# Patient Record
Sex: FEMALE | Race: BLACK OR AFRICAN AMERICAN | NOT HISPANIC OR LATINO | Employment: OTHER | URBAN - METROPOLITAN AREA
[De-identification: names, ages, dates, MRNs, and addresses within clinical notes are randomized per-mention and may not be internally consistent; named-entity substitution may affect disease eponyms.]

---

## 2019-01-24 ENCOUNTER — HOSPITAL ENCOUNTER (EMERGENCY)
Facility: HOSPITAL | Age: 69
Discharge: HOME | End: 2019-01-24
Attending: EMERGENCY MEDICINE
Payer: MEDICARE

## 2019-01-24 VITALS
TEMPERATURE: 98 F | WEIGHT: 165 LBS | HEIGHT: 71 IN | BODY MASS INDEX: 23.1 KG/M2 | RESPIRATION RATE: 18 BRPM | SYSTOLIC BLOOD PRESSURE: 150 MMHG | DIASTOLIC BLOOD PRESSURE: 69 MMHG | HEART RATE: 80 BPM | OXYGEN SATURATION: 95 %

## 2019-01-24 DIAGNOSIS — R05.9 COUGH: ICD-10-CM

## 2019-01-24 DIAGNOSIS — J10.1 INFLUENZA A: Primary | ICD-10-CM

## 2019-01-24 LAB
ALBUMIN SERPL BCP-MCNC: 3.9 G/DL
ALP SERPL-CCNC: 45 U/L
ALT SERPL W/O P-5'-P-CCNC: 12 U/L
ANION GAP SERPL CALC-SCNC: 11 MMOL/L
AST SERPL-CCNC: 22 U/L
BASOPHILS # BLD AUTO: 0.01 K/UL
BASOPHILS NFR BLD: 0.1 %
BILIRUB SERPL-MCNC: 0.8 MG/DL
BUN SERPL-MCNC: 17 MG/DL
CALCIUM SERPL-MCNC: 9.4 MG/DL
CHLORIDE SERPL-SCNC: 99 MMOL/L
CO2 SERPL-SCNC: 28 MMOL/L
CREAT SERPL-MCNC: 1.2 MG/DL
CTP QC/QA: YES
DIFFERENTIAL METHOD: ABNORMAL
EOSINOPHIL # BLD AUTO: 0.1 K/UL
EOSINOPHIL NFR BLD: 0.6 %
ERYTHROCYTE [DISTWIDTH] IN BLOOD BY AUTOMATED COUNT: 11.8 %
EST. GFR  (AFRICAN AMERICAN): 53 ML/MIN/1.73 M^2
EST. GFR  (NON AFRICAN AMERICAN): 46 ML/MIN/1.73 M^2
FLUAV AG NPH QL: POSITIVE
FLUBV AG NPH QL: NEGATIVE
GLUCOSE SERPL-MCNC: 182 MG/DL
HCT VFR BLD AUTO: 35.9 %
HGB BLD-MCNC: 12.2 G/DL
LYMPHOCYTES # BLD AUTO: 1.7 K/UL
LYMPHOCYTES NFR BLD: 13.7 %
MCH RBC QN AUTO: 28.8 PG
MCHC RBC AUTO-ENTMCNC: 34 G/DL
MCV RBC AUTO: 85 FL
MONOCYTES # BLD AUTO: 0.8 K/UL
MONOCYTES NFR BLD: 5.9 %
NEUTROPHILS # BLD AUTO: 10.1 K/UL
NEUTROPHILS NFR BLD: 79.7 %
PLATELET # BLD AUTO: 158 K/UL
PMV BLD AUTO: 12 FL
POTASSIUM SERPL-SCNC: 3.6 MMOL/L
PROT SERPL-MCNC: 7.4 G/DL
RBC # BLD AUTO: 4.23 M/UL
SODIUM SERPL-SCNC: 138 MMOL/L
WBC # BLD AUTO: 12.66 K/UL

## 2019-01-24 PROCEDURE — 25000003 PHARM REV CODE 250: Performed by: PHYSICIAN ASSISTANT

## 2019-01-24 PROCEDURE — 25000242 PHARM REV CODE 250 ALT 637 W/ HCPCS: Performed by: PHYSICIAN ASSISTANT

## 2019-01-24 PROCEDURE — 94640 AIRWAY INHALATION TREATMENT: CPT

## 2019-01-24 PROCEDURE — 80053 COMPREHEN METABOLIC PANEL: CPT

## 2019-01-24 PROCEDURE — 99284 EMERGENCY DEPT VISIT MOD MDM: CPT | Mod: 25

## 2019-01-24 PROCEDURE — 85025 COMPLETE CBC W/AUTO DIFF WBC: CPT

## 2019-01-24 RX ORDER — OSELTAMIVIR PHOSPHATE 75 MG/1
75 CAPSULE ORAL 2 TIMES DAILY
Qty: 10 CAPSULE | Refills: 0 | Status: SHIPPED | OUTPATIENT
Start: 2019-01-24 | End: 2019-01-29

## 2019-01-24 RX ORDER — ALBUTEROL SULFATE 2.5 MG/.5ML
2.5 SOLUTION RESPIRATORY (INHALATION)
Status: COMPLETED | OUTPATIENT
Start: 2019-01-24 | End: 2019-01-24

## 2019-01-24 RX ORDER — ONDANSETRON 8 MG/1
8 TABLET, ORALLY DISINTEGRATING ORAL
Status: COMPLETED | OUTPATIENT
Start: 2019-01-24 | End: 2019-01-24

## 2019-01-24 RX ORDER — AZITHROMYCIN 250 MG/1
TABLET, FILM COATED ORAL
Qty: 6 TABLET | Refills: 0 | Status: SHIPPED | OUTPATIENT
Start: 2019-01-24 | End: 2019-04-18

## 2019-01-24 RX ORDER — BENZONATATE 100 MG/1
100 CAPSULE ORAL 3 TIMES DAILY PRN
Qty: 15 CAPSULE | Refills: 0 | Status: SHIPPED | OUTPATIENT
Start: 2019-01-24 | End: 2019-02-03

## 2019-01-24 RX ADMIN — ALBUTEROL SULFATE 2.5 MG: 2.5 SOLUTION RESPIRATORY (INHALATION) at 01:01

## 2019-01-24 RX ADMIN — ONDANSETRON 8 MG: 8 TABLET, ORALLY DISINTEGRATING ORAL at 01:01

## 2019-01-24 NOTE — ED PROVIDER NOTES
Encounter Date: 1/24/2019       History     Chief Complaint   Patient presents with    Cough     pt states she has cough / congestion x > 2 weeks. 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 states when she lays down she feels like she is wheezing and  can't sleep for coughing.     Chief Complaint:  Cough  History of  Present Illness: History obtained from patient. This 68 y.o. female who has past medical history of diabetes, hypertension and CAD presents to the ED complaining of productive cough for 2 weeks with associated nasal congestion, rhinorrhea, sore throat, chills and nausea.  She reports midsternal chest pain in generalized headache only with coughing.  She denies fever, difficulty swallowing, vomiting, diarrhea, abdominal pain, shortness of breath, urinary symptoms, dizziness or weakness. Patient states she receive the flu vaccination this year.  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            Review of patient's allergies indicates:   Allergen Reactions    Statins-hmg-coa reductase inhibitors Edema     Past Medical History:   Diagnosis Date    Coronary artery disease     Diabetes mellitus     Hypertension      Past Surgical History:   Procedure Laterality Date    BACK SURGERY      CHOLECYSTECTOMY      HYSTERECTOMY       No family history on file.  Social History     Tobacco Use    Smoking status: Never Smoker   Substance Use Topics    Alcohol use: No     Frequency: Never    Drug use: No     Review of Systems   Constitutional: Positive for chills. Negative for fever.   HENT: Positive for congestion, rhinorrhea and sore throat.    Eyes: Negative for pain.   Respiratory: Positive for cough. Negative for shortness of breath.    Cardiovascular: Negative for chest pain.   Gastrointestinal: Positive for nausea. Negative for abdominal pain, diarrhea and vomiting.   Genitourinary: Negative for dysuria.   Musculoskeletal: Negative for back pain and myalgias.   Skin: Negative  for rash.   Neurological: Negative for dizziness, weakness and headaches.       Physical Exam     Initial Vitals [01/24/19 0039]   BP Pulse Resp Temp SpO2   (!) 153/69 82 18 99.1 °F (37.3 °C) (!) 94 %      MAP       --         Physical Exam    Nursing note and vitals reviewed.  Constitutional: She appears well-developed and well-nourished. No distress.   HENT:   Head: Normocephalic and atraumatic.   Right Ear: Tympanic membrane normal.   Left Ear: Tympanic membrane normal.   Nose: Rhinorrhea present.   Mouth/Throat: Uvula is midline, oropharynx is clear and moist and mucous membranes are normal.   Eyes: EOM are normal. Pupils are equal, round, and reactive to light.   Neck: Normal range of motion. Neck supple.   Cardiovascular: Normal rate, regular rhythm and normal heart sounds. Exam reveals no gallop and no friction rub.    No murmur heard.  Pulmonary/Chest: Breath sounds normal. No respiratory distress. She has no wheezes. She has no rhonchi. She has no rales.   Abdominal: Soft. Bowel sounds are normal. There is no tenderness. There is no rebound and no guarding.   Musculoskeletal: Normal range of motion.   Neurological: She is alert and oriented to person, place, and time.   Skin: Skin is warm and dry.   Psychiatric: She has a normal mood and affect.         ED Course   Procedures  Labs Reviewed   COMPREHENSIVE METABOLIC PANEL - Abnormal; Notable for the following components:       Result Value    Glucose 182 (*)     Alkaline Phosphatase 45 (*)     eGFR if  53 (*)     eGFR if non  46 (*)     All other components within normal limits   CBC W/ AUTO DIFFERENTIAL - Abnormal; Notable for the following components:    Hematocrit 35.9 (*)     Gran # (ANC) 10.1 (*)     Gran% 79.7 (*)     Lymph% 13.7 (*)     All other components within normal limits   POCT INFLUENZA A/B - Abnormal; Notable for the following components:    Rapid Influenza A Ag Positive (*)     All other components within  normal limits          Imaging Results          X-Ray Chest PA And Lateral (Final result)  Result time 01/24/19 01:24:37    Final result by Rafaela Sanon MD (01/24/19 01:24:37)                 Impression:      No acute abnormality.      Electronically signed by: Rafaela Sanon  Date:    01/24/2019  Time:    01:24             Narrative:    EXAMINATION:  XR CHEST PA AND LATERAL    CLINICAL HISTORY:  Cough    TECHNIQUE:  PA and lateral views of the chest were performed.    COMPARISON:  None    FINDINGS:  The lungs are clear, with normal appearance of pulmonary vasculature and no pleural effusion or pneumothorax.  There is asymmetric elevation of the right hemidiaphragm.  Mild scoliotic changes are present.    The cardiac silhouette is normal in size. The hilar and mediastinal contours are unremarkable.  His vascular calcifications seen in the aortic knob.    Bones are intact. There is incompletely imaged cervical screw plate device.                                 Medical Decision Making:   Clinical Tests:   Lab Tests: Ordered and Reviewed  Radiological Study: Ordered and Reviewed  ED Management:  This is an evaluation of a 68 y.o. female who presents to the ED for cough.  Oxygen saturation initially at 94%.  Vital signs otherwise stable.  Afebrile.  Patient is nontoxic appearing and in no acute distress. Lungs are clear to auscultation. Patient is coughing on exam.  There is rhinorrhea. Posterior oropharynx is clear.  Bilateral TMs are clear.  Heart sounds with.  Abdomen soft nontender to palpation. Given patient's comorbidities, labs were drawn.  CMP showed no significant electrolyte abnormalities.  CBC showed no leukocytosis or anemia.  Chest x-ray showed no acute cardiopulmonary findings.  Patient positive for influenza A.  Patient is coughing improved after albuterol breathing treatment.  Patient will be discharged home with Tamiflu and Tessalon.  Given patient's duration of cough and positive influenza, I  will discharge home with azithromycin.    Patient given return precautions and instructed to return to the emergency department for any new or worsening symptoms. Patient verbalized understanding and agreed with plan.     I discussed the case with Dr. Santana who is in agreement with my assessment and plan.                Attending Attestation:     Physician Attestation Statement for NP/PA:   I discussed this assessment and plan of this patient with the NP/PA, but I did not personally examine the patient. The face to face encounter was performed by the NP/PA.                     Clinical Impression:   The primary encounter diagnosis was Influenza A. A diagnosis of Cough was also pertinent to this visit.                             Akira Burgos PA-C  01/24/19 0647       Jose Alberto Santana MD  01/24/19 2004

## 2019-01-24 NOTE — ED TRIAGE NOTES
Patient arrived to ED with c/o productive cough, congestion, n/v due to gagging during coughing, with SOB x 2 weeks.

## 2019-01-28 ENCOUNTER — HOSPITAL ENCOUNTER (EMERGENCY)
Facility: HOSPITAL | Age: 69
Discharge: HOME | End: 2019-01-28
Attending: EMERGENCY MEDICINE
Payer: MEDICARE

## 2019-01-28 VITALS
HEART RATE: 76 BPM | WEIGHT: 165 LBS | RESPIRATION RATE: 18 BRPM | SYSTOLIC BLOOD PRESSURE: 117 MMHG | TEMPERATURE: 99 F | DIASTOLIC BLOOD PRESSURE: 62 MMHG | HEIGHT: 70 IN | OXYGEN SATURATION: 95 % | BODY MASS INDEX: 23.62 KG/M2

## 2019-01-28 DIAGNOSIS — J10.1 INFLUENZA A: ICD-10-CM

## 2019-01-28 DIAGNOSIS — J40 BRONCHITIS: Primary | ICD-10-CM

## 2019-01-28 DIAGNOSIS — N39.0 ACUTE UTI: ICD-10-CM

## 2019-01-28 LAB
BACTERIA #/AREA URNS HPF: ABNORMAL /HPF
BILIRUB UR QL STRIP: ABNORMAL
CLARITY UR: CLEAR
COLOR UR: ABNORMAL
GLUCOSE UR QL STRIP: NEGATIVE
GRAN CASTS #/AREA URNS LPF: 2 /LPF
HGB UR QL STRIP: NEGATIVE
HYALINE CASTS #/AREA URNS LPF: 50 /LPF
KETONES UR QL STRIP: NEGATIVE
LEUKOCYTE ESTERASE UR QL STRIP: ABNORMAL
MICROSCOPIC COMMENT: ABNORMAL
NITRITE UR QL STRIP: NEGATIVE
PH UR STRIP: 5 [PH] (ref 5–8)
PROT UR QL STRIP: ABNORMAL
RBC #/AREA URNS HPF: 2 /HPF (ref 0–4)
SP GR UR STRIP: 1.03 (ref 1–1.03)
URN SPEC COLLECT METH UR: ABNORMAL
UROBILINOGEN UR STRIP-ACNC: ABNORMAL EU/DL
WBC #/AREA URNS HPF: 10 /HPF (ref 0–5)

## 2019-01-28 PROCEDURE — 94640 AIRWAY INHALATION TREATMENT: CPT

## 2019-01-28 PROCEDURE — 25000003 PHARM REV CODE 250: Performed by: PHYSICIAN ASSISTANT

## 2019-01-28 PROCEDURE — 81000 URINALYSIS NONAUTO W/SCOPE: CPT

## 2019-01-28 PROCEDURE — 25000242 PHARM REV CODE 250 ALT 637 W/ HCPCS: Performed by: PHYSICIAN ASSISTANT

## 2019-01-28 PROCEDURE — 99284 EMERGENCY DEPT VISIT MOD MDM: CPT | Mod: 25

## 2019-01-28 RX ORDER — ALBUTEROL SULFATE 90 UG/1
1-2 AEROSOL, METERED RESPIRATORY (INHALATION) EVERY 6 HOURS PRN
Qty: 1 INHALER | Refills: 0 | Status: SHIPPED | OUTPATIENT
Start: 2019-01-28 | End: 2019-04-18

## 2019-01-28 RX ORDER — AMOXICILLIN AND CLAVULANATE POTASSIUM 875; 125 MG/1; MG/1
1 TABLET, FILM COATED ORAL 2 TIMES DAILY
Qty: 14 TABLET | Refills: 0 | Status: SHIPPED | OUTPATIENT
Start: 2019-01-28 | End: 2019-02-04

## 2019-01-28 RX ORDER — BENZONATATE 100 MG/1
200 CAPSULE ORAL ONCE
Status: DISCONTINUED | OUTPATIENT
Start: 2019-01-28 | End: 2019-01-28 | Stop reason: HOSPADM

## 2019-01-28 RX ORDER — BENZONATATE 100 MG/1
100 CAPSULE ORAL 3 TIMES DAILY PRN
Qty: 20 CAPSULE | Refills: 0 | Status: SHIPPED | OUTPATIENT
Start: 2019-01-28 | End: 2019-02-07

## 2019-01-28 RX ORDER — ACETAMINOPHEN 325 MG/1
650 TABLET ORAL
Status: COMPLETED | OUTPATIENT
Start: 2019-01-28 | End: 2019-01-28

## 2019-01-28 RX ORDER — IPRATROPIUM BROMIDE AND ALBUTEROL SULFATE 2.5; .5 MG/3ML; MG/3ML
3 SOLUTION RESPIRATORY (INHALATION)
Status: COMPLETED | OUTPATIENT
Start: 2019-01-28 | End: 2019-01-28

## 2019-01-28 RX ADMIN — ACETAMINOPHEN 650 MG: 325 TABLET ORAL at 10:01

## 2019-01-28 RX ADMIN — IPRATROPIUM BROMIDE AND ALBUTEROL SULFATE 3 ML: .5; 3 SOLUTION RESPIRATORY (INHALATION) at 10:01

## 2019-01-28 NOTE — ED PROVIDER NOTES
"Encounter Date: 1/28/2019    SCRIBE #1 NOTE: IChance, am scribing for, and in the presence of,  Ryan Ennis PA-C. I have scribed the following portions of the note - Other sections scribed: HPI, ROS .       History     Chief Complaint   Patient presents with    Influenza     "I got the flu." diagnosed Thursday; still feeling bad, cough, fever, bodyaches     CC: Influenza      68 y.o. Female with a past medical history of Coronary artery disease, Diabetes mellitus, and Hypertension presents to ED c/o acute onset type A influenza that was diagnosed on Thursday. Pt reports symptoms have not improved since taking antibiotics and antivirals.She reports experiencing cough with phlegm, frequency, less than x10 episodes of diarrhea, and an left temporal headache after coughing. She has been experiencing these symptoms for about x1 week. Pt notes using her relatives albuterol inhaler as needed that has provided some relief. She notes abdominal pain, and chest pain have subsided. Deneis dysuria, rash, and nausea.           The history is provided by the patient and a relative. No  was used.     Review of patient's allergies indicates:   Allergen Reactions    Statins-hmg-coa reductase inhibitors Edema     Past Medical History:   Diagnosis Date    Coronary artery disease     Diabetes mellitus     Hypertension      Past Surgical History:   Procedure Laterality Date    BACK SURGERY      CHOLECYSTECTOMY      HYSTERECTOMY       No family history on file.  Social History     Tobacco Use    Smoking status: Never Smoker   Substance Use Topics    Alcohol use: No     Frequency: Never    Drug use: No     Review of Systems   Constitutional: Negative for appetite change, diaphoresis and fever.   HENT: Negative for rhinorrhea and sore throat.    Eyes: Negative for visual disturbance.   Respiratory: Positive for cough and shortness of breath (secondary to cough).    Cardiovascular: Negative " for chest pain.   Gastrointestinal: Positive for diarrhea. Negative for abdominal pain, nausea and vomiting.   Genitourinary: Positive for frequency. Negative for dysuria.   Musculoskeletal: Negative for gait problem.   Skin: Negative for rash.   Neurological: Positive for headaches. Negative for syncope.       Physical Exam     Initial Vitals [01/28/19 0947]   BP Pulse Resp Temp SpO2   134/63 (!) 58 20 99.1 °F (37.3 °C) 95 %      MAP       --         Physical Exam    Nursing note and vitals reviewed.  Constitutional: She appears well-developed and well-nourished. She is not diaphoretic. No distress.   HENT:   Head: Normocephalic and atraumatic.   Nasal congestion present without active rhinorrhea. No sinus TTP. TMs intact without erythema or swelling; able to discern bony landmarks. No mastoid tenderness or swelling behind the ears. No pain with manipulation of external ears. No oropharyngeal edema, swelling, erythema, tonsillar exudates, uvula deviation, changes in phonation, trismus, drooling, or cervical adenopathy. No meningeal signs.    Eyes: Conjunctivae and EOM are normal. Right eye exhibits no discharge. Left eye exhibits no discharge.   Neck: Normal range of motion. No tracheal deviation present. No JVD present.   Cardiovascular: Normal rate, regular rhythm and normal heart sounds. Exam reveals no friction rub.    No murmur heard.  Pulmonary/Chest: Breath sounds normal. No stridor. No respiratory distress. She has no decreased breath sounds. She has no wheezes. She has no rhonchi. She has no rales. She exhibits no tenderness.   Abdominal: Soft. She exhibits no distension. There is no tenderness. There is no rigidity, no rebound and no guarding.   Musculoskeletal: She exhibits no edema or tenderness.   Neurological: She is alert and oriented to person, place, and time.   Skin: Skin is warm and dry. No rash and no abscess noted. No erythema. No pallor.         ED Course   Procedures  Labs Reviewed    URINALYSIS, REFLEX TO URINE CULTURE - Abnormal; Notable for the following components:       Result Value    Protein, UA 2+ (*)     Bilirubin (UA) 1+ (*)     Urobilinogen, UA 4.0-6.0 (*)     Leukocytes, UA Trace (*)     All other components within normal limits    Narrative:     Preferred Collection Type->Urine, Clean Catch   URINALYSIS MICROSCOPIC - Abnormal; Notable for the following components:    WBC, UA 10 (*)     Hyaline Casts, UA 50 (*)     Granular Casts, UA 2 (*)     All other components within normal limits    Narrative:     Preferred Collection Type->Urine, Clean Catch          Imaging Results    None          Medical Decision Making:   History:   Old Medical Records: I decided to obtain old medical records.  Initial Assessment:   68-year-old female with URI symptoms. Diagnosed with flu A 4 days ago  Clinical Tests:   Lab Tests: Ordered and Reviewed  ED Management:  Will offer DuoNeb for symptomatic care.  Patient reports significant improvement of symptoms. Patient has just completed course of antibiotics prophylactically for pneumonia despite absence of pneumonia on chest x-ray 4 days ago.  Patient is also completed a course of Tamiflu for her diagnosis of flu a 4 days ago.  Patient likely has lingering URI symptoms. Given symptoms urinary frequency, urinalysis is also ordered that reveals possible UTI.  Will send home on a course of Augmentin.  Patient is not septic.  No respiratory distress.    Sent home with albuterol inhaler and Augmentin.  Advising PCP follow-up.  Strict return precautions discussed with patient.  Patient agreeable with plan.  Other:   I have discussed this case with another health care provider.            Scribe Attestation:   Scribe #1: I performed the above scribed service and the documentation accurately describes the services I performed. I attest to the accuracy of the note.    Attending Attestation:           Physician Attestation for Scribe:  Physician Attestation  Statement for Scribe #1: I, Ryan Ennis PA-C, reviewed documentation, as scribed by Chance Hearn in my presence, and it is both accurate and complete.                    Clinical Impression:   The primary encounter diagnosis was Bronchitis. Diagnoses of Acute UTI and Influenza A were also pertinent to this visit.                             Ryan Ennis PA-C  01/28/19 1300

## 2019-01-28 NOTE — ED TRIAGE NOTES
Pt presents to ED c/o symptoms from flu.  States she can't stop coughing, very congested, and is wheezing.  States she was given cough medicine, tamiflu, and takes tylenol as well.  States she isn't getting better.  Denies chest pains.

## 2019-01-29 ENCOUNTER — PES CALL (OUTPATIENT)
Dept: ADMINISTRATIVE | Facility: CLINIC | Age: 69
End: 2019-01-29

## 2019-04-16 PROBLEM — I25.10 CORONARY ARTERY DISEASE: Status: ACTIVE | Noted: 2019-04-16

## 2019-04-16 PROBLEM — E11.9 DIABETES MELLITUS: Status: ACTIVE | Noted: 2019-04-16

## 2019-04-16 PROBLEM — I10 HYPERTENSION: Status: ACTIVE | Noted: 2019-04-16

## 2019-04-16 NOTE — PROGRESS NOTES
Subjective:       Patient ID: Meenu MASON is a 68 y.o. female with a PMH significant for CAD, HTN, T2D who presents today to establish care.  Patient was seen in the ED at [facility] in 1/2019 with Influenza A.  Patient previously followed by Dr. Hawk.    Chief Complaint: Establish Care; Night Sweats (1+m); and Arm Pain (Right arm)    HPI     Patient denies f/c, n/v/d.  No chest pain or SOB.  No abdominal pain or dysuria.  No headaches or change in vision.  No dizziness.  No significant  weight gain or weight loss.  Remaining ROS negative.    Review of Systems   Constitutional: Negative for appetite change, chills, diaphoresis, fatigue, fever and unexpected weight change.   HENT: Negative for ear pain, hearing loss, sinus pain, tinnitus, trouble swallowing and voice change.    Eyes: Negative for photophobia, pain and visual disturbance.   Respiratory: Negative for chest tightness, shortness of breath and wheezing.    Cardiovascular: Negative for chest pain, palpitations and leg swelling.   Gastrointestinal: Negative for abdominal pain, blood in stool, constipation, diarrhea, nausea and vomiting.   Endocrine: Negative for cold intolerance, heat intolerance, polydipsia, polyphagia and polyuria.   Genitourinary: Negative for decreased urine volume, difficulty urinating, dysuria, flank pain, hematuria, pelvic pain, vaginal bleeding, vaginal discharge and vaginal pain.   Musculoskeletal: Positive for arthralgias (right arm pain). Negative for gait problem, joint swelling, myalgias and neck pain.   Skin: Negative for rash.   Neurological: Negative for dizziness, tremors, syncope, weakness, numbness and headaches.   Hematological: Does not bruise/bleed easily.   Psychiatric/Behavioral: Negative for agitation, confusion and sleep disturbance. The patient is not nervous/anxious.        Objective:      Physical Exam   Constitutional: She is oriented to person, place, and time. She appears well-developed and  well-nourished. No distress.   HENT:   Head: Normocephalic and atraumatic.   Nose: Nose normal.   Mouth/Throat: Oropharynx is clear and moist.   Eyes: Pupils are equal, round, and reactive to light. Conjunctivae and EOM are normal. No scleral icterus.   Neck: Normal range of motion. Neck supple. No JVD present. No thyromegaly present.   Cardiovascular: Normal rate, regular rhythm and intact distal pulses. Exam reveals no gallop and no friction rub.   No murmur heard.  Pulmonary/Chest: Effort normal and breath sounds normal. No respiratory distress. She has no wheezes. She has no rales.   Abdominal: Soft. Bowel sounds are normal. She exhibits no distension. There is no tenderness. There is no rebound and no guarding.   Musculoskeletal: Normal range of motion. She exhibits no edema.   Lymphadenopathy:     She has no cervical adenopathy.   Neurological: She is alert and oriented to person, place, and time. No cranial nerve deficit or sensory deficit.   Skin: Skin is warm and dry. No rash noted. No erythema.   Psychiatric: She has a normal mood and affect. Her behavior is normal. Thought content normal.       Assessment:       1. Encounter to establish care    2. Type 2 diabetes mellitus without complication, without long-term current use of insulin    3. Essential hypertension    4. Coronary artery disease involving native coronary artery of native heart without angina pectoris        Plan:   -Today's Visit - patient is awake and alert and with her  today.    -Nutrition - Obesity - BMI was 31.23 today.  Discussed diet modification and exercise.    -Cards - HTN and HLD (no history of CAD per patient) - on Amlodipine and Carvedilol.  On Atorvastatin (no allergy to statins).  On ASA.  Check fasting labs.  BP today stable at 118/57.  Heart exam with 2/6 KELSEY - evaluate further next visit and consider an Echo.    PVD - Right Femoral Bypass - around 2012 in [place]. Dr. Essex.  No claudication.    -Endocrine - T2D - on  Metformin 500mg bid (had diarrhea with 1g bid).  Check A1C.  Check urine microalbumin.  Has allergy to ACEI - tongue swelling.    Last Eye exam - 2018.  Will refer to Optometry.  Last Foot exam -  Today.  No PN.    -MSK - Cervical DD - S/p discectomy 20 years ago.  Having pain in right arm originating from the c-spine.  Seen by Dr. Madsen 2 weeks (Orthopedics) and told it was not Fibromyalgia (as diagnosed by Dr. Hawk).  On Lyrica and doesn't feel it helps.  On Opiods in past - Argillite - in [place], [place].  Has not been on this for 2 years.  Will refer to Pain.  Can consider Mobic if renal function ok.    -Renal - CKD - check CMP.    -GI - GERD - on Omeprazole prn.  No gastritis or esophagitis.    History of Gallstones - s/p Lap Janet 2012.    We discussed colon cancer screening - last colonoscopy was 2017 - has 2 polyps.  Will refer back to GI  No FH of colon cancer.    -GYN - Last Pap was many and normal.  Will refer to GYN.  Last Mammo was 2018 and normal - will reschedule.  We discussed DXA screening for osteoporosis - will discuss next visit.      -HCM - We discussed Flu (10/2018) and Tdap (over 10 years - will give a script for Tdap) vaccinations.  We discussed Shingles (waiting for Shingrix Availability) and Pneumococcal (23 and 13 - had both and will get records) vaccinations.      -Follow Up - 3 monthss

## 2019-04-18 ENCOUNTER — LAB VISIT (OUTPATIENT)
Dept: LAB | Facility: HOSPITAL | Age: 69
End: 2019-04-18
Attending: INTERNAL MEDICINE
Payer: MEDICARE

## 2019-04-18 ENCOUNTER — TELEPHONE (OUTPATIENT)
Dept: PRIMARY CARE CLINIC | Facility: CLINIC | Age: 69
End: 2019-04-18

## 2019-04-18 ENCOUNTER — OFFICE VISIT (OUTPATIENT)
Dept: PRIMARY CARE CLINIC | Facility: CLINIC | Age: 69
End: 2019-04-18
Payer: MEDICARE

## 2019-04-18 DIAGNOSIS — Z12.4 CERVICAL CANCER SCREENING: ICD-10-CM

## 2019-04-18 DIAGNOSIS — I10 ESSENTIAL HYPERTENSION: ICD-10-CM

## 2019-04-18 DIAGNOSIS — Z12.39 BREAST CANCER SCREENING: ICD-10-CM

## 2019-04-18 DIAGNOSIS — M50.30 DDD (DEGENERATIVE DISC DISEASE), CERVICAL: ICD-10-CM

## 2019-04-18 DIAGNOSIS — K63.5 POLYP OF COLON, UNSPECIFIED PART OF COLON, UNSPECIFIED TYPE: ICD-10-CM

## 2019-04-18 DIAGNOSIS — Z12.11 COLON CANCER SCREENING: ICD-10-CM

## 2019-04-18 DIAGNOSIS — I25.10 CORONARY ARTERY DISEASE INVOLVING NATIVE CORONARY ARTERY OF NATIVE HEART WITHOUT ANGINA PECTORIS: ICD-10-CM

## 2019-04-18 DIAGNOSIS — E11.9 TYPE 2 DIABETES MELLITUS WITHOUT COMPLICATION, WITHOUT LONG-TERM CURRENT USE OF INSULIN: ICD-10-CM

## 2019-04-18 DIAGNOSIS — G89.4 CHRONIC PAIN DISORDER: ICD-10-CM

## 2019-04-18 DIAGNOSIS — Z76.89 ENCOUNTER TO ESTABLISH CARE: ICD-10-CM

## 2019-04-18 DIAGNOSIS — Z76.89 ENCOUNTER TO ESTABLISH CARE: Primary | ICD-10-CM

## 2019-04-18 LAB
25(OH)D3+25(OH)D2 SERPL-MCNC: 17 NG/ML (ref 30–96)
ALBUMIN SERPL BCP-MCNC: 4 G/DL (ref 3.5–5.2)
ALP SERPL-CCNC: 82 U/L (ref 55–135)
ALT SERPL W/O P-5'-P-CCNC: 43 U/L (ref 10–44)
ANION GAP SERPL CALC-SCNC: 12 MMOL/L (ref 8–16)
AST SERPL-CCNC: 91 U/L (ref 10–40)
BASOPHILS # BLD AUTO: 0.02 K/UL (ref 0–0.2)
BASOPHILS NFR BLD: 0.3 % (ref 0–1.9)
BILIRUB SERPL-MCNC: 1.1 MG/DL (ref 0.1–1)
BILIRUB UR QL STRIP: NEGATIVE
BUN SERPL-MCNC: 20 MG/DL (ref 8–23)
CALCIUM SERPL-MCNC: 9.4 MG/DL (ref 8.7–10.5)
CHLORIDE SERPL-SCNC: 103 MMOL/L (ref 95–110)
CHOLEST SERPL-MCNC: 109 MG/DL (ref 120–199)
CHOLEST/HDLC SERPL: 1.9 {RATIO} (ref 2–5)
CLARITY UR REFRACT.AUTO: CLEAR
CO2 SERPL-SCNC: 27 MMOL/L (ref 23–29)
COLOR UR AUTO: YELLOW
CREAT SERPL-MCNC: 1.1 MG/DL (ref 0.5–1.4)
DIFFERENTIAL METHOD: ABNORMAL
EOSINOPHIL # BLD AUTO: 0.3 K/UL (ref 0–0.5)
EOSINOPHIL NFR BLD: 3.9 % (ref 0–8)
ERYTHROCYTE [DISTWIDTH] IN BLOOD BY AUTOMATED COUNT: 12.2 % (ref 11.5–14.5)
EST. GFR  (AFRICAN AMERICAN): 59.2 ML/MIN/1.73 M^2
EST. GFR  (NON AFRICAN AMERICAN): 51.3 ML/MIN/1.73 M^2
ESTIMATED AVG GLUCOSE: 189 MG/DL (ref 68–131)
GLUCOSE SERPL-MCNC: 169 MG/DL (ref 70–110)
GLUCOSE UR QL STRIP: ABNORMAL
HBA1C MFR BLD HPLC: 8.2 % (ref 4–5.6)
HCT VFR BLD AUTO: 38.9 % (ref 37–48.5)
HDLC SERPL-MCNC: 57 MG/DL (ref 40–75)
HDLC SERPL: 52.3 % (ref 20–50)
HGB BLD-MCNC: 12.3 G/DL (ref 12–16)
HGB UR QL STRIP: NEGATIVE
IMM GRANULOCYTES # BLD AUTO: 0.01 K/UL (ref 0–0.04)
IMM GRANULOCYTES NFR BLD AUTO: 0.2 % (ref 0–0.5)
KETONES UR QL STRIP: NEGATIVE
LDLC SERPL CALC-MCNC: 37.6 MG/DL (ref 63–159)
LEUKOCYTE ESTERASE UR QL STRIP: NEGATIVE
LYMPHOCYTES # BLD AUTO: 2.8 K/UL (ref 1–4.8)
LYMPHOCYTES NFR BLD: 41.7 % (ref 18–48)
MCH RBC QN AUTO: 28 PG (ref 27–31)
MCHC RBC AUTO-ENTMCNC: 31.6 G/DL (ref 32–36)
MCV RBC AUTO: 89 FL (ref 82–98)
MONOCYTES # BLD AUTO: 0.5 K/UL (ref 0.3–1)
MONOCYTES NFR BLD: 7.8 % (ref 4–15)
NEUTROPHILS # BLD AUTO: 3.1 K/UL (ref 1.8–7.7)
NEUTROPHILS NFR BLD: 46.1 % (ref 38–73)
NITRITE UR QL STRIP: NEGATIVE
NONHDLC SERPL-MCNC: 52 MG/DL
NRBC BLD-RTO: 0 /100 WBC
PH UR STRIP: 5 [PH] (ref 5–8)
PLATELET # BLD AUTO: 171 K/UL (ref 150–350)
PMV BLD AUTO: 13.4 FL (ref 9.2–12.9)
POTASSIUM SERPL-SCNC: 3.7 MMOL/L (ref 3.5–5.1)
PROT SERPL-MCNC: 7.8 G/DL (ref 6–8.4)
PROT UR QL STRIP: NEGATIVE
RBC # BLD AUTO: 4.39 M/UL (ref 4–5.4)
SODIUM SERPL-SCNC: 142 MMOL/L (ref 136–145)
SP GR UR STRIP: 1.02 (ref 1–1.03)
TRIGL SERPL-MCNC: 72 MG/DL (ref 30–150)
TSH SERPL DL<=0.005 MIU/L-ACNC: 1.91 UIU/ML (ref 0.4–4)
URN SPEC COLLECT METH UR: ABNORMAL
WBC # BLD AUTO: 6.64 K/UL (ref 3.9–12.7)

## 2019-04-18 PROCEDURE — 3074F SYST BP LT 130 MM HG: CPT | Mod: CPTII,S$GLB,, | Performed by: INTERNAL MEDICINE

## 2019-04-18 PROCEDURE — 80061 LIPID PANEL: CPT

## 2019-04-18 PROCEDURE — 99204 OFFICE O/P NEW MOD 45 MIN: CPT | Mod: S$GLB,,, | Performed by: INTERNAL MEDICINE

## 2019-04-18 PROCEDURE — 3078F DIAST BP <80 MM HG: CPT | Mod: CPTII,S$GLB,, | Performed by: INTERNAL MEDICINE

## 2019-04-18 PROCEDURE — 99999 PR PBB SHADOW E&M-EST. PATIENT-LVL V: ICD-10-PCS | Mod: PBBFAC,,, | Performed by: INTERNAL MEDICINE

## 2019-04-18 PROCEDURE — 99999 PR PBB SHADOW E&M-EST. PATIENT-LVL V: CPT | Mod: PBBFAC,,, | Performed by: INTERNAL MEDICINE

## 2019-04-18 PROCEDURE — 1101F PR PT FALLS ASSESS DOC 0-1 FALLS W/OUT INJ PAST YR: ICD-10-PCS | Mod: CPTII,S$GLB,, | Performed by: INTERNAL MEDICINE

## 2019-04-18 PROCEDURE — 99204 PR OFFICE/OUTPT VISIT, NEW, LEVL IV, 45-59 MIN: ICD-10-PCS | Mod: S$GLB,,, | Performed by: INTERNAL MEDICINE

## 2019-04-18 PROCEDURE — 84443 ASSAY THYROID STIM HORMONE: CPT

## 2019-04-18 PROCEDURE — 85025 COMPLETE CBC W/AUTO DIFF WBC: CPT

## 2019-04-18 PROCEDURE — 3074F PR MOST RECENT SYSTOLIC BLOOD PRESSURE < 130 MM HG: ICD-10-PCS | Mod: CPTII,S$GLB,, | Performed by: INTERNAL MEDICINE

## 2019-04-18 PROCEDURE — 83036 HEMOGLOBIN GLYCOSYLATED A1C: CPT

## 2019-04-18 PROCEDURE — 1101F PT FALLS ASSESS-DOCD LE1/YR: CPT | Mod: CPTII,S$GLB,, | Performed by: INTERNAL MEDICINE

## 2019-04-18 PROCEDURE — 3078F PR MOST RECENT DIASTOLIC BLOOD PRESSURE < 80 MM HG: ICD-10-PCS | Mod: CPTII,S$GLB,, | Performed by: INTERNAL MEDICINE

## 2019-04-18 PROCEDURE — 82306 VITAMIN D 25 HYDROXY: CPT

## 2019-04-18 PROCEDURE — 36415 COLL VENOUS BLD VENIPUNCTURE: CPT | Mod: PN

## 2019-04-18 PROCEDURE — 81003 URINALYSIS AUTO W/O SCOPE: CPT

## 2019-04-18 PROCEDURE — 80053 COMPREHEN METABOLIC PANEL: CPT

## 2019-04-18 RX ORDER — ATORVASTATIN CALCIUM 20 MG/1
20 TABLET, FILM COATED ORAL DAILY
COMMUNITY
Start: 2018-10-16 | End: 2020-01-29 | Stop reason: SDUPTHER

## 2019-04-18 RX ORDER — CARVEDILOL 6.25 MG/1
6.25 TABLET ORAL 2 TIMES DAILY
COMMUNITY
Start: 2018-11-13 | End: 2019-08-05 | Stop reason: SDUPTHER

## 2019-04-18 RX ORDER — AMLODIPINE BESYLATE 10 MG/1
10 TABLET ORAL DAILY
Refills: 0 | COMMUNITY
Start: 2019-04-02 | End: 2019-07-08 | Stop reason: SDUPTHER

## 2019-04-18 RX ORDER — INSULIN PUMP SYRINGE, 3 ML
EACH MISCELLANEOUS
Qty: 1 EACH | Refills: 0 | Status: SHIPPED | OUTPATIENT
Start: 2019-04-18 | End: 2022-02-14 | Stop reason: SDUPTHER

## 2019-04-18 RX ORDER — METFORMIN HYDROCHLORIDE 500 MG/1
500 TABLET ORAL 2 TIMES DAILY
COMMUNITY
Start: 2018-10-16 | End: 2019-07-03 | Stop reason: SDUPTHER

## 2019-04-18 RX ORDER — OMEPRAZOLE 20 MG/1
20 CAPSULE, DELAYED RELEASE ORAL DAILY
COMMUNITY
Start: 2018-10-16 | End: 2020-03-16 | Stop reason: SDUPTHER

## 2019-04-18 RX ORDER — LANCETS
1 EACH MISCELLANEOUS 3 TIMES DAILY
Qty: 200 EACH | Refills: 1 | Status: SHIPPED | OUTPATIENT
Start: 2019-04-18

## 2019-04-18 NOTE — TELEPHONE ENCOUNTER
----- Message from Sergio Pendleton sent at 4/18/2019 11:04 AM CDT -----  Contact: LOUIE MASON [[id number]]  Name of Who is Calling: LOUIE MASON [[id number]]     What is the request in detail: Called in regards to informing nurse with the information she requested. Pt states that the last Ophthalmologist she visit was Dr. Metzger :[facility] ([address] [place] [address]). A call back is recommended if needed. Please advise. Thanks        Can the clinic reply by [facility]: No     What Number to Call Back if not in [facility]: [phone]

## 2019-04-18 NOTE — PATIENT INSTRUCTIONS
I will have you sign a release to get records from Dr. Hawk.    Your weight and BMI are elevated today.  Target BMI is less than 25.  Please start or countinue diet changes and exercise.     Your blood pressure was good.    I will order routine fasting labs today - at least 6-8 hours of fasting.    We will give you the prescription to have the Tdap Vaccine at your pharmacy.    I will refer you to GI for a screening colonoscopy.  I will refer you to the Pain Clinic.  I will refer you GYN for your PAP.  I will refer you to Optometry for an Eye exam.  I will refer you for a Mammogram.    Return in 3 months - sooner if needed.  Please come at least 15-20 minutes before your scheduled appointment time.

## 2019-04-18 NOTE — TELEPHONE ENCOUNTER
Pt stated that she had an eye exam om Blank 15, 2018 with Dr. Antonio on [address]. Pt will wait to see schedule with  eye doctor. 4*18*19 jdp

## 2019-04-22 ENCOUNTER — TELEPHONE (OUTPATIENT)
Dept: PRIMARY CARE CLINIC | Facility: CLINIC | Age: 69
End: 2019-04-22

## 2019-04-22 ENCOUNTER — TELEPHONE (OUTPATIENT)
Dept: INTERNAL MEDICINE | Facility: CLINIC | Age: 69
End: 2019-04-22

## 2019-04-22 NOTE — TELEPHONE ENCOUNTER
"----- Message from Gustavo Wallace MD sent at 4/18/2019  4:36 PM CDT -----  Please let patient know the following"    I have reviewed your recent labs and have the following recommendations:    Your Metabolic Panel shows that your kidney function is overall good and stable.  One of your liver numbers is slightly elevated.  It is unclear the reason.  I would like to repeat this test when you return for follow up.  Your Blood Counts are normal.  Your Thyroid Function is normal.  Your vitamin D level was low.  I recommend taking over the counter Vitamin D3 at 2000 units once a day.  We will follow the levels periodically.  Your Diabetic test (A1C) was a little high at 8.1.  I would like to add another medication to you called Januvia (we would have to see if your insurance will cover this) - dose is 100mg once a day.  If you are ok with this addition, let me know.  Your Cholesterol panel was overall very good.  Your Urine Test showed glucose in your urine, but otherwise normal.    Please let me know if you have any questions or concerns.  "

## 2019-04-22 NOTE — TELEPHONE ENCOUNTER
Pt verbalized understanding that her  Metabolic Panel shows that your kidney function is overall good and stable.  One of your liver numbers is slightly elevated.  It is unclear the reason.  I would like to repeat this test when you return for follow up.   Your Blood Counts are normal.   Your Thyroid Function is normal.   Your vitamin D level was low.  I recommend taking over the counter Vitamin D3 at 2000 units once a day.  We will follow the levels periodically.   Your Diabetic test (A1C) was a little high at 8.1.  I would like to add another medication to you called Januvia (we would have to see if your insurance will cover this) - dose is 100mg once a day.  If you are ok with this addition, let me know.   Your Cholesterol panel was overall very good.   Your Urine Test showed glucose in your urine, but otherwise normal.     Please let me know if you have any questions or concerns. 4*22*19 jdp

## 2019-04-22 NOTE — TELEPHONE ENCOUNTER
Left voice message for patient to schedule appointment from referral to Pain Medicine, Optometry and Gastroenterology Clinics.  Letter sent to patient home.  Alex MORE  [phone]

## 2019-04-26 ENCOUNTER — TELEPHONE (OUTPATIENT)
Dept: PAIN MEDICINE | Facility: CLINIC | Age: 69
End: 2019-04-26

## 2019-05-02 ENCOUNTER — TELEPHONE (OUTPATIENT)
Dept: PRIMARY CARE CLINIC | Facility: CLINIC | Age: 69
End: 2019-05-02

## 2019-05-02 ENCOUNTER — OFFICE VISIT (OUTPATIENT)
Dept: PAIN MEDICINE | Facility: CLINIC | Age: 69
End: 2019-05-02
Attending: ANESTHESIOLOGY
Payer: MEDICARE

## 2019-05-02 ENCOUNTER — HOSPITAL ENCOUNTER (OUTPATIENT)
Dept: RADIOLOGY | Facility: OTHER | Age: 69
Discharge: HOME | End: 2019-05-02
Attending: INTERNAL MEDICINE
Payer: MEDICARE

## 2019-05-02 VITALS — BODY MASS INDEX: 23.1 KG/M2 | HEIGHT: 71 IN | WEIGHT: 165 LBS

## 2019-05-02 VITALS
HEIGHT: 61 IN | TEMPERATURE: 98 F | BODY MASS INDEX: 31.15 KG/M2 | WEIGHT: 165 LBS | DIASTOLIC BLOOD PRESSURE: 65 MMHG | RESPIRATION RATE: 18 BRPM | HEART RATE: 57 BPM | SYSTOLIC BLOOD PRESSURE: 147 MMHG

## 2019-05-02 DIAGNOSIS — Z12.39 BREAST CANCER SCREENING: ICD-10-CM

## 2019-05-02 DIAGNOSIS — M54.12 CERVICAL RADICULOPATHY: ICD-10-CM

## 2019-05-02 DIAGNOSIS — M50.30 DDD (DEGENERATIVE DISC DISEASE), CERVICAL: Primary | ICD-10-CM

## 2019-05-02 DIAGNOSIS — M47.812 OSTEOARTHRITIS OF CERVICAL SPINE, UNSPECIFIED SPINAL OSTEOARTHRITIS COMPLICATION STATUS: ICD-10-CM

## 2019-05-02 DIAGNOSIS — M54.2 NECK PAIN: ICD-10-CM

## 2019-05-02 PROCEDURE — 3077F SYST BP >= 140 MM HG: CPT | Mod: CPTII,S$GLB,, | Performed by: ANESTHESIOLOGY

## 2019-05-02 PROCEDURE — 99999 PR PBB SHADOW E&M-EST. PATIENT-LVL IV: ICD-10-PCS | Mod: PBBFAC,,, | Performed by: ANESTHESIOLOGY

## 2019-05-02 PROCEDURE — 77067 SCR MAMMO BI INCL CAD: CPT | Mod: 26,,, | Performed by: RADIOLOGY

## 2019-05-02 PROCEDURE — 1101F PR PT FALLS ASSESS DOC 0-1 FALLS W/OUT INJ PAST YR: ICD-10-PCS | Mod: CPTII,S$GLB,, | Performed by: ANESTHESIOLOGY

## 2019-05-02 PROCEDURE — 3078F DIAST BP <80 MM HG: CPT | Mod: CPTII,S$GLB,, | Performed by: ANESTHESIOLOGY

## 2019-05-02 PROCEDURE — 77067 MAMMO DIGITAL SCREENING BILAT WITH TOMOSYNTHESIS_CAD: ICD-10-PCS | Mod: 26,,, | Performed by: RADIOLOGY

## 2019-05-02 PROCEDURE — 1101F PT FALLS ASSESS-DOCD LE1/YR: CPT | Mod: CPTII,S$GLB,, | Performed by: ANESTHESIOLOGY

## 2019-05-02 PROCEDURE — 99499 UNLISTED E&M SERVICE: CPT | Mod: S$GLB,,, | Performed by: ANESTHESIOLOGY

## 2019-05-02 PROCEDURE — 3077F PR MOST RECENT SYSTOLIC BLOOD PRESSURE >= 140 MM HG: ICD-10-PCS | Mod: CPTII,S$GLB,, | Performed by: ANESTHESIOLOGY

## 2019-05-02 PROCEDURE — 99204 PR OFFICE/OUTPT VISIT, NEW, LEVL IV, 45-59 MIN: ICD-10-PCS | Mod: GC,S$GLB,, | Performed by: ANESTHESIOLOGY

## 2019-05-02 PROCEDURE — 99999 PR PBB SHADOW E&M-EST. PATIENT-LVL IV: CPT | Mod: PBBFAC,,, | Performed by: ANESTHESIOLOGY

## 2019-05-02 PROCEDURE — 77067 SCR MAMMO BI INCL CAD: CPT | Mod: TC

## 2019-05-02 PROCEDURE — 77063 BREAST TOMOSYNTHESIS BI: CPT | Mod: 26,,, | Performed by: RADIOLOGY

## 2019-05-02 PROCEDURE — 77063 MAMMO DIGITAL SCREENING BILAT WITH TOMOSYNTHESIS_CAD: ICD-10-PCS | Mod: 26,,, | Performed by: RADIOLOGY

## 2019-05-02 PROCEDURE — 3078F PR MOST RECENT DIASTOLIC BLOOD PRESSURE < 80 MM HG: ICD-10-PCS | Mod: CPTII,S$GLB,, | Performed by: ANESTHESIOLOGY

## 2019-05-02 PROCEDURE — 99204 OFFICE O/P NEW MOD 45 MIN: CPT | Mod: GC,S$GLB,, | Performed by: ANESTHESIOLOGY

## 2019-05-02 PROCEDURE — 99499 RISK ADDL DX/OHS AUDIT: ICD-10-PCS | Mod: S$GLB,,, | Performed by: ANESTHESIOLOGY

## 2019-05-02 RX ORDER — GABAPENTIN 300 MG/1
300 CAPSULE ORAL 3 TIMES DAILY
Qty: 90 CAPSULE | Refills: 2 | Status: SHIPPED | OUTPATIENT
Start: 2019-05-02 | End: 2019-05-03 | Stop reason: SDUPTHER

## 2019-05-02 RX ORDER — IBUPROFEN 600 MG/1
TABLET ORAL
Refills: 0 | COMMUNITY
Start: 2019-04-02 | End: 2020-03-16

## 2019-05-02 RX ORDER — ASPIRIN 81 MG/1
1 TABLET ORAL
COMMUNITY
Start: 2019-01-08 | End: 2020-06-16 | Stop reason: SDUPTHER

## 2019-05-02 RX ORDER — PREGABALIN 150 MG/1
CAPSULE ORAL
Refills: 2 | COMMUNITY
Start: 2019-04-15 | End: 2019-05-29

## 2019-05-02 NOTE — TELEPHONE ENCOUNTER
----- Message from Gustavo Wallace MD sent at 5/2/2019 11:34 AM CDT -----  Please let patient know that her Mammogram was negative.  Thank you

## 2019-05-02 NOTE — TELEPHONE ENCOUNTER
Call placed to pt regarding results of mammogram. The patient verbalized understanding and had no further questions or concerns.  Manolo Escoto LPN

## 2019-05-02 NOTE — PROGRESS NOTES
Chronic Pain - New Consult    Referring Physician: Gustavo Wallace MD    Chief Complaint:   Chief Complaint   Patient presents with    Arm Pain     right        SUBJECTIVE:    Meenu Guy presents to the clinic for the evaluation of right arm pain. The pain started 3years ago following (non- injury related) and symptoms have been worsening.The pain is located in the right arm area and radiates to the right hand.  The pain is described as aching and hot sensation and is rated as 10/10. The pain is rated with a score of  10/10 on the BEST day and a score of 10/10 on the WORST day.  Symptoms interfere with daily activity and sleeping. The pain is exacerbated by Sitting and Touching.  The pain is mitigated by massage, elevation and medications. She reports spending 3 hours per day reclining. The patient reports 3 hours of uninterrupted sleep per night.    Patient denies night fever/night sweats, urinary incontinence, bowel incontinence, significant weight loss and significant motor weakness.    Pt has history of cervical and lumbar discectomy 20 years ago in [facility] by Dr. Alejandro Cruz. Pt has been pain free up until 4 years ago. Pain began in her right arm and has progressively worsened. Denies weakness. Pt has lost ROM in her right arm.  She is right handed. She has previously been seen by orthopedics and received a steroid shoulder injections which did not provide any relief and worsened her pain. Received injection in January.     She was previously on Norco 7.5mg TID by pain management in [place], [place]. She has not taken any opioids in 1.5 years.     Physical Therapy/Home Exercise: completed one year ago     Pain Disability Index Review:  Last 3 PDI Scores 5/2/2019   Pain Disability Index (PDI) 35       Pain Medications:   lyrica 175mg BID for approx 6 months  Ibuprofen 600mg BID prn    - Anti-Coagulants: [email or web address]     report:  Reviewed and consistent with medication use as prescribed.     Pain  Procedures: none    Imaging: none    Past Medical History:   Diagnosis Date    Coronary artery disease     Diabetes mellitus     Diabetes mellitus, type 2     Hypertension      Past Surgical History:   Procedure Laterality Date    BACK SURGERY      CHOLECYSTECTOMY      DISC REMOVAL      HYSTERECTOMY       Social History     Socioeconomic History    Marital status:      Spouse name: Not on file    Number of children: Not on file    Years of education: Not on file    Highest education level: Not on file   Occupational History    Not on file   Social Needs    Financial resource strain: Not on file    Food insecurity:     Worry: Not on file     Inability: Not on file    Transportation needs:     Medical: Not on file     Non-medical: Not on file   Tobacco Use    Smoking status: Never Smoker   Substance and Sexual Activity    Alcohol use: No     Frequency: Never    Drug use: No    Sexual activity: Not Currently   Lifestyle    Physical activity:     Days per week: Not on file     Minutes per session: Not on file    Stress: Not on file   Relationships    Social connections:     Talks on phone: Not on file     Gets together: Not on file     Attends Confucianism service: Not on file     Active member of club or organization: Not on file     Attends meetings of clubs or organizations: Not on file     Relationship status: Not on file   Other Topics Concern    Not on file   Social History Narrative    Not on file     No family history on file.    Review of patient's allergies indicates:   Allergen Reactions    Statins-hmg-coa reductase inhibitors Edema       Current Outpatient Medications   Medication Sig    amLODIPine (NORVASC) 10 MG tablet Take 10 mg by mouth once daily.    atorvastatin (LIPITOR) 20 MG tablet Take 20 mg by mouth once daily.    blood sugar diagnostic Strp 1 each by Misc.(Non-Drug; Combo Route) route 3 (three) times daily.    blood-glucose meter kit Use as instructed     "carvedilol (COREG) 6.25 MG tablet Take 6.25 mg by mouth 2 (two) times daily.    lancets Misc 1 each by Misc.(Non-Drug; Combo Route) route 3 (three) times daily.    metFORMIN (GLUCOPHAGE) 500 MG tablet Take 500 mg by mouth 2 (two) times daily.    omeprazole (PRILOSEC) 20 MG capsule Take 20 mg by mouth once daily.     No current facility-administered medications for this visit.        REVIEW OF SYSTEMS:    GENERAL:  No weight loss, malaise or fevers.  HEENT:  Negative for frequent or significant headaches.  NECK:  Negative for lumps, goiter, pain and significant neck swelling.  RESPIRATORY:  Negative for cough, wheezing or shortness of breath.  CARDIOVASCULAR:  Negative for chest pain, leg swelling or palpitations. +CAD +HTN  GI:  Negative for abdominal discomfort, blood in stools or black stools or change in bowel habits. +GERD +DM  MUSCULOSKELETAL:  See HPI.  SKIN:  Negative for lesions, rash, and itching.  PSYCH:  +ve for sleep disturbance, mood disorder and recent psychosocial stressors.  HEMATOLOGY/LYMPHOLOGY:  Negative for prolonged bleeding, bruising easily or swollen nodes. +CKD  NEURO:   No history of headaches, syncope, paralysis, seizures or tremors.  All other reviewed and negative other than HPI.    OBJECTIVE:    BP (!) 147/65   Pulse (!) 57   Temp 98 °F (36.7 °C)   Resp 18   Ht 5' 1" (1.549 m)   Wt 74.8 kg (165 lb)   BMI 31.18 kg/m²     PHYSICAL EXAMINATION:    General appearance: Well appearing, in no acute distress, alert and oriented x3.  Psych:  Mood and affect appropriate.  Skin: Skin color, texture, turgor normal, no rashes or lesions, in both upper and lower body.  Head/face:  Normocephalic, atraumatic. No palpable lymph nodes.  Neck: mild pain to palpation over the cervical paraspinous muscles. Spurling +. mild pain with neck flexion, extension, or lateral flexion.   Cor: RRR  Pulm: CTA  GI:  Soft and non-tender.  Back: Straight leg raising in the sitting and supine positions is negative " to radicular pain. No pain to palpation over the spine or costovertebral angles. Limited range of motion with pain reproduction on right arm.  Extremities: Peripheral joint ROM is limited and with pain in the right arm.  No deformities, edema, or skin discoloration. Good capillary refill. +TTP right bicep and shoulder.   Musculoskeletal: Shoulder, hip, sacroiliac and knee provocative maneuvers are negative. Bilateral upper and lower extremity strength is normal and symmetric.  No atrophy or tone abnormalities are noted.  Neuro: Bilateral upper and lower extremity coordination and muscle stretch reflexes are physiologic and symmetric.  Plantar response are downgoing. No loss of sensation is noted.   Gait: normal.    ASSESSMENT: 68 y.o. year old female with right arm pain, consistent with      1. DDD (degenerative disc disease), cervical     2. Neck pain  MRI Cervical Spine Without Contrast    X-Ray Cervical Spine AP And Lateral   3. Osteoarthritis of cervical spine, unspecified spinal osteoarthritis complication status     4. Cervical radiculopathy           PLAN:       - Will order cervical MRI and cervical Xray  - Will obtain records from imaging center, neurosurgeon, and primary care doctor  - Will wean off lyrica and switch to Gabapentin. Will start gabapentin 300mg nightly x 1 week, titrate to 300mg TID  - Will start cymbalta at next visit  - RTC 2 weeks  - Consider scheduling for a Cervical Epidural Injection at next visit once MRI obtained      The above plan and management options were discussed at length with patient. Patient is in agreement with the above and verbalized understanding. It will be communicated with the referring physician via electronic record, fax, or mail.     I have personally reviewed the history and exam of this patient and agree with the resident/fellow/NPs note as stated above.    Rahul Bowie MD    05/02/2019

## 2019-05-02 NOTE — LETTER
May 9, 2019      Gustavo Wallace MD  [address]  [address]  [facility] [address]           [facility] [place] [facility] [address]  [address] [place] [address]  [facility] [address]  Phone: [phone]  Fax: [phone]          Patient: Meenu Guy   MR Number: [id number]   YOB: 1950   Date of Visit: 5/2/2019       Dear Dr. Gustavo Wallace:    Thank you for referring Meenu Guy to me for evaluation. Attached you will find relevant portions of my assessment and plan of care.    If you have questions, please do not hesitate to call me. I look forward to following Meenu Guy along with you.    Sincerely,    Rahul Bowie MD    Enclosure  CC:  No Recipients    If you would like to receive this communication electronically, please contact [email or web address] or [phone] to request more information on [employer] access.    For providers and/or their staff who would like to refer a patient to [facility], please contact us through our one-stop-shop provider referral line, [facility], at [phone].    If you feel you have received this communication in error or would no longer like to receive these types of communications, please e-mail [email or web address]

## 2019-05-03 RX ORDER — GABAPENTIN 300 MG/1
300 CAPSULE ORAL 3 TIMES DAILY
Qty: 90 CAPSULE | Refills: 2 | Status: SHIPPED | OUTPATIENT
Start: 2019-05-03 | End: 2019-05-09 | Stop reason: SDUPTHER

## 2019-05-03 NOTE — TELEPHONE ENCOUNTER
----- Message from Anthony Lester sent at 5/3/2019  2:02 PM CDT -----  Contact: LOUIE MASON [[id number]]  Name of Who is Calling: LOUIE MASON [[id number]]      What is the request in detail: Patient would like to follow up on her gabapentin (NEURONTIN) 300 MG capsule refill to [facility] [address] [facility]      Can the clinic reply by [facility]: no      What Number to Call Back if not in [facility]: [phone] (requesting a confirmation

## 2019-05-03 NOTE — TELEPHONE ENCOUNTER
----- Message from Rukhsana Angulo sent at 5/3/2019 10:58 AM CDT -----  Contact: pt  Can the clinic reply in [facility]: no  Please refill the medication(s) listed below. Please call the patient when the prescription(s) is ready for  at this phone number  [phone]        Medication #1 gabapentin (NEURONTIN) 300 MG capsule      Preferred Pharmacy:[address]

## 2019-05-06 ENCOUNTER — TELEPHONE (OUTPATIENT)
Dept: PAIN MEDICINE | Facility: CLINIC | Age: 69
End: 2019-05-06

## 2019-05-06 DIAGNOSIS — G89.4 CHRONIC PAIN DISORDER: Primary | ICD-10-CM

## 2019-05-06 RX ORDER — GABAPENTIN 300 MG/1
300 CAPSULE ORAL 3 TIMES DAILY
Qty: 90 CAPSULE | Refills: 2 | OUTPATIENT
Start: 2019-05-06 | End: 2020-05-05

## 2019-05-06 NOTE — TELEPHONE ENCOUNTER
Call placed to pt regarding medication refill. Pt was informed that Dr. Wallace could not refill her , pt should request refill from Pain Management doctor. The patient verbalized understanding and had no further questions or concerns.  Manolo Escoto LPN

## 2019-05-06 NOTE — TELEPHONE ENCOUNTER
----- Message from Valdo Fierro sent at 5/6/2019 11:59 AM CDT -----  Contact: LOUIE MASON [[id number]]  Name of Who is Calling:LOUIE MASON [[id number]]      What is the request in detail: Please fax pt's script for gabapentin (NEURONTIN) 300 MG capsule. [facility] [phone]        Can the clinic reply by [facility]: N    What Number to Call Back if not in [facility]:[phone]

## 2019-05-06 NOTE — TELEPHONE ENCOUNTER
Contacted and spoke with patient regarding Gabapentin medication that his pharmacy did not receive.    Can you please resend the Gabapentin. [facility] at [phone].    Please review.  Thanks

## 2019-05-06 NOTE — TELEPHONE ENCOUNTER
----- Message from Lynette Singh sent at 5/6/2019  1:20 PM CDT -----  Contact: Self            Name of Who is Calling: LOUIE MASON [[id number]]      What is the request in detail: Pt states the pharmacy advised her that they do not have the Gabapentin. Pt would like the medication sent to [facility] pharmacy at [phone]. Please contact to further discuss and advise.        Can the clinic reply by [facility]: N      What Number to Call Back [facility] not [facility]: [phone]

## 2019-05-06 NOTE — TELEPHONE ENCOUNTER
Pt requesting refill on Gabapentin 300 mg. Please authorize. Medication pended.  Lov: 04/18/2019.  Manolo Escoto LPN

## 2019-05-09 RX ORDER — GABAPENTIN 300 MG/1
300 CAPSULE ORAL 3 TIMES DAILY
Qty: 90 CAPSULE | Refills: 2 | OUTPATIENT
Start: 2019-05-09 | End: 2019-05-20 | Stop reason: SDUPTHER

## 2019-05-09 NOTE — TELEPHONE ENCOUNTER
----- Message from Lynda Stevens sent at 5/9/2019 11:23 AM CDT -----  Contact: pt   Name of Who is Calling: LOUIE MASON [[id number]]    What is the request in detail: Patient is requesting a call back in regards to RX gabapentin (NEURONTIN) 300 MG capsule, patient would like to know if RX is ready for ....Please contact to further discuss and advise      Can the clinic reply by [facility]:     What Number to Call Back if not in [facility]: [phone]

## 2019-05-09 NOTE — TELEPHONE ENCOUNTER
Staff contacted the patient to inform her that her prescription had been sent to her requested pharmacy [facility] - [place], [place] - [address] [place] [address].    Patient verbalized understanding and expressed thanks.

## 2019-05-09 NOTE — TELEPHONE ENCOUNTER
Staff contacted the patient to further discuss her concerns regarding gabapentin prescription being sent.     Patient did not answer therefore staff left a detailed voice message instructing the patient to give our office a call at [phone].    Patient prescription was printed instead of being e prescribed to her pharmacy please e prescribed attached gabapentin prescription.

## 2019-05-20 ENCOUNTER — OFFICE VISIT (OUTPATIENT)
Dept: PAIN MEDICINE | Facility: CLINIC | Age: 69
End: 2019-05-20
Payer: MEDICARE

## 2019-05-20 VITALS
TEMPERATURE: 98 F | SYSTOLIC BLOOD PRESSURE: 131 MMHG | HEIGHT: 71 IN | WEIGHT: 169.81 LBS | DIASTOLIC BLOOD PRESSURE: 64 MMHG | HEART RATE: 54 BPM | RESPIRATION RATE: 18 BRPM | BODY MASS INDEX: 23.77 KG/M2

## 2019-05-20 DIAGNOSIS — M50.30 DDD (DEGENERATIVE DISC DISEASE), CERVICAL: ICD-10-CM

## 2019-05-20 DIAGNOSIS — M47.22 OSTEOARTHRITIS OF SPINE WITH RADICULOPATHY, CERVICAL REGION: ICD-10-CM

## 2019-05-20 DIAGNOSIS — M54.12 CERVICAL RADICULOPATHY: Primary | ICD-10-CM

## 2019-05-20 PROCEDURE — 99999 PR PBB SHADOW E&M-EST. PATIENT-LVL III: CPT | Mod: PBBFAC,,, | Performed by: NURSE PRACTITIONER

## 2019-05-20 PROCEDURE — 99213 OFFICE O/P EST LOW 20 MIN: CPT | Mod: S$GLB,,, | Performed by: NURSE PRACTITIONER

## 2019-05-20 PROCEDURE — 3078F PR MOST RECENT DIASTOLIC BLOOD PRESSURE < 80 MM HG: ICD-10-PCS | Mod: CPTII,S$GLB,, | Performed by: NURSE PRACTITIONER

## 2019-05-20 PROCEDURE — 3075F PR MOST RECENT SYSTOLIC BLOOD PRESS GE 130-139MM HG: ICD-10-PCS | Mod: CPTII,S$GLB,, | Performed by: NURSE PRACTITIONER

## 2019-05-20 PROCEDURE — 99999 PR PBB SHADOW E&M-EST. PATIENT-LVL III: ICD-10-PCS | Mod: PBBFAC,,, | Performed by: NURSE PRACTITIONER

## 2019-05-20 PROCEDURE — 3078F DIAST BP <80 MM HG: CPT | Mod: CPTII,S$GLB,, | Performed by: NURSE PRACTITIONER

## 2019-05-20 PROCEDURE — 1101F PR PT FALLS ASSESS DOC 0-1 FALLS W/OUT INJ PAST YR: ICD-10-PCS | Mod: CPTII,S$GLB,, | Performed by: NURSE PRACTITIONER

## 2019-05-20 PROCEDURE — 99213 PR OFFICE/OUTPT VISIT, EST, LEVL III, 20-29 MIN: ICD-10-PCS | Mod: S$GLB,,, | Performed by: NURSE PRACTITIONER

## 2019-05-20 PROCEDURE — 1101F PT FALLS ASSESS-DOCD LE1/YR: CPT | Mod: CPTII,S$GLB,, | Performed by: NURSE PRACTITIONER

## 2019-05-20 PROCEDURE — 3075F SYST BP GE 130 - 139MM HG: CPT | Mod: CPTII,S$GLB,, | Performed by: NURSE PRACTITIONER

## 2019-05-20 RX ORDER — GABAPENTIN 300 MG/1
CAPSULE ORAL
Qty: 90 CAPSULE | Refills: 2 | Status: SHIPPED | OUTPATIENT
Start: 2019-05-20 | End: 2019-10-09 | Stop reason: SDUPTHER

## 2019-05-20 NOTE — PROGRESS NOTES
Chronic patient Established Note (Follow up visit)      SUBJECTIVE:    Meenu Guy presents to the clinic for a follow-up appointment for neck pain. She continues to report neck pain that radiates into her right arm to her hand. She denies any left arm pain. She reports difficulty dressing and bathing herself due to the pain. She was unable to obtain previously ordered MRI due to scheduling conflict. She did not start Gabapentin as prescribed. She denies any other health changes. Her pain today is 10/10.     Pain Disability Index Review:  Last 3 PDI Scores 5/20/2019 5/2/2019   Pain Disability Index (PDI) 61 35       Pain Medications:  none    Opioid Contract: no     report:  Reviewed and consistent with medication use as prescribed.    Pain Procedures:   None     Relevant Surgery: cervical and lumbar discectomy 20 years ago     Physical Therapy/Home Exercise: yes    Imaging:   None      Allergies:   Review of patient's allergies indicates:   Allergen Reactions    Statins-hmg-coa reductase inhibitors Edema    Ace inhibitors Swelling       Current Medications:   Current Outpatient Medications   Medication Sig Dispense Refill    amLODIPine (NORVASC) 10 MG tablet Take 10 mg by mouth once daily.  0    aspirin (ECOTRIN) 81 MG EC tablet Take 1 tablet by mouth.      atorvastatin (LIPITOR) 20 MG tablet Take 20 mg by mouth once daily.      blood sugar diagnostic Strp 1 each by Misc.(Non-Drug; Combo Route) route 3 (three) times daily. 200 each 1    blood-glucose meter kit Use as instructed 1 each 0    carvedilol (COREG) 6.25 MG tablet Take 6.25 mg by mouth 2 (two) times daily.      ibuprofen (ADVIL,MOTRIN) 600 MG tablet   0    lancets Misc 1 each by Misc.(Non-Drug; Combo Route) route 3 (three) times daily. 200 each 1    LYRICA 150 mg capsule   2    metFORMIN (GLUCOPHAGE) 500 MG tablet Take 500 mg by mouth 2 (two) times daily.      omeprazole (PRILOSEC) 20 MG capsule Take 20 mg by mouth once daily.       gabapentin (NEURONTIN) 300 MG capsule Take 1 capsule (300 mg total) by mouth 3 (three) times daily. Take 1 capsule at night for 1 week, then take 1 capsule twice a day for a week, then take 1 capsule 3x/day, thereafter. 90 capsule 2     No current facility-administered medications for this visit.        REVIEW OF SYSTEMS:    GENERAL:  No weight loss, malaise or fevers.  HEENT:  Negative for frequent or significant headaches.  NECK:  Negative for lumps, goiter, pain and significant neck swelling.  RESPIRATORY:  Negative for cough, wheezing or shortness of breath.  CARDIOVASCULAR:  Negative for chest pain, leg swelling or palpitations. HTN  GI:  Negative for abdominal discomfort, blood in stools or black stools or change in bowel habits.  MUSCULOSKELETAL:  See HPI.  SKIN:  Negative for lesions, rash, and itching.  PSYCH:  Negative for sleep disturbance, mood disorder and recent psychosocial stressors.  HEMATOLOGY/LYMPHOLOGY:  Negative for prolonged bleeding, bruising easily or swollen nodes.  NEURO:   No history of headaches, syncope, paralysis, seizures or tremors.  ENDO: Diabetes  All other reviewed and negative other than HPI.    Past Medical History:  Past Medical History:   Diagnosis Date    Coronary artery disease     Diabetes mellitus     Diabetes mellitus, type 2     Hypertension        Past Surgical History:  Past Surgical History:   Procedure Laterality Date    BACK SURGERY      CHOLECYSTECTOMY      DISC REMOVAL      HYSTERECTOMY         Family History:  History reviewed. No pertinent family history.    Social History:  Social History     Socioeconomic History    Marital status:      Spouse name: Not on file    Number of children: Not on file    Years of education: Not on file    Highest education level: Not on file   Occupational History    Not on file   Social Needs    Financial resource strain: Not on file    Food insecurity:     Worry: Not on file     Inability: Not on file     "Transportation needs:     Medical: Not on file     Non-medical: Not on file   Tobacco Use    Smoking status: Never Smoker   Substance and Sexual Activity    Alcohol use: No     Frequency: Never    Drug use: No    Sexual activity: Not Currently   Lifestyle    Physical activity:     Days per week: Not on file     Minutes per session: Not on file    Stress: Not on file   Relationships    Social connections:     Talks on phone: Not on file     Gets together: Not on file     Attends Amish service: Not on file     Active member of club or organization: Not on file     Attends meetings of clubs or organizations: Not on file     Relationship status: Not on file   Other Topics Concern    Not on file   Social History Narrative    Not on file       OBJECTIVE:    /64   Pulse (!) 54   Temp 98.2 °F (36.8 °C) (Oral)   Resp 18   Ht 5' 11" (1.803 m)   Wt 77 kg (169 lb 12.8 oz)   BMI 23.68 kg/m²     PHYSICAL EXAMINATION:    General appearance: Well appearing, in no acute distress, alert and oriented x3.  Psych:  Mood and affect appropriate.  Skin: Skin color, texture, turgor normal, no rashes or lesions, in both upper and lower body.  Head/face:  Atraumatic, normocephalic. No palpable lymph nodes  Neck: There is pain to palpation over the cervical paraspinous muscles. Spurling Negative. Limited ROM with pain on flexion and extension.   Cor: RRR  Pulm: CTA  GI: Abdomen soft and non-tender.  Extremities: Peripheral joint ROM is full and pain free without obvious instability or laxity in all four extremities. No deformities, edema, or skin discoloration. Good capillary refill.  Musculoskeletal: Shoulder provocative maneuvers are negative. Bilateral upper extremity strength is normal and symmetric.  No atrophy or tone abnormalities are noted.  Neuro: Bilateral upper extremity coordination and muscle stretch reflexes are physiologic and symmetric.  Plantar response are downgoing. No loss of sensation is " noted.  Gait: Normal.    ASSESSMENT: 68 y.o. year old female with neck pain, consistent with the followin. Cervical radiculopathy  gabapentin (NEURONTIN) 300 MG capsule   2. Osteoarthritis of spine with radiculopathy, cervical region  gabapentin (NEURONTIN) 300 MG capsule   3. DDD (degenerative disc disease), cervical  gabapentin (NEURONTIN) 300 MG capsule         PLAN:     - Previous imaging was reviewed and discussed with the patient today.    - Obtain cervical xray and MRI. Rescheduled today.     - Consider cervical TACOS depending on above imaging.     - Trial Gabapentin 300 mg TID. Titration instructions provided today.     - I have stressed the importance of physical activity and a home exercise plan to help with pain and improve health.    - RTC after above imaging.     - Counseled patient regarding the importance of activity modification and constant sleeping habits.    - Dr. Bowie was consulted on the patient and agrees with this plan.    The above plan and management options were discussed at length with patient. Patient is in agreement with the above and verbalized understanding.    Yudi Mclean NP  2019

## 2019-05-22 ENCOUNTER — HOSPITAL ENCOUNTER (OUTPATIENT)
Dept: RADIOLOGY | Facility: OTHER | Age: 69
Discharge: HOME | End: 2019-05-22
Attending: STUDENT IN AN ORGANIZED HEALTH CARE EDUCATION/TRAINING PROGRAM
Payer: MEDICARE

## 2019-05-22 DIAGNOSIS — M54.2 NECK PAIN: ICD-10-CM

## 2019-05-22 PROCEDURE — 72040 X-RAY EXAM NECK SPINE 2-3 VW: CPT | Mod: TC,FY

## 2019-05-22 PROCEDURE — 72040 XR CERVICAL SPINE AP LATERAL: ICD-10-PCS | Mod: 26,,, | Performed by: RADIOLOGY

## 2019-05-22 PROCEDURE — 72040 X-RAY EXAM NECK SPINE 2-3 VW: CPT | Mod: 26,,, | Performed by: RADIOLOGY

## 2019-05-23 ENCOUNTER — TELEPHONE (OUTPATIENT)
Dept: PAIN MEDICINE | Facility: CLINIC | Age: 69
End: 2019-05-23

## 2019-05-23 RX ORDER — DIAZEPAM 5 MG/1
TABLET ORAL
Qty: 2 TABLET | Refills: 0 | Status: SHIPPED | OUTPATIENT
Start: 2019-05-23 | End: 2019-05-29

## 2019-05-23 NOTE — TELEPHONE ENCOUNTER
Spoke with patient regarding MRI. She was unable to complete MRI as she became anxious. We will reschedule the MRI with Valium phoned into to pharmacy to take prior to MRI. Patient verbalized understanding.

## 2019-05-23 NOTE — TELEPHONE ENCOUNTER
----- Message from Peace Arreola sent at 5/23/2019 10:31 AM CDT -----  Name of Who is Calling: LOUIE MASON [[id number]]    What is the request in detail: Pt states she could not go through with the MRI yesterday because she was anxious while in the machine. Please call.       Can the clinic reply by [facility]:   No       What Number to Call Back if not in [facility]: [phone]

## 2019-05-27 ENCOUNTER — HOSPITAL ENCOUNTER (OUTPATIENT)
Dept: RADIOLOGY | Facility: OTHER | Age: 69
Discharge: HOME | End: 2019-05-27
Attending: STUDENT IN AN ORGANIZED HEALTH CARE EDUCATION/TRAINING PROGRAM
Payer: MEDICARE

## 2019-05-27 PROCEDURE — 72141 MRI CERVICAL SPINE WITHOUT CONTRAST: ICD-10-PCS | Mod: 26,,, | Performed by: RADIOLOGY

## 2019-05-27 PROCEDURE — 72141 MRI NECK SPINE W/O DYE: CPT | Mod: TC

## 2019-05-27 PROCEDURE — 72141 MRI NECK SPINE W/O DYE: CPT | Mod: 26,,, | Performed by: RADIOLOGY

## 2019-05-29 ENCOUNTER — OFFICE VISIT (OUTPATIENT)
Dept: PAIN MEDICINE | Facility: CLINIC | Age: 69
End: 2019-05-29
Payer: MEDICARE

## 2019-05-29 VITALS
BODY MASS INDEX: 23.55 KG/M2 | DIASTOLIC BLOOD PRESSURE: 66 MMHG | SYSTOLIC BLOOD PRESSURE: 136 MMHG | TEMPERATURE: 99 F | HEIGHT: 71 IN | HEART RATE: 63 BPM | WEIGHT: 168.19 LBS

## 2019-05-29 DIAGNOSIS — M47.22 OSTEOARTHRITIS OF SPINE WITH RADICULOPATHY, CERVICAL REGION: ICD-10-CM

## 2019-05-29 DIAGNOSIS — M50.30 DDD (DEGENERATIVE DISC DISEASE), CERVICAL: ICD-10-CM

## 2019-05-29 DIAGNOSIS — Z98.1 HISTORY OF FUSION OF CERVICAL SPINE: ICD-10-CM

## 2019-05-29 DIAGNOSIS — M54.12 CERVICAL RADICULOPATHY: Primary | ICD-10-CM

## 2019-05-29 PROCEDURE — 99999 PR PBB SHADOW E&M-EST. PATIENT-LVL III: CPT | Mod: PBBFAC,,, | Performed by: NURSE PRACTITIONER

## 2019-05-29 PROCEDURE — 99213 PR OFFICE/OUTPT VISIT, EST, LEVL III, 20-29 MIN: ICD-10-PCS | Mod: S$GLB,,, | Performed by: NURSE PRACTITIONER

## 2019-05-29 PROCEDURE — 3078F DIAST BP <80 MM HG: CPT | Mod: CPTII,S$GLB,, | Performed by: NURSE PRACTITIONER

## 2019-05-29 PROCEDURE — 1101F PT FALLS ASSESS-DOCD LE1/YR: CPT | Mod: CPTII,S$GLB,, | Performed by: NURSE PRACTITIONER

## 2019-05-29 PROCEDURE — 3075F SYST BP GE 130 - 139MM HG: CPT | Mod: CPTII,S$GLB,, | Performed by: NURSE PRACTITIONER

## 2019-05-29 PROCEDURE — 99499 RISK ADDL DX/OHS AUDIT: ICD-10-PCS | Mod: S$GLB,,, | Performed by: NURSE PRACTITIONER

## 2019-05-29 PROCEDURE — 99999 PR PBB SHADOW E&M-EST. PATIENT-LVL III: ICD-10-PCS | Mod: PBBFAC,,, | Performed by: NURSE PRACTITIONER

## 2019-05-29 PROCEDURE — 3078F PR MOST RECENT DIASTOLIC BLOOD PRESSURE < 80 MM HG: ICD-10-PCS | Mod: CPTII,S$GLB,, | Performed by: NURSE PRACTITIONER

## 2019-05-29 PROCEDURE — 3075F PR MOST RECENT SYSTOLIC BLOOD PRESS GE 130-139MM HG: ICD-10-PCS | Mod: CPTII,S$GLB,, | Performed by: NURSE PRACTITIONER

## 2019-05-29 PROCEDURE — 1101F PR PT FALLS ASSESS DOC 0-1 FALLS W/OUT INJ PAST YR: ICD-10-PCS | Mod: CPTII,S$GLB,, | Performed by: NURSE PRACTITIONER

## 2019-05-29 PROCEDURE — 99499 UNLISTED E&M SERVICE: CPT | Mod: S$GLB,,, | Performed by: NURSE PRACTITIONER

## 2019-05-29 PROCEDURE — 99213 OFFICE O/P EST LOW 20 MIN: CPT | Mod: S$GLB,,, | Performed by: NURSE PRACTITIONER

## 2019-05-29 NOTE — PROGRESS NOTES
Chronic patient Established Note (Follow up visit)      SUBJECTIVE:    Meenu Guy presents to the clinic for a follow-up appointment for neck pain and image review. She continues to report neck pain that radiates into her right arm to her hand. She describes this pain as burning and shooting in nature. She denies any left arm pain. Her pain is worse with activity. She reports difficulty performing housework. She is currently taking Gabapentin with limited benefit at this time. She denies any other health changes. Her pain today is 8/10..       Pain Disability Index Review:  Last 3 PDI Scores 5/29/2019 5/20/2019 5/2/2019   Pain Disability Index (PDI) 10 61 35       Pain Medications:  none    Opioid Contract: no     report:  Reviewed and consistent with medication use as prescribed.    Pain Procedures:   None     Relevant Surgery: cervical and lumbar discectomy 20 years ago     Physical Therapy/Home Exercise: yes    Imaging:   MRI Cervical Spine 5/27/2019:  FINDINGS:  There is reversal of the normal cervical curvature.  Extensive postoperative changes from anterior cervical fusion C3-4 C4-5 C5-C6 and C6-C7 disc spaces.  There is a faint T2 hyperintensity in the cervical cord at C4-5 disc space likely representing myelomalacia.  Craniovertebral alignment is within normal limits.  There is no Chiari type malformations.  Prevertebral soft tissues are within normal limits.    C2-3: There is disc dehydration.  Broad-based posterior disc bulge and osteophyte complex greater on the right with compression of the thecal sac.  No cord compression is noted.  There is hypertrophic changes of the ligamentum flava dorsally that causes an extradural compression of the thecal sac.  The AP diameter of the spinal canal is compromised and measures approximately 9 mm (image 4 series 7).  There is also at least moderate right foraminal stenosis and mild left foraminal stenosis.  Facet arthropathy noted.    C3-4: Postoperative  changes from anterior cervical fusion.  Mild broad-based posterior osteophyte and disc bulge complex without cord compression.  There is no significant central canal spinal stenosis.  There is however bilateral moderate to severe foraminal stenosis from uncovertebral joint osteophytes.    C4-5: Stable postoperative changes from anterior cervical fusion.  Susceptibility artifacts from presence of cervical plate.  Mild broad-based posterior osteophytes without cord compression.  There is at least a moderate to severe right foraminal stenosis and moderate left foraminal stenosis from uncovertebral joint osteophytes.    C5-6, C6-C7: Solid anterior bony cervical fusion.  There is no significant spinal stenosis.  Broad-based posterior osteophyte lateralize to the right at C5-C6 disc space with effacement of the anterior subarachnoid space.  No cord compression.  There is moderate bilateral foraminal stenosis C5-C6 and C6-C7 disc spaces.    C7-T1: Spondylosis with disc space narrowing.  There is a broad-based disc protrusion and osteophyte complex greater on the left with posterior displacement of the thecal sac.  The disc protrusion/osteophyte complex contacts the ventral surface of the cervical cord.  There is a severely bilateral foraminal stenosis from uncovertebral joint osteophytes.    T1-T2: Broad-based posterior osteophyte and a disc protrusion complex with posterior displacement of the thecal sac and slight dorsal displacement of the cervical cord.  Severe bilateral foraminal stenosis and at least a mild to moderate central canal spinal stenosis.      Impression       1. Postoperative changes from anterior cervical fusion C3 through C7 without significant central canal spinal stenosis.  There is however bilateral foraminal stenotic disease at multiple intervertebral disc spaces as above.  2. Broad-based disc protrusion and osteophyte complex C7-T1 disc space and T1-T2 disc spaces as above.  There is associated  severe foraminal stenotic changes at these levels.     Xray Cervical Spine 5/22/2019:  FINDINGS:  There are vertebral body screws with an anterior plate at C3-C4-C4-C5 status post ACDF fusion with disc implants.  The odontoid prevertebral soft tissues and posterior elements are intact.  There is osseous fusion of C5-C6.  And C6-C7.  No fracture dislocation bone destruction seen.  There is mild DJD.      Impression       Chronic change as above.           Allergies:   Review of patient's allergies indicates:   Allergen Reactions    Statins-hmg-coa reductase inhibitors Edema    Ace inhibitors Swelling       Current Medications:   Current Outpatient Medications   Medication Sig Dispense Refill    amLODIPine (NORVASC) 10 MG tablet Take 10 mg by mouth once daily.  0    aspirin (ECOTRIN) 81 MG EC tablet Take 1 tablet by mouth.      atorvastatin (LIPITOR) 20 MG tablet Take 20 mg by mouth once daily.      blood sugar diagnostic Strp 1 each by Misc.(Non-Drug; Combo Route) route 3 (three) times daily. 200 each 1    blood-glucose meter kit Use as instructed 1 each 0    carvedilol (COREG) 6.25 MG tablet Take 6.25 mg by mouth 2 (two) times daily.      diazePAM (VALIUM) 5 MG tablet Take 1 tablet 30 minutes prior to MRI, take second tablet if needed. 2 tablet 0    gabapentin (NEURONTIN) 300 MG capsule Take 1 capsule at bedtime for 1 week, then take 1 capsule twice a day for 1 week, then increase to 1 capsule three times a day. 90 capsule 2    ibuprofen (ADVIL,MOTRIN) 600 MG tablet   0    lancets Misc 1 each by Misc.(Non-Drug; Combo Route) route 3 (three) times daily. 200 each 1    LYRICA 150 mg capsule   2    metFORMIN (GLUCOPHAGE) 500 MG tablet Take 500 mg by mouth 2 (two) times daily.      omeprazole (PRILOSEC) 20 MG capsule Take 20 mg by mouth once daily.       No current facility-administered medications for this visit.        REVIEW OF SYSTEMS:    GENERAL:  No weight loss, malaise or fevers.  HEENT:  Negative  for frequent or significant headaches.  NECK:  Negative for lumps, goiter, pain and significant neck swelling.  RESPIRATORY:  Negative for cough, wheezing or shortness of breath.  CARDIOVASCULAR:  Negative for chest pain, leg swelling or palpitations. HTN  GI:  Negative for abdominal discomfort, blood in stools or black stools or change in bowel habits.  MUSCULOSKELETAL:  See HPI.  SKIN:  Negative for lesions, rash, and itching.  PSYCH:  Negative for sleep disturbance, mood disorder and recent psychosocial stressors.  HEMATOLOGY/LYMPHOLOGY:  Negative for prolonged bleeding, bruising easily or swollen nodes.  NEURO:   No history of headaches, syncope, paralysis, seizures or tremors.  ENDO: Diabetes  All other reviewed and negative other than HPI.    Past Medical History:  Past Medical History:   Diagnosis Date    Coronary artery disease     Diabetes mellitus     Diabetes mellitus, type 2     Hypertension        Past Surgical History:  Past Surgical History:   Procedure Laterality Date    BACK SURGERY      CHOLECYSTECTOMY      DISC REMOVAL      HYSTERECTOMY         Family History:  History reviewed. No pertinent family history.    Social History:  Social History     Socioeconomic History    Marital status:      Spouse name: Not on file    Number of children: Not on file    Years of education: Not on file    Highest education level: Not on file   Occupational History    Not on file   Social Needs    Financial resource strain: Not on file    Food insecurity:     Worry: Not on file     Inability: Not on file    Transportation needs:     Medical: Not on file     Non-medical: Not on file   Tobacco Use    Smoking status: Never Smoker   Substance and Sexual Activity    Alcohol use: No     Frequency: Never    Drug use: No    Sexual activity: Not Currently   Lifestyle    Physical activity:     Days per week: Not on file     Minutes per session: Not on file    Stress: Not on file   Relationships     "Social connections:     Talks on phone: Not on file     Gets together: Not on file     Attends Hinduism service: Not on file     Active member of club or organization: Not on file     Attends meetings of clubs or organizations: Not on file     Relationship status: Not on file   Other Topics Concern    Not on file   Social History Narrative    Not on file       OBJECTIVE:    /66   Pulse 63   Temp 98.6 °F (37 °C)   Ht 5' 11" (1.803 m)   Wt 76.3 kg (168 lb 3.4 oz)   BMI 23.46 kg/m²     PHYSICAL EXAMINATION:    General appearance: Well appearing, in no acute distress, alert and oriented x3.  Psych:  Mood and affect appropriate.  Skin: Skin color, texture, turgor normal, no rashes or lesions, in both upper and lower body.  Head/face:  Atraumatic, normocephalic. No palpable lymph nodes  Neck: There is pain to palpation over the cervical paraspinous muscles. Spurling Negative. Limited ROM with pain on flexion and extension. Positive facet loading bilaterally.  Cor: RRR  Pulm: CTA  GI: Abdomen soft and non-tender.  Extremities: Peripheral joint ROM is full and pain free without obvious instability or laxity in all four extremities. No deformities, edema, or skin discoloration. Good capillary refill.  Musculoskeletal: Shoulder provocative maneuvers are negative. Bilateral upper extremity strength is normal and symmetric.  No atrophy or tone abnormalities are noted.  Neuro: Bilateral upper extremity coordination and muscle stretch reflexes are physiologic and symmetric.  Plantar response are downgoing. No loss of sensation is noted.  Gait: Normal.    ASSESSMENT: 68 y.o. year old female with neck pain, consistent with the followin. Cervical radiculopathy     2. Osteoarthritis of spine with radiculopathy, cervical region     3. DDD (degenerative disc disease), cervical     4. History of fusion of cervical spine           PLAN:     - Previous imaging was reviewed and discussed with the patient today.    - " We discussed cervical TACOS today. She would like to think about this and discuss with her .     - Continue Gabapentin 300 mg TID. We may escalate this further in the future,     - I have stressed the importance of physical activity and a home exercise plan to help with pain and improve health.    - RTC PRN. She may call to schedule procedure.     - Counseled patient regarding the importance of activity modification and constant sleeping habits.    - Dr. Bowie was consulted on the patient and agrees with this plan.    The above plan and management options were discussed at length with patient. Patient is in agreement with the above and verbalized understanding.    Yudi Mclean NP  05/29/2019

## 2019-06-06 ENCOUNTER — TELEPHONE (OUTPATIENT)
Dept: PRIMARY CARE CLINIC | Facility: CLINIC | Age: 69
End: 2019-06-06

## 2019-06-06 NOTE — TELEPHONE ENCOUNTER
Spoke w pt and advised this is done.    ----- Message from Gustavo Wallace MD sent at 6/6/2019  2:19 PM CDT -----  Contact: LOUIE MASON [[id number]]  Letter done.  Please forward as she requests.  thanks  ----- Message -----  From: Carlota Rooney MA  Sent: 6/6/2019   2:07 PM  To: Gustavo Wallace MD    Please advise.    ----- Message -----  From: Suzi Estrada  Sent: 6/6/2019   9:15 AM  To: Rodrigo Chen Staff    Name of Who is Calling:  LOUIE MASON [[id number]]    What is the request in detail:  Patient called requesting a letter from Dr. Wallace stating that she has degenerative disc disease and it has become difficult climbing the stairs to her apartment each day.  Please give a call back at your earliest convenience.   THANKS!       Can the clinic reply by [facility]:  No    Number to Call Back: LOUIE MASON / # [phone]

## 2019-07-03 DIAGNOSIS — E11.9 TYPE 2 DIABETES MELLITUS WITHOUT COMPLICATION, WITHOUT LONG-TERM CURRENT USE OF INSULIN: Primary | ICD-10-CM

## 2019-07-03 RX ORDER — METFORMIN HYDROCHLORIDE 500 MG/1
500 TABLET ORAL 2 TIMES DAILY
Qty: 180 TABLET | Refills: 1 | Status: SHIPPED | OUTPATIENT
Start: 2019-07-03 | End: 2020-01-06 | Stop reason: SDUPTHER

## 2019-07-05 DIAGNOSIS — E11.9 TYPE 2 DIABETES MELLITUS WITHOUT COMPLICATION: ICD-10-CM

## 2019-07-08 RX ORDER — AMLODIPINE BESYLATE 10 MG/1
10 TABLET ORAL DAILY
Qty: 90 TABLET | Refills: 0 | Status: SHIPPED | OUTPATIENT
Start: 2019-07-08 | End: 2019-10-08 | Stop reason: SDUPTHER

## 2019-07-08 NOTE — TELEPHONE ENCOUNTER
Message below was sent to our office in error.Please review pended medication refill request.Thanks.

## 2019-07-08 NOTE — TELEPHONE ENCOUNTER
----- Message from Lucy Roach sent at 7/8/2019 11:09 AM CDT -----  Contact: PT  Can the clinic reply in [facility]: No    Please refill the medication(s) listed below. The patient can be reached at this phone number (_)(_[phone] once it is called into the pharmacy.    Medication #1amLODIPine (NORVASC) 10 MG tablet    Preferred Pharmacy:[address]

## 2019-07-12 ENCOUNTER — PATIENT OUTREACH (OUTPATIENT)
Dept: ADMINISTRATIVE | Facility: HOSPITAL | Age: 69
End: 2019-07-12

## 2019-07-12 DIAGNOSIS — E11.9 TYPE 2 DIABETES MELLITUS WITHOUT COMPLICATION, UNSPECIFIED WHETHER LONG TERM INSULIN USE: ICD-10-CM

## 2019-07-12 DIAGNOSIS — Z11.59 NEED FOR HEPATITIS C SCREENING TEST: ICD-10-CM

## 2019-07-12 DIAGNOSIS — E11.9 ENCOUNTER FOR DIABETIC FOOT EXAM: Primary | ICD-10-CM

## 2019-07-12 PROBLEM — M19.041 PRIMARY OSTEOARTHRITIS OF RIGHT HAND: Status: ACTIVE | Noted: 2019-07-12

## 2019-07-12 PROBLEM — K21.9 GASTRO-ESOPHAGEAL REFLUX DISEASE WITHOUT ESOPHAGITIS: Status: ACTIVE | Noted: 2019-07-12

## 2019-07-12 PROBLEM — N18.2 CHRONIC KIDNEY DISEASE, STAGE II (MILD): Status: ACTIVE | Noted: 2019-07-12

## 2019-07-12 PROBLEM — K59.1 FUNCTIONAL DIARRHEA: Status: ACTIVE | Noted: 2019-07-12

## 2019-07-12 PROBLEM — E78.5 HYPERLIPIDEMIA: Status: ACTIVE | Noted: 2019-07-12

## 2019-07-12 PROBLEM — J01.90 ACUTE SINUSITIS: Status: ACTIVE | Noted: 2019-07-12

## 2019-07-12 PROBLEM — M79.7 FIBROMYALGIA: Status: ACTIVE | Noted: 2019-07-12

## 2019-07-12 PROBLEM — E11.51 TYPE 2 DIABETES MELLITUS WITH DIABETIC PERIPHERAL ANGIOPATHY WITHOUT GANGRENE: Status: ACTIVE | Noted: 2019-07-12

## 2019-07-12 PROBLEM — I73.9 PERIPHERAL VASCULAR DISEASE: Status: ACTIVE | Noted: 2019-04-16

## 2019-07-12 NOTE — PROGRESS NOTES
Chart review completed.    Jeanne VALERIO LPN  Clinical Care Coordinator  Internal Medicine  [facility]

## 2019-07-23 NOTE — PROGRESS NOTES
Subjective:       Patient ID: Meenu Guy is a 69 y.o. female with a PMH significant for CAD, HTN, T2D who was seen initially by me on 4/18/2019.  Patient was seen in the ED at [facility] in 1/2019 with Influenza A.  Patient previously followed by Dr. Hawk.    Chief Complaint: Follow-up    HPI  Patient is overall stable today.  Still with right arm pain and gabapentin not helping.  She recently went to [place] for a birthday trip and walked a lot.  Some minor aches on he legs.      Patient denies f/c, n/v/d.  No chest pain or SOB.  No abdominal pain or dysuria.  No headaches or change in vision.  No dizziness.  No significant  weight gain or weight loss.  Remaining ROS negative.    Review of Systems   Constitutional: Negative for appetite change, chills, diaphoresis, fatigue, fever and unexpected weight change.   HENT: Negative for ear pain, hearing loss, sinus pain, tinnitus, trouble swallowing and voice change.    Eyes: Negative for photophobia, pain and visual disturbance.   Respiratory: Negative for chest tightness, shortness of breath and wheezing.    Cardiovascular: Negative for chest pain, palpitations and leg swelling.   Gastrointestinal: Negative for abdominal pain, blood in stool, constipation, diarrhea, nausea and vomiting.   Endocrine: Negative for cold intolerance, heat intolerance, polydipsia, polyphagia and polyuria.   Genitourinary: Negative for decreased urine volume, difficulty urinating, dysuria, flank pain, hematuria, pelvic pain, vaginal bleeding, vaginal discharge and vaginal pain.   Musculoskeletal: Positive for arthralgias (right arm pain). Negative for gait problem, joint swelling, myalgias and neck pain.   Skin: Negative for rash.   Neurological: Negative for dizziness, tremors, syncope, weakness, numbness and headaches.   Hematological: Does not bruise/bleed easily.   Psychiatric/Behavioral: Negative for agitation, confusion and sleep disturbance. The patient is not  nervous/anxious.        Objective:      Physical Exam   Constitutional: She is oriented to person, place, and time. She appears well-developed and well-nourished. No distress.   HENT:   Head: Normocephalic and atraumatic.   Nose: Nose normal.   Mouth/Throat: Oropharynx is clear and moist.   Eyes: Pupils are equal, round, and reactive to light. Conjunctivae and EOM are normal. No scleral icterus.   Neck: Normal range of motion. Neck supple. No JVD present. No thyromegaly present.   Cardiovascular: Normal rate, regular rhythm and intact distal pulses. Exam reveals no gallop and no friction rub.   Murmur (2/6 KELSEY) heard.  Pulmonary/Chest: Effort normal and breath sounds normal. No respiratory distress. She has no wheezes. She has no rales.   Abdominal: Soft. Bowel sounds are normal. She exhibits no distension. There is no tenderness. There is no rebound and no guarding.   Musculoskeletal: Normal range of motion. She exhibits no edema.   Lymphadenopathy:     She has no cervical adenopathy.   Neurological: She is alert and oriented to person, place, and time. No cranial nerve deficit or sensory deficit.   Skin: Skin is warm and dry. No rash noted. No erythema.   Psychiatric: She has a normal mood and affect. Her behavior is normal. Thought content normal.       Assessment:       1. Coronary artery disease involving native coronary artery of native heart without angina pectoris    2. Hyperlipidemia, unspecified hyperlipidemia type    3. Peripheral vascular disease    4. Chronic kidney disease, stage II (mild)    5. Type 2 diabetes mellitus with diabetic peripheral angiopathy without gangrene, without long-term current use of insulin    6. Gastro-esophageal reflux disease without esophagitis    7. Primary osteoarthritis of right hand    8. Fibromyalgia    9. Systolic murmur    10. Post-menopausal    11. Colon cancer screening        Plan:   -Today's Visit - patient is awake and alert and with her   today.    -Nutrition - BMI good at 24.29 today.      -Cards - HTN and HLD (no history of CAD per patient) - on Amlodipine and Carvedilol.  BP in 4/2019 was stable at 118/57. BP today is 128/54.    On Atorvastatin (no allergy to statins).  On ASA.  Check fasting labs - done 4/2019 with  , TG 72, HDL 57, LDL 37.6.     Heart exam with 2/6 KELSEY - evaluate further next visit and consider an Echo.    PVD - Right Femoral Bypass - around 2012 in [place]. Dr. Essex.  No claudication.    -Endocrine - T2D - on Metformin 500mg bid (had diarrhea with 1g bid).  Check A1C - 8.2 in 4/2019.  Discussed adding Januvia.    Check urine microalbumin - ordered in 4/2019, but not yet completed.  Has allergy to ACEI - tongue swelling.  Last Eye exam - 2018.  Will refer to Optometry - patient to arrange it herself..  Last Foot exam -  Done 4/2019 by me.  No PN.    TSH normal in 4/2019.  Vitamin D Insufficient - level was 17 - advised to lower D3 to 5000 units daily (she was taking 10,000 daily).    -MSK - Cervical DD - S/p discectomy 20 years ago.  Having pain in right arm originating from the c-spine.  Seen by Dr. Madsen 2 weeks (Orthopedics) and told it was not Fibromyalgia (as diagnosed by Dr. Hawk).  On Lyrica and doesn't feel it helps.  On Opiods in past - [facility] - in [place], [place].  Has not been on this for 2 years.  Will refer to Pain.  Last seen on 5/29/2019.  On Gabapentin with not much benefit.  Will add Lidoderm 5% patch.  If not covered by her insurance, I will prescribe Voltaren gel as she requested.    -Renal - CKD - CMP on 4/18/2019 with creatinine 1.1 (GFR 59.2).    -GI - GERD - on Omeprazole prn.  No gastritis or esophagitis.    History of Gallstones - s/p Lap Janet 2012.    We discussed colon cancer screening - last colonoscopy was 2017 - has 2 polyps.  Will refer back to GI - she refused in 4/2019 .  Will send FIT test.  No FH of colon cancer.    -GYN - Last Pap was many years ago and normal. Will refer to GYN - missed  appointment on 5/1/2019.    Last Mammo was 5/2019 and normal.  We discussed DXA screening for osteoporosis - will discuss next visit.      -HCM - We discussed Flu (10/2018) and Tdap (4/19/2019) vaccinations.  We discussed Shingles (waiting for Shingrix Availability - advised to get on waiting list at pharmacy) and Pneumococcal (23 and 13 - had both and will get records) vaccinations.      -Follow Up - 3 months

## 2019-07-25 ENCOUNTER — HOSPITAL ENCOUNTER (OUTPATIENT)
Dept: RADIOLOGY | Facility: OTHER | Age: 69
Discharge: HOME | End: 2019-07-25
Attending: INTERNAL MEDICINE
Payer: MEDICARE

## 2019-07-25 ENCOUNTER — HOSPITAL ENCOUNTER (OUTPATIENT)
Dept: CARDIOLOGY | Facility: OTHER | Age: 69
Discharge: HOME | End: 2019-07-25
Attending: INTERNAL MEDICINE
Payer: MEDICARE

## 2019-07-25 ENCOUNTER — TELEPHONE (OUTPATIENT)
Dept: INTERNAL MEDICINE | Facility: CLINIC | Age: 69
End: 2019-07-25

## 2019-07-25 ENCOUNTER — OFFICE VISIT (OUTPATIENT)
Dept: PRIMARY CARE CLINIC | Facility: CLINIC | Age: 69
End: 2019-07-25
Payer: MEDICARE

## 2019-07-25 VITALS
BODY MASS INDEX: 24.24 KG/M2 | BODY MASS INDEX: 23.38 KG/M2 | HEIGHT: 70 IN | OXYGEN SATURATION: 96 % | HEIGHT: 71 IN | TEMPERATURE: 98 F | HEART RATE: 61 BPM | DIASTOLIC BLOOD PRESSURE: 57 MMHG | HEART RATE: 60 BPM | OXYGEN SATURATION: 95 % | WEIGHT: 169.31 LBS | DIASTOLIC BLOOD PRESSURE: 54 MMHG | WEIGHT: 167 LBS | SYSTOLIC BLOOD PRESSURE: 118 MMHG | SYSTOLIC BLOOD PRESSURE: 128 MMHG

## 2019-07-25 VITALS
HEIGHT: 70 IN | SYSTOLIC BLOOD PRESSURE: 128 MMHG | BODY MASS INDEX: 24.2 KG/M2 | DIASTOLIC BLOOD PRESSURE: 54 MMHG | WEIGHT: 169 LBS

## 2019-07-25 DIAGNOSIS — E78.5 HYPERLIPIDEMIA, UNSPECIFIED HYPERLIPIDEMIA TYPE: ICD-10-CM

## 2019-07-25 DIAGNOSIS — K21.9 GASTRO-ESOPHAGEAL REFLUX DISEASE WITHOUT ESOPHAGITIS: ICD-10-CM

## 2019-07-25 DIAGNOSIS — E11.51 TYPE 2 DIABETES MELLITUS WITH DIABETIC PERIPHERAL ANGIOPATHY WITHOUT GANGRENE, WITHOUT LONG-TERM CURRENT USE OF INSULIN: ICD-10-CM

## 2019-07-25 DIAGNOSIS — R01.1 SYSTOLIC MURMUR: ICD-10-CM

## 2019-07-25 DIAGNOSIS — M79.7 FIBROMYALGIA: ICD-10-CM

## 2019-07-25 DIAGNOSIS — Z12.11 COLON CANCER SCREENING: ICD-10-CM

## 2019-07-25 DIAGNOSIS — Z78.0 POST-MENOPAUSAL: ICD-10-CM

## 2019-07-25 DIAGNOSIS — M19.041 PRIMARY OSTEOARTHRITIS OF RIGHT HAND: ICD-10-CM

## 2019-07-25 DIAGNOSIS — N18.2 CHRONIC KIDNEY DISEASE, STAGE II (MILD): ICD-10-CM

## 2019-07-25 DIAGNOSIS — I73.9 PERIPHERAL VASCULAR DISEASE: ICD-10-CM

## 2019-07-25 DIAGNOSIS — I25.10 CORONARY ARTERY DISEASE INVOLVING NATIVE CORONARY ARTERY OF NATIVE HEART WITHOUT ANGINA PECTORIS: Primary | ICD-10-CM

## 2019-07-25 PROCEDURE — 99214 PR OFFICE/OUTPT VISIT, EST, LEVL IV, 30-39 MIN: ICD-10-PCS | Mod: S$GLB,,, | Performed by: INTERNAL MEDICINE

## 2019-07-25 PROCEDURE — 1101F PR PT FALLS ASSESS DOC 0-1 FALLS W/OUT INJ PAST YR: ICD-10-PCS | Mod: CPTII,S$GLB,, | Performed by: INTERNAL MEDICINE

## 2019-07-25 PROCEDURE — 3045F PR MOST RECENT HEMOGLOBIN A1C LEVEL 7.0-9.0%: CPT | Mod: CPTII,S$GLB,, | Performed by: INTERNAL MEDICINE

## 2019-07-25 PROCEDURE — 3074F PR MOST RECENT SYSTOLIC BLOOD PRESSURE < 130 MM HG: ICD-10-PCS | Mod: CPTII,S$GLB,, | Performed by: INTERNAL MEDICINE

## 2019-07-25 PROCEDURE — 99214 OFFICE O/P EST MOD 30 MIN: CPT | Mod: S$GLB,,, | Performed by: INTERNAL MEDICINE

## 2019-07-25 PROCEDURE — 3045F PR MOST RECENT HEMOGLOBIN A1C LEVEL 7.0-9.0%: ICD-10-PCS | Mod: CPTII,S$GLB,, | Performed by: INTERNAL MEDICINE

## 2019-07-25 PROCEDURE — 1101F PT FALLS ASSESS-DOCD LE1/YR: CPT | Mod: CPTII,S$GLB,, | Performed by: INTERNAL MEDICINE

## 2019-07-25 PROCEDURE — 99499 UNLISTED E&M SERVICE: CPT | Mod: S$GLB,,, | Performed by: INTERNAL MEDICINE

## 2019-07-25 PROCEDURE — 77080 DEXA BONE DENSITY SPINE HIP: ICD-10-PCS | Mod: 26,,, | Performed by: RADIOLOGY

## 2019-07-25 PROCEDURE — 99999 PR PBB SHADOW E&M-EST. PATIENT-LVL III: ICD-10-PCS | Mod: PBBFAC,,, | Performed by: INTERNAL MEDICINE

## 2019-07-25 PROCEDURE — 99999 PR PBB SHADOW E&M-EST. PATIENT-LVL III: CPT | Mod: PBBFAC,,, | Performed by: INTERNAL MEDICINE

## 2019-07-25 PROCEDURE — 93306 TRANSTHORACIC ECHO (TTE) COMPLETE (CUPID ONLY): ICD-10-PCS | Mod: 26,,, | Performed by: INTERNAL MEDICINE

## 2019-07-25 PROCEDURE — 77080 DXA BONE DENSITY AXIAL: CPT | Mod: 26,,, | Performed by: RADIOLOGY

## 2019-07-25 PROCEDURE — 3074F SYST BP LT 130 MM HG: CPT | Mod: CPTII,S$GLB,, | Performed by: INTERNAL MEDICINE

## 2019-07-25 PROCEDURE — 3078F DIAST BP <80 MM HG: CPT | Mod: CPTII,S$GLB,, | Performed by: INTERNAL MEDICINE

## 2019-07-25 PROCEDURE — 93306 TTE W/DOPPLER COMPLETE: CPT

## 2019-07-25 PROCEDURE — 77080 DXA BONE DENSITY AXIAL: CPT | Mod: TC

## 2019-07-25 PROCEDURE — 93306 TTE W/DOPPLER COMPLETE: CPT | Mod: 26,,, | Performed by: INTERNAL MEDICINE

## 2019-07-25 PROCEDURE — 99499 RISK ADDL DX/OHS AUDIT: ICD-10-PCS | Mod: S$GLB,,, | Performed by: INTERNAL MEDICINE

## 2019-07-25 PROCEDURE — 3078F PR MOST RECENT DIASTOLIC BLOOD PRESSURE < 80 MM HG: ICD-10-PCS | Mod: CPTII,S$GLB,, | Performed by: INTERNAL MEDICINE

## 2019-07-25 RX ORDER — LIDOCAINE 50 MG/G
1 PATCH TOPICAL DAILY
Qty: 30 PATCH | Refills: 3 | Status: SHIPPED | OUTPATIENT
Start: 2019-07-25 | End: 2019-07-30 | Stop reason: SDUPTHER

## 2019-07-25 NOTE — TELEPHONE ENCOUNTER
Call placed to patient regarding Bone Density test results. Results explained in detail. Patient verbalized understanding and had no further questions or concerns.     Elina Mendoza MA

## 2019-07-25 NOTE — TELEPHONE ENCOUNTER
----- Message from Suzi Estrada sent at 7/25/2019 12:31 PM CDT -----  Contact: LOUIE MASON [[id number]]      Can the clinic reply in [facility]:No    Please refill the medication(s) listed below. Please call the patient when the prescription(s) is ready for  at this phone number   [phone] /  cell [phone]      Medication #1 SITagliptin (JANUVIA) 100 MG Tab      [facility]  [address] [facility]       [phone] (Phone)  [phone] (Fax

## 2019-07-25 NOTE — TELEPHONE ENCOUNTER
----- Message from Gustavo Wallace MD sent at 7/25/2019 12:00 PM CDT -----  Please let patient know that her Bone Density test was overall good.  thanks

## 2019-07-26 ENCOUNTER — TELEPHONE (OUTPATIENT)
Dept: PRIMARY CARE CLINIC | Facility: CLINIC | Age: 69
End: 2019-07-26

## 2019-07-26 LAB
AORTIC ROOT ANNULUS: 2.23 CM
AORTIC VALVE CUSP SEPERATION: 1.89 CM
ASCENDING AORTA: 2.12 CM
AV INDEX (PROSTH): 0.97
AV MEAN GRADIENT: 6 MMHG
AV PEAK GRADIENT: 15 MMHG
AV VALVE AREA: 2.94 CM2
AV VELOCITY RATIO: 0.76
BSA FOR ECHO PROCEDURE: 1.95 M2
CV ECHO LV RWT: 0.49 CM
DOP CALC AO PEAK VEL: 1.94 M/S
DOP CALC AO VTI: 38.17 CM
DOP CALC LVOT AREA: 3 CM2
DOP CALC LVOT DIAMETER: 1.96 CM
DOP CALC LVOT PEAK VEL: 1.47 M/S
DOP CALC LVOT STROKE VOLUME: 112.18 CM3
DOP CALCLVOT PEAK VEL VTI: 37.2 CM
E WAVE DECELERATION TIME: 189.5 MSEC
E/A RATIO: 0.78
E/E' RATIO: 10 M/S
ECHO LV POSTERIOR WALL: 1.2 CM (ref 0.6–1.1)
FRACTIONAL SHORTENING: 32 % (ref 28–44)
INTERVENTRICULAR SEPTUM: 1.14 CM (ref 0.6–1.1)
LA MAJOR: 4.59 CM
LA MINOR: 5.18 CM
LA WIDTH: 3.84 CM
LEFT ATRIUM SIZE: 3.41 CM
LEFT ATRIUM VOLUME INDEX: 27.9 ML/M2
LEFT ATRIUM VOLUME: 54.17 CM3
LEFT INTERNAL DIMENSION IN SYSTOLE: 3.34 CM (ref 2.1–4)
LEFT VENTRICLE DIASTOLIC VOLUME INDEX: 58.07 ML/M2
LEFT VENTRICLE DIASTOLIC VOLUME: 112.83 ML
LEFT VENTRICLE MASS INDEX: 112 G/M2
LEFT VENTRICLE SYSTOLIC VOLUME INDEX: 23.4 ML/M2
LEFT VENTRICLE SYSTOLIC VOLUME: 45.39 ML
LEFT VENTRICULAR INTERNAL DIMENSION IN DIASTOLE: 4.9 CM (ref 3.5–6)
LEFT VENTRICULAR MASS: 218.46 G
LV LATERAL E/E' RATIO: 10 M/S
LV SEPTAL E/E' RATIO: 10 M/S
MV PEAK A VEL: 1.03 M/S
MV PEAK E VEL: 0.8 M/S
PISA TR MAX VEL: 2.21 M/S
RA MAJOR: 3.54 CM
RA WIDTH: 3.02 CM
RIGHT VENTRICULAR END-DIASTOLIC DIMENSION: 2.97 CM
SINUS: 2.52 CM
STJ: 2.19 CM
TDI LATERAL: 0.08 M/S
TDI SEPTAL: 0.08 M/S
TDI: 0.08 M/S
TR MAX PG: 20 MMHG

## 2019-07-26 NOTE — TELEPHONE ENCOUNTER
Call placed to pt regarding transthoracic echo test. Results given , The patient verbalized understanding and had no further questions or concerns.  Manolo Escoto LPN

## 2019-07-26 NOTE — TELEPHONE ENCOUNTER
----- Message from Gustavo Wallace MD sent at 7/26/2019 10:51 AM CDT -----  Please let patient know that her echo showed mild enlargement of the heart, which is most likely a result of her hypertension.  The heart's squeeze is normal, but does not relax well.  It is important that we continue to control her blood pressure well.  The heart valves are overall good.    Thank you

## 2019-07-29 ENCOUNTER — LAB VISIT (OUTPATIENT)
Dept: LAB | Facility: HOSPITAL | Age: 69
End: 2019-07-29
Attending: INTERNAL MEDICINE
Payer: MEDICARE

## 2019-07-29 DIAGNOSIS — Z12.11 COLON CANCER SCREENING: ICD-10-CM

## 2019-07-29 PROCEDURE — 82274 ASSAY TEST FOR BLOOD FECAL: CPT

## 2019-07-30 ENCOUNTER — TELEPHONE (OUTPATIENT)
Dept: PHARMACY | Facility: CLINIC | Age: 69
End: 2019-07-30

## 2019-07-30 DIAGNOSIS — M79.7 FIBROMYALGIA: Primary | ICD-10-CM

## 2019-07-30 NOTE — TELEPHONE ENCOUNTER
Good Afternoon.    The prior authorization for the Diclofenac Gel 1% prescription will not approved per the patient's [facility] [facility] Medicare plan. The medication is only FDA approved for Diabetic Neuropathy, Neuropathic Cancer, or Post-Herpatic Neuralgia. Per the diagnosis provided this authorization is not appropriate.     A failed attempt to notify the patient has been made.     If there are any additional questions or concerns please contact the pharmacy at, [phone].    Thanks.    Susanne Best CPhT.  Patient Care Advocate  [facility]   [email or web address]

## 2019-07-30 NOTE — TELEPHONE ENCOUNTER
----- Message from Camren Gamez sent at 7/30/2019  3:52 PM CDT -----  Contact: Self   Type: RX Refill Request    Who Called:  Self   Refill or New Rx: refill     RX Name and Strength:  lidocaine (LIDODERM) 5 %    How is the patient currently taking it? (ex. 1XDay):     Is this a 30 day or 90 day RX: 90    Preferred Pharmacy with phone number:[facility] - [address] [phone] (Phone)  [phone] (Fax)      Local or Mail Order: local     Ordering Provider: Rodrigo   Would the patient rather a call back or a response via [facility]?  Call     Best Call Back Number: [phone]    Patient wants it to be sent to [facility] pharmacy instead because [facility] did not contact her insurance company so she would prefer to send it [facility].

## 2019-07-31 LAB — HEMOCCULT STL QL IA: NEGATIVE

## 2019-07-31 RX ORDER — LIDOCAINE 50 MG/G
1 PATCH TOPICAL DAILY
Qty: 30 PATCH | Refills: 3 | Status: SHIPPED | OUTPATIENT
Start: 2019-07-31 | End: 2019-11-26

## 2019-07-31 NOTE — TELEPHONE ENCOUNTER
----- Message from Gustavo Wallace MD sent at 7/31/2019  1:40 PM CDT -----  Please let patient know that her FIT stool test is negative.

## 2019-07-31 NOTE — TELEPHONE ENCOUNTER
Call placed to pt regarding results of FIT Stool Test. Left voice message for pt to call office with any questions or concerns.  Manolo Escoto LPN

## 2019-08-01 ENCOUNTER — TELEPHONE (OUTPATIENT)
Dept: PRIMARY CARE CLINIC | Facility: CLINIC | Age: 69
End: 2019-08-01

## 2019-08-01 NOTE — TELEPHONE ENCOUNTER
----- Message from Mindy Peoples sent at 8/1/2019  9:37 AM CDT -----  Contact: pt  Name of Who is Calling: Meenu      What is the request in detail: pt states that she received a call on yesterday. Pt states that she thinks it may be in reference to some tests she took. Please call to advise      Can the clinic reply by [facility]: no      What Number to Call Back [facility]: [phone]

## 2019-08-02 ENCOUNTER — TELEPHONE (OUTPATIENT)
Dept: PRIMARY CARE CLINIC | Facility: CLINIC | Age: 69
End: 2019-08-02

## 2019-08-02 NOTE — TELEPHONE ENCOUNTER
----- Message from Es Lara sent at 8/2/2019 10:00 AM CDT -----  Contact: Self   Name of Who is Calling: LOUIE MASON [[id number]]      What is the request in detail: pt states that the pharmacy     ( [place], [place] [facility] [phone] (Phone)  [phone] (Fax))  informed her that the lidocaine (LIDODERM) 5 % requires a PA.  Please contact to further discuss and advise.    Can the clinic reply by [facility]: N       What Number [facility] Back [facility]: [phone]

## 2019-08-05 RX ORDER — CARVEDILOL 6.25 MG/1
6.25 TABLET ORAL 2 TIMES DAILY
Qty: 30 TABLET | Refills: 3 | Status: SHIPPED | OUTPATIENT
Start: 2019-08-05 | End: 2019-09-13 | Stop reason: SDUPTHER

## 2019-08-05 NOTE — TELEPHONE ENCOUNTER
----- Message from Es Lara sent at 8/5/2019 12:57 PM CDT -----  Contact: Self   Can the clinic reply in [facility]: N       Please refill the medication(s) listed below. Please call the patient when the prescription(s) is ready for  at this phone number  [phone]      Medication #1 carvedilol (COREG) 6.25 MG tablet    Medication #2       Preferred Pharmacy:  [address] [phone] (Phone)  [phone] (Fax)

## 2019-08-07 ENCOUNTER — TELEPHONE (OUTPATIENT)
Dept: INTERNAL MEDICINE | Facility: CLINIC | Age: 69
End: 2019-08-07

## 2019-08-07 NOTE — TELEPHONE ENCOUNTER
lvm that I called yesterday and PA was approved and she can  her lidocaine patches at [facility] if they did not call and tell her already

## 2019-08-13 ENCOUNTER — PATIENT OUTREACH (OUTPATIENT)
Dept: ADMINISTRATIVE | Facility: HOSPITAL | Age: 69
End: 2019-08-13

## 2019-08-20 ENCOUNTER — TELEPHONE (OUTPATIENT)
Dept: PRIMARY CARE CLINIC | Facility: CLINIC | Age: 69
End: 2019-08-20

## 2019-08-20 RX ORDER — IBUPROFEN 600 MG/1
600 TABLET ORAL
Refills: 0 | Status: CANCELLED | OUTPATIENT
Start: 2019-08-20

## 2019-08-20 NOTE — TELEPHONE ENCOUNTER
----- Message from Anthony Lester sent at 8/20/2019 11:47 AM CDT -----  Contact: LOUIE MASON [[id number]]    Can the clinic reply in [facility]: no      Please refill the medication(s) listed below. Please call the patient when the prescription(s) is ready for  at this phone number   [phone]      Medication #1 ibuprofen (ADVIL,MOTRIN) 600 MG tablet    Preferred Pharmacy: [address]

## 2019-08-22 ENCOUNTER — PATIENT OUTREACH (OUTPATIENT)
Dept: ADMINISTRATIVE | Facility: HOSPITAL | Age: 69
End: 2019-08-22

## 2019-08-22 DIAGNOSIS — E11.9 TYPE 2 DIABETES MELLITUS WITHOUT COMPLICATION, WITHOUT LONG-TERM CURRENT USE OF INSULIN: Primary | ICD-10-CM

## 2019-08-23 ENCOUNTER — TELEPHONE (OUTPATIENT)
Dept: PRIMARY CARE CLINIC | Facility: CLINIC | Age: 69
End: 2019-08-23

## 2019-08-23 NOTE — TELEPHONE ENCOUNTER
Call placed to pt regarding refill on medication. Pt stated insurance company will not cover Januvia 100 mg until next year. Pt was instructed to call insurance company and ask what alternative medication is covered. The patient verbalized understanding and had no further questions or concerns.  Manolo Escoto LPN

## 2019-08-23 NOTE — TELEPHONE ENCOUNTER
----- Message from Dayo Aiken sent at 8/22/2019  4:05 PM CDT -----  Contact: Pt  Name of Who is Calling: LOUIE MASON [[id number]]    What is the request in detail:Pt is requesting a call back regarding her medication....   Please contact to further discuss and advise      Can the clinic reply by [facility]: No     What [facility]:  [phone]

## 2019-08-26 DIAGNOSIS — E11.51 TYPE 2 DIABETES MELLITUS WITH DIABETIC PERIPHERAL ANGIOPATHY WITHOUT GANGRENE, WITHOUT LONG-TERM CURRENT USE OF INSULIN: Primary | ICD-10-CM

## 2019-08-26 RX ORDER — GLIMEPIRIDE 4 MG/1
4 TABLET ORAL
Qty: 90 TABLET | Refills: 3 | OUTPATIENT
Start: 2019-08-26 | End: 2020-08-25

## 2019-08-26 RX ORDER — ACARBOSE 50 MG/1
25 TABLET ORAL
Qty: 270 TABLET | Refills: 3 | Status: CANCELLED | OUTPATIENT
Start: 2019-08-26 | End: 2020-08-25

## 2019-08-26 RX ORDER — GLIMEPIRIDE 2 MG/1
2 TABLET ORAL
Qty: 30 TABLET | Refills: 3 | Status: SHIPPED | OUTPATIENT
Start: 2019-08-26 | End: 2020-03-16 | Stop reason: SDUPTHER

## 2019-08-26 RX ORDER — ACARBOSE 25 MG/1
25 TABLET ORAL
Qty: 270 TABLET | Refills: 3 | OUTPATIENT
Start: 2019-08-26 | End: 2020-08-25

## 2019-08-26 NOTE — TELEPHONE ENCOUNTER
----- Message from Josselin Sorensen sent at 8/26/2019 11:52 AM CDT -----  Contact: LOUIE MASON [[id number]]  Name of Who is Calling: LOUIE MASON [[id number]]    What is the request in detail: Would like to speak with staff in regards to alternative for SITagliptin (JANUVIA) 100 MG Tab. [facility] gave two alternatives: Tier 1- Glimepiride and Tier 2- Acarbose. Please contact to further discuss and advise      Can the clinic reply by [facility]: no    What Number to Call Back if not [facility]: [phone] or [phone]

## 2019-08-27 ENCOUNTER — TELEPHONE (OUTPATIENT)
Dept: INTERNAL MEDICINE | Facility: CLINIC | Age: 69
End: 2019-08-27

## 2019-08-27 NOTE — TELEPHONE ENCOUNTER
Spoke to pt and told her that glimepirdie was sent in to replace her januvia and reminded her 10/24 appt  Pt said she saw her eye appt already

## 2019-08-28 ENCOUNTER — PATIENT OUTREACH (OUTPATIENT)
Dept: ADMINISTRATIVE | Facility: HOSPITAL | Age: 69
End: 2019-08-28

## 2019-08-29 ENCOUNTER — PATIENT OUTREACH (OUTPATIENT)
Dept: ADMINISTRATIVE | Facility: HOSPITAL | Age: 69
End: 2019-08-29

## 2019-08-29 DIAGNOSIS — E11.9 TYPE 2 DIABETES MELLITUS WITHOUT COMPLICATION, WITHOUT LONG-TERM CURRENT USE OF INSULIN: Primary | ICD-10-CM

## 2019-09-03 ENCOUNTER — TELEPHONE (OUTPATIENT)
Dept: INTERNAL MEDICINE | Facility: CLINIC | Age: 69
End: 2019-09-03

## 2019-09-03 NOTE — TELEPHONE ENCOUNTER
----- Message from Josselin Sorensen sent at 9/3/2019  3:45 PM CDT -----  Contact: LOUIE MASON [[id number]]  Type:  Patient Returning Call    Who Called: LOUIE MASON [[id number]]    Who Left Message for Patient:     Does the patient know what this is regarding?: yes    Best Call Back Number: [phone]    Additional Information:

## 2019-09-13 RX ORDER — CARVEDILOL 6.25 MG/1
6.25 TABLET ORAL 2 TIMES DAILY
Qty: 30 TABLET | Refills: 3 | Status: SHIPPED | OUTPATIENT
Start: 2019-09-13 | End: 2020-02-10 | Stop reason: SDUPTHER

## 2019-09-17 ENCOUNTER — LAB VISIT (OUTPATIENT)
Dept: LAB | Facility: HOSPITAL | Age: 69
End: 2019-09-17
Attending: INTERNAL MEDICINE
Payer: MEDICARE

## 2019-09-17 DIAGNOSIS — E11.9 TYPE 2 DIABETES MELLITUS WITHOUT COMPLICATION, WITHOUT LONG-TERM CURRENT USE OF INSULIN: ICD-10-CM

## 2019-09-17 PROCEDURE — 83036 HEMOGLOBIN GLYCOSYLATED A1C: CPT

## 2019-09-17 PROCEDURE — 36415 COLL VENOUS BLD VENIPUNCTURE: CPT | Mod: PN

## 2019-09-18 DIAGNOSIS — E11.9 TYPE 2 DIABETES MELLITUS WITHOUT COMPLICATION, WITHOUT LONG-TERM CURRENT USE OF INSULIN: Primary | ICD-10-CM

## 2019-09-18 LAB
ESTIMATED AVG GLUCOSE: 186 MG/DL (ref 68–131)
HBA1C MFR BLD HPLC: 8.1 % (ref 4–5.6)

## 2019-09-19 ENCOUNTER — TELEPHONE (OUTPATIENT)
Dept: PRIMARY CARE CLINIC | Facility: CLINIC | Age: 69
End: 2019-09-19

## 2019-09-19 NOTE — TELEPHONE ENCOUNTER
Pt verbalized understanding that her A1C was still high.   I would like her to see Diabetic Education.   If she agrees, can you help her schedule appointment?   thanks  Pt is scheduled for 10/3/19 [email or web address]

## 2019-09-19 NOTE — TELEPHONE ENCOUNTER
----- Message from Gustavo Wallace MD sent at 9/18/2019  6:31 PM CDT -----  Please let patient know that her A1C was still high.  I would like her to see Diabetic Education.  If she agrees, can you help her schedule appointment?  thanks

## 2019-09-20 ENCOUNTER — PATIENT OUTREACH (OUTPATIENT)
Dept: ADMINISTRATIVE | Facility: HOSPITAL | Age: 69
End: 2019-09-20

## 2019-09-23 ENCOUNTER — PATIENT OUTREACH (OUTPATIENT)
Dept: ADMINISTRATIVE | Facility: HOSPITAL | Age: 69
End: 2019-09-23

## 2019-09-25 ENCOUNTER — PATIENT OUTREACH (OUTPATIENT)
Dept: ADMINISTRATIVE | Facility: OTHER | Age: 69
End: 2019-09-25

## 2019-09-26 ENCOUNTER — OFFICE VISIT (OUTPATIENT)
Dept: PODIATRY | Facility: CLINIC | Age: 69
End: 2019-09-26
Payer: MEDICARE

## 2019-09-26 VITALS — SYSTOLIC BLOOD PRESSURE: 150 MMHG | DIASTOLIC BLOOD PRESSURE: 67 MMHG | HEART RATE: 64 BPM

## 2019-09-26 DIAGNOSIS — E11.51 TYPE II DIABETES MELLITUS WITH PERIPHERAL CIRCULATORY DISORDER: ICD-10-CM

## 2019-09-26 DIAGNOSIS — I73.9 PERIPHERAL VASCULAR DISEASE: Primary | ICD-10-CM

## 2019-09-26 DIAGNOSIS — E11.9 ENCOUNTER FOR DIABETIC FOOT EXAM: ICD-10-CM

## 2019-09-26 PROCEDURE — 99999 PR PBB SHADOW E&M-EST. PATIENT-LVL II: CPT | Mod: PBBFAC,,, | Performed by: PODIATRIST

## 2019-09-26 PROCEDURE — 99499 UNLISTED E&M SERVICE: CPT | Mod: S$GLB,,, | Performed by: PODIATRIST

## 2019-09-26 PROCEDURE — 3078F DIAST BP <80 MM HG: CPT | Mod: CPTII,S$GLB,, | Performed by: PODIATRIST

## 2019-09-26 PROCEDURE — 3078F PR MOST RECENT DIASTOLIC BLOOD PRESSURE < 80 MM HG: ICD-10-PCS | Mod: CPTII,S$GLB,, | Performed by: PODIATRIST

## 2019-09-26 PROCEDURE — 99999 PR PBB SHADOW E&M-EST. PATIENT-LVL II: ICD-10-PCS | Mod: PBBFAC,,, | Performed by: PODIATRIST

## 2019-09-26 PROCEDURE — 1101F PR PT FALLS ASSESS DOC 0-1 FALLS W/OUT INJ PAST YR: ICD-10-PCS | Mod: CPTII,S$GLB,, | Performed by: PODIATRIST

## 2019-09-26 PROCEDURE — 3077F PR MOST RECENT SYSTOLIC BLOOD PRESSURE >= 140 MM HG: ICD-10-PCS | Mod: CPTII,S$GLB,, | Performed by: PODIATRIST

## 2019-09-26 PROCEDURE — 3077F SYST BP >= 140 MM HG: CPT | Mod: CPTII,S$GLB,, | Performed by: PODIATRIST

## 2019-09-26 PROCEDURE — 3045F PR MOST RECENT HEMOGLOBIN A1C LEVEL 7.0-9.0%: ICD-10-PCS | Mod: CPTII,S$GLB,, | Performed by: PODIATRIST

## 2019-09-26 PROCEDURE — 99204 PR OFFICE/OUTPT VISIT, NEW, LEVL IV, 45-59 MIN: ICD-10-PCS | Mod: S$GLB,,, | Performed by: PODIATRIST

## 2019-09-26 PROCEDURE — 3045F PR MOST RECENT HEMOGLOBIN A1C LEVEL 7.0-9.0%: CPT | Mod: CPTII,S$GLB,, | Performed by: PODIATRIST

## 2019-09-26 PROCEDURE — 99204 OFFICE O/P NEW MOD 45 MIN: CPT | Mod: S$GLB,,, | Performed by: PODIATRIST

## 2019-09-26 PROCEDURE — 99499 RISK ADDL DX/OHS AUDIT: ICD-10-PCS | Mod: S$GLB,,, | Performed by: PODIATRIST

## 2019-09-26 PROCEDURE — 1101F PT FALLS ASSESS-DOCD LE1/YR: CPT | Mod: CPTII,S$GLB,, | Performed by: PODIATRIST

## 2019-09-26 NOTE — PATIENT INSTRUCTIONS
How to Check Your Feet    Below are tips to help you look for foot problems. Try to check your feet at the same time each day, such as when you get out of bed in the morning.    · Check the top of each foot. The tops of toes, back of the heel, and outer edge of the foot can get a lot of rubbing from poor-fitting shoes.    · Check the bottom of each foot. Daily wear and tear often leads to problems at pressure spots.    · Check the toes and nails. Fungal infections often occur between toes. Toenail problems can also be a sign of fungal infections or lead to breaks in the skin.    · Check your shoes, too. Loose objects inside a shoe can injure the foot. Use your hand to feel inside your shoes for things like tami, loose stitching, or rough areas that could irritate your skin.        Diabetic Foot Care    Diabetes can lead to a number of different foot complications. Fortunately, most of these complications can be prevented with a little extra foot care. If diabetes is not well controlled, the high blood sugar can cause damage to blood vessels and result in poor circulation to the foot. When the skin does not get enough blood flow, it becomes prone to pressure sores and ulcers, which heal slowly.  High blood sugar can also damage nerves, interfering with the ability to feel pain and pressure. When you cant feel your foot normally, it is easy to injure your skin, bones and joints without knowing it. For these reasons diabetes increases the risk of fungal infections, bunions and ulcers. Deep ulcers can lead to bone infection. Gangrene is the most serious foot complication of diabetes. It usually occurs on the tips of the toes as blacked areas of skin. The black area is dead tissue. In severe cases, gangrene spreads to involve the entire toe, other toes and the entire foot. Foot or toe amputation may be required. Good foot care and blood sugar control can prevent this.    Home Care  1. Wear comfortable, proper fitting  shoes.  2. Wash your feet daily with warm water and mild soap.  3. After drying, apply a moisturizing cream or lotion.  4. Check your feet daily for skin breaks, blisters, swelling, or redness. Look between your toes also.  5. Wear cotton socks and change them every day.  6. Trim toe nails carefully and do not cut your cuticles.  7. Strive to keep your blood sugar under control with a combination of medicines, diet and activity.  8. If you smoke and have diabetes, it is very important that you stop. Smoking reduces blood flow to your foot.  9. Avoid activities that increase your risk of foot injury:  · Do not walk barefoot.  · Do not use heating pads or hot water bottles on your feet.  · Do not put your foot in a hot tub without first checking the temperature with your hand.  10) Schedule yearly foot exams.    Follow Up  with your doctor or as advised by our staff. Report any cut, puncture, scrape, other injury, blister, ingrown toenail or ulcer on your foot.    Get Prompt Medical Attention  if any of the following occur:  -- Open ulcer with pus draining from the wound  -- Increasing foot or leg pain  -- New areas of redness or swelling or tender areas of the foot    © 1587-5699 [employer]. [address] [place] [address], [place] [address]. All rights reserved. This information is not intended as a substitute for professional medical care. Always follow your healthcare professional's instructions.

## 2019-09-26 NOTE — PROGRESS NOTES
Chief Complaint   Patient presents with    Diabetic Foot Exam              HPI:   The patient is a 69 y.o.  female  who presents for a diabetic foot exam.     Patient reports no presence of abnormal sensation to the feet .    History of diabetic foot ulcers: none   History of foot surgery: none.     Shoes worn today:  Thick sole flip flops  She has history of PVD without ulcerations          Primary care doctor is: Gustavo Wallace MD  Chief Complaint   Patient presents with    Diabetic Foot Exam            Patient Active Problem List   Diagnosis    Diabetes mellitus    Peripheral vascular disease    Coronary artery disease    Acute sinusitis    Body mass index (bmi) 25.0-25.9, adult    Chronic kidney disease, stage II (mild)    Fibromyalgia    Functional diarrhea    Gastro-esophageal reflux disease without esophagitis    Hyperlipidemia    Primary osteoarthritis of right hand    Type 2 diabetes mellitus with diabetic peripheral angiopathy without gangrene           Current Outpatient Medications on File Prior to Visit   Medication Sig Dispense Refill    amLODIPine (NORVASC) 10 MG tablet Take 1 tablet (10 mg total) by mouth once daily. 90 tablet 0    aspirin (ECOTRIN) 81 MG EC tablet Take 1 tablet by mouth.      atorvastatin (LIPITOR) 20 MG tablet Take 20 mg by mouth once daily.      blood sugar diagnostic Strp 1 each by Misc.(Non-Drug; Combo Route) route 3 (three) times daily. 200 each 1    blood-glucose meter kit Use as instructed 1 each 0    carvedilol (COREG) 6.25 MG tablet Take 1 tablet (6.25 mg total) by mouth 2 (two) times daily. 30 tablet 3    glimepiride (AMARYL) 2 MG tablet Take 1 tablet (2 mg total) by mouth before breakfast. 30 tablet 3    lancets Misc 1 each by Misc.(Non-Drug; Combo Route) route 3 (three) times daily. 200 each 1    lidocaine (LIDODERM) 5 % Place 1 patch onto the skin once daily. Remove & Discard patch within 12 hours or as directed by MD 30 patch 3    metFORMIN  (GLUCOPHAGE) 500 MG tablet Take 1 tablet (500 mg total) by mouth 2 (two) times daily. 180 tablet 1    omeprazole (PRILOSEC) 20 MG capsule Take 20 mg by mouth once daily.      gabapentin (NEURONTIN) 300 MG capsule Take 1 capsule at bedtime for 1 week, then take 1 capsule twice a day for 1 week, then increase to 1 capsule three times a day. (Patient not taking: Reported on 9/26/2019) 90 capsule 2    ibuprofen (ADVIL,MOTRIN) 600 MG tablet   0     No current facility-administered medications on file prior to visit.            Review of patient's allergies indicates:   Allergen Reactions    Statins-hmg-coa reductase inhibitors Edema    Ace inhibitors Swelling           Social History     Socioeconomic History    Marital status:      Spouse name: Not on file    Number of children: Not on file    Years of education: Not on file    Highest education level: Not on file   Occupational History    Not on file   Social Needs    Financial resource strain: Not on file    Food insecurity:     Worry: Not on file     Inability: Not on file    Transportation needs:     Medical: Not on file     Non-medical: Not on file   Tobacco Use    Smoking status: Never Smoker    Smokeless tobacco: Never Used   Substance and Sexual Activity    Alcohol use: Yes     Frequency: Never     Comment: socially    Drug use: No    Sexual activity: Yes     Partners: Male   Lifestyle    Physical activity:     Days per week: Not on file     Minutes per session: Not on file    Stress: Not on file   Relationships    Social connections:     Talks on phone: Not on file     Gets together: Not on file     Attends Yarsanism service: Not on file     Active member of club or organization: Not on file     Attends meetings of clubs or organizations: Not on file     Relationship status: Not on file   Other Topics Concern    Not on file   Social History Narrative    Not on file           ROS:  General ROS: negative  Respiratory ROS: no cough,  shortness of breath, or wheezing  Cardiovascular ROS: no chest pain or dyspnea on exertion  Musculoskeletal ROS: negative  Neurological ROS:   negative for - impaired coordination/balance or numbness/tingling  Dermatological ROS: negative      LAST HbA1c:   Lab Results   Component Value Date    HGBA1C 8.1 (H) 09/18/2019           EXAM:   Vitals:    09/26/19 1338   BP: (!) 150/67   Pulse: 64       General: alert, no distress, cooperative    Vascular:   Dorsalis Pedis:  absent   Posterior Tibial:  diminished, but dopplerable  Capillary refill time:  3 seconds  Temperature of toes cool to touch  Edema: Absent bilaterally      Neurological:     Sharp touch:  normal  Light touch: normal  Tinels Sign:  Absent  Mulders Click:   Absent  [place]:  intact x 10       Dermatological:   Skin: warm and dry  Wounds/Ulcers:  Absent  Bruising:  Absent  Erythema:  Absent  Toenails:  Elongated by 1mm, thickened by 1mm and  Polished.    Absent paronychia      Musculoskeletal:   Metatarsophalangeal range of motion:   diminished range of motion  Subtalar joint range of motion: full range of motion  Ankle joint range of motion:  full range of motion  Bunions:  Present  Hammertoes: Present               ASSESSMENT/PLAN:          Problem List Items Addressed This Visit     Peripheral vascular disease - Primary      Other Visit Diagnoses     Encounter for diabetic foot exam        Type II diabetes mellitus with peripheral circulatory disorder                I counseled the patient on the patient's conditions, their implications and medical management.       Shoe inspection. Diabetic Foot Education. Patient reminded of the importance of good nutrition and blood sugar control to help prevent podiatric complications of diabetes. Patient instructed on proper foot hygiene. We discussed wearing proper shoe gear, daily foot inspections, never walking without protective shoe gear, never putting sharp instruments to feet.      Annual diabetes foot  exam.                  Lashay Burgos DPM  NPI: [id number]       [facility] - PODIATRY  [address] [address]  Dept: [phone]  Dept Fax: [phone]

## 2019-09-26 NOTE — LETTER
September 26, 2019      Gustavo Wallace MD  [address]  [address]  [facility] [address]           [place] - Podiatry  [address] [facility] [address]  [facility] [address]  Phone: [phone]  Fax: [phone]          Patient: Meenu Guy   MR Number: [id number]   YOB: 1950   Date of Visit: 9/26/2019       Dear Dr. Gustavo Wallace:    Thank you for referring Meenu Guy to me for evaluation. Attached you will find relevant portions of my assessment and plan of care.    If you have questions, please do not hesitate to call me. I look forward to following Meenu Guy along with you.    Sincerely,    Lashay Burgos DPM    Enclosure  CC:  No Recipients    If you would like to receive this communication electronically, please contact [email or web address] or [phone] to request more information on [facility] Link access.    For providers and/or their staff who would like to refer a patient to [facility], please contact us through our one-stop-shop provider referral line, [facility] [place], at [phone].    If you feel you have received this communication in error or would no longer like to receive these types of communications, please e-mail [email or web address]

## 2019-09-27 ENCOUNTER — PATIENT OUTREACH (OUTPATIENT)
Dept: ADMINISTRATIVE | Facility: HOSPITAL | Age: 69
End: 2019-09-27

## 2019-10-03 ENCOUNTER — HOSPITAL ENCOUNTER (OUTPATIENT)
Dept: DIABETES | Facility: OTHER | Age: 69
Discharge: HOME | End: 2019-10-03
Attending: INTERNAL MEDICINE
Payer: MEDICARE

## 2019-10-03 VITALS — WEIGHT: 168.63 LBS | BODY MASS INDEX: 24.14 KG/M2 | HEIGHT: 70 IN

## 2019-10-03 DIAGNOSIS — E11.9 TYPE 2 DIABETES MELLITUS WITHOUT COMPLICATION, WITHOUT LONG-TERM CURRENT USE OF INSULIN: ICD-10-CM

## 2019-10-03 DIAGNOSIS — E11.8 TYPE 2 DIABETES MELLITUS WITH COMPLICATION, UNSPECIFIED WHETHER LONG TERM INSULIN USE: Primary | ICD-10-CM

## 2019-10-03 PROCEDURE — G0108 DIAB MANAGE TRN  PER INDIV: HCPCS

## 2019-10-04 ENCOUNTER — PATIENT OUTREACH (OUTPATIENT)
Dept: ADMINISTRATIVE | Facility: HOSPITAL | Age: 69
End: 2019-10-04

## 2019-10-04 NOTE — PROGRESS NOTES
Diabetes Education  Author: Kezia Ambrose RN  Date: 10/4/2019    Diabetes Care Management Summary  Diabetes Education Record Assessment/Progress: Initial  Current Diabetes Risk Level: Low         Diabetes Type  Diabetes Type : Type II    Diabetes History  Diabetes Diagnosis: >10 years  Current Treatment: Oral Medication  Reviewed Problem List with Patient: Yes    Health Maintenance was reviewed today with patient. Discussed with patient importance of routine eye exams, foot exams/foot care, blood work (i.e.: A1c, microalbumin, and lipid), dental visits, yearly flu vaccine, and pneumonia vaccine as indicated by PCP. Patient verbalized understanding.     Health Maintenance Topics with due status: Not Due       Topic Last Completion Date    Lipid Panel 2019    TETANUS VACCINE 2019    Mammogram 2019    DEXA SCAN 2019    Fecal Occult Blood Test (FOBT)/FitKit 2019    Hemoglobin A1c 2019    Aspirin/Antiplatelet Therapy 2019    Foot Exam 2019     Health Maintenance Due   Topic Date Due    Hepatitis C Screening  1950    Eye Exam  1960    Urine Microalbumin  1959    Pneumococcal Vaccine (65+ High/Highest Risk) (1 of 2 - PCV13) 2014       Nutrition  Meal Plan 24 Hour Recall - Breakfast: bran muffin, cup of coffee w/ sweet n low   Meal Plan 24 Hour Recall - Lunch: crab salad, coke   Meal Plan 24 Hour Recall - Dinner: glucerna   Meal Plan 24 Hour Recall - Snack: 2 oreo cookies     Monitoring   Self Monitoring : SMBG: AM Fastin - hasn't been checking very regularly   Blood Glucose Logs: No  Do you use a personal continuous glucose monitor?: No  In the last month, how often have you had a low blood sugar reaction?: never    Exercise   Exercise Type: (having pain in arm d/t disc in neck)  Frequency: Never    Current Diabetes Treatment   Current Treatment: Oral Medication    Social History  Preferred Learning Method: Face to Face  Primary Support:  Spouse  Educational Level: High School  Occupation: retired   Smoking Status: Ex Smoker(quit about 12-13 years ago)  Alcohol Use: Rarely                                Barriers to Change  Barriers to Change: None  Learning Challenges : None    Readiness to Learn   Readiness to Learn : Acceptance    Cultural Influences  Cultural Influences: No    Diabetes Education Assessment/Progress  Diabetes Disease Process (diabetes disease process and treatment options): Discussion, Requires Assistance Family/SO, Comprehends Key Points, Individual Session(ed on insulin resistance, beta cell burnout and importance of using lifestyle changes + meds as needed to keep BG well controlled)  Nutrition (Incorporating nutritional management into one's lifestyle): Discussion, Requires Assistance Family/SO, Individual Session, Written Materials Provided, Demonstration(ed on the plate method, increasing veggies, choosing lean protein + healthy fats and portioned servings of complex cho's w/ each meal )  Physical Activity (incorporating physical activity into one's lifestyle): Discussion, Requires Assistance Family/SO, Individual Session(ed on role/benefit of activity, reviewed ADA recs for physical activity and safety considerations)  Medications (states correct name, dose, onset, peak, duration, side effects & timing of meds): Discussion, Requires Assistance Family/SO, Individual Session(ed on how metformin and glimeperide work together to help keep BG well controlled)  Monitoring (monitoring blood glucose/other parameters & using results): Discussion, Requires Assistance Family/SO, Written Materials Provided, Individual Session(ed on importance of monitoring BG to trend progress of regime changes, ed on SMBG schedule and goal BG)  Acute Complications (preventing, detecting, and treating acute complications): Discussion, Requires Assistance Family/SO, Individual Session(reviewed s/s of low BG and how to prevent and treat)  Chronic  Complications (preventing, detecting, and treating chronic complications): Discussion, Requires Assistance Family/SO, Individual Session, Written Materials Provided(ed on current A1C, goal A1C and importance of keeping BG well controlled to prevent complications r/t DM)  Clinical (diabetes, other pertinent medical history, and relevant comorbidities reviewed during visit): Discussion, Requires Assistance Family/SO, Individual Session  Cognitive (knowledge of self-management skills, functional health literacy): Discussion, Requires Assistance Family/SO, Individual Session  Psychosocial (emotional response to diabetes): Discussion, Requires Assistance Family/SO, Individual Session  Diabetes Distress and Support Systems: Discussion, Requires Assistance Family/SO, Individual Session  Behavioral (readiness for change, lifestyle practices, self-care behaviors): Discussion, Requires Assistance Family/SO, Individual Session(pt motivated to adjust lifestyle changes - does not want injections, but wants to keep BG well controlled)    Goals  Patient has selected/evaluated goals during today's session: Yes, selected  Healthy Eating: Set(pt will limit cho's per meal to about 30-45gm, will work on not skipping meals)  Start Date: 10/03/19         Diabetes Care Plan/Intervention  Education Plan/Intervention: Individual Follow-Up DSMT    Diabetes Meal Plan  Restrictions: Restricted Carbohydrate  Carbohydrate Per Meal: 30-45g    Today's Self-Management Care Plan was developed with the patient's input and is based on barriers identified during today's assessment.    The long and short-term goals in the care plan were written with the patient/caregiver's input. The patient has agreed to work toward these goals to improve her overall diabetes control.      The patient received a copy of today's self-management plan and verbalized understanding of the care plan, goals, and all of today's instructions.      The patient was encouraged to  communicate with her physician and care team regarding her condition(s) and treatment.  I provided the patient with my contact information today and encouraged her to contact me via phone or patient portal as needed.     Education Units of Time   Time Spent: 60 min

## 2019-10-07 ENCOUNTER — TELEPHONE (OUTPATIENT)
Dept: PAIN MEDICINE | Facility: CLINIC | Age: 69
End: 2019-10-07

## 2019-10-07 ENCOUNTER — TELEPHONE (OUTPATIENT)
Dept: PRIMARY CARE CLINIC | Facility: CLINIC | Age: 69
End: 2019-10-07

## 2019-10-07 NOTE — TELEPHONE ENCOUNTER
Pt forwarded to allie Haile. Pt needed Dr. chaves orthopedic doctor office  to reschedule her surgery  10/7/19 talha

## 2019-10-07 NOTE — TELEPHONE ENCOUNTER
----- Message from Elton Torres sent at 10/7/2019  9:22 AM CDT -----  Contact: LOUIE MASON [[id number]]  Name of Who is Calling: LOUIE MASON [[id number]]     What is the request in detail: LOUIE MASON [[id number]] is requesting a call back in regards to a sooner appointment patient is in  Pain   Please contact to further discuss and advise      Can the clinic reply by [facility]: No     What Number to Call Back if not in [facility]:  [phone]

## 2019-10-07 NOTE — TELEPHONE ENCOUNTER
----- Message from Brunilda Saldañaaux sent at 10/7/2019  8:57 AM CDT -----  Contact: Self  Type: Patient Call Back    Who called: self    What is the request in detail: patient calling about a surgery with orthopedics. At the time It was recommended she could not proceed she is now ready to move forward.    Can the clinic reply by [facility]? no    Would the patient rather a call back or a response via [facility]? call    Best call back number:[phone]

## 2019-10-07 NOTE — TELEPHONE ENCOUNTER
Staff returned call to pt    Pt stated she in pain and would like to get scheduled for the  procedure she discussed on 5/2919 with Yudi     Staff informed pt that we will inform Yudi of her regards and follow up upon her response to advised on if she can get scheduled for the procure or if an office visit is needed being that she was last seen on 5/29/19     Pt gave verbal understanding

## 2019-10-08 RX ORDER — AMLODIPINE BESYLATE 10 MG/1
10 TABLET ORAL DAILY
Qty: 90 TABLET | Refills: 1 | Status: SHIPPED | OUTPATIENT
Start: 2019-10-08 | End: 2020-01-06 | Stop reason: SDUPTHER

## 2019-10-09 DIAGNOSIS — M50.30 DDD (DEGENERATIVE DISC DISEASE), CERVICAL: ICD-10-CM

## 2019-10-09 DIAGNOSIS — M47.22 OSTEOARTHRITIS OF SPINE WITH RADICULOPATHY, CERVICAL REGION: ICD-10-CM

## 2019-10-09 DIAGNOSIS — M54.12 CERVICAL RADICULOPATHY: ICD-10-CM

## 2019-10-09 RX ORDER — GABAPENTIN 300 MG/1
CAPSULE ORAL
Qty: 90 CAPSULE | Refills: 2 | Status: SHIPPED | OUTPATIENT
Start: 2019-10-09 | End: 2019-11-26

## 2019-10-09 NOTE — TELEPHONE ENCOUNTER
----- Message from Li Sanon sent at 10/9/2019 11:08 AM CDT -----  Pt scheduled with Dr. Bowie 10/30/19 stating she need something for pain, please call pt.

## 2019-10-09 NOTE — TELEPHONE ENCOUNTER
Staff returned call to pt    Pt stated she is having sever pain in arm       Staff asked  pt if she is still taking Gabapentin and if so, does it work for her    Pt stated she is out, have been out for a whileand she needs a refill     Staff informed pt that we will speak with provider regarding refill for Gabapentin and follow up should it be sent to the pharmacy     Pt gave verbal understanding

## 2019-10-10 ENCOUNTER — PATIENT OUTREACH (OUTPATIENT)
Dept: ADMINISTRATIVE | Facility: HOSPITAL | Age: 69
End: 2019-10-10

## 2019-10-14 PROBLEM — J01.90 ACUTE SINUSITIS: Status: RESOLVED | Noted: 2019-07-12 | Resolved: 2019-10-14

## 2019-10-21 ENCOUNTER — PATIENT OUTREACH (OUTPATIENT)
Dept: ADMINISTRATIVE | Facility: HOSPITAL | Age: 69
End: 2019-10-21

## 2019-10-21 NOTE — PROGRESS NOTES
Subjective:       Patient ID: Meenu Guy is a 69 y.o. female with a PMH significant for CAD, HTN, T2D who was seen initially by me on 4/18/2019 and for follow up on 7/25/2019.  Patient previously followed by Dr. Hawk.    Chief Complaint: Diabetes; Hypertension; and Arm Pain (right )    HPI  Patient is overall stable today.  Still with right arm pain and scheduled for epidural next week. No other complaints.  Asking for a disability parking sticker form to be completed.      Patient denies f/c, n/v/d.  No chest pain or SOB.  No abdominal pain or dysuria.  No headaches or change in vision.  No dizziness.  No significant  weight gain or weight loss.  Remaining ROS negative.    Review of Systems   Constitutional: Negative for appetite change, chills, diaphoresis, fatigue, fever and unexpected weight change.   HENT: Negative for ear pain, hearing loss, sinus pain, tinnitus, trouble swallowing and voice change.    Eyes: Negative for photophobia, pain and visual disturbance.   Respiratory: Negative for chest tightness, shortness of breath and wheezing.    Cardiovascular: Negative for chest pain, palpitations and leg swelling.   Gastrointestinal: Negative for abdominal pain, blood in stool, constipation, diarrhea, nausea and vomiting.   Endocrine: Negative for cold intolerance, heat intolerance, polydipsia, polyphagia and polyuria.   Genitourinary: Negative for decreased urine volume, difficulty urinating, dysuria, flank pain, hematuria, pelvic pain, vaginal bleeding, vaginal discharge and vaginal pain.   Musculoskeletal: Positive for arthralgias (right arm pain). Negative for gait problem, joint swelling, myalgias and neck pain.   Skin: Negative for rash.   Neurological: Negative for dizziness, tremors, syncope, weakness, numbness and headaches.   Hematological: Does not bruise/bleed easily.   Psychiatric/Behavioral: Negative for agitation, confusion and sleep disturbance. The patient is not nervous/anxious.         Objective:      Physical Exam   Constitutional: She is oriented to person, place, and time. She appears well-developed and well-nourished. No distress.   HENT:   Head: Normocephalic and atraumatic.   Nose: Nose normal.   Mouth/Throat: Oropharynx is clear and moist.   Eyes: Pupils are equal, round, and reactive to light. Conjunctivae and EOM are normal. No scleral icterus.   Neck: Normal range of motion. Neck supple. No JVD present. No thyromegaly present.   Cardiovascular: Normal rate, regular rhythm and intact distal pulses. Exam reveals no gallop and no friction rub.   Murmur (2/6 KELSEY) heard.  Pulmonary/Chest: Effort normal and breath sounds normal. No respiratory distress. She has no wheezes. She has no rales.   Abdominal: Soft. Bowel sounds are normal. She exhibits no distension. There is no tenderness. There is no rebound and no guarding.   Musculoskeletal: Normal range of motion. She exhibits no edema.   Lymphadenopathy:     She has no cervical adenopathy.   Neurological: She is alert and oriented to person, place, and time. No cranial nerve deficit or sensory deficit.   Skin: Skin is warm and dry. No rash noted. No erythema.   Psychiatric: She has a normal mood and affect. Her behavior is normal. Thought content normal.       Assessment:       1. Coronary artery disease involving native coronary artery of native heart without angina pectoris    2. Hyperlipidemia, unspecified hyperlipidemia type    3. Chronic kidney disease, stage II (mild)    4. Type 2 diabetes mellitus with diabetic peripheral angiopathy without gangrene, without long-term current use of insulin    5. Fibromyalgia    6. Peripheral vascular disease        Plan:   -Today's Visit - patient is awake and alert and with her  today.    -Nutrition - BMI good at 24.29 in 7/2019.      -Cards - HTN and HLD (no history of CAD per patient) - on Amlodipine and Carvedilol.  BP in 4/2019 was stable at 118/57.  BP in 7/2019 was 128/54.  BP is  good at 126/60.    On Atorvastatin (no allergy to statins).  On ASA.  Check fasting labs - done 4/2019 with  , TG 72, HDL 57, LDL 37.6.     Heart exam with 2/6 KELSEY - TTE in 7/2019 - Mild concentric left ventricular hypertrophy.  · Normal left ventricular systolic function. The estimated ejection fraction is 60%  · Normal right ventricular systolic function.  · No wall motion abnormalities.  · Grade II (moderate) left ventricular diastolic dysfunction consistent with pseudonormalization.  · Normal left atrial pressure.  Mild mitral regurgitation..    PVD - Right Femoral Bypass - around 2012 in [place]. Dr. Essex.  No claudication.    -Endocrine - T2D - on Metformin 500mg bid (had diarrhea with 1g bid).  Check A1C - 8.2 in 4/2019.  Started Glimepiride 2mg in 8/2019.  Repeat was 8.1 in 9/2019.  Seen by Diabetic Education in 10/2019.  Repeat A1C.   Discussed adding Januvia.    Check urine microalbumin - ordered in 4/2019 and 7/2019, but not yet completed.  Has allergy to ACEI - tongue swelling.  Last Eye exam - 2018.  Will refer to Optometry - patient to arrange it herself.  Last Foot exam -  Done 4/2019 by me.  Seen by Podiatry on 9/26/2019.  Diabetic Education - seen last on 10/3/2019.    TSH normal in 4/2019.  Vitamin D Insufficient - level was 17 - advised to lower D3 to 5000 units daily (she was taking 10,000 daily).    -MSK - Cervical DD - S/p discectomy 20 years ago.  Having pain in right arm originating from the c-spine. Seen by Dr. Madsen 2 weeks (Orthopedics) and told it was not Fibromyalgia (as diagnosed by Dr. Hawk).  On Lyrica and doesn't feel it helps.  On Opiods in past - [place] - in [place].  Has not been on this for 2 years.    Will refer to Pain.  Seen on 5/29/2019.  On Gabapentin with not much benefit.  Will add Lidoderm 5% patch.  If not covered by her insurance, I will prescribe Voltaren gel as she requested.    Scheduled for Epidural in 10/2019.    -Renal - CKD - CMP on 4/18/2019 with  creatinine 1.1 (GFR 59.2).    -GI - GERD - on Omeprazole prn.  No gastritis or esophagitis.    History of Gallstones - s/p Lap Janet 2012.    We discussed colon cancer screening - last colonoscopy was 2017 - has 2 polyps.  Will refer back to GI - she refused in 4/2019 .  No FH of colon cancer.  FIT negative in 7/2019.    -GYN - Last Pap was many years ago and normal. Will refer to GYN - missed appointment on 5/1/2019.    Last Mammo was 5/2019 and normal.  We discussed DXA screening for osteoporosis - normal in 7/2019.    -HCM - We discussed Flu (10/2018 - 10/8/2019 by [facility] [facility]) and Tdap (4/19/2019) vaccinations.  We discussed Shingles (waiting for Shingrix Availability - advised to get on waiting list at pharmacy) and Pneumococcal (23 and 13 - had both and will get records) vaccinations.      -Follow Up - 3 months

## 2019-10-21 NOTE — H&P (VIEW-ONLY)
Subjective:       Patient ID: Meenu Guy is a 69 y.o. female with a PMH significant for CAD, HTN, T2D who was seen initially by me on 4/18/2019 and for follow up on 7/25/2019.  Patient previously followed by Dr. Hawk.    Chief Complaint: Diabetes; Hypertension; and Arm Pain (right )    HPI  Patient is overall stable today.  Still with right arm pain and scheduled for epidural next week. No other complaints.  Asking for a disability parking sticker form to be completed.      Patient denies f/c, n/v/d.  No chest pain or SOB.  No abdominal pain or dysuria.  No headaches or change in vision.  No dizziness.  No significant  weight gain or weight loss.  Remaining ROS negative.    Review of Systems   Constitutional: Negative for appetite change, chills, diaphoresis, fatigue, fever and unexpected weight change.   HENT: Negative for ear pain, hearing loss, sinus pain, tinnitus, trouble swallowing and voice change.    Eyes: Negative for photophobia, pain and visual disturbance.   Respiratory: Negative for chest tightness, shortness of breath and wheezing.    Cardiovascular: Negative for chest pain, palpitations and leg swelling.   Gastrointestinal: Negative for abdominal pain, blood in stool, constipation, diarrhea, nausea and vomiting.   Endocrine: Negative for cold intolerance, heat intolerance, polydipsia, polyphagia and polyuria.   Genitourinary: Negative for decreased urine volume, difficulty urinating, dysuria, flank pain, hematuria, pelvic pain, vaginal bleeding, vaginal discharge and vaginal pain.   Musculoskeletal: Positive for arthralgias (right arm pain). Negative for gait problem, joint swelling, myalgias and neck pain.   Skin: Negative for rash.   Neurological: Negative for dizziness, tremors, syncope, weakness, numbness and headaches.   Hematological: Does not bruise/bleed easily.   Psychiatric/Behavioral: Negative for agitation, confusion and sleep disturbance. The patient is not nervous/anxious.         Objective:      Physical Exam   Constitutional: She is oriented to person, place, and time. She appears well-developed and well-nourished. No distress.   HENT:   Head: Normocephalic and atraumatic.   Nose: Nose normal.   Mouth/Throat: Oropharynx is clear and moist.   Eyes: Pupils are equal, round, and reactive to light. Conjunctivae and EOM are normal. No scleral icterus.   Neck: Normal range of motion. Neck supple. No JVD present. No thyromegaly present.   Cardiovascular: Normal rate, regular rhythm and intact distal pulses. Exam reveals no gallop and no friction rub.   Murmur (2/6 KELSEY) heard.  Pulmonary/Chest: Effort normal and breath sounds normal. No respiratory distress. She has no wheezes. She has no rales.   Abdominal: Soft. Bowel sounds are normal. She exhibits no distension. There is no tenderness. There is no rebound and no guarding.   Musculoskeletal: Normal range of motion. She exhibits no edema.   Lymphadenopathy:     She has no cervical adenopathy.   Neurological: She is alert and oriented to person, place, and time. No cranial nerve deficit or sensory deficit.   Skin: Skin is warm and dry. No rash noted. No erythema.   Psychiatric: She has a normal mood and affect. Her behavior is normal. Thought content normal.       Assessment:       1. Coronary artery disease involving native coronary artery of native heart without angina pectoris    2. Hyperlipidemia, unspecified hyperlipidemia type    3. Chronic kidney disease, stage II (mild)    4. Type 2 diabetes mellitus with diabetic peripheral angiopathy without gangrene, without long-term current use of insulin    5. Fibromyalgia    6. Peripheral vascular disease        Plan:   -Today's Visit - patient is awake and alert and with her  today.    -Nutrition - BMI good at 24.29 in 7/2019.      -Cards - HTN and HLD (no history of CAD per patient) - on Amlodipine and Carvedilol.  BP in 4/2019 was stable at 118/57.  BP in 7/2019 was 128/54.  BP is  good at 126/60.    On Atorvastatin (no allergy to statins).  On ASA.  Check fasting labs - done 4/2019 with  , TG 72, HDL 57, LDL 37.6.     Heart exam with 2/6 KELSEY - TTE in 7/2019 - Mild concentric left ventricular hypertrophy.  · Normal left ventricular systolic function. The estimated ejection fraction is 60%  · Normal right ventricular systolic function.  · No wall motion abnormalities.  · Grade II (moderate) left ventricular diastolic dysfunction consistent with pseudonormalization.  · Normal left atrial pressure.  Mild mitral regurgitation..    PVD - Right Femoral Bypass - around 2012 in [place]. Dr. Essex.  No claudication.    -Endocrine - T2D - on Metformin 500mg bid (had diarrhea with 1g bid).  Check A1C - 8.2 in 4/2019.  Started Glimepiride 2mg in 8/2019.  Repeat was 8.1 in 9/2019.  Seen by Diabetic Education in 10/2019.  Repeat A1C.   Discussed adding Januvia.    Check urine microalbumin - ordered in 4/2019 and 7/2019, but not yet completed.  Has allergy to ACEI - tongue swelling.  Last Eye exam - 2018.  Will refer to Optometry - patient to arrange it herself.  Last Foot exam -  Done 4/2019 by me.  Seen by Podiatry on 9/26/2019.  Diabetic Education - seen last on 10/3/2019.    TSH normal in 4/2019.  Vitamin D Insufficient - level was 17 - advised to lower D3 to 5000 units daily (she was taking 10,000 daily).    -MSK - Cervical DD - S/p discectomy 20 years ago.  Having pain in right arm originating from the c-spine. Seen by Dr. Madsen 2 weeks (Orthopedics) and told it was not Fibromyalgia (as diagnosed by Dr. Hawk).  On Lyrica and doesn't feel it helps.  On Opiods in past - [place] - in [place].  Has not been on this for 2 years.    Will refer to Pain.  Seen on 5/29/2019.  On Gabapentin with not much benefit.  Will add Lidoderm 5% patch.  If not covered by her insurance, I will prescribe Voltaren gel as she requested.    Scheduled for Epidural in 10/2019.    -Renal - CKD - CMP on 4/18/2019 with  creatinine 1.1 (GFR 59.2).    -GI - GERD - on Omeprazole prn.  No gastritis or esophagitis.    History of Gallstones - s/p Lap Janet 2012.    We discussed colon cancer screening - last colonoscopy was 2017 - has 2 polyps.  Will refer back to GI - she refused in 4/2019 .  No FH of colon cancer.  FIT negative in 7/2019.    -GYN - Last Pap was many years ago and normal. Will refer to GYN - missed appointment on 5/1/2019.    Last Mammo was 5/2019 and normal.  We discussed DXA screening for osteoporosis - normal in 7/2019.    -HCM - We discussed Flu (10/2018 - 10/8/2019 by [facility] [employer]) and Tdap (4/19/2019) vaccinations.  We discussed Shingles (waiting for Shingrix Availability - advised to get on waiting list at pharmacy) and Pneumococcal (23 and 13 - had both and will get records) vaccinations.      -Follow Up - 3 months

## 2019-10-22 ENCOUNTER — PATIENT OUTREACH (OUTPATIENT)
Dept: ADMINISTRATIVE | Facility: HOSPITAL | Age: 69
End: 2019-10-22

## 2019-10-24 ENCOUNTER — LAB VISIT (OUTPATIENT)
Dept: LAB | Facility: HOSPITAL | Age: 69
End: 2019-10-24
Attending: INTERNAL MEDICINE
Payer: MEDICARE

## 2019-10-24 ENCOUNTER — OFFICE VISIT (OUTPATIENT)
Dept: PRIMARY CARE CLINIC | Facility: CLINIC | Age: 69
End: 2019-10-24
Payer: MEDICARE

## 2019-10-24 VITALS
HEIGHT: 70 IN | DIASTOLIC BLOOD PRESSURE: 60 MMHG | HEART RATE: 61 BPM | BODY MASS INDEX: 24.43 KG/M2 | SYSTOLIC BLOOD PRESSURE: 126 MMHG | WEIGHT: 170.63 LBS

## 2019-10-24 DIAGNOSIS — E11.51 TYPE 2 DIABETES MELLITUS WITH DIABETIC PERIPHERAL ANGIOPATHY WITHOUT GANGRENE, WITHOUT LONG-TERM CURRENT USE OF INSULIN: ICD-10-CM

## 2019-10-24 DIAGNOSIS — E78.5 HYPERLIPIDEMIA, UNSPECIFIED HYPERLIPIDEMIA TYPE: ICD-10-CM

## 2019-10-24 DIAGNOSIS — I25.10 CORONARY ARTERY DISEASE INVOLVING NATIVE CORONARY ARTERY OF NATIVE HEART WITHOUT ANGINA PECTORIS: Primary | ICD-10-CM

## 2019-10-24 DIAGNOSIS — I73.9 PERIPHERAL VASCULAR DISEASE: ICD-10-CM

## 2019-10-24 DIAGNOSIS — M79.7 FIBROMYALGIA: ICD-10-CM

## 2019-10-24 DIAGNOSIS — N18.2 CHRONIC KIDNEY DISEASE, STAGE II (MILD): ICD-10-CM

## 2019-10-24 LAB
ALBUMIN SERPL BCP-MCNC: 4.1 G/DL (ref 3.5–5.2)
ALP SERPL-CCNC: 46 U/L (ref 55–135)
ALT SERPL W/O P-5'-P-CCNC: 17 U/L (ref 10–44)
ANION GAP SERPL CALC-SCNC: 12 MMOL/L (ref 8–16)
AST SERPL-CCNC: 27 U/L (ref 10–40)
BILIRUB SERPL-MCNC: 0.6 MG/DL (ref 0.1–1)
BUN SERPL-MCNC: 24 MG/DL (ref 8–23)
CALCIUM SERPL-MCNC: 10.2 MG/DL (ref 8.7–10.5)
CHLORIDE SERPL-SCNC: 99 MMOL/L (ref 95–110)
CO2 SERPL-SCNC: 28 MMOL/L (ref 23–29)
CREAT SERPL-MCNC: 1.4 MG/DL (ref 0.5–1.4)
EST. GFR  (AFRICAN AMERICAN): 43.9 ML/MIN/1.73 M^2
EST. GFR  (NON AFRICAN AMERICAN): 38.1 ML/MIN/1.73 M^2
ESTIMATED AVG GLUCOSE: 174 MG/DL (ref 68–131)
GLUCOSE SERPL-MCNC: 147 MG/DL (ref 70–110)
HBA1C MFR BLD HPLC: 7.7 % (ref 4–5.6)
POTASSIUM SERPL-SCNC: 4.2 MMOL/L (ref 3.5–5.1)
PROT SERPL-MCNC: 7.7 G/DL (ref 6–8.4)
SODIUM SERPL-SCNC: 139 MMOL/L (ref 136–145)

## 2019-10-24 PROCEDURE — 1101F PR PT FALLS ASSESS DOC 0-1 FALLS W/OUT INJ PAST YR: ICD-10-PCS | Mod: CPTII,S$GLB,, | Performed by: INTERNAL MEDICINE

## 2019-10-24 PROCEDURE — 99214 PR OFFICE/OUTPT VISIT, EST, LEVL IV, 30-39 MIN: ICD-10-PCS | Mod: S$GLB,,, | Performed by: INTERNAL MEDICINE

## 2019-10-24 PROCEDURE — 80053 COMPREHEN METABOLIC PANEL: CPT

## 2019-10-24 PROCEDURE — 99499 RISK ADDL DX/OHS AUDIT: ICD-10-PCS | Mod: S$GLB,,, | Performed by: INTERNAL MEDICINE

## 2019-10-24 PROCEDURE — 3074F PR MOST RECENT SYSTOLIC BLOOD PRESSURE < 130 MM HG: ICD-10-PCS | Mod: CPTII,S$GLB,, | Performed by: INTERNAL MEDICINE

## 2019-10-24 PROCEDURE — 99999 PR PBB SHADOW E&M-EST. PATIENT-LVL III: ICD-10-PCS | Mod: PBBFAC,,, | Performed by: INTERNAL MEDICINE

## 2019-10-24 PROCEDURE — 36415 COLL VENOUS BLD VENIPUNCTURE: CPT | Mod: PN

## 2019-10-24 PROCEDURE — 3074F SYST BP LT 130 MM HG: CPT | Mod: CPTII,S$GLB,, | Performed by: INTERNAL MEDICINE

## 2019-10-24 PROCEDURE — 3078F DIAST BP <80 MM HG: CPT | Mod: CPTII,S$GLB,, | Performed by: INTERNAL MEDICINE

## 2019-10-24 PROCEDURE — 83036 HEMOGLOBIN GLYCOSYLATED A1C: CPT

## 2019-10-24 PROCEDURE — 1101F PT FALLS ASSESS-DOCD LE1/YR: CPT | Mod: CPTII,S$GLB,, | Performed by: INTERNAL MEDICINE

## 2019-10-24 PROCEDURE — 3078F PR MOST RECENT DIASTOLIC BLOOD PRESSURE < 80 MM HG: ICD-10-PCS | Mod: CPTII,S$GLB,, | Performed by: INTERNAL MEDICINE

## 2019-10-24 PROCEDURE — 99999 PR PBB SHADOW E&M-EST. PATIENT-LVL III: CPT | Mod: PBBFAC,,, | Performed by: INTERNAL MEDICINE

## 2019-10-24 PROCEDURE — 99499 UNLISTED E&M SERVICE: CPT | Mod: S$GLB,,, | Performed by: INTERNAL MEDICINE

## 2019-10-24 PROCEDURE — 99214 OFFICE O/P EST MOD 30 MIN: CPT | Mod: S$GLB,,, | Performed by: INTERNAL MEDICINE

## 2019-10-24 NOTE — PATIENT INSTRUCTIONS
Your exam was overall normal today.    Your blood pressure was good.    I will order labs today.    We will give you the Flu Vaccine today.  I recommend getting on a waiting list at your local pharmacy for the Shingles vaccine (Shingrix) is interested.    Return in 3 months - sooner if needed.  Please come at least 15-20 minutes before your scheduled appointment time.

## 2019-10-25 ENCOUNTER — TELEPHONE (OUTPATIENT)
Dept: PRIMARY CARE CLINIC | Facility: CLINIC | Age: 69
End: 2019-10-25

## 2019-10-25 DIAGNOSIS — R79.89 ELEVATED SERUM CREATININE: Primary | ICD-10-CM

## 2019-10-25 NOTE — TELEPHONE ENCOUNTER
----- Message from Elton Torres sent at 10/24/2019  2:49 PM CDT -----  Contact: LOUIE MASON [[id number]]  Name of Who is Calling: LOUIE MASON [[id number]]     What is the request in detail:LOUIE MASON [[id number]] is requesting a call back in regards to [facility]les [facility] [place] [address] [address]   Please contact to further discuss and advise      Can the clinic reply by [facility]: No     What [facility] Back [facility]:  [phone]

## 2019-10-30 ENCOUNTER — TELEPHONE (OUTPATIENT)
Dept: INTERNAL MEDICINE | Facility: CLINIC | Age: 69
End: 2019-10-30

## 2019-10-30 ENCOUNTER — HOSPITAL ENCOUNTER (OUTPATIENT)
Facility: OTHER | Age: 69
Discharge: HOME | End: 2019-10-30
Attending: ANESTHESIOLOGY | Admitting: ANESTHESIOLOGY
Payer: MEDICARE

## 2019-10-30 ENCOUNTER — TELEPHONE (OUTPATIENT)
Dept: PAIN MEDICINE | Facility: CLINIC | Age: 69
End: 2019-10-30

## 2019-10-30 VITALS
TEMPERATURE: 98 F | DIASTOLIC BLOOD PRESSURE: 71 MMHG | OXYGEN SATURATION: 96 % | RESPIRATION RATE: 16 BRPM | SYSTOLIC BLOOD PRESSURE: 170 MMHG | HEART RATE: 68 BPM

## 2019-10-30 DIAGNOSIS — M54.12 CERVICAL RADICULOPATHY: Primary | ICD-10-CM

## 2019-10-30 LAB — POCT GLUCOSE: 94 MG/DL (ref 70–110)

## 2019-10-30 PROCEDURE — 63600175 PHARM REV CODE 636 W HCPCS: Performed by: ANESTHESIOLOGY

## 2019-10-30 PROCEDURE — 62321 NJX INTERLAMINAR CRV/THRC: CPT | Performed by: ANESTHESIOLOGY

## 2019-10-30 PROCEDURE — 25000003 PHARM REV CODE 250: Performed by: ANESTHESIOLOGY

## 2019-10-30 PROCEDURE — 62321 NJX INTERLAMINAR CRV/THRC: CPT | Mod: ,,, | Performed by: ANESTHESIOLOGY

## 2019-10-30 PROCEDURE — 62321 PR INJ CERV/THORAC, W/GUIDANCE: ICD-10-PCS | Mod: ,,, | Performed by: ANESTHESIOLOGY

## 2019-10-30 PROCEDURE — 63600175 PHARM REV CODE 636 W HCPCS: Performed by: STUDENT IN AN ORGANIZED HEALTH CARE EDUCATION/TRAINING PROGRAM

## 2019-10-30 PROCEDURE — 25500020 PHARM REV CODE 255: Performed by: ANESTHESIOLOGY

## 2019-10-30 RX ORDER — MIDAZOLAM HYDROCHLORIDE 1 MG/ML
INJECTION INTRAMUSCULAR; INTRAVENOUS
Status: DISCONTINUED | OUTPATIENT
Start: 2019-10-30 | End: 2019-10-30 | Stop reason: HOSPADM

## 2019-10-30 RX ORDER — DEXAMETHASONE SODIUM PHOSPHATE 4 MG/ML
INJECTION, SOLUTION INTRA-ARTICULAR; INTRALESIONAL; INTRAMUSCULAR; INTRAVENOUS; SOFT TISSUE
Status: DISCONTINUED | OUTPATIENT
Start: 2019-10-30 | End: 2019-10-30 | Stop reason: HOSPADM

## 2019-10-30 RX ORDER — LIDOCAINE HYDROCHLORIDE 5 MG/ML
INJECTION, SOLUTION INFILTRATION; INTRAVENOUS
Status: DISCONTINUED | OUTPATIENT
Start: 2019-10-30 | End: 2019-10-30 | Stop reason: HOSPADM

## 2019-10-30 RX ORDER — LIDOCAINE HYDROCHLORIDE 10 MG/ML
INJECTION INFILTRATION; PERINEURAL
Status: DISCONTINUED | OUTPATIENT
Start: 2019-10-30 | End: 2019-10-30 | Stop reason: HOSPADM

## 2019-10-30 RX ORDER — SODIUM CHLORIDE 9 MG/ML
500 INJECTION, SOLUTION INTRAVENOUS CONTINUOUS
Status: DISCONTINUED | OUTPATIENT
Start: 2019-10-30 | End: 2019-10-30 | Stop reason: HOSPADM

## 2019-10-30 RX ADMIN — SODIUM CHLORIDE 500 ML: 0.9 INJECTION, SOLUTION INTRAVENOUS at 01:10

## 2019-10-30 NOTE — TELEPHONE ENCOUNTER
Spoke with pt, she had a verbal understanding. Scheduled lab.    ----- Message from Gustavo Wallace MD sent at 10/25/2019  6:46 AM CDT -----  Please let patient know the following.    Your A1C is better at 7.7 - good job.  Your metabolic panel shows a slight increase in your kidney test.  Please very gently increase your oral fluids over the next week and return in 1 week for a repeat metabolic panel.    Thank you.

## 2019-10-30 NOTE — TELEPHONE ENCOUNTER
Staff called pt to inform her that Yudi states it ok to schedule after 2 weeks    Pt gave verbal understanding       Pt scheduled pt for 11/26/19 @ 2:00

## 2019-10-30 NOTE — DISCHARGE INSTRUCTIONS

## 2019-10-30 NOTE — DISCHARGE SUMMARY
Discharge Note  Short Stay      SUMMARY     Admit Date: 10/30/2019    Attending Physician: Rahul Bowie      Discharge Physician: Rahul Bowie      Discharge Date: 10/30/2019 1:57 PM    Procedure(s) (LRB):  INJECTION, STEROID, EPIDURAL, C7-T1 IL (N/A)    Final Diagnosis: Cervical radiculopathy [M54.12]    Disposition: Home or self care    Patient Instructions:   Current Discharge Medication List      CONTINUE these medications which have NOT CHANGED    Details   amLODIPine (NORVASC) 10 MG tablet TAKE 1 TABLET (10 MG TOTAL) BY MOUTH ONCE DAILY.  Qty: 90 tablet, Refills: 1      carvedilol (COREG) 6.25 MG tablet Take 1 tablet (6.25 mg total) by mouth 2 (two) times daily.  Qty: 30 tablet, Refills: 3      glimepiride (AMARYL) 2 MG tablet Take 1 tablet (2 mg total) by mouth before breakfast.  Qty: 30 tablet, Refills: 3    Associated Diagnoses: Type 2 diabetes mellitus with diabetic peripheral angiopathy without gangrene, without long-term current use of insulin      metFORMIN (GLUCOPHAGE) 500 MG tablet Take 1 tablet (500 mg total) by mouth 2 (two) times daily.  Qty: 180 tablet, Refills: 1    Associated Diagnoses: Type 2 diabetes mellitus without complication, without long-term current use of insulin      aspirin (ECOTRIN) 81 MG EC tablet Take 1 tablet by mouth.      atorvastatin (LIPITOR) 20 MG tablet Take 20 mg by mouth once daily.      blood sugar diagnostic Strp 1 each by Misc.(Non-Drug; Combo Route) route 3 (three) times daily.  Qty: 200 each, Refills: 1      blood-glucose meter kit Use as instructed  Qty: 1 each, Refills: 0    Comments: Please give glucometer that insurance will cover      gabapentin (NEURONTIN) 300 MG capsule Take 1 capsule at bedtime for 1 week, then take 1 capsule twice a day for 1 week, then increase to 1 capsule three times a day.  Qty: 90 capsule, Refills: 2    Associated Diagnoses: Cervical radiculopathy; Osteoarthritis of spine with radiculopathy, cervical region; DDD (degenerative  disc disease), cervical      ibuprofen (ADVIL,MOTRIN) 600 MG tablet Refills: 0      lancets Misc 1 each by Misc.(Non-Drug; Combo Route) route 3 (three) times daily.  Qty: 200 each, Refills: 1      lidocaine (LIDODERM) 5 % Place 1 patch onto the skin once daily. Remove & Discard patch within 12 hours or as directed by MD  Qty: 30 patch, Refills: 3    Associated Diagnoses: Fibromyalgia      omeprazole (PRILOSEC) 20 MG capsule Take 20 mg by mouth once daily.                 Discharge Diagnosis: Cervical radiculopathy [M54.12]  Condition on Discharge: Stable with no complications to procedure   Diet on Discharge: Same as before.  Activity: as per instruction sheet.  Discharge to: Home with a responsible adult.  Follow up: 2-4 weeks    Please call my office or pager at [phone] if experienced any weakness or loss of sensation, fever > 101.5, pain uncontrolled with oral medications, persistent nausea/vomiting/or diarrhea, redness or drainage from the incisions, or any other worrisome concerns. If physician on call was not reached or could not communicate with our office for any reason please go to the nearest emergency department

## 2019-10-30 NOTE — TELEPHONE ENCOUNTER
Staff returned call to pt to regarding appt     Pt states seh needs a 2 week follow up appt after procedure today    Staff informed pt that we do not have any availability around the 2 week time frame with the physician nor the APPS so we will speak with Yudi regarding follow up appt and follow up upon her response    Pt gave verbal understanding

## 2019-10-30 NOTE — OP NOTE
"Patient Name: Meenu Guy  MRN: [id number]    INFORMED CONSENT: The procedure, risks, benefits and options were discussed with patient. There are no contraindications to the procedure. The patient expressed understanding and agreed to proceed. The personnel performing the procedure was discussed. I verify that I personally obtained Meenu's consent prior to the start of the procedure and the signed consent can be found on the patient's chart.    Procedure Date: 10/30/2019    Anesthesia: Topical    Pre Procedure diagnosis: Cervical radiculopathy [M54.12]  1. Cervical radiculopathy      Post-Procedure diagnosis: SAME    Moderate Sedation: None    PROCEDURE: C7-T1 CERVICAL EPIDURAL STEROID INJECTION         DESCRIPTION OF PROCEDURE: The patient was brought to the procedure room. After performing time out.  IV access was obtained prior to the procedure. The patient was positioned prone on the fluoroscopy table. Continuous hemodynamic monitoring was initiated including blood pressure, EKG, and pulse oximetry. The area of the cervical spine was prepped with chlorhexidine and draped in a sterile fashion. Skin anesthesia was achieved using 3 mL of Lidocaine 1% over the respective injection site. The C7-T1 interspace was visualized under fluoroscopic imaging. An 18 gauge 3 1/2" Tuohy needle was slowly inserted and advanced using loss of resistance to saline with AP, oblique and lateral fluoroscopic imaging for needle guidance. Negative aspiration for blood or CSF was confirmed. Epidural contrast spread was confirmed using 2mL of Omnipaque 300 contrast. Spread of the contrast in the cervical epidural space was noted . 6 mL of lidocaine 0.5% and 10 mg decadron was injected. The needle was removed and bleeding was nil. A sterile dressing was applied. No specimens collected. patient was taken back to the recovery room for further observation.     Blood Loss: Nill  Specimen: None    Rahul Bowie MD    "

## 2019-10-30 NOTE — TELEPHONE ENCOUNTER
Please make her an appointment for the next available with me or Dr. Bowie. It may have to be more than 2 weeks from procedure.

## 2019-10-30 NOTE — PLAN OF CARE
PATIENT TOLERATED PROCEDURE WELL. PT COMPLAINS OF  7/10 PAIN. ASSISTED PATIENT UP FOR FIRST TIME. STEADY ON FEET AND DISCHARGE INSTRUCTIONS GIVEN.

## 2019-10-30 NOTE — TELEPHONE ENCOUNTER
----- Message from Seth Blas, Patient Care Assistant sent at 10/30/2019  3:32 PM CDT -----  Contact: LOUIE MASON [[id number]]  Name of Who is Calling: LOUIE MASON [[id number]]    What is the request in detail:Requesting to reschedule 11/01/19 and needs a 2 week follow up   Appointment for surgery. Please contact to further discuss and advise      Can the clinic reply by [facility]: No    What Number to Call Back [facility] not [facility]:   [phone]

## 2019-11-06 ENCOUNTER — TELEPHONE (OUTPATIENT)
Dept: PAIN MEDICINE | Facility: CLINIC | Age: 69
End: 2019-11-06

## 2019-11-06 ENCOUNTER — TELEPHONE (OUTPATIENT)
Dept: INTERNAL MEDICINE | Facility: CLINIC | Age: 69
End: 2019-11-06

## 2019-11-06 ENCOUNTER — LAB VISIT (OUTPATIENT)
Dept: LAB | Facility: HOSPITAL | Age: 69
End: 2019-11-06
Attending: INTERNAL MEDICINE
Payer: MEDICARE

## 2019-11-06 DIAGNOSIS — M79.18 MYOFASCIAL PAIN: Primary | ICD-10-CM

## 2019-11-06 DIAGNOSIS — R79.89 ELEVATED SERUM CREATININE: ICD-10-CM

## 2019-11-06 LAB
ANION GAP SERPL CALC-SCNC: 11 MMOL/L (ref 8–16)
BUN SERPL-MCNC: 19 MG/DL (ref 8–23)
CALCIUM SERPL-MCNC: 10.3 MG/DL (ref 8.7–10.5)
CHLORIDE SERPL-SCNC: 101 MMOL/L (ref 95–110)
CO2 SERPL-SCNC: 29 MMOL/L (ref 23–29)
CREAT SERPL-MCNC: 1.3 MG/DL (ref 0.5–1.4)
EST. GFR  (AFRICAN AMERICAN): 48 ML/MIN/1.73 M^2
EST. GFR  (NON AFRICAN AMERICAN): 41.7 ML/MIN/1.73 M^2
GLUCOSE SERPL-MCNC: 144 MG/DL (ref 70–110)
POTASSIUM SERPL-SCNC: 3.8 MMOL/L (ref 3.5–5.1)
SODIUM SERPL-SCNC: 141 MMOL/L (ref 136–145)

## 2019-11-06 PROCEDURE — 36415 COLL VENOUS BLD VENIPUNCTURE: CPT | Mod: PN

## 2019-11-06 PROCEDURE — 80048 BASIC METABOLIC PNL TOTAL CA: CPT

## 2019-11-06 RX ORDER — TIZANIDINE 4 MG/1
4 TABLET ORAL 2 TIMES DAILY PRN
Qty: 20 TABLET | Refills: 0 | Status: SHIPPED | OUTPATIENT
Start: 2019-11-06 | End: 2019-12-05 | Stop reason: SDUPTHER

## 2019-11-06 NOTE — TELEPHONE ENCOUNTER
Spoke with patient regarding procedure and neck pain, patient stated that gabapentin and tylenol is not helping her pain and would like recommendations from nurse practitioner Yudi, message sent Yudi Mclean NP at this tme

## 2019-11-06 NOTE — TELEPHONE ENCOUNTER
----- Message from Jennifer Carrera sent at 11/6/2019  7:37 AM CST -----  Contact: LOUIE MASON [[id number]]  Name of Who is Calling : LOUIE MASON [[id number]]    What is the request in detail :     Patient is requesting a call from staff   .....Please contact to further discuss and advise.    Can the clinic reply by [facility] :  NO    What Number to Call Back : [phone]

## 2019-11-06 NOTE — TELEPHONE ENCOUNTER
Phoned patient and discussed her pain. We discussed that it may take up to 14 days to see the full benefit from TACOS. She verbalized understanding. We discussed continuing to take Gabapentin and Tylenol. She may also alternate ice and heat. Tizanidine sent to pharmacy for muscle pain.

## 2019-11-06 NOTE — TELEPHONE ENCOUNTER
Contacted and informed patient of lab results. States she is still having pain in her arm after a procedure that was done in pain medicine. Sent message to providers in pain med to inform them medication recommended and prescribed isn't giving patient any relief and the pain is worse at night and at time of call is 10/10.

## 2019-11-06 NOTE — TELEPHONE ENCOUNTER
----- Message from Ingrid Barrera LPN sent at 11/6/2019  9:11 AM CST -----  Contact: LOUIE MASON [[id number]]  Spoke with patient regarding procedure and neck pain, patient stated that gabapentin and tylenol is not helping her pain and would like recommendations on what she can take and can something be called into her pharmacy    ----- Message -----  From: Jennifer Carrera  Sent: 11/6/2019   7:37 AM CST  To: Caridad Bagley Staff    Name of Who is Calling : LOUIE MASON [[id number]]    What is the request in detail :     Patient is requesting a call from staff   .....Please contact to further discuss and advise.    Can the clinic reply by [facility] :  NO    What Number to Call Back : [phone]

## 2019-11-25 ENCOUNTER — TELEPHONE (OUTPATIENT)
Dept: PAIN MEDICINE | Facility: CLINIC | Age: 69
End: 2019-11-25

## 2019-11-26 ENCOUNTER — OFFICE VISIT (OUTPATIENT)
Dept: PAIN MEDICINE | Facility: CLINIC | Age: 69
End: 2019-11-26
Payer: MEDICARE

## 2019-11-26 VITALS
DIASTOLIC BLOOD PRESSURE: 69 MMHG | HEART RATE: 65 BPM | BODY MASS INDEX: 24.27 KG/M2 | SYSTOLIC BLOOD PRESSURE: 135 MMHG | HEIGHT: 70 IN | WEIGHT: 169.56 LBS | TEMPERATURE: 98 F

## 2019-11-26 DIAGNOSIS — M54.12 CERVICAL RADICULOPATHY: Primary | ICD-10-CM

## 2019-11-26 DIAGNOSIS — M47.22 OSTEOARTHRITIS OF SPINE WITH RADICULOPATHY, CERVICAL REGION: ICD-10-CM

## 2019-11-26 DIAGNOSIS — Z98.1 HISTORY OF FUSION OF CERVICAL SPINE: ICD-10-CM

## 2019-11-26 DIAGNOSIS — M50.30 DDD (DEGENERATIVE DISC DISEASE), CERVICAL: ICD-10-CM

## 2019-11-26 PROCEDURE — 99499 RISK ADDL DX/OHS AUDIT: ICD-10-PCS | Mod: S$GLB,,, | Performed by: NURSE PRACTITIONER

## 2019-11-26 PROCEDURE — 3078F DIAST BP <80 MM HG: CPT | Mod: CPTII,S$GLB,, | Performed by: NURSE PRACTITIONER

## 2019-11-26 PROCEDURE — 3078F PR MOST RECENT DIASTOLIC BLOOD PRESSURE < 80 MM HG: ICD-10-PCS | Mod: CPTII,S$GLB,, | Performed by: NURSE PRACTITIONER

## 2019-11-26 PROCEDURE — 3075F PR MOST RECENT SYSTOLIC BLOOD PRESS GE 130-139MM HG: ICD-10-PCS | Mod: CPTII,S$GLB,, | Performed by: NURSE PRACTITIONER

## 2019-11-26 PROCEDURE — 99499 UNLISTED E&M SERVICE: CPT | Mod: S$GLB,,, | Performed by: NURSE PRACTITIONER

## 2019-11-26 PROCEDURE — 99999 PR PBB SHADOW E&M-EST. PATIENT-LVL III: CPT | Mod: PBBFAC,,, | Performed by: NURSE PRACTITIONER

## 2019-11-26 PROCEDURE — 1125F PR PAIN SEVERITY QUANTIFIED, PAIN PRESENT: ICD-10-PCS | Mod: S$GLB,,, | Performed by: NURSE PRACTITIONER

## 2019-11-26 PROCEDURE — 1159F MED LIST DOCD IN RCRD: CPT | Mod: S$GLB,,, | Performed by: NURSE PRACTITIONER

## 2019-11-26 PROCEDURE — 99999 PR PBB SHADOW E&M-EST. PATIENT-LVL III: ICD-10-PCS | Mod: PBBFAC,,, | Performed by: NURSE PRACTITIONER

## 2019-11-26 PROCEDURE — 1125F AMNT PAIN NOTED PAIN PRSNT: CPT | Mod: S$GLB,,, | Performed by: NURSE PRACTITIONER

## 2019-11-26 PROCEDURE — 3075F SYST BP GE 130 - 139MM HG: CPT | Mod: CPTII,S$GLB,, | Performed by: NURSE PRACTITIONER

## 2019-11-26 PROCEDURE — 99213 PR OFFICE/OUTPT VISIT, EST, LEVL III, 20-29 MIN: ICD-10-PCS | Mod: S$GLB,,, | Performed by: NURSE PRACTITIONER

## 2019-11-26 PROCEDURE — 1101F PT FALLS ASSESS-DOCD LE1/YR: CPT | Mod: CPTII,S$GLB,, | Performed by: NURSE PRACTITIONER

## 2019-11-26 PROCEDURE — 99213 OFFICE O/P EST LOW 20 MIN: CPT | Mod: S$GLB,,, | Performed by: NURSE PRACTITIONER

## 2019-11-26 PROCEDURE — 1101F PR PT FALLS ASSESS DOC 0-1 FALLS W/OUT INJ PAST YR: ICD-10-PCS | Mod: CPTII,S$GLB,, | Performed by: NURSE PRACTITIONER

## 2019-11-26 PROCEDURE — 1159F PR MEDICATION LIST DOCUMENTED IN MEDICAL RECORD: ICD-10-PCS | Mod: S$GLB,,, | Performed by: NURSE PRACTITIONER

## 2019-11-26 RX ORDER — PREGABALIN 50 MG/1
50 CAPSULE ORAL DAILY
Qty: 30 CAPSULE | Refills: 1 | Status: SHIPPED | OUTPATIENT
Start: 2019-11-26 | End: 2019-12-26

## 2019-11-26 NOTE — PROGRESS NOTES
Chronic patient Established Note (Follow up visit)      SUBJECTIVE:    Meenu Guy presents to the clinic for a follow-up appointment for neck pain. She is s/p C7-T1 IL TACOS on 10/30/2019. She reports 100% relief of her neck pain for 3 days. Since then, her pain has returned to base line. She continues to report neck pain that radiates into her right arm to her hand. She describes this pain as tight and burning in nature. Her pain is worse with prolonged activity. She had limited relief with Gabapentin in the past. She is currently taking Zanaflex with limited relief. She denies any other health changes. Her pain today is 10/10.      Pain Disability Index Review:  Last 3 PDI Scores 5/29/2019 5/20/2019 5/2/2019   Pain Disability Index (PDI) 10 61 35       Pain Medications:  Zanaflex    Opioid Contract: no     report:  Reviewed and consistent with medication use as prescribed.    Pain Procedures:   10/30/2019- C7-T1 IL TACOS    Relevant Surgery: cervical and lumbar discectomy 20 years ago     Physical Therapy/Home Exercise: yes    Imaging:   MRI Cervical Spine 5/27/2019:  FINDINGS:  There is reversal of the normal cervical curvature.  Extensive postoperative changes from anterior cervical fusion C3-4 C4-5 C5-C6 and C6-C7 disc spaces.  There is a faint T2 hyperintensity in the cervical cord at C4-5 disc space likely representing myelomalacia.  Craniovertebral alignment is within normal limits.  There is no Chiari type malformations.  Prevertebral soft tissues are within normal limits.    C2-3: There is disc dehydration.  Broad-based posterior disc bulge and osteophyte complex greater on the right with compression of the thecal sac.  No cord compression is noted.  There is hypertrophic changes of the ligamentum flava dorsally that causes an extradural compression of the thecal sac.  The AP diameter of the spinal canal is compromised and measures approximately 9 mm (image 4 series 7).  There is also at least  moderate right foraminal stenosis and mild left foraminal stenosis.  Facet arthropathy noted.    C3-4: Postoperative changes from anterior cervical fusion.  Mild broad-based posterior osteophyte and disc bulge complex without cord compression.  There is no significant central canal spinal stenosis.  There is however bilateral moderate to severe foraminal stenosis from uncovertebral joint osteophytes.    C4-5: Stable postoperative changes from anterior cervical fusion.  Susceptibility artifacts from presence of cervical plate.  Mild broad-based posterior osteophytes without cord compression.  There is at least a moderate to severe right foraminal stenosis and moderate left foraminal stenosis from uncovertebral joint osteophytes.    C5-6, C6-C7: Solid anterior bony cervical fusion.  There is no significant spinal stenosis.  Broad-based posterior osteophyte lateralize to the right at C5-C6 disc space with effacement of the anterior subarachnoid space.  No cord compression.  There is moderate bilateral foraminal stenosis C5-C6 and C6-C7 disc spaces.    C7-T1: Spondylosis with disc space narrowing.  There is a broad-based disc protrusion and osteophyte complex greater on the left with posterior displacement of the thecal sac.  The disc protrusion/osteophyte complex contacts the ventral surface of the cervical cord.  There is a severely bilateral foraminal stenosis from uncovertebral joint osteophytes.    T1-T2: Broad-based posterior osteophyte and a disc protrusion complex with posterior displacement of the thecal sac and slight dorsal displacement of the cervical cord.  Severe bilateral foraminal stenosis and at least a mild to moderate central canal spinal stenosis.      Impression       1. Postoperative changes from anterior cervical fusion C3 through C7 without significant central canal spinal stenosis.  There is however bilateral foraminal stenotic disease at multiple intervertebral disc spaces as above.  2.  Broad-based disc protrusion and osteophyte complex C7-T1 disc space and T1-T2 disc spaces as above.  There is associated severe foraminal stenotic changes at these levels.     Xray Cervical Spine 5/22/2019:  FINDINGS:  There are vertebral body screws with an anterior plate at C3-C4-C4-C5 status post ACDF fusion with disc implants.  The odontoid prevertebral soft tissues and posterior elements are intact.  There is osseous fusion of C5-C6.  And C6-C7.  No fracture dislocation bone destruction seen.  There is mild DJD.      Impression       Chronic change as above.           Allergies:   Review of patient's allergies indicates:   Allergen Reactions    Statins-hmg-coa reductase inhibitors Edema    Ace inhibitors Swelling       Current Medications:   Current Outpatient Medications   Medication Sig Dispense Refill    amLODIPine (NORVASC) 10 MG tablet TAKE 1 TABLET (10 MG TOTAL) BY MOUTH ONCE DAILY. 90 tablet 1    aspirin (ECOTRIN) 81 MG EC tablet Take 1 tablet by mouth.      atorvastatin (LIPITOR) 20 MG tablet Take 20 mg by mouth once daily.      blood sugar diagnostic Strp 1 each by Misc.(Non-Drug; Combo Route) route 3 (three) times daily. 200 each 1    blood-glucose meter kit Use as instructed 1 each 0    carvedilol (COREG) 6.25 MG tablet Take 1 tablet (6.25 mg total) by mouth 2 (two) times daily. 30 tablet 3    gabapentin (NEURONTIN) 300 MG capsule Take 1 capsule at bedtime for 1 week, then take 1 capsule twice a day for 1 week, then increase to 1 capsule three times a day. 90 capsule 2    glimepiride (AMARYL) 2 MG tablet Take 1 tablet (2 mg total) by mouth before breakfast. 30 tablet 3    ibuprofen (ADVIL,MOTRIN) 600 MG tablet   0    lancets Misc 1 each by Misc.(Non-Drug; Combo Route) route 3 (three) times daily. 200 each 1    lidocaine (LIDODERM) 5 % Place 1 patch onto the skin once daily. Remove & Discard patch within 12 hours or as directed by MD 30 patch 3    metFORMIN (GLUCOPHAGE) 500 MG tablet  Take 1 tablet (500 mg total) by mouth 2 (two) times daily. 180 tablet 1    omeprazole (PRILOSEC) 20 MG capsule Take 20 mg by mouth once daily.       No current facility-administered medications for this visit.        REVIEW OF SYSTEMS:    GENERAL:  No weight loss, malaise or fevers.  HEENT:  Negative for frequent or significant headaches.  NECK:  Negative for lumps, goiter, pain and significant neck swelling.  RESPIRATORY:  Negative for cough, wheezing or shortness of breath.  CARDIOVASCULAR:  Negative for chest pain, leg swelling or palpitations. HTN  GI:  Negative for abdominal discomfort, blood in stools or black stools or change in bowel habits.  MUSCULOSKELETAL:  See HPI.  SKIN:  Negative for lesions, rash, and itching.  PSYCH:  Negative for sleep disturbance, mood disorder and recent psychosocial stressors.  HEMATOLOGY/LYMPHOLOGY:  Negative for prolonged bleeding, bruising easily or swollen nodes.  NEURO:   No history of headaches, syncope, paralysis, seizures or tremors.  ENDO: Diabetes  All other reviewed and negative other than HPI.    Past Medical History:  Past Medical History:   Diagnosis Date    Coronary artery disease     Diabetes mellitus     Diabetes mellitus, type 2     Hypertension        Past Surgical History:  Past Surgical History:   Procedure Laterality Date    BACK SURGERY      CHOLECYSTECTOMY      DISC REMOVAL      EPIDURAL STEROID INJECTION N/A 10/30/2019    Procedure: INJECTION, STEROID, EPIDURAL, C7-T1 IL;  Surgeon: Rahul Bowie MD;  Location: [facility];  Service: Pain Management;  Laterality: N/A;    HYSTERECTOMY         Family History:  History reviewed. No pertinent family history.    Social History:  Social History     Socioeconomic History    Marital status:      Spouse name: Not on file    Number of children: Not on file    Years of education: Not on file    Highest education level: Not on file   Occupational History    Not on file   Social Needs     "Financial resource strain: Not on file    Food insecurity:     Worry: Not on file     Inability: Not on file    Transportation needs:     Medical: Not on file     Non-medical: Not on file   Tobacco Use    Smoking status: Never Smoker    Smokeless tobacco: Never Used   Substance and Sexual Activity    Alcohol use: Yes     Frequency: Never     Comment: socially    Drug use: No    Sexual activity: Yes     Partners: Male   Lifestyle    Physical activity:     Days per week: Not on file     Minutes per session: Not on file    Stress: Not on file   Relationships    Social connections:     Talks on phone: Not on file     Gets together: Not on file     Attends Restorationist service: Not on file     Active member of club or organization: Not on file     Attends meetings of clubs or organizations: Not on file     Relationship status: Not on file   Other Topics Concern    Not on file   Social History Narrative    Not on file       OBJECTIVE:    /69   Pulse 65   Temp 98 °F (36.7 °C) (Oral)   Ht 5' 10" (1.778 m)   Wt 76.9 kg (169 lb 8.5 oz)   BMI 24.33 kg/m²     PHYSICAL EXAMINATION:    General appearance: Well appearing, in no acute distress, alert and oriented x3.  Psych:  Mood and affect appropriate.  Skin: Skin color, texture, turgor normal, no rashes or lesions, in both upper and lower body.  Head/face:  Atraumatic, normocephalic. No palpable lymph nodes  Neck: There is pain to palpation over the cervical paraspinous muscles. Spurling Negative. Limited ROM with pain on flexion, extension, and lateral rotation. Positive facet loading bilaterally.  Cor: RRR  Pulm: CTA  GI: Abdomen soft and non-tender.  Extremities: Peripheral joint ROM is full and pain free without obvious instability or laxity in all four extremities. No deformities, edema, or skin discoloration. Good capillary refill.  Musculoskeletal: Shoulder provocative maneuvers are negative. Bilateral upper extremity strength is normal and symmetric.  " No atrophy or tone abnormalities are noted.  Neuro: Bilateral upper extremity coordination and muscle stretch reflexes are physiologic and symmetric.  Plantar response are downgoing. No loss of sensation is noted.  Gait: Normal.    ASSESSMENT: 69 y.o. year old female with neck pain, consistent with the followin. Cervical radiculopathy     2. Osteoarthritis of spine with radiculopathy, cervical region     3. DDD (degenerative disc disease), cervical     4. History of fusion of cervical spine           PLAN:     - Previous imaging was reviewed and discussed with the patient today.    - We discussed repeat cervical TACOS today. She would like to think about this.    - She may benefit from cervical medial branch blocks in the future.    - Trial Lyrica 50 mg once daily, #30. We can escalate this in the future as needed.      - I have stressed the importance of physical activity and a home exercise plan to help with pain and improve health.    - RTC in 1 month or sooner if needed.      - Counseled patient regarding the importance of activity modification and constant sleeping habits.    - Dr. Bowie was consulted on the patient and agrees with this plan.    The above plan and management options were discussed at length with patient. Patient is in agreement with the above and verbalized understanding.    Yudi Mclean NP  2019

## 2019-11-29 ENCOUNTER — HOSPITAL ENCOUNTER (EMERGENCY)
Facility: OTHER | Age: 69
Discharge: HOME | End: 2019-11-29
Attending: EMERGENCY MEDICINE
Payer: MEDICARE

## 2019-11-29 VITALS
TEMPERATURE: 99 F | RESPIRATION RATE: 16 BRPM | DIASTOLIC BLOOD PRESSURE: 63 MMHG | BODY MASS INDEX: 23.62 KG/M2 | OXYGEN SATURATION: 91 % | WEIGHT: 165 LBS | HEIGHT: 70 IN | SYSTOLIC BLOOD PRESSURE: 137 MMHG | HEART RATE: 61 BPM

## 2019-11-29 DIAGNOSIS — R50.9 ACUTE FEBRILE ILLNESS: Primary | ICD-10-CM

## 2019-11-29 DIAGNOSIS — R11.2 NAUSEA VOMITING AND DIARRHEA: ICD-10-CM

## 2019-11-29 DIAGNOSIS — R06.02 SOB (SHORTNESS OF BREATH): ICD-10-CM

## 2019-11-29 DIAGNOSIS — R19.7 NAUSEA VOMITING AND DIARRHEA: ICD-10-CM

## 2019-11-29 DIAGNOSIS — J32.9 SINUSITIS, UNSPECIFIED CHRONICITY, UNSPECIFIED LOCATION: ICD-10-CM

## 2019-11-29 LAB
ALBUMIN SERPL BCP-MCNC: 3.3 G/DL (ref 3.5–5.2)
ALP SERPL-CCNC: 58 U/L (ref 55–135)
ALT SERPL W/O P-5'-P-CCNC: 18 U/L (ref 10–44)
ANION GAP SERPL CALC-SCNC: 13 MMOL/L (ref 8–16)
AST SERPL-CCNC: 23 U/L (ref 10–40)
BACTERIA #/AREA URNS HPF: ABNORMAL /HPF
BASOPHILS # BLD AUTO: 0.03 K/UL (ref 0–0.2)
BASOPHILS NFR BLD: 0.2 % (ref 0–1.9)
BILIRUB SERPL-MCNC: 1 MG/DL (ref 0.1–1)
BILIRUB UR QL STRIP: ABNORMAL
BNP SERPL-MCNC: 870 PG/ML (ref 0–99)
BUN SERPL-MCNC: 24 MG/DL (ref 8–23)
CALCIUM SERPL-MCNC: 9.3 MG/DL (ref 8.7–10.5)
CHLORIDE SERPL-SCNC: 99 MMOL/L (ref 95–110)
CLARITY UR: ABNORMAL
CO2 SERPL-SCNC: 25 MMOL/L (ref 23–29)
COLOR UR: YELLOW
CREAT SERPL-MCNC: 1.8 MG/DL (ref 0.5–1.4)
DIFFERENTIAL METHOD: ABNORMAL
EOSINOPHIL # BLD AUTO: 0 K/UL (ref 0–0.5)
EOSINOPHIL NFR BLD: 0.1 % (ref 0–8)
ERYTHROCYTE [DISTWIDTH] IN BLOOD BY AUTOMATED COUNT: 12.5 % (ref 11.5–14.5)
EST. GFR  (AFRICAN AMERICAN): 32 ML/MIN/1.73 M^2
EST. GFR  (NON AFRICAN AMERICAN): 28 ML/MIN/1.73 M^2
GLUCOSE SERPL-MCNC: 183 MG/DL (ref 70–110)
GLUCOSE UR QL STRIP: NEGATIVE
HCT VFR BLD AUTO: 34 % (ref 37–48.5)
HGB BLD-MCNC: 11.2 G/DL (ref 12–16)
HGB UR QL STRIP: ABNORMAL
HYALINE CASTS #/AREA URNS LPF: 0 /LPF
IMM GRANULOCYTES # BLD AUTO: 0.08 K/UL (ref 0–0.04)
IMM GRANULOCYTES NFR BLD AUTO: 0.6 % (ref 0–0.5)
INFLUENZA A, MOLECULAR: NEGATIVE
INFLUENZA B, MOLECULAR: NEGATIVE
KETONES UR QL STRIP: ABNORMAL
LEUKOCYTE ESTERASE UR QL STRIP: ABNORMAL
LYMPHOCYTES # BLD AUTO: 1.6 K/UL (ref 1–4.8)
LYMPHOCYTES NFR BLD: 12.6 % (ref 18–48)
MCH RBC QN AUTO: 28.9 PG (ref 27–31)
MCHC RBC AUTO-ENTMCNC: 32.9 G/DL (ref 32–36)
MCV RBC AUTO: 88 FL (ref 82–98)
MICROSCOPIC COMMENT: ABNORMAL
MONOCYTES # BLD AUTO: 1.2 K/UL (ref 0.3–1)
MONOCYTES NFR BLD: 9.3 % (ref 4–15)
NEUTROPHILS # BLD AUTO: 9.9 K/UL (ref 1.8–7.7)
NEUTROPHILS NFR BLD: 77.2 % (ref 38–73)
NITRITE UR QL STRIP: NEGATIVE
NRBC BLD-RTO: 0 /100 WBC
PH UR STRIP: 6 [PH] (ref 5–8)
PLATELET # BLD AUTO: 154 K/UL (ref 150–350)
PMV BLD AUTO: 12.3 FL (ref 9.2–12.9)
POCT GLUCOSE: 153 MG/DL (ref 70–110)
POTASSIUM SERPL-SCNC: 3.6 MMOL/L (ref 3.5–5.1)
PROT SERPL-MCNC: 7.3 G/DL (ref 6–8.4)
PROT UR QL STRIP: ABNORMAL
RBC # BLD AUTO: 3.87 M/UL (ref 4–5.4)
RBC #/AREA URNS HPF: 4 /HPF (ref 0–4)
SODIUM SERPL-SCNC: 137 MMOL/L (ref 136–145)
SP GR UR STRIP: 1.02 (ref 1–1.03)
SPECIMEN SOURCE: NORMAL
SQUAMOUS #/AREA URNS HPF: 14 /HPF
URN SPEC COLLECT METH UR: ABNORMAL
UROBILINOGEN UR STRIP-ACNC: NEGATIVE EU/DL
WBC # BLD AUTO: 12.8 K/UL (ref 3.9–12.7)
WBC #/AREA URNS HPF: 15 /HPF (ref 0–5)

## 2019-11-29 PROCEDURE — S0028 INJECTION, FAMOTIDINE, 20 MG: HCPCS | Performed by: NURSE PRACTITIONER

## 2019-11-29 PROCEDURE — 63600175 PHARM REV CODE 636 W HCPCS: Performed by: NURSE PRACTITIONER

## 2019-11-29 PROCEDURE — 87077 CULTURE AEROBIC IDENTIFY: CPT

## 2019-11-29 PROCEDURE — 96374 THER/PROPH/DIAG INJ IV PUSH: CPT

## 2019-11-29 PROCEDURE — 81000 URINALYSIS NONAUTO W/SCOPE: CPT

## 2019-11-29 PROCEDURE — 25000242 PHARM REV CODE 250 ALT 637 W/ HCPCS: Performed by: EMERGENCY MEDICINE

## 2019-11-29 PROCEDURE — 25000003 PHARM REV CODE 250: Performed by: NURSE PRACTITIONER

## 2019-11-29 PROCEDURE — 93005 ELECTROCARDIOGRAM TRACING: CPT

## 2019-11-29 PROCEDURE — 87502 INFLUENZA DNA AMP PROBE: CPT

## 2019-11-29 PROCEDURE — 99285 EMERGENCY DEPT VISIT HI MDM: CPT | Mod: 25

## 2019-11-29 PROCEDURE — 85025 COMPLETE CBC W/AUTO DIFF WBC: CPT

## 2019-11-29 PROCEDURE — 87186 SC STD MICRODIL/AGAR DIL: CPT

## 2019-11-29 PROCEDURE — 82962 GLUCOSE BLOOD TEST: CPT

## 2019-11-29 PROCEDURE — 96375 TX/PRO/DX INJ NEW DRUG ADDON: CPT

## 2019-11-29 PROCEDURE — 83880 ASSAY OF NATRIURETIC PEPTIDE: CPT

## 2019-11-29 PROCEDURE — 25000003 PHARM REV CODE 250: Performed by: EMERGENCY MEDICINE

## 2019-11-29 PROCEDURE — 94644 CONT INHLJ TX 1ST HOUR: CPT

## 2019-11-29 PROCEDURE — 87088 URINE BACTERIA CULTURE: CPT

## 2019-11-29 PROCEDURE — 87086 URINE CULTURE/COLONY COUNT: CPT

## 2019-11-29 PROCEDURE — 93010 EKG 12-LEAD: ICD-10-PCS | Mod: ,,, | Performed by: INTERNAL MEDICINE

## 2019-11-29 PROCEDURE — 80053 COMPREHEN METABOLIC PANEL: CPT

## 2019-11-29 PROCEDURE — 96361 HYDRATE IV INFUSION ADD-ON: CPT

## 2019-11-29 PROCEDURE — 93010 ELECTROCARDIOGRAM REPORT: CPT | Mod: ,,, | Performed by: INTERNAL MEDICINE

## 2019-11-29 PROCEDURE — 96376 TX/PRO/DX INJ SAME DRUG ADON: CPT

## 2019-11-29 PROCEDURE — 63600175 PHARM REV CODE 636 W HCPCS: Performed by: EMERGENCY MEDICINE

## 2019-11-29 RX ORDER — IBUPROFEN 600 MG/1
600 TABLET ORAL
Status: COMPLETED | OUTPATIENT
Start: 2019-11-29 | End: 2019-11-29

## 2019-11-29 RX ORDER — FAMOTIDINE 10 MG/ML
20 INJECTION INTRAVENOUS
Status: COMPLETED | OUTPATIENT
Start: 2019-11-29 | End: 2019-11-29

## 2019-11-29 RX ORDER — ONDANSETRON 4 MG/1
4 TABLET, ORALLY DISINTEGRATING ORAL EVERY 6 HOURS PRN
Qty: 12 TABLET | Refills: 0 | Status: SHIPPED | OUTPATIENT
Start: 2019-11-29 | End: 2019-12-04

## 2019-11-29 RX ORDER — ONDANSETRON 2 MG/ML
4 INJECTION INTRAMUSCULAR; INTRAVENOUS
Status: COMPLETED | OUTPATIENT
Start: 2019-11-29 | End: 2019-11-29

## 2019-11-29 RX ORDER — FLUTICASONE PROPIONATE 50 MCG
1 SPRAY, SUSPENSION (ML) NASAL 2 TIMES DAILY PRN
Qty: 15 G | Refills: 0 | Status: SHIPPED | OUTPATIENT
Start: 2019-11-29 | End: 2021-06-10 | Stop reason: SDUPTHER

## 2019-11-29 RX ORDER — IPRATROPIUM BROMIDE AND ALBUTEROL SULFATE 2.5; .5 MG/3ML; MG/3ML
3 SOLUTION RESPIRATORY (INHALATION)
Status: COMPLETED | OUTPATIENT
Start: 2019-11-29 | End: 2019-11-29

## 2019-11-29 RX ADMIN — IPRATROPIUM BROMIDE AND ALBUTEROL SULFATE 3 ML: .5; 3 SOLUTION RESPIRATORY (INHALATION) at 06:11

## 2019-11-29 RX ADMIN — ONDANSETRON 4 MG: 2 INJECTION INTRAMUSCULAR; INTRAVENOUS at 07:11

## 2019-11-29 RX ADMIN — IPRATROPIUM BROMIDE AND ALBUTEROL SULFATE 3 ML: .5; 3 SOLUTION RESPIRATORY (INHALATION) at 07:11

## 2019-11-29 RX ADMIN — SODIUM CHLORIDE 1000 ML: 0.9 INJECTION, SOLUTION INTRAVENOUS at 06:11

## 2019-11-29 RX ADMIN — IBUPROFEN 600 MG: 600 TABLET, FILM COATED ORAL at 06:11

## 2019-11-29 RX ADMIN — FAMOTIDINE 20 MG: 10 INJECTION, SOLUTION INTRAVENOUS at 06:11

## 2019-11-29 RX ADMIN — ONDANSETRON 4 MG: 2 INJECTION INTRAMUSCULAR; INTRAVENOUS at 06:11

## 2019-11-29 NOTE — ED NOTES
Pt with sob ,chill, cough and vomiting x 3 days. Pt aaox3 skin warm and dry. Lungs clear bilaterally, heart  Rate regular . Skin warm and dry. Abdomin soft non tender.

## 2019-11-29 NOTE — ED PROVIDER NOTES
Encounter Date: 11/29/2019    SCRIBE #1 NOTE: I, Cathy Sanchez, am scribing for, and in the presence of, Dr. Nicole.       History     Chief Complaint   Patient presents with    General Illness     belching, nausea, body aches, and wheezing since Wednesday.      Time seen by provider: 5:28 PM    This is a 69 y.o. female with hx of NIDDM, HTN who presents with multiple complaints over the past two days. She reports fever, chills, right ear pain, post nasal drip, painful swallowing, nausea, diarrhea, mild abdominal pain, urinary frequency, and excessive belching. She states she feels mucus in the throat which makes swallowing difficult.  Abdominal pain is described as cramping prior to episodes of diarrhea. She states that she ate a banana yesterday and has not had any abdominal pain or diarrhea since. Pt has no nasal congestion, rhinorrhea, sore throat, cough, SOB, chest pain, dysuria, or back pain. Pt does have chronic neck pain s/p epidural steroid injection on 10/30/19, which she states has actually exacerbated pain. She saw pain management who prescribed Lyrica, which she has not yet started. She is allergic to statins and ace inhibitors. She admits to smoking in the past (quit 14 years ago), but denies any current smoking, illicit drug use, or heavy drinking.    The history is provided by the patient and the spouse.     Review of patient's allergies indicates:   Allergen Reactions    Statins-hmg-coa reductase inhibitors Edema    Ace inhibitors Swelling     Past Medical History:   Diagnosis Date    Coronary artery disease     Diabetes mellitus     Diabetes mellitus, type 2     Hypertension      Past Surgical History:   Procedure Laterality Date    BACK SURGERY      CHOLECYSTECTOMY      DISC REMOVAL      EPIDURAL STEROID INJECTION N/A 10/30/2019    Procedure: INJECTION, STEROID, EPIDURAL, C7-T1 IL;  Surgeon: Rahul Bowie MD;  [facility];  Service: Pain Management;  Laterality: N/A;     HYSTERECTOMY       History reviewed. No pertinent family history.  Social History     Tobacco Use    Smoking status: Never Smoker    Smokeless tobacco: Never Used   Substance Use Topics    Alcohol use: Yes     Frequency: Never     Comment: socially    Drug use: No     Review of Systems   Constitutional: Positive for chills and fever. Negative for appetite change.   HENT: Positive for ear pain (right), postnasal drip and trouble swallowing. Negative for congestion, rhinorrhea and sore throat.    Eyes: Negative for visual disturbance.   Respiratory: Negative for cough and shortness of breath.    Cardiovascular: Negative for chest pain and palpitations.   Gastrointestinal: Positive for abdominal pain, diarrhea and nausea. Negative for vomiting.   Genitourinary: Positive for frequency. Negative for decreased urine volume, dysuria and vaginal discharge.   Musculoskeletal: Positive for neck pain (chronic). Negative for joint swelling and neck stiffness.   Skin: Negative for rash and wound.   Neurological: Negative for weakness, numbness and headaches.   Psychiatric/Behavioral: Negative for confusion.       Physical Exam     Initial Vitals [11/29/19 1629]   BP Pulse Resp Temp SpO2   125/89 81 (!) 22 99.9 °F (37.7 °C) (!) 91 %      MAP       --         Physical Exam    Nursing note and vitals reviewed.  Constitutional: She appears well-developed and well-nourished. No distress.   HENT:   Head: Normocephalic and atraumatic.   Uvula midline, no tonsillar enlargement, cobblestoning posterior oropharynx is present.  Turbinate swelling.   Eyes: Conjunctivae and EOM are normal. Pupils are equal, round, and reactive to light.   Neck: Normal range of motion. Neck supple.   Cardiovascular: Normal rate, regular rhythm and normal heart sounds.   No murmur heard.  Pulmonary/Chest: Breath sounds normal. No respiratory distress. She has no wheezes. She has no rhonchi. She has no rales.   Abdominal: Soft. Bowel sounds are normal.  There is no tenderness.   Musculoskeletal: Normal range of motion.   Neurological: She is alert and oriented to person, place, and time. She has normal strength. No cranial nerve deficit or sensory deficit. GCS score is 15. GCS eye subscore is 4. GCS verbal subscore is 5. GCS motor subscore is 6.   Skin: Skin is warm and dry. No rash noted.   Psychiatric: She has a normal mood and affect. Her behavior is normal.         ED Course   Procedures  Labs Reviewed   CULTURE, URINE - Abnormal; Notable for the following components:       Result Value    Urine Culture, Routine   (*)     Value: ESCHERICHIA COLI  >100,000 cfu/ml      All other components within normal limits    Narrative:     Preferred Collection Type->Urine, Clean Catch   CBC W/ AUTO DIFFERENTIAL - Abnormal; Notable for the following components:    WBC 12.80 (*)     RBC 3.87 (*)     Hemoglobin 11.2 (*)     Hematocrit 34.0 (*)     Immature Granulocytes 0.6 (*)     Gran # (ANC) 9.9 (*)     Immature Grans (Abs) 0.08 (*)     Mono # 1.2 (*)     Gran% 77.2 (*)     Lymph% 12.6 (*)     All other components within normal limits   COMPREHENSIVE METABOLIC PANEL - Abnormal; Notable for the following components:    Glucose 183 (*)     BUN, Bld 24 (*)     Creatinine 1.8 (*)     Albumin 3.3 (*)     eGFR if  32 (*)     eGFR if non  28 (*)     All other components within normal limits   B-TYPE NATRIURETIC PEPTIDE - Abnormal; Notable for the following components:     (*)     All other components within normal limits   URINALYSIS, REFLEX TO URINE CULTURE - Abnormal; Notable for the following components:    Appearance, UA Hazy (*)     Protein, UA 2+ (*)     Ketones, UA Trace (*)     Bilirubin (UA) 1+ (*)     Occult Blood UA 2+ (*)     Leukocytes, UA 1+ (*)     All other components within normal limits    Narrative:     Preferred Collection Type->Urine, Clean Catch   URINALYSIS MICROSCOPIC - Abnormal; Notable for the following components:     WBC, UA 15 (*)     Bacteria Moderate (*)     All other components within normal limits    Narrative:     Preferred Collection Type->Urine, Clean Catch   POCT GLUCOSE - Abnormal; Notable for the following components:    POCT Glucose 153 (*)     All other components within normal limits   INFLUENZA A & B BY MOLECULAR     EKG Readings: (Independently Interpreted)   Sinus rhythm with frequent PVC present. Rate of 81. No STEMI.        Imaging Results          X-Ray Chest PA And Lateral (Final result)  Result time 11/29/19 17:48:04    Final result by Juan Bates MD (11/29/19 17:48:04)                 Impression:      No acute process.      Electronically signed by: Juan Bates MD  Date:    11/29/2019  Time:    17:48             Narrative:    EXAMINATION:  XR CHEST PA AND LATERAL    CLINICAL HISTORY:  Shortness of breath    TECHNIQUE:  PA and lateral views of the chest were performed.    COMPARISON:  01/24/2019.    FINDINGS:  There are postoperative changes in the lower cervical spine.  The trachea is unremarkable.  The cardiomediastinal silhouette is within normal limits.  The hemidiaphragms are unremarkable.  There is no evidence of free air beneath the hemidiaphragms.  There are no pleural effusions.  There is no evidence of a pneumothorax.  There is no evidence of pneumomediastinum.  No airspace opacity is present.  There are degenerative changes in the osseous structures.                              X-Rays:   Independently Interpreted Readings:   Chest X-Ray: Non specific elevation of right hemidiaphragm. No lobar infiltrate, effusion, or PTX. Will defer to radiology.     Medical Decision Making:   History:   Old Medical Records: I decided to obtain old medical records.  Independently Interpreted Test(s):   I have ordered and independently interpreted X-rays - see prior notes.  I have ordered and independently interpreted EKG Reading(s) - see prior notes  Clinical Tests:   Lab Tests: Ordered and  Reviewed  Radiological Study: Ordered and Reviewed  Medical Tests: Ordered and Reviewed  ED Management:  Emergent evaluation of 69-year-old female with complaint of fever, URI symptoms, nausea and diarrhea.  Vital signs reveal mildly elevated temperature, but afebrile.  Physical exam reveals stigmata of URI without obvious source for bacterial infection.  Labs include urinalysis which show 15 WBCs per high-powered field but also 14 squamous epithelial cells - will defer treatment to culture results as she is asymptomatic.  Other labs showed mild leukocytosis, mild dehydration as evidence by BUN and creatinine of 24/1.8.  Influenza swab and chest x-ray are normal. She was treated in the ED with Zofran, duo nebs, ibuprofen, Pepcid and fluids with improvement.  Ultimately she was discharged in good condition with prescriptions for Flonase and Zofran, encouraged close follow-up with PCP or return for new or worsening.            Scribe Attestation:   Scribe #1: I performed the above scribed service and the documentation accurately describes the services I performed. I attest to the accuracy of the note.    Attending Attestation:           Physician Attestation for Scribe:  Physician Attestation Statement for Scribe #1: I, Dr. Nicole, reviewed documentation, as scribed by Cathy Sanchez in my presence, and it is both accurate and complete.                 ED Course as of Dec 02 0651   Fri Nov 29, 2019   1652 Triage Sort Note: Meenu Guy, a nontoxic/well appearing, 69 y.o. female, presented to the ED with c/o fever, body aches, nausea, belching, SOB, diarrhea and vomiting since Wednesday.     All ED beds are full at present; patient notified of this status.  Patient seen and medically screened by Nurse Practitioner in triage. Orders initiated at triage to expedite care.  Patient is stable to return to the waiting room and will be placed in an ED bed when available.  Care will be transferred to an alternate  provider when patient was placed in an Exam Room from the [facility] for physical exam, additional orders, and disposition.  4:52 PM Carolina Rachel DNP, THAOP-C      [AT]      ED Course User Index  [AT] PAVEL Sharma                Clinical Impression:     1. Acute febrile illness    2. SOB (shortness of breath)    3. Nausea vomiting and diarrhea    4. Sinusitis, unspecified chronicity, unspecified location                                Katelyn Nicole MD  12/02/19 0651

## 2019-12-01 LAB — BACTERIA UR CULT: ABNORMAL

## 2019-12-03 ENCOUNTER — TELEPHONE (OUTPATIENT)
Dept: INTERNAL MEDICINE | Facility: CLINIC | Age: 69
End: 2019-12-03

## 2019-12-03 NOTE — TELEPHONE ENCOUNTER
----- Message from Stella Gunter sent at 12/2/2019  3:29 PM CST -----  Contact: LOUIE MASON [[id number]]   Name of Who is Calling:     What is the request in detail:  Patient request call back in reference to er f/u appointment  patient went to er on Friday  state she need her oxygen levels checked Please contact to further discuss and advise      Can the clinic reply by [facility]: no     What Number to Call Back if not in [facility]:  [phone]

## 2019-12-04 ENCOUNTER — OFFICE VISIT (OUTPATIENT)
Dept: INTERNAL MEDICINE | Facility: CLINIC | Age: 69
End: 2019-12-04
Payer: MEDICARE

## 2019-12-04 VITALS
HEIGHT: 70 IN | SYSTOLIC BLOOD PRESSURE: 110 MMHG | BODY MASS INDEX: 23.54 KG/M2 | HEART RATE: 67 BPM | OXYGEN SATURATION: 97 % | WEIGHT: 164.44 LBS | DIASTOLIC BLOOD PRESSURE: 60 MMHG

## 2019-12-04 DIAGNOSIS — Z09 HOSPITAL DISCHARGE FOLLOW-UP: Primary | ICD-10-CM

## 2019-12-04 PROCEDURE — 1101F PR PT FALLS ASSESS DOC 0-1 FALLS W/OUT INJ PAST YR: ICD-10-PCS | Mod: CPTII,S$GLB,, | Performed by: INTERNAL MEDICINE

## 2019-12-04 PROCEDURE — 99499 RISK ADDL DX/OHS AUDIT: ICD-10-PCS | Mod: S$GLB,,, | Performed by: INTERNAL MEDICINE

## 2019-12-04 PROCEDURE — 1159F MED LIST DOCD IN RCRD: CPT | Mod: S$GLB,,, | Performed by: INTERNAL MEDICINE

## 2019-12-04 PROCEDURE — 1101F PT FALLS ASSESS-DOCD LE1/YR: CPT | Mod: CPTII,S$GLB,, | Performed by: INTERNAL MEDICINE

## 2019-12-04 PROCEDURE — 99499 UNLISTED E&M SERVICE: CPT | Mod: S$GLB,,, | Performed by: INTERNAL MEDICINE

## 2019-12-04 PROCEDURE — 3078F DIAST BP <80 MM HG: CPT | Mod: CPTII,S$GLB,, | Performed by: INTERNAL MEDICINE

## 2019-12-04 PROCEDURE — 1159F PR MEDICATION LIST DOCUMENTED IN MEDICAL RECORD: ICD-10-PCS | Mod: S$GLB,,, | Performed by: INTERNAL MEDICINE

## 2019-12-04 PROCEDURE — 99999 PR PBB SHADOW E&M-EST. PATIENT-LVL III: CPT | Mod: PBBFAC,,, | Performed by: INTERNAL MEDICINE

## 2019-12-04 PROCEDURE — 3074F PR MOST RECENT SYSTOLIC BLOOD PRESSURE < 130 MM HG: ICD-10-PCS | Mod: CPTII,S$GLB,, | Performed by: INTERNAL MEDICINE

## 2019-12-04 PROCEDURE — 1126F AMNT PAIN NOTED NONE PRSNT: CPT | Mod: S$GLB,,, | Performed by: INTERNAL MEDICINE

## 2019-12-04 PROCEDURE — 99999 PR PBB SHADOW E&M-EST. PATIENT-LVL III: ICD-10-PCS | Mod: PBBFAC,,, | Performed by: INTERNAL MEDICINE

## 2019-12-04 PROCEDURE — 1126F PR PAIN SEVERITY QUANTIFIED, NO PAIN PRESENT: ICD-10-PCS | Mod: S$GLB,,, | Performed by: INTERNAL MEDICINE

## 2019-12-04 PROCEDURE — 3078F PR MOST RECENT DIASTOLIC BLOOD PRESSURE < 80 MM HG: ICD-10-PCS | Mod: CPTII,S$GLB,, | Performed by: INTERNAL MEDICINE

## 2019-12-04 PROCEDURE — 99213 PR OFFICE/OUTPT VISIT, EST, LEVL III, 20-29 MIN: ICD-10-PCS | Mod: S$GLB,,, | Performed by: INTERNAL MEDICINE

## 2019-12-04 PROCEDURE — 3074F SYST BP LT 130 MM HG: CPT | Mod: CPTII,S$GLB,, | Performed by: INTERNAL MEDICINE

## 2019-12-04 PROCEDURE — 99213 OFFICE O/P EST LOW 20 MIN: CPT | Mod: S$GLB,,, | Performed by: INTERNAL MEDICINE

## 2019-12-04 RX ORDER — ONDANSETRON 4 MG/1
4 TABLET, FILM COATED ORAL EVERY 8 HOURS PRN
Qty: 30 TABLET | Refills: 0 | Status: SHIPPED | OUTPATIENT
Start: 2019-12-04 | End: 2020-03-16

## 2019-12-04 NOTE — PATIENT INSTRUCTIONS
Your exam was overall normal today.    Your blood pressure was good.  Your lungs were clear.    Increase your Omeprazole to twice a day for 7 days.  I will refill your nausea medicine - Zofran.  Take Mucinex for the congestion.      I will see you back in 1 week.    Keep follow up in January - sooner if needed.  Please come at least 15-20 minutes before your scheduled appointment time.

## 2019-12-04 NOTE — PROGRESS NOTES
Subjective:       Patient ID: Meenu Guy is a 69 y.o. female with a PMH significant for CAD, HTN, T2D who was seen initially by me on 4/18/2019 and for follow up on 7/25/2019 and 10/24/2019.  Patient previously followed by Dr. Hawk.    Chief Complaint: Hospital Follow Up    HPI  Patient is overall stable today.      Patient denies f/c, n/v/d.  No chest pain or SOB.  No abdominal pain or dysuria.  No headaches or change in vision.  No dizziness.  No significant  weight gain or weight loss.  Remaining ROS negative.    Review of Systems   Constitutional: Positive for fatigue. Negative for appetite change, chills, diaphoresis, fever and unexpected weight change.   HENT: Negative for ear pain, hearing loss, sinus pain, tinnitus, trouble swallowing and voice change.    Eyes: Negative for photophobia, pain and visual disturbance.   Respiratory: Positive for cough. Negative for chest tightness, shortness of breath and wheezing.    Cardiovascular: Negative for chest pain, palpitations and leg swelling.   Gastrointestinal: Positive for nausea. Negative for abdominal pain, blood in stool, constipation, diarrhea and vomiting.   Endocrine: Negative for cold intolerance, heat intolerance, polydipsia, polyphagia and polyuria.   Genitourinary: Negative for decreased urine volume, difficulty urinating, dysuria, flank pain, hematuria, pelvic pain, vaginal bleeding, vaginal discharge and vaginal pain.   Musculoskeletal: Positive for arthralgias (right arm pain). Negative for gait problem, joint swelling, myalgias and neck pain.   Skin: Negative for rash.   Neurological: Negative for dizziness, tremors, syncope, weakness, numbness and headaches.   Hematological: Does not bruise/bleed easily.   Psychiatric/Behavioral: Negative for agitation, confusion and sleep disturbance. The patient is not nervous/anxious.        Objective:      Physical Exam   Constitutional: She is oriented to person, place, and time. She appears  "well-developed and well-nourished. No distress.   HENT:   Head: Normocephalic and atraumatic.   Nose: Nose normal.   Mouth/Throat: Oropharynx is clear and moist.   Eyes: Pupils are equal, round, and reactive to light. Conjunctivae and EOM are normal. No scleral icterus.   Neck: Normal range of motion. Neck supple. No JVD present. No thyromegaly present.   Cardiovascular: Normal rate, regular rhythm and intact distal pulses. Exam reveals no gallop and no friction rub.   Murmur (2/6 KELSEY) heard.  Pulmonary/Chest: Effort normal and breath sounds normal. No respiratory distress. She has no wheezes. She has no rales.   Abdominal: Soft. Bowel sounds are normal. She exhibits no distension. There is no tenderness. There is no rebound and no guarding.   Musculoskeletal: Normal range of motion. She exhibits no edema.   Lymphadenopathy:     She has no cervical adenopathy.   Neurological: She is alert and oriented to person, place, and time. No cranial nerve deficit or sensory deficit.   Skin: Skin is warm and dry. No rash noted. No erythema.   Psychiatric: She has a normal mood and affect. Her behavior is normal. Thought content normal.       Assessment:       1. Hospital discharge follow-up        Plan:   -ED Follow Up - Patient seen in ED at [facility] on 11/29/2019 - "Emergent evaluation of 69-year-old female with complaint of fever, URI symptoms, nausea and diarrhea.  Vital signs reveal mildly elevated temperature, but afebrile.  Physical exam reveals stigmata of URI without obvious source for bacterial infection.  Labs include urinalysis which show 15 WBCs per high-powered field but also 14 squamous epithelial cells - will defer treatment to culture results as she is asymptomatic.  Other labs showed mild leukocytosis, mild dehydration as evidence by BUN and creatinine of 24/1.8.  Influenza swab and chest x-ray are normal. She was treated in the ED with Zofran, duo nebs, ibuprofen, Pepcid and fluids with improvement.  " "Ultimately she was discharged in good condition with prescriptions for Flonase and Zofran, encouraged close follow-up with PCP or return for new or worsening".    She was prescribed Augmentin 875mg bid for 10 days for a urine culture that was positive for EColi.  Patient reported previously dark urine, but no urinary symptoms.    Feels a little better, but still with symptoms.  Having increased belching and fatigue.  Having nausea after coughing.  Cough is sometimes productive of white sputum.  No diarrhea.  No abdominal pain.    BP and HR good.  Pox normal.  Lungs clear.    Will increase Omeprazole 20mg to bid for 1 week.  Will refill Zofran as needed for nausea.  Complete the antibiotics.  Use Mucinex for congestions.    Return in 1 week.  =================================  -Nutrition - BMI good at 24.29 in 7/2019.      -Cards - HTN and HLD (no history of CAD per patient) - on Amlodipine and Carvedilol.  BP in 4/2019 was stable at 118/57.  BP in 7/2019 was 128/54.  BP was good at 126/60 in 10/2019.    On Atorvastatin (no allergy to statins).  On ASA.  Check fasting labs - done 4/2019 with  , TG 72, HDL 57, LDL 37.6.     Heart exam with 2/6 KELSEY - TTE in 7/2019 - Mild concentric left ventricular hypertrophy.  · Normal left ventricular systolic function. The estimated ejection fraction is 60%  · Normal right ventricular systolic function.  · No wall motion abnormalities.  · Grade II (moderate) left ventricular diastolic dysfunction consistent with pseudonormalization.  · Normal left atrial pressure.  Mild mitral regurgitation.    PVD - Right Femoral Bypass - around 2012 in [place]. Dr. Essex.  No claudication.    -Endocrine - T2D - on Metformin 500mg bid (had diarrhea with 1g bid).  Check A1C - 8.2 in 4/2019.  Started Glimepiride 2mg in 8/2019.  Repeat was 8.1 in 9/2019.  Seen by Diabetic Education in 10/2019.  Repeat A1C was 8.1 in 10/2019.   Discussed adding Januvia.    Check urine microalbumin - ordered in " 4/2019 and 7/2019, but not yet completed.  Has allergy to ACEI - tongue swelling.  Last Eye exam - 2018.  Will refer to Optometry - patient to arrange it herself.  Last Foot exam -  Done 4/2019 by me.  Seen by Podiatry on 9/26/2019.  Diabetic Education - seen last on 10/3/2019.    TSH normal in 4/2019.  Vitamin D Insufficient - level was 17 - advised to lower D3 to 5000 units daily (she was taking 10,000 daily).    -[facility]K - Cervical DD - S/p discectomy 20 years ago.  Having pain in right arm originating from the c-spine. Seen by Dr. Madsen 2 weeks (Orthopedics) and told it was not Fibromyalgia (as diagnosed by Dr. Hawk).  On Lyrica and doesn't feel it helps.  On Opiods in past - [facility] - in [place].  Has not been on this for 2 years.    Will refer to Pain.  Seen on 5/29/2019.  On Gabapentin with not much benefit.  Will add Lidoderm 5% patch.  If not covered by her insurance, I will prescribe Voltaren gel as she requested.    Scheduled for Epidural in 10/2019.  Last seen by Pain on 11/26/2019.    -Renal - CKD - CMP on 4/18/2019 with creatinine 1.1 (GFR 59.2).  Repeat was 1.4 in 10/2019, 1.3 in 11/2019 and in ED creatinine was up to 1.8.  Repeat today.    -GI - GERD - on Omeprazole prn.  No gastritis or esophagitis.    History of Gallstones - s/p Lap Janet 2012.    We discussed colon cancer screening - last colonoscopy was 2017 - has 2 polyps.  Will refer back to GI - she refused in 4/2019 .  No FH of colon cancer.  FIT negative in 7/2019.    -GYN - Last Pap was many years ago and normal. Will refer to GYN - missed appointment on 5/1/2019.    Last Mammo was 5/2019 and normal.  We discussed DXA screening for osteoporosis - normal in 7/2019.    -HCM - We discussed Flu (10/2018 - 10/8/2019 by Jenise) and Tdap (4/19/2019) vaccinations.  We discussed Shingles (waiting for Shingrix Availability - advised to get on waiting list at pharmacy) and Pneumococcal (23 and 13 - had both and will get records) vaccinations.       -Follow Up - keep appointment in 1/2019.

## 2019-12-04 NOTE — PROGRESS NOTES
Patient, Meenu Guy (MRN #[id number]), presented with a recent Estimated Glumerular Filtration Rate (EGFR) between 30 and 45 consistent with the definition of chronic kidney disease stage 3 - moderate (ICD10 - N18.3).    eGFR if    Date Value Ref Range Status   11/29/2019 32 (A) >60 mL/min/1.73 m^2 Final       The patient's chronic kidney disease stage 3 was monitored, evaluated, addressed and/or treated. This addendum to the medical record is made on 12/04/2019.

## 2019-12-05 ENCOUNTER — PATIENT OUTREACH (OUTPATIENT)
Dept: ADMINISTRATIVE | Facility: HOSPITAL | Age: 69
End: 2019-12-05

## 2019-12-05 DIAGNOSIS — M79.18 MYOFASCIAL PAIN: ICD-10-CM

## 2019-12-05 RX ORDER — TIZANIDINE 4 MG/1
4 TABLET ORAL 2 TIMES DAILY PRN
Qty: 20 TABLET | Refills: 0 | Status: SHIPPED | OUTPATIENT
Start: 2019-12-05 | End: 2019-12-15

## 2019-12-06 NOTE — PROGRESS NOTES
Subjective:       Patient ID: Meenu Guy is a 69 y.o. female with a PMH significant for CAD, HTN, T2D who was seen initially by me on 4/18/2019 and for follow up on 7/25/2019 and 10/24/2019.  Patient previously followed by Dr. Hawk.    Chief Complaint: Hospital Follow Up    HPI  Patient is overall stable today.  Overall improved today and back to normal.    Patient denies f/c, n/v/d.  No chest pain or SOB.  No abdominal pain or dysuria.  No headaches or change in vision.  No dizziness.  No significant  weight gain or weight loss.  Remaining ROS negative.    Review of Systems   Constitutional: Negative for appetite change, chills, diaphoresis, fatigue, fever and unexpected weight change.   HENT: Negative for ear pain, hearing loss, sinus pain, tinnitus, trouble swallowing and voice change.    Eyes: Negative for photophobia, pain and visual disturbance.   Respiratory: Negative for cough, chest tightness, shortness of breath and wheezing.    Cardiovascular: Negative for chest pain, palpitations and leg swelling.   Gastrointestinal: Negative for abdominal pain, blood in stool, constipation, diarrhea, nausea and vomiting.   Endocrine: Negative for cold intolerance, heat intolerance, polydipsia, polyphagia and polyuria.   Genitourinary: Negative for decreased urine volume, difficulty urinating, dysuria, flank pain, hematuria, pelvic pain, vaginal bleeding, vaginal discharge and vaginal pain.   Musculoskeletal: Negative for arthralgias, gait problem, joint swelling, myalgias and neck pain.   Skin: Negative for rash.   Neurological: Negative for dizziness, tremors, syncope, weakness, numbness and headaches.   Hematological: Does not bruise/bleed easily.   Psychiatric/Behavioral: Negative for agitation, confusion and sleep disturbance. The patient is not nervous/anxious.        Objective:      Physical Exam   Constitutional: She is oriented to person, place, and time. She appears well-developed and  "well-nourished. No distress.   HENT:   Head: Normocephalic and atraumatic.   Nose: Nose normal.   Mouth/Throat: Oropharynx is clear and moist.   Eyes: Pupils are equal, round, and reactive to light. Conjunctivae and EOM are normal. No scleral icterus.   Neck: Normal range of motion. Neck supple. No JVD present. No thyromegaly present.   Cardiovascular: Normal rate, regular rhythm and intact distal pulses. Exam reveals no gallop and no friction rub.   Murmur (2/6 KELSEY) heard.  Pulmonary/Chest: Effort normal and breath sounds normal. No respiratory distress. She has no wheezes. She has no rales.   Abdominal: Soft. Bowel sounds are normal. She exhibits no distension. There is no tenderness. There is no rebound and no guarding.   Musculoskeletal: Normal range of motion. She exhibits no edema.   Lymphadenopathy:     She has no cervical adenopathy.   Neurological: She is alert and oriented to person, place, and time. No cranial nerve deficit or sensory deficit.   Skin: Skin is warm and dry. No rash noted. No erythema.   Psychiatric: She has a normal mood and affect. Her behavior is normal. Thought content normal.       Assessment:       1. Hospital discharge follow-up    2. Elevated serum creatinine        Plan:   -Today's Visit - Patient overall stable and feeling back to normal today.  Will repeat BMP to check creatinine.  Has 3 more days of antibiotics.  No Nausea.  Congestion is better.  No dysuria.     Discussed my leaving.  She will follow up with Dr. Marvin in 3 months.    =================================  -ED Follow Up - Patient seen in ED at [facility] on 11/29/2019 - "Emergent evaluation of 69-year-old female with complaint of fever, URI symptoms, nausea and diarrhea.  Vital signs reveal mildly elevated temperature, but afebrile.  Physical exam reveals stigmata of URI without obvious source for bacterial infection.  Labs include urinalysis which show 15 WBCs per high-powered field but also 14 squamous epithelial " "cells - will defer treatment to culture results as she is asymptomatic.  Other labs showed mild leukocytosis, mild dehydration as evidence by BUN and creatinine of 24/1.8.  Influenza swab and chest x-ray are normal. She was treated in the ED with Zofran, duo nebs, ibuprofen, Pepcid and fluids with improvement.  Ultimately she was discharged in good condition with prescriptions for Flonase and Zofran, encouraged close follow-up with PCP or return for new or worsening".    She was prescribed Augmentin 875mg bid for 10 days for a urine culture that was positive for EColi.  Patient reported previously dark urine, but no urinary symptoms.    Feels a little better, but still with symptoms.  Having increased belching and fatigue.  Having nausea after coughing.  Cough is sometimes productive of white sputum.  No diarrhea.  No abdominal pain.    BP and HR good.  Pox normal.  Lungs clear.    Will increase Omeprazole 20mg to bid for 1 week.  Will refill Zofran as needed for nausea.  Complete the antibiotics.  Use Mucinex for congestions.    Return in 1 week.  =================================  -Nutrition - BMI good at 24.29 in 7/2019.      -Cards - HTN and HLD (no history of CAD per patient) - on Amlodipine and Carvedilol.  BP in 4/2019 was stable at 118/57.  BP in 7/2019 was 128/54.  BP was good at 126/60 in 10/2019.    On Atorvastatin (no allergy to statins).  On ASA.  Check fasting labs - done 4/2019 with  , TG 72, HDL 57, LDL 37.6.     Heart exam with 2/6 KELSEY - TTE in 7/2019 - Mild concentric left ventricular hypertrophy.  · Normal left ventricular systolic function. The estimated ejection fraction is 60%  · Normal right ventricular systolic function.  · No wall motion abnormalities.  · Grade II (moderate) left ventricular diastolic dysfunction consistent with pseudonormalization.  · Normal left atrial pressure.  Mild mitral regurgitation.    PVD - Right Femoral Bypass - around 2012 in [place]. Dr. Essex.  No " claudication.    -Endocrine - T2D - on Metformin 500mg bid (had diarrhea with 1g bid).  Check A1C - 8.2 in 4/2019.  Started Glimepiride 2mg in 8/2019.  Repeat was 8.1 in 9/2019.  Seen by Diabetic Education in 10/2019.  Repeat A1C was 8.1 in 10/2019.   Discussed adding Januvia.    Check urine microalbumin - ordered in 4/2019 and 7/2019, but not yet completed.  Has allergy to ACEI - tongue swelling.  Last Eye exam - 2018.  Will refer to Optometry - patient to arrange it herself.  Last Foot exam -  Done 4/2019 by me.  Seen by Podiatry on 9/26/2019.  Diabetic Education - seen last on 10/3/2019.    TSH normal in 4/2019.  Vitamin D Insufficient - level was 17 - advised to lower D3 to 5000 units daily (she was taking 10,000 daily).    -[facility] - Cervical DD - S/p discectomy 20 years ago.  Having pain in right arm originating from the c-spine. Seen by Dr. Madsen 2 weeks (Orthopedics) and told it was not Fibromyalgia (as diagnosed by Dr. Hawk).  On Lyrica and doesn't feel it helps.  On Opiods in past - [place] - in [place].  Has not been on this for 2 years.    Will refer to Pain.  Seen on 5/29/2019.  On Gabapentin with not much benefit.  Will add Lidoderm 5% patch.  If not covered by her insurance, I will prescribe Voltaren gel as she requested.    Scheduled for Epidural in 10/2019.  Last seen by Pain on 11/26/2019.    -Renal - CKD - CMP on 4/18/2019 with creatinine 1.1 (GFR 59.2).  Repeat was 1.4 in 10/2019, 1.3 in 11/2019 and in ED creatinine was up to 1.8.  Repeat today.    -GI - GERD - on Omeprazole prn.  No gastritis or esophagitis.    History of Gallstones - s/p Lap Janet 2012.    We discussed colon cancer screening - last colonoscopy was 2017 - has 2 polyps.  Will refer back to GI - she refused in 4/2019 .  No FH of colon cancer.  FIT negative in 7/2019.    -GYN - Last Pap was many years ago and normal. Will refer to GYN - missed appointment on 5/1/2019.    Last Mammo was 5/2019 and normal.  We discussed DXA screening  for osteoporosis - normal in 7/2019.    -HCM - We discussed Flu (10/8/2019 by Jenise) and Tdap (4/19/2019) vaccinations.  We discussed Shingles (Had Shingrix #1 in 10/2019.  Needs second.) and Pneumococcal (23 and 13 - had one at age 64 - suspect it was 13, but she will try to get records.  If cannot get records, will vaccinate with 23, then in a year with 13) vaccinations.      -Follow Up - 3 months with Dr. Marvin

## 2019-12-09 ENCOUNTER — LAB VISIT (OUTPATIENT)
Dept: LAB | Facility: HOSPITAL | Age: 69
End: 2019-12-09
Attending: INTERNAL MEDICINE
Payer: MEDICARE

## 2019-12-09 DIAGNOSIS — Z11.59 NEED FOR HEPATITIS C SCREENING TEST: ICD-10-CM

## 2019-12-09 LAB — HCV AB SERPL QL IA: NEGATIVE

## 2019-12-09 PROCEDURE — 36415 COLL VENOUS BLD VENIPUNCTURE: CPT | Mod: PN

## 2019-12-09 PROCEDURE — 86803 HEPATITIS C AB TEST: CPT

## 2019-12-10 ENCOUNTER — LAB VISIT (OUTPATIENT)
Dept: LAB | Facility: OTHER | Age: 69
End: 2019-12-10
Attending: INTERNAL MEDICINE
Payer: MEDICARE

## 2019-12-10 ENCOUNTER — OFFICE VISIT (OUTPATIENT)
Dept: INTERNAL MEDICINE | Facility: CLINIC | Age: 69
End: 2019-12-10
Payer: MEDICARE

## 2019-12-10 VITALS
BODY MASS INDEX: 23.31 KG/M2 | DIASTOLIC BLOOD PRESSURE: 58 MMHG | HEART RATE: 60 BPM | OXYGEN SATURATION: 95 % | WEIGHT: 162.5 LBS | SYSTOLIC BLOOD PRESSURE: 122 MMHG

## 2019-12-10 DIAGNOSIS — R79.89 ELEVATED SERUM CREATININE: ICD-10-CM

## 2019-12-10 DIAGNOSIS — Z09 HOSPITAL DISCHARGE FOLLOW-UP: Primary | ICD-10-CM

## 2019-12-10 LAB
ANION GAP SERPL CALC-SCNC: 11 MMOL/L (ref 8–16)
BUN SERPL-MCNC: 21 MG/DL (ref 8–23)
CALCIUM SERPL-MCNC: 10.3 MG/DL (ref 8.7–10.5)
CHLORIDE SERPL-SCNC: 101 MMOL/L (ref 95–110)
CO2 SERPL-SCNC: 27 MMOL/L (ref 23–29)
CREAT SERPL-MCNC: 1.3 MG/DL (ref 0.5–1.4)
EST. GFR  (AFRICAN AMERICAN): 48 ML/MIN/1.73 M^2
EST. GFR  (NON AFRICAN AMERICAN): 42 ML/MIN/1.73 M^2
GLUCOSE SERPL-MCNC: 181 MG/DL (ref 70–110)
POTASSIUM SERPL-SCNC: 4 MMOL/L (ref 3.5–5.1)
SODIUM SERPL-SCNC: 139 MMOL/L (ref 136–145)

## 2019-12-10 PROCEDURE — 99999 PR PBB SHADOW E&M-EST. PATIENT-LVL III: CPT | Mod: PBBFAC,,, | Performed by: INTERNAL MEDICINE

## 2019-12-10 PROCEDURE — 1126F AMNT PAIN NOTED NONE PRSNT: CPT | Mod: S$GLB,,, | Performed by: INTERNAL MEDICINE

## 2019-12-10 PROCEDURE — 1126F PR PAIN SEVERITY QUANTIFIED, NO PAIN PRESENT: ICD-10-PCS | Mod: S$GLB,,, | Performed by: INTERNAL MEDICINE

## 2019-12-10 PROCEDURE — 36415 COLL VENOUS BLD VENIPUNCTURE: CPT

## 2019-12-10 PROCEDURE — 1101F PR PT FALLS ASSESS DOC 0-1 FALLS W/OUT INJ PAST YR: ICD-10-PCS | Mod: CPTII,S$GLB,, | Performed by: INTERNAL MEDICINE

## 2019-12-10 PROCEDURE — 3078F DIAST BP <80 MM HG: CPT | Mod: CPTII,S$GLB,, | Performed by: INTERNAL MEDICINE

## 2019-12-10 PROCEDURE — 1159F PR MEDICATION LIST DOCUMENTED IN MEDICAL RECORD: ICD-10-PCS | Mod: S$GLB,,, | Performed by: INTERNAL MEDICINE

## 2019-12-10 PROCEDURE — 1159F MED LIST DOCD IN RCRD: CPT | Mod: S$GLB,,, | Performed by: INTERNAL MEDICINE

## 2019-12-10 PROCEDURE — 99214 OFFICE O/P EST MOD 30 MIN: CPT | Mod: S$GLB,,, | Performed by: INTERNAL MEDICINE

## 2019-12-10 PROCEDURE — 3074F SYST BP LT 130 MM HG: CPT | Mod: CPTII,S$GLB,, | Performed by: INTERNAL MEDICINE

## 2019-12-10 PROCEDURE — 99499 RISK ADDL DX/OHS AUDIT: ICD-10-PCS | Mod: S$GLB,,, | Performed by: INTERNAL MEDICINE

## 2019-12-10 PROCEDURE — 3078F PR MOST RECENT DIASTOLIC BLOOD PRESSURE < 80 MM HG: ICD-10-PCS | Mod: CPTII,S$GLB,, | Performed by: INTERNAL MEDICINE

## 2019-12-10 PROCEDURE — 99999 PR PBB SHADOW E&M-EST. PATIENT-LVL III: ICD-10-PCS | Mod: PBBFAC,,, | Performed by: INTERNAL MEDICINE

## 2019-12-10 PROCEDURE — 99499 UNLISTED E&M SERVICE: CPT | Mod: S$GLB,,, | Performed by: INTERNAL MEDICINE

## 2019-12-10 PROCEDURE — 1101F PT FALLS ASSESS-DOCD LE1/YR: CPT | Mod: CPTII,S$GLB,, | Performed by: INTERNAL MEDICINE

## 2019-12-10 PROCEDURE — 99214 PR OFFICE/OUTPT VISIT, EST, LEVL IV, 30-39 MIN: ICD-10-PCS | Mod: S$GLB,,, | Performed by: INTERNAL MEDICINE

## 2019-12-10 PROCEDURE — 3074F PR MOST RECENT SYSTOLIC BLOOD PRESSURE < 130 MM HG: ICD-10-PCS | Mod: CPTII,S$GLB,, | Performed by: INTERNAL MEDICINE

## 2019-12-10 PROCEDURE — 80048 BASIC METABOLIC PNL TOTAL CA: CPT

## 2019-12-10 NOTE — PATIENT INSTRUCTIONS
Your exam was overall normal today.    Your blood pressure was good.    I will order routine non-fasting labs today.    Please get the records of the Pneumococcal vaccines you had - there are two different vaccines (Pneumovax 23 and Prevnar 13).  You need the 2nd Shingrix vaccine - please call [facility] to schedule this.    Return in 3 months with Dr. Marvin - sooner if needed.  Please come at least 15-20 minutes before your scheduled appointment time.

## 2019-12-11 ENCOUNTER — TELEPHONE (OUTPATIENT)
Dept: PRIMARY CARE CLINIC | Facility: CLINIC | Age: 69
End: 2019-12-11

## 2019-12-11 NOTE — TELEPHONE ENCOUNTER
----- Message from Gustavo Wallace MD sent at 12/10/2019 11:56 AM CST -----  Please let patient know that her kidney function has improved and back to her baseline of 1.3.  Thank you.

## 2020-01-02 ENCOUNTER — TELEPHONE (OUTPATIENT)
Dept: PAIN MEDICINE | Facility: CLINIC | Age: 70
End: 2020-01-02

## 2020-01-02 DIAGNOSIS — E11.9 TYPE 2 DIABETES MELLITUS WITHOUT COMPLICATION, WITHOUT LONG-TERM CURRENT USE OF INSULIN: ICD-10-CM

## 2020-01-13 RX ORDER — METFORMIN HYDROCHLORIDE 500 MG/1
500 TABLET ORAL 2 TIMES DAILY
Qty: 180 TABLET | Refills: 4 | Status: SHIPPED | OUTPATIENT
Start: 2020-01-13 | End: 2020-06-16

## 2020-01-29 DIAGNOSIS — E78.5 HYPERLIPIDEMIA, UNSPECIFIED HYPERLIPIDEMIA TYPE: Primary | ICD-10-CM

## 2020-01-29 RX ORDER — ATORVASTATIN CALCIUM 20 MG/1
20 TABLET, FILM COATED ORAL DAILY
Qty: 90 TABLET | Refills: 0 | Status: SHIPPED | OUTPATIENT
Start: 2020-01-29 | End: 2020-06-16 | Stop reason: SDUPTHER

## 2020-01-29 NOTE — TELEPHONE ENCOUNTER
----- Message from Elton Torres sent at 1/29/2020 10:12 AM CST -----  Contact: Shanita An   Can the clinic reply in [facility]:     Please refill the medication(s) listed below. The patient can be reached at this phone number once it is called into the pharmacy. Patient already has appointment scheduled Shanita An      Medication #1atorvastatin (LIPITOR) 20 MG tablet    Medication #2    Preferred Pharmacy:  [phone]

## 2020-02-10 DIAGNOSIS — I25.10 CORONARY ARTERY DISEASE INVOLVING NATIVE CORONARY ARTERY OF NATIVE HEART WITHOUT ANGINA PECTORIS: Primary | ICD-10-CM

## 2020-02-10 RX ORDER — CARVEDILOL 6.25 MG/1
6.25 TABLET ORAL 2 TIMES DAILY
Qty: 60 TABLET | Refills: 3 | OUTPATIENT
Start: 2020-02-10

## 2020-02-10 RX ORDER — CARVEDILOL 6.25 MG/1
6.25 TABLET ORAL 2 TIMES DAILY
Qty: 30 TABLET | Refills: 0 | Status: SHIPPED | OUTPATIENT
Start: 2020-02-10 | End: 2020-03-03 | Stop reason: SDUPTHER

## 2020-02-10 NOTE — TELEPHONE ENCOUNTER
----- Message from Brianne Britt sent at 2/10/2020 10:39 AM CST -----  Contact: LOUIE MASON       Can the clinic reply in [facility]:       Please refill the medication(s) listed below. Please call the patient when the prescription(s) is ready for  at this phone number   [phone]        Medication #1 carvedilol (COREG) 6.25 MG tablet      Preferred Pharmacy: [address]

## 2020-03-03 DIAGNOSIS — I25.10 CORONARY ARTERY DISEASE INVOLVING NATIVE CORONARY ARTERY OF NATIVE HEART WITHOUT ANGINA PECTORIS: ICD-10-CM

## 2020-03-03 RX ORDER — CARVEDILOL 6.25 MG/1
6.25 TABLET ORAL 2 TIMES DAILY
Qty: 30 TABLET | Refills: 0 | Status: SHIPPED | OUTPATIENT
Start: 2020-03-03 | End: 2020-03-16 | Stop reason: SDUPTHER

## 2020-03-03 NOTE — TELEPHONE ENCOUNTER
----- Message from Elton Torres sent at 3/3/2020 10:11 AM CST -----  Contact: LOUIE MASON [[id number]]  Name of Who is Calling: LOUIE MASON [[id number]]    What is the request in detail: LOUIE MASON [[id number]] is calling in regards to Running out of Medicaine she was a patient of DR bedolla her appointment wont be until 03/16/2020 ....  Rx carvediloL (COREG) 6.25 MG tablet     [facility] - [place] [address] [employer]   Please contact to further discuss and advise      Can the clinic reply by [facility]: no     What Number to [facility] Back [facility]  [phone] (home)  [phone]

## 2020-03-16 ENCOUNTER — HOSPITAL ENCOUNTER (OUTPATIENT)
Dept: RADIOLOGY | Facility: OTHER | Age: 70
Discharge: HOME | End: 2020-03-16
Attending: INTERNAL MEDICINE
Payer: MEDICARE

## 2020-03-16 ENCOUNTER — OFFICE VISIT (OUTPATIENT)
Dept: INTERNAL MEDICINE | Facility: CLINIC | Age: 70
End: 2020-03-16
Payer: MEDICARE

## 2020-03-16 ENCOUNTER — APPOINTMENT (OUTPATIENT)
Dept: LAB | Facility: HOSPITAL | Age: 70
End: 2020-03-16
Attending: INTERNAL MEDICINE
Payer: MEDICARE

## 2020-03-16 VITALS
OXYGEN SATURATION: 93 % | WEIGHT: 166.69 LBS | SYSTOLIC BLOOD PRESSURE: 110 MMHG | DIASTOLIC BLOOD PRESSURE: 60 MMHG | BODY MASS INDEX: 23.86 KG/M2 | HEIGHT: 70 IN | HEART RATE: 70 BPM

## 2020-03-16 DIAGNOSIS — E11.22 TYPE 2 DIABETES MELLITUS WITH STAGE 3 CHRONIC KIDNEY DISEASE, WITHOUT LONG-TERM CURRENT USE OF INSULIN: ICD-10-CM

## 2020-03-16 DIAGNOSIS — N18.30 TYPE 2 DIABETES MELLITUS WITH STAGE 3 CHRONIC KIDNEY DISEASE, WITHOUT LONG-TERM CURRENT USE OF INSULIN: ICD-10-CM

## 2020-03-16 DIAGNOSIS — I73.9 PERIPHERAL VASCULAR DISEASE: ICD-10-CM

## 2020-03-16 DIAGNOSIS — K21.9 GASTRO-ESOPHAGEAL REFLUX DISEASE WITHOUT ESOPHAGITIS: ICD-10-CM

## 2020-03-16 DIAGNOSIS — Z23 NEED FOR PNEUMOCOCCAL VACCINE: ICD-10-CM

## 2020-03-16 DIAGNOSIS — D64.9 NORMOCYTIC ANEMIA: ICD-10-CM

## 2020-03-16 DIAGNOSIS — N18.30 CKD (CHRONIC KIDNEY DISEASE) STAGE 3, GFR 30-59 ML/MIN: ICD-10-CM

## 2020-03-16 DIAGNOSIS — E55.9 VITAMIN D DEFICIENCY: ICD-10-CM

## 2020-03-16 DIAGNOSIS — E78.2 MIXED HYPERLIPIDEMIA: ICD-10-CM

## 2020-03-16 DIAGNOSIS — I10 ESSENTIAL HYPERTENSION: Primary | ICD-10-CM

## 2020-03-16 PROBLEM — I25.10 CORONARY ARTERY DISEASE: Status: RESOLVED | Noted: 2019-04-16 | Resolved: 2020-03-16

## 2020-03-16 PROBLEM — K63.5 COLON POLYP: Status: ACTIVE | Noted: 2020-03-16

## 2020-03-16 PROCEDURE — 1101F PT FALLS ASSESS-DOCD LE1/YR: CPT | Mod: CPTII,S$GLB,, | Performed by: INTERNAL MEDICINE

## 2020-03-16 PROCEDURE — 1125F AMNT PAIN NOTED PAIN PRSNT: CPT | Mod: S$GLB,,, | Performed by: INTERNAL MEDICINE

## 2020-03-16 PROCEDURE — 3074F SYST BP LT 130 MM HG: CPT | Mod: CPTII,S$GLB,, | Performed by: INTERNAL MEDICINE

## 2020-03-16 PROCEDURE — 1101F PR PT FALLS ASSESS DOC 0-1 FALLS W/OUT INJ PAST YR: ICD-10-PCS | Mod: CPTII,S$GLB,, | Performed by: INTERNAL MEDICINE

## 2020-03-16 PROCEDURE — 1125F PR PAIN SEVERITY QUANTIFIED, PAIN PRESENT: ICD-10-PCS | Mod: S$GLB,,, | Performed by: INTERNAL MEDICINE

## 2020-03-16 PROCEDURE — 3051F PR MOST RECENT HEMOGLOBIN A1C LEVEL 7.0 - < 8.0%: ICD-10-PCS | Mod: CPTII,S$GLB,, | Performed by: INTERNAL MEDICINE

## 2020-03-16 PROCEDURE — 3078F PR MOST RECENT DIASTOLIC BLOOD PRESSURE < 80 MM HG: ICD-10-PCS | Mod: CPTII,S$GLB,, | Performed by: INTERNAL MEDICINE

## 2020-03-16 PROCEDURE — 76770 US RETROPERITONEAL COMPLETE: ICD-10-PCS | Mod: 26,,, | Performed by: RADIOLOGY

## 2020-03-16 PROCEDURE — 1159F MED LIST DOCD IN RCRD: CPT | Mod: S$GLB,,, | Performed by: INTERNAL MEDICINE

## 2020-03-16 PROCEDURE — 99999 PR PBB SHADOW E&M-EST. PATIENT-LVL IV: CPT | Mod: PBBFAC,,, | Performed by: INTERNAL MEDICINE

## 2020-03-16 PROCEDURE — 3074F PR MOST RECENT SYSTOLIC BLOOD PRESSURE < 130 MM HG: ICD-10-PCS | Mod: CPTII,S$GLB,, | Performed by: INTERNAL MEDICINE

## 2020-03-16 PROCEDURE — 3078F DIAST BP <80 MM HG: CPT | Mod: CPTII,S$GLB,, | Performed by: INTERNAL MEDICINE

## 2020-03-16 PROCEDURE — 76770 US EXAM ABDO BACK WALL COMP: CPT | Mod: 26,,, | Performed by: RADIOLOGY

## 2020-03-16 PROCEDURE — 99999 PR PBB SHADOW E&M-EST. PATIENT-LVL IV: ICD-10-PCS | Mod: PBBFAC,,, | Performed by: INTERNAL MEDICINE

## 2020-03-16 PROCEDURE — 3051F HG A1C>EQUAL 7.0%<8.0%: CPT | Mod: CPTII,S$GLB,, | Performed by: INTERNAL MEDICINE

## 2020-03-16 PROCEDURE — 99215 OFFICE O/P EST HI 40 MIN: CPT | Mod: S$GLB,,, | Performed by: INTERNAL MEDICINE

## 2020-03-16 PROCEDURE — 99215 PR OFFICE/OUTPT VISIT, EST, LEVL V, 40-54 MIN: ICD-10-PCS | Mod: S$GLB,,, | Performed by: INTERNAL MEDICINE

## 2020-03-16 PROCEDURE — 1159F PR MEDICATION LIST DOCUMENTED IN MEDICAL RECORD: ICD-10-PCS | Mod: S$GLB,,, | Performed by: INTERNAL MEDICINE

## 2020-03-16 PROCEDURE — 76770 US EXAM ABDO BACK WALL COMP: CPT | Mod: TC

## 2020-03-16 RX ORDER — ONDANSETRON 4 MG/1
4 TABLET, FILM COATED ORAL EVERY 8 HOURS PRN
Qty: 30 TABLET | Refills: 0 | Status: CANCELLED | OUTPATIENT
Start: 2020-03-16

## 2020-03-16 RX ORDER — GLIMEPIRIDE 2 MG/1
2 TABLET ORAL
Qty: 90 TABLET | Refills: 3 | Status: SHIPPED | OUTPATIENT
Start: 2020-03-16 | End: 2021-03-23 | Stop reason: SDUPTHER

## 2020-03-16 RX ORDER — CARVEDILOL 6.25 MG/1
6.25 TABLET ORAL 2 TIMES DAILY
Qty: 180 TABLET | Refills: 3 | Status: SHIPPED | OUTPATIENT
Start: 2020-03-16 | End: 2021-03-16

## 2020-03-16 RX ORDER — AMLODIPINE BESYLATE 10 MG/1
10 TABLET ORAL DAILY
Qty: 90 TABLET | Refills: 3 | Status: SHIPPED | OUTPATIENT
Start: 2020-03-16 | End: 2021-03-23 | Stop reason: SDUPTHER

## 2020-03-16 RX ORDER — GABAPENTIN 300 MG/1
CAPSULE ORAL
COMMUNITY
Start: 2019-12-16 | End: 2020-08-31

## 2020-03-16 RX ORDER — PREGABALIN 50 MG/1
CAPSULE ORAL
COMMUNITY
Start: 2020-02-10 | End: 2020-03-16

## 2020-03-16 RX ORDER — LIDOCAINE 50 MG/G
PATCH TOPICAL
COMMUNITY
Start: 2019-12-16 | End: 2020-08-31

## 2020-03-16 RX ORDER — OMEPRAZOLE 20 MG/1
20 CAPSULE, DELAYED RELEASE ORAL DAILY PRN
Qty: 30 CAPSULE | Refills: 11 | Status: SHIPPED | OUTPATIENT
Start: 2020-03-16 | End: 2021-03-23

## 2020-03-16 NOTE — PROGRESS NOTES
Subjective:       Patient ID: Meenu Guy is a 69 y.o. female who  has a past medical history of Diabetes mellitus, type 2 and Hypertension.    Chief Complaint: Establish Care and Diabetes     History was obtained from the patient and supplemented through chart review.  She was previously seen by Dr. Gustavo Wallace 12/2019 for hospital f/u.    HPI    HTN:    No h/o CAD, arrhythmia. TTE  with normal EF, grade 2 diastolic dysfunction.    Pt's BP is controlled on Coreg 6.25 b.i.d., Norvasc 10. +DM2 with micro albuminuria. H/o ACE angioedema.    Tolerating meds well. Pt denies CP, SOB, lightheadedness, dizziness, leg edema.    Exercise: 1 hour/day. Treadmill. Wants to start water aerobics.  Diet: stopped drinking coke.  Weakness for sweets.  Eats a lot of peppermints.     DM2:  Currently pt is taking Metformin 500 BID, glimepiride 2.  GFR 48.  Unable to tolerate Metformin 1000 due to diarrhea.    With elevated microalbumin creatinine ratio.  Not on an ACE/ARB d/t angioedema.   Retinal exams:    Foot exams:    Hemoglobin A1C   Date Value Ref Range Status   03/16/2020 7.6 (H) 4.0 - 5.6 % Final     Comment:     ADA Screening Guidelines:  5.7-6.4%  Consistent with prediabetes  >or=6.5%  Consistent with diabetes  High levels of fetal hemoglobin interfere with the HbA1C  assay. Heterozygous hemoglobin variants (HbS, HgC, etc)do  not significantly interfere with this assay.   However, presence of multiple variants may affect accuracy.     10/24/2019 7.7 (H) 4.0 - 5.6 % Final     Comment:     ADA Screening Guidelines:  5.7-6.4%  Consistent with prediabetes  >or=6.5%  Consistent with diabetes  High levels of fetal hemoglobin interfere with the HbA1C  assay. Heterozygous hemoglobin variants (HbS, HgC, etc)do  not significantly interfere with this assay.   However, presence of multiple variants may affect accuracy.     09/18/2019 8.1 (H) 4.0 - 5.6 % Final     Comment:     ADA Screening  Guidelines:  5.7-6.4%  Consistent with prediabetes  >or=6.5%  Consistent with diabetes  High levels of fetal hemoglobin interfere with the HbA1C  assay. Heterozygous hemoglobin variants (HbS, HgC, etc)do  not significantly interfere with this assay.   However, presence of multiple variants may affect accuracy.       HLD:  Is currently taking Lipitor 20 and ASA 81 daily.  Lab Results   Component Value Date    LDLCALC 59.6 (L) 03/16/2020     The ASCVD Risk score (Artesian PRATIK Jr., et al., 2013) failed to calculate for the following reasons:    The valid total cholesterol range is 130 to 320 mg/dL    PVD:  Status post right femoral bypass around 2012 in [place].  No claudication.  Quit tobacco 15 years ago.       CKD 3:  Takes intermittent NSAIDs d/t arm pain.  Check for proteinuria.  H/o ACE/ARB angioedema.  Renal ultrasound: ordering  Does not see Nephrology.  Lab Results   Component Value Date    CREATININE 1.3 12/10/2019    BUN 21 12/10/2019     12/10/2019    K 4.0 12/10/2019     12/10/2019    CO2 27 12/10/2019     Lab Results   Component Value Date    URICACID 7.3 (H) 03/16/2020     Lab Results   Component Value Date    CALCIUM 10.3 12/10/2019     Lab Results   Component Value Date    PTH 25.8 03/16/2020    CALCIUM 10.3 12/10/2019     Lab Results   Component Value Date    YONNPKOI30LX 42 03/16/2020    ANEVEPVK89PN 17 (L) 04/18/2019     Vitamin D deficiency: Is taking OTC Vit D, MVI.   Lab Results   Component Value Date    VCOALKIU41JU 42 03/16/2020    CEVPXJED27PU 17 (L) 04/18/2019     Normocytic anemia:  Borderline low. S/p hyst.  Lab Results   Component Value Date    IRON 93 03/16/2020    TIBC 420 03/16/2020    FERRITIN 43 03/16/2020     Lab Results   Component Value Date    KVWLBDDH21 331 03/16/2020     No results found for: FOLATE    GERD:  Ran out of Prilosec, Zofran, but not having sx daily. Avoiding food triggers.    HCM:  Pneumococcal (23 and 13 - had one at age 64 - suspect it was 13, but she will  try to get records.  If cannot get records, will vaccinate with 23, then in a year with 13) vaccinations.            Not addressed today.  Colon polyp:  C scope 2017 with 2 polyps.  Repeat in 3 years.  She refused to see GI.  Fit kit negative .    Cervical DDD, FMA:  S/p dissectomy 20 years ago. Not much improvement on lyrica or gabapentin.  Follows with Pain Med for epidural.    Review of Systems   Constitutional: Negative for fever and unexpected weight change.   HENT: Positive for congestion. Negative for rhinorrhea and sneezing.    Eyes: Negative for redness and itching.   Respiratory: Negative for shortness of breath and wheezing.    Cardiovascular: Negative for chest pain and leg swelling.   Gastrointestinal: Negative for abdominal pain, diarrhea and nausea.   Genitourinary: Negative for dysuria and menstrual problem.   Musculoskeletal: Positive for arthralgias and neck pain. Negative for gait problem.   Skin: Negative for color change and rash.   Neurological: Negative for dizziness and light-headedness.   Hematological: Negative for adenopathy.   Psychiatric/Behavioral: Negative for confusion. The patient is not nervous/anxious.          Past Medical History:   Diagnosis Date    Diabetes mellitus, type 2     Hypertension      Past Surgical History:   Procedure Laterality Date    BACK SURGERY      CHOLECYSTECTOMY      DISC REMOVAL      EPIDURAL STEROID INJECTION N/A 10/30/2019    Procedure: INJECTION, STEROID, EPIDURAL, C7-T1 IL;  Surgeon: Rahul Bowie MD;  [facility] [place];  Service: Pain Management;  Laterality: N/A;    HYSTERECTOMY       Family History   Problem Relation Age of Onset    Hypertension Mother     Stroke Father     Coronary artery disease Father         s/p 4v CABG    Breast cancer Neg Hx     Colon cancer Neg Hx      Social History     Socioeconomic History    Marital status:      Spouse name: Not on file    Number of children: Not on file    Years of  "education: Not on file    Highest education level: Not on file   Occupational History    Not on file   Social Needs    Financial resource strain: Not on file    Food insecurity:     Worry: Not on file     Inability: Not on file    Transportation needs:     Medical: Not on file     Non-medical: Not on file   Tobacco Use    Smoking status: Former Smoker     Packs/day: 2.00     Last attempt to quit: 2005     Years since quitting: 15.2    Smokeless tobacco: Never Used   Substance and Sexual Activity    Alcohol use: Yes     Frequency: Never     Comment: socially    Drug use: No    Sexual activity: Yes     Partners: Male   Lifestyle    Physical activity:     Days per week: Not on file     Minutes per session: Not on file    Stress: Not on file   Relationships    Social connections:     Talks on phone: Not on file     Gets together: Not on file     Attends Gnosticism service: Not on file     Active member of club or organization: Not on file     Attends meetings of clubs or organizations: Not on file     Relationship status: Not on file   Other Topics Concern    Not on file   Social History Narrative    Not on file     Objective:      Vitals:    03/16/20 0906   BP: 110/60   Pulse: 70   SpO2: (!) 93%   Weight: 75.6 kg (166 lb 10.7 oz)   Height: 5' 10" (1.778 m)      Physical Exam   Constitutional: She appears well-developed and well-nourished. No distress.   HENT:   Head: Normocephalic and atraumatic.   Nose: Nose normal.   Mouth/Throat: Oropharynx is clear and moist. No oropharyngeal exudate.   Eyes: Pupils are equal, round, and reactive to light. EOM are normal. Right eye exhibits no discharge. Left eye exhibits no discharge. No scleral icterus.   Neck: Neck supple. No tracheal deviation present. No thyromegaly present.   Cardiovascular: Normal rate, regular rhythm, normal heart sounds and intact distal pulses.   No murmur heard.  Pulmonary/Chest: Effort normal and breath sounds normal. No respiratory " distress. She has no wheezes.   Abdominal: Soft. Bowel sounds are normal. She exhibits no distension. There is no tenderness.   Musculoskeletal: She exhibits no edema or deformity.   Lymphadenopathy:     She has no cervical adenopathy.   Neurological: She is alert. No cranial nerve deficit. Gait normal.   Skin: Skin is warm and dry. She is not diaphoretic. No erythema.   Psychiatric: She has a normal mood and affect. Her behavior is normal.         Lab Results   Component Value Date    WBC 12.80 (H) 11/29/2019    HGB 11.2 (L) 11/29/2019    HCT 34.0 (L) 11/29/2019     11/29/2019    CHOL 122 03/16/2020    TRIG 67 03/16/2020    HDL 49 03/16/2020    ALT 18 11/29/2019    AST 23 11/29/2019     12/10/2019    K 4.0 12/10/2019     12/10/2019    CREATININE 1.3 12/10/2019    BUN 21 12/10/2019    CO2 27 12/10/2019    TSH 1.908 04/18/2019    HGBA1C 7.6 (H) 03/16/2020       The ASCVD Risk score (Cee DC Jr., et al., 2013) failed to calculate for the following reasons:    The valid total cholesterol range is 130 to 320 mg/dL    (Imaging have been independently reviewed)  CXR without acute abnormality.    Assessment:       1. Essential hypertension    2. Type 2 diabetes mellitus with stage 3 chronic kidney disease, without long-term current use of insulin    3. Mixed hyperlipidemia    4. Peripheral vascular disease    5. CKD (chronic kidney disease) stage 3, GFR 30-59 ml/min    6. Vitamin D deficiency    7. Normocytic anemia    8. Gastro-esophageal reflux disease without esophagitis    9. Need for pneumococcal vaccine          Plan:       Meenu was seen today for establish care and diabetes.    Diagnoses and all orders for this visit:    Essential hypertension  Comments:  Controlled. Cont Coreg 6.25 BID, Norvasc 10. H/o DM, but h/o ACE angioedema.  Orders:  -     carvediloL (COREG) 6.25 MG tablet; Take 1 tablet (6.25 mg total) by mouth 2 (two) times daily.  -     amLODIPine (NORVASC) 10 MG tablet; Take 1 tablet  (10 mg total) by mouth once daily.    Type 2 diabetes mellitus with stage 3 chronic kidney disease, without long-term current use of insulin  Comments:  Borderline high; GFR borderline low; will monitor on Metformin 500 BID,  glimepiride. Consider XR d/t diarrhea or Trulicity.  Orders:  -     Lipid panel; Future  -     Microalbumin/creatinine urine ratio; Future  -     Hemoglobin A1c; Future  -     glimepiride (AMARYL) 2 MG tablet; Take 1 tablet (2 mg total) by mouth daily with breakfast.    Mixed hyperlipidemia  Comments:  Controlled. Cont Lipitor 20, ASA.  Orders:  -     Lipid panel; Future    Peripheral vascular disease  Comments:  Continue ASA, statin.    CKD (chronic kidney disease) stage 3, GFR 30-59 ml/min  Comments:  Likely d/t HTN, DM2. Stable. Advised against NSAIDs. Ordering CKD w/u, renal U/S, nephro referral.  Orders:  -     Ambulatory referral/consult to Nephrology; Future  -     Uric acid; Future  -     Comprehensive metabolic panel; Future  -     Phosphorus; Future  -     Vitamin D; Future  -     PTH, intact; Future  -     Protein / creatinine ratio, urine; Future  -     US Retroperitoneal Complete (Kidney and; Future    Vitamin D deficiency  Comments:  Normalized. Cont OTC Vit D, MVI.   Orders:  -     Vitamin D; Future    Normocytic anemia  Comments:  Borderline low.  +CKD. Iron panel wnl.  Orders:  -     Ferritin; Future  -     Iron and TIBC; Future  -     Vitamin B12; Future    Gastro-esophageal reflux disease without esophagitis  Comments:  Controlled. Advised to avoid dietary triggers. Decrease PPI to PRN.  Orders:  -     omeprazole (PRILOSEC) 20 MG capsule; Take 1 capsule (20 mg total) by mouth daily as needed.    Need for pneumococcal vaccine  Comments:  checking [place] registry.    Other orders  -     Cancel: ondansetron (ZOFRAN) 4 MG tablet; Take 1 tablet (4 mg total) by mouth every 8 (eight) hours as needed for Nausea.         Side effects of medication(s) were discussed in detail and  patient voiced understanding.  Patient will call back for any issues or complications.     RTC in 3 month(s) or sooner PRN for DM.

## 2020-03-16 NOTE — PATIENT INSTRUCTIONS
Please do not take over-the-counter NSAIDs such as Aleve, ibuprofen, naproxen, Motrin, goodies powder. May take Tylenol.      Tips to Control Acid Reflux    To control acid reflux, youll need to make some basic diet and lifestyle changes. The simple steps outlined below may be all youll need to ease discomfort.  Watch what you eat  · Avoid fatty foods and spicy foods.  · Eat fewer acidic foods, such as citrus and tomato-based foods. These can increase symptoms.  · Limit drinking alcohol, caffeine, and fizzy beverages. All increase acid reflux.  · Try limiting chocolate, peppermint, and spearmint. These can worsen acid reflux in some people.  Watch when you eat  · Avoid lying down for 3 hours after eating.  · Do not snack before going to bed.  Raise your head  Raising your head and upper body by 4 to 6 inches helps limit reflux when youre lying down. Put blocks under the head of your bed frame to raise it.  Other changes  · Lose weight, if you need to  · Dont exercise near bedtime  · Avoid tight-fitting clothes  · Limit aspirin and ibuprofen  · Stop smoking   Date Last Reviewed: 7/1/2016  © 2744-0439 [employer]. [address] [place] [address], [place] [address]. All rights reserved. This information is not intended as a substitute for professional medical care. Always follow your healthcare professional's instructions.

## 2020-03-17 ENCOUNTER — TELEPHONE (OUTPATIENT)
Dept: INTERNAL MEDICINE | Facility: CLINIC | Age: 70
End: 2020-03-17

## 2020-03-17 NOTE — TELEPHONE ENCOUNTER
Spoke with patient telling her that The ultrasound of your kidney did not show kidney stones or swelling; this suggests chronic kidney disease.  The rest of your labs are in process.

## 2020-03-17 NOTE — TELEPHONE ENCOUNTER
----- Message from Lali Marvin MD sent at 3/16/2020  5:44 PM CDT -----  -Please call the patient with lab results    The ultrasound of your kidney did not show kidney stones or swelling; this suggests chronic kidney disease.  The rest of your labs are in process.        -------------------------  Renal U/S:  No hydro, nephrolithiasis.

## 2020-03-24 ENCOUNTER — TELEPHONE (OUTPATIENT)
Dept: INTERNAL MEDICINE | Facility: CLINIC | Age: 70
End: 2020-03-24

## 2020-03-24 DIAGNOSIS — N18.30 CKD (CHRONIC KIDNEY DISEASE) STAGE 3, GFR 30-59 ML/MIN: Primary | ICD-10-CM

## 2020-03-24 NOTE — TELEPHONE ENCOUNTER
----- Message from Lali Marvin MD sent at 3/24/2020 10:56 AM CDT -----  Could you please call the lab and check on the CMP and phosphorus?  Her labs were drawn last week, but those labs are still in process.  Let me know.  Thanks!

## 2020-03-24 NOTE — TELEPHONE ENCOUNTER
Labs went to [facility] and need to be reordered.  Will check labs in 3 months before her visit to reduce exposure to COVID.

## 2020-03-25 ENCOUNTER — TELEPHONE (OUTPATIENT)
Dept: INTERNAL MEDICINE | Facility: CLINIC | Age: 70
End: 2020-03-25

## 2020-03-25 NOTE — TELEPHONE ENCOUNTER
----- Message from Lali Marvin MD sent at 3/24/2020  4:32 PM CDT -----  -Please call the patient with lab results  -Please schedule labs to be done in 3 months before our visit.    Your labs are normal, including checking for electrolytes, vitamin levels such as iron, B12.  Unfortunately there was an issue with the lab to check your kidney function, but we can schedule this lab before your visit in 3 months to reduce exposure to the coronavirus.    Your A1c, a marker for long-term control of diabetes, has been stable.  You can continue taking Metformin.    Your vitamin-D level is normal, and you may continue taking your vitamin-D supplement.    Your cholesterol level is at goal.  Please continue taking Lipitor/atorvastatin.         -------------------------  DM:  Stable. Borderline high; GFR borderline low.  Unfortunately there was an issue with the lab and it was sent to [facility]; will schedule CMP before next visit to decrease exposure to COVID.  Monitor on Metformin 500 BID. Consider XR d/t diarrhea or Trulicity.    CKD3:  Reschedule CMP, phos as above.    HLD:  Controlled. Cont Lipitor 20, ASA.    Vitamin D deficiency:   Normalized. Can cont OTC Vit D, MVI.     Normocytic anemia:  Borderline low/normal, so will defer supplementation.  +CKD. Iron panel, B12 wnl.    The ASCVD Risk score (Cee CHRISTIE Jr., et al., 2013) failed to calculate for the following reasons:    The valid total cholesterol range is 130 to 320 mg/dL

## 2020-04-24 ENCOUNTER — TELEPHONE (OUTPATIENT)
Dept: NEPHROLOGY | Facility: CLINIC | Age: 70
End: 2020-04-24

## 2020-05-05 ENCOUNTER — OFFICE VISIT (OUTPATIENT)
Dept: NEPHROLOGY | Facility: CLINIC | Age: 70
End: 2020-05-05
Payer: MEDICARE

## 2020-05-05 DIAGNOSIS — N18.30 TYPE 2 DIABETES MELLITUS WITH STAGE 3 CHRONIC KIDNEY DISEASE, WITHOUT LONG-TERM CURRENT USE OF INSULIN: Primary | ICD-10-CM

## 2020-05-05 DIAGNOSIS — E55.9 VITAMIN D DEFICIENCY: ICD-10-CM

## 2020-05-05 DIAGNOSIS — I12.9 HYPERTENSIVE KIDNEY DISEASE WITH STAGE 3 CHRONIC KIDNEY DISEASE: ICD-10-CM

## 2020-05-05 DIAGNOSIS — N18.30 CKD (CHRONIC KIDNEY DISEASE) STAGE 3, GFR 30-59 ML/MIN: ICD-10-CM

## 2020-05-05 DIAGNOSIS — N18.30 HYPERTENSIVE KIDNEY DISEASE WITH STAGE 3 CHRONIC KIDNEY DISEASE: ICD-10-CM

## 2020-05-05 DIAGNOSIS — E11.22 TYPE 2 DIABETES MELLITUS WITH STAGE 3 CHRONIC KIDNEY DISEASE, WITHOUT LONG-TERM CURRENT USE OF INSULIN: Primary | ICD-10-CM

## 2020-05-05 PROCEDURE — 99443 PR PHYSICIAN TELEPHONE EVALUATION 21-30 MIN: ICD-10-PCS | Mod: 95,,, | Performed by: INTERNAL MEDICINE

## 2020-05-05 PROCEDURE — 99443 PR PHYSICIAN TELEPHONE EVALUATION 21-30 MIN: CPT | Mod: 95,,, | Performed by: INTERNAL MEDICINE

## 2020-05-10 PROBLEM — N18.30 HYPERTENSIVE KIDNEY DISEASE WITH STAGE 3 CHRONIC KIDNEY DISEASE: Status: ACTIVE | Noted: 2020-05-10

## 2020-05-10 PROBLEM — I12.9 HYPERTENSIVE KIDNEY DISEASE WITH STAGE 3 CHRONIC KIDNEY DISEASE: Status: ACTIVE | Noted: 2020-05-10

## 2020-05-10 NOTE — PROGRESS NOTES
Established Patient - Audio Only Telehealth Visit     The patient location is: home   The chief complaint leading to consultation is: CKD  Visit type: Virtual visit with audio only (telephone)  Total time spent with patient: 21 minutes        The reason for the audio only service rather than synchronous audio and video virtual visit was related to technical difficulties or patient preference/necessity.     Each patient to whom I provide medical services by telemedicine is:  (1) informed of the relationship between the physician and patient and the respective role of any other health care provider with respect to management of the patient; and (2) notified that they may decline to receive medical services by telemedicine and may withdraw from such care at any time. Patient verbally consented to receive this service via voice-only telephone call.     This service was not originating from a related E/M service provided within the previous 7 days nor will  to an E/M service or procedure within the next 24 hours or my soonest available appointment.  Prevailing standard of care was able to be met in this audio-only visit.      Subjective:   Patient ID: Meenu Guy is a 69 y.o. Black or  female who presents for new evaluation of Chronic Kidney Disease      HPI   was evaluated today for new patient evaluation of CKD. She was referred by her PCP, Dr. Marvin. Since this was her first visit with me I reviewed her prior pertinent chart.     She has longstanding diabetes which has been suboptimally controlled, hypertension, prior long term use of NSAID for shoulder pain, diastolic dysfunction, hyperlipidemia, PVD, S/P right femoral bypass in 2012, remote h/o tobacco smoking, GERD, chronic use of PPI  and other medical problems.     She has side effects from ACE-I which caused angioedema. She has microalbuminuria.     She does not check her BP at home. She was counseled to monitor BP at home  and review it during medical visits and follow low salt diet.     She has had episode of KAREN in 11/2019, exact context unclear. But she had UTI, URI that time.     Most recently she has been doing ok and does not have nausea/ vomiting/ flank pain/ cloudy urine/ leg swelling/ fever. She does not have orthostatic symptoms.     Available labs from [facility] since 1/2019 show CKD III with gradual worsening, episode of KAREN, episode of E coli UTI in 11/2019 treated with Augmentin.     Renal Function:  Lab Results   Component Value Date     (H) 12/10/2019     (H) 11/29/2019     12/10/2019     11/29/2019    K 4.0 12/10/2019    K 3.6 11/29/2019     12/10/2019    CL 99 11/29/2019    CO2 27 12/10/2019    CO2 25 11/29/2019    BUN 21 12/10/2019    BUN 24 (H) 11/29/2019    CALCIUM 10.3 12/10/2019    CALCIUM 9.3 11/29/2019    CREATININE 1.3 12/10/2019    CREATININE 1.8 (H) 11/29/2019    ALBUMIN 3.3 (L) 11/29/2019    ALBUMIN 4.1 10/24/2019    ESTGFRAFRICA 48 (A) 12/10/2019    ESTGFRAFRICA 32 (A) 11/29/2019    EGFRNONAA 42 (A) 12/10/2019    EGFRNONAA 28 (A) 11/29/2019       Urinalysis:  Lab Results   Component Value Date    APPEARANCEUA Hazy (A) 11/29/2019    PHUR 6.0 11/29/2019    SPECGRAV 1.020 11/29/2019    PROTEINUA 2+ (A) 11/29/2019    GLUCUA Negative 11/29/2019    OCCULTUA 2+ (A) 11/29/2019    NITRITE Negative 11/29/2019    LEUKOCYTESUR 1+ (A) 11/29/2019       Protein/Creatinine Ratio:  Lab Results   Component Value Date    PROTEINURINE 28 (H) 03/16/2020    CREATRANDUR 401.9 (H) 03/16/2020    CREATRANDUR 401.9 (H) 03/16/2020    UTPCR 0.07 03/16/2020       CBC:  Lab Results   Component Value Date    WBC 12.80 (H) 11/29/2019    HGB 11.2 (L) 11/29/2019    HCT 34.0 (L) 11/29/2019       PTH:  Lab Results   Component Value Date    PTH 25.8 03/16/2020     Vit D 42    US kidneys 3/16/20  9 and 9.3 cm kidneys  Size lower limits of normal  No hydronephrosis  No mass    Review of Systems    Constitutional: Negative for appetite change and chills.   HENT: Negative for congestion, facial swelling and sore throat.    Eyes: Negative for visual disturbance.   Respiratory: Negative for cough, chest tightness and shortness of breath.    Cardiovascular: Negative for chest pain, palpitations and leg swelling.   Gastrointestinal: Negative for abdominal pain, nausea and vomiting.   Genitourinary: Negative for difficulty urinating, dysuria, flank pain, frequency, hematuria and urgency.   Musculoskeletal: Positive for arthralgias. Negative for back pain and joint swelling.   Skin: Negative for pallor and rash.   Allergic/Immunologic: Negative for immunocompromised state.   Neurological: Negative for dizziness, numbness and headaches.   Psychiatric/Behavioral: The patient is not nervous/anxious.        Objective:   Physical Exam    Telephone visit  She sounded well and not in any distress    Assessment:     1. Type 2 diabetes mellitus with stage 3 chronic kidney disease, without long-term current use of insulin    2. CKD (chronic kidney disease) stage 3, GFR 30-59 ml/min    3. Vitamin D deficiency    4. Hypertensive kidney disease with stage 3 chronic kidney disease        Plan:       Problem List Items Addressed This Visit        Renal/    CKD (chronic kidney disease) stage 3, GFR 30-59 ml/min    Relevant Orders    PTH, intact    Renal function panel    Hepatitis B Surface Antigen    Hepatitis C Antibody    Immunofixation electrophoresis    Immunoglobulin free LT chains blood    Protein electrophoresis, serum    Vitamin D    Uric acid    Urinalysis    Protein / creatinine ratio, urine    US Retroperitoneal Complete (Kidney and    CBC auto differential    Hypertensive kidney disease with stage 3 chronic kidney disease    Relevant Orders    PTH, intact    Renal function panel    Hepatitis B Surface Antigen    Hepatitis C Antibody    Immunofixation electrophoresis    Immunoglobulin free LT chains blood    Protein  electrophoresis, serum    Vitamin D    Uric acid    Urinalysis    Protein / creatinine ratio, urine    US Retroperitoneal Complete (Kidney and    CBC auto differential       Endocrine    Type 2 diabetes mellitus with stage 3 chronic kidney disease, without long-term current use of insulin - Primary    Relevant Orders    PTH, intact    Renal function panel    Hepatitis B Surface Antigen    Hepatitis C Antibody    Immunofixation electrophoresis    Immunoglobulin free LT chains blood    Protein electrophoresis, serum    Vitamin D    Uric acid    Urinalysis    Protein / creatinine ratio, urine    US Retroperitoneal Complete (Kidney and    CBC auto differential    Vitamin D deficiency    Relevant Orders    PTH, intact    Renal function panel    Hepatitis B Surface Antigen    Hepatitis C Antibody    Immunofixation electrophoresis    Immunoglobulin free LT chains blood    Protein electrophoresis, serum    Vitamin D    Uric acid    Urinalysis    Protein / creatinine ratio, urine    US Retroperitoneal Complete (Kidney and    CBC auto differential        Ms. Guy has CKD III with microalbuminuria due to longstanding diabetes, hypertension, atherosclerotic vascular disease (risk factor profile, prior vascular interventions, remote h/o tobacco smoking) and previous chronic use of NSAID for shoulder pain. She has had episodes of KAREN, context of this unknown.    I had a detailed discussion with patient about her CKD diagnosis, CKD staging, interpretation of serial labs pertaining to her kidneys, potential risk of progression of CKD. Possible etiology of her CKD, need for improved glycemic and BP control, strict low salt diet, avoiding any NSAID, having periodic monitoring of renal labs to assess and treat any electrolyte disturbance, acid base disorder, to follow progress of CKD with creatinine and eGFR. I stressed to have BP monitoring at home and to bring readings for review with future visits with MD.    - periodically  monitor renal panel for electrolytes, acid base status, eGFR  - CKD staging, diagnosis, onset of CKD, potential risk of CKD progression, potential risk of KAREN due to volume depletion/ BROOKLYNN/ ATN due to hemodynamic changes d/w patient  - stress to follow low salt diet, keep well hydrated, follow low K diet in case of any dyskalemia, nutritional changes d/w patient   - trend PTH levels, vit D, phos, corrected Ca and treat as necessary   - trend urine studies for proteinuria  - obtain screening labs for hep C/B, labs to rule out paraproteinemia   - avoid NSAID/ bactrim/ IV contrast/ gadolinium/ aminoglycoside/ fleet enema/ PPI where possible  - prior side effects listed for ACE-I in the form of angioedema, cannot use for her diabetes with CKD and microalbuminuria   - trend H/H periodically if CKD stage progresses  - avoid metformin use if creatinine > 1.5-1.6    Labs in July 2020 and then schedule follow up.    Plan, labs, recommendations were discussed with patient, her questions were answered to her satisfaction.     RTC 2-3 months

## 2020-05-14 ENCOUNTER — TELEPHONE (OUTPATIENT)
Dept: INTERNAL MEDICINE | Facility: CLINIC | Age: 70
End: 2020-05-14

## 2020-05-14 NOTE — TELEPHONE ENCOUNTER
----- Message from Lali Marvin MD sent at 3/16/2020  5:40 PM CDT -----  Regarding: LINKS  Could you check the Links registry, specifically for the  pneumococcal vaccine?  Thanks!

## 2020-05-20 ENCOUNTER — TELEPHONE (OUTPATIENT)
Dept: INTERNAL MEDICINE | Facility: CLINIC | Age: 70
End: 2020-05-20

## 2020-05-20 ENCOUNTER — TELEPHONE (OUTPATIENT)
Dept: NEPHROLOGY | Facility: CLINIC | Age: 70
End: 2020-05-20

## 2020-05-20 DIAGNOSIS — Z12.31 ENCOUNTER FOR SCREENING MAMMOGRAM FOR BREAST CANCER: Primary | ICD-10-CM

## 2020-05-20 NOTE — TELEPHONE ENCOUNTER
----- Message from Stella Gunter sent at 5/20/2020  4:48 PM CDT -----  Contact: LOUIE MSAON [[id number]]   Name of Who is Calling:     What is the request in detail: patient request call back mammogram orders  Please contact to further discuss and advise      Can the clinic reply by [facility]: no     [facility]:  [phone]///[phone]

## 2020-06-09 ENCOUNTER — HOSPITAL ENCOUNTER (OUTPATIENT)
Dept: RADIOLOGY | Facility: OTHER | Age: 70
Discharge: HOME | End: 2020-06-09
Attending: INTERNAL MEDICINE
Payer: MEDICARE

## 2020-06-09 DIAGNOSIS — Z12.31 ENCOUNTER FOR SCREENING MAMMOGRAM FOR BREAST CANCER: ICD-10-CM

## 2020-06-09 PROCEDURE — 77067 MAMMO DIGITAL SCREENING BILAT WITH TOMOSYNTHESIS_CAD: ICD-10-PCS | Mod: 26,,, | Performed by: RADIOLOGY

## 2020-06-09 PROCEDURE — 77067 SCR MAMMO BI INCL CAD: CPT | Mod: TC

## 2020-06-09 PROCEDURE — 77063 BREAST TOMOSYNTHESIS BI: CPT | Mod: 26,,, | Performed by: RADIOLOGY

## 2020-06-09 PROCEDURE — 77063 MAMMO DIGITAL SCREENING BILAT WITH TOMOSYNTHESIS_CAD: ICD-10-PCS | Mod: 26,,, | Performed by: RADIOLOGY

## 2020-06-09 PROCEDURE — 77067 SCR MAMMO BI INCL CAD: CPT | Mod: 26,,, | Performed by: RADIOLOGY

## 2020-06-16 ENCOUNTER — OFFICE VISIT (OUTPATIENT)
Dept: INTERNAL MEDICINE | Facility: CLINIC | Age: 70
End: 2020-06-16
Payer: MEDICARE

## 2020-06-16 VITALS
DIASTOLIC BLOOD PRESSURE: 58 MMHG | OXYGEN SATURATION: 97 % | HEART RATE: 66 BPM | BODY MASS INDEX: 24.05 KG/M2 | WEIGHT: 168 LBS | SYSTOLIC BLOOD PRESSURE: 122 MMHG | HEIGHT: 70 IN

## 2020-06-16 DIAGNOSIS — E11.22 TYPE 2 DIABETES MELLITUS WITH STAGE 3 CHRONIC KIDNEY DISEASE, WITHOUT LONG-TERM CURRENT USE OF INSULIN: ICD-10-CM

## 2020-06-16 DIAGNOSIS — M25.511 CHRONIC RIGHT SHOULDER PAIN: Primary | ICD-10-CM

## 2020-06-16 DIAGNOSIS — E78.2 MIXED HYPERLIPIDEMIA: ICD-10-CM

## 2020-06-16 DIAGNOSIS — Z23 NEED FOR ZOSTER VACCINE: ICD-10-CM

## 2020-06-16 DIAGNOSIS — Z23 NEED FOR PNEUMOCOCCAL VACCINE: ICD-10-CM

## 2020-06-16 DIAGNOSIS — N18.30 TYPE 2 DIABETES MELLITUS WITH STAGE 3 CHRONIC KIDNEY DISEASE, WITHOUT LONG-TERM CURRENT USE OF INSULIN: ICD-10-CM

## 2020-06-16 DIAGNOSIS — G89.29 CHRONIC RIGHT SHOULDER PAIN: Primary | ICD-10-CM

## 2020-06-16 DIAGNOSIS — I10 ESSENTIAL HYPERTENSION: ICD-10-CM

## 2020-06-16 PROCEDURE — 1101F PR PT FALLS ASSESS DOC 0-1 FALLS W/OUT INJ PAST YR: ICD-10-PCS | Mod: CPTII,S$GLB,, | Performed by: INTERNAL MEDICINE

## 2020-06-16 PROCEDURE — 3078F DIAST BP <80 MM HG: CPT | Mod: CPTII,S$GLB,, | Performed by: INTERNAL MEDICINE

## 2020-06-16 PROCEDURE — 1159F PR MEDICATION LIST DOCUMENTED IN MEDICAL RECORD: ICD-10-PCS | Mod: S$GLB,,, | Performed by: INTERNAL MEDICINE

## 2020-06-16 PROCEDURE — 99999 PR PBB SHADOW E&M-EST. PATIENT-LVL V: CPT | Mod: PBBFAC,,, | Performed by: INTERNAL MEDICINE

## 2020-06-16 PROCEDURE — 1126F AMNT PAIN NOTED NONE PRSNT: CPT | Mod: S$GLB,,, | Performed by: INTERNAL MEDICINE

## 2020-06-16 PROCEDURE — 99499 RISK ADDL DX/OHS AUDIT: ICD-10-PCS | Mod: S$GLB,,, | Performed by: INTERNAL MEDICINE

## 2020-06-16 PROCEDURE — 3074F PR MOST RECENT SYSTOLIC BLOOD PRESSURE < 130 MM HG: ICD-10-PCS | Mod: CPTII,S$GLB,, | Performed by: INTERNAL MEDICINE

## 2020-06-16 PROCEDURE — 99999 PR PBB SHADOW E&M-EST. PATIENT-LVL V: ICD-10-PCS | Mod: PBBFAC,,, | Performed by: INTERNAL MEDICINE

## 2020-06-16 PROCEDURE — 1101F PT FALLS ASSESS-DOCD LE1/YR: CPT | Mod: CPTII,S$GLB,, | Performed by: INTERNAL MEDICINE

## 2020-06-16 PROCEDURE — 99215 PR OFFICE/OUTPT VISIT, EST, LEVL V, 40-54 MIN: ICD-10-PCS | Mod: S$GLB,,, | Performed by: INTERNAL MEDICINE

## 2020-06-16 PROCEDURE — 1126F PR PAIN SEVERITY QUANTIFIED, NO PAIN PRESENT: ICD-10-PCS | Mod: S$GLB,,, | Performed by: INTERNAL MEDICINE

## 2020-06-16 PROCEDURE — 1159F MED LIST DOCD IN RCRD: CPT | Mod: S$GLB,,, | Performed by: INTERNAL MEDICINE

## 2020-06-16 PROCEDURE — 99215 OFFICE O/P EST HI 40 MIN: CPT | Mod: S$GLB,,, | Performed by: INTERNAL MEDICINE

## 2020-06-16 PROCEDURE — 3078F PR MOST RECENT DIASTOLIC BLOOD PRESSURE < 80 MM HG: ICD-10-PCS | Mod: CPTII,S$GLB,, | Performed by: INTERNAL MEDICINE

## 2020-06-16 PROCEDURE — 3051F HG A1C>EQUAL 7.0%<8.0%: CPT | Mod: CPTII,S$GLB,, | Performed by: INTERNAL MEDICINE

## 2020-06-16 PROCEDURE — 3051F PR MOST RECENT HEMOGLOBIN A1C LEVEL 7.0 - < 8.0%: ICD-10-PCS | Mod: CPTII,S$GLB,, | Performed by: INTERNAL MEDICINE

## 2020-06-16 PROCEDURE — 99499 UNLISTED E&M SERVICE: CPT | Mod: S$GLB,,, | Performed by: INTERNAL MEDICINE

## 2020-06-16 PROCEDURE — 3074F SYST BP LT 130 MM HG: CPT | Mod: CPTII,S$GLB,, | Performed by: INTERNAL MEDICINE

## 2020-06-16 RX ORDER — ATORVASTATIN CALCIUM 20 MG/1
20 TABLET, FILM COATED ORAL DAILY
Qty: 90 TABLET | Refills: 3 | Status: SHIPPED | OUTPATIENT
Start: 2020-06-16 | End: 2021-06-10 | Stop reason: SDUPTHER

## 2020-06-16 RX ORDER — DICLOFENAC SODIUM 10 MG/G
2 GEL TOPICAL 4 TIMES DAILY PRN
Qty: 100 G | Refills: 11 | Status: SHIPPED | OUTPATIENT
Start: 2020-06-16 | End: 2020-08-31

## 2020-06-16 RX ORDER — ASPIRIN 81 MG/1
81 TABLET ORAL DAILY
Qty: 90 TABLET | Refills: 3 | Status: SHIPPED | OUTPATIENT
Start: 2020-06-16 | End: 2021-09-23 | Stop reason: SDUPTHER

## 2020-06-16 RX ORDER — METFORMIN HYDROCHLORIDE 500 MG/1
500 TABLET, EXTENDED RELEASE ORAL
Qty: 30 TABLET | Refills: 11 | Status: SHIPPED | OUTPATIENT
Start: 2020-06-16 | End: 2020-12-23 | Stop reason: SDUPTHER

## 2020-06-16 NOTE — PROGRESS NOTES
Subjective:       Patient ID: Meenu Guy is a 69 y.o. female who  has a past medical history of Diabetes mellitus, type 2 and Hypertension.    Chief Complaint: Diabetes, Follow-up, and Arthritis     History was obtained from the patient and supplemented through chart review.  -saw Nephrology for CKD 3.  Planning on labs .    HPI    R Arm, shoulder pain, neck pain:  For years.  Mainly occurs at night.  Some hand tingling.  Stiffness.  Improves with hot water shower.  Decreased shoulder abduction.  Takes Tylenol.    HTN:    No h/o CAD, arrhythmia. TTE  with normal EF, grade 2 diastolic dysfunction.    Pt's BP is controlled on Coreg 6.25 b.i.d., Norvasc 10. +DM2 with micro albuminuria, but H/o ACE angioedema.    Tolerating meds well. Pt denies CP, SOB, lightheadedness, dizziness, leg edema.    Exercise: 1 hour/day. Treadmill.   Diet: stopped drinking coke.  Weakness for sweets.  Eats a lot of peppermints.    DM2:  Currently pt is taking Metformin 500 BID, glimepiride 2, but is having multiple loose stools/day with Metformin.  GFR 48.      With elevated microalbumin creatinine ratio.  Not on an ACE/ARB d/t angioedema.   Retinal exams:    Foot exams:    Hemoglobin A1C   Date Value Ref Range Status   03/16/2020 7.6 (H) 4.0 - 5.6 % Final     Comment:     ADA Screening Guidelines:  5.7-6.4%  Consistent with prediabetes  >or=6.5%  Consistent with diabetes  High levels of fetal hemoglobin interfere with the HbA1C  assay. Heterozygous hemoglobin variants (HbS, HgC, etc)do  not significantly interfere with this assay.   However, presence of multiple variants may affect accuracy.     10/24/2019 7.7 (H) 4.0 - 5.6 % Final     Comment:     ADA Screening Guidelines:  5.7-6.4%  Consistent with prediabetes  >or=6.5%  Consistent with diabetes  High levels of fetal hemoglobin interfere with the HbA1C  assay. Heterozygous hemoglobin variants (HbS, HgC, etc)do  not significantly interfere with this  assay.   However, presence of multiple variants may affect accuracy.     09/18/2019 8.1 (H) 4.0 - 5.6 % Final     Comment:     ADA Screening Guidelines:  5.7-6.4%  Consistent with prediabetes  >or=6.5%  Consistent with diabetes  High levels of fetal hemoglobin interfere with the HbA1C  assay. Heterozygous hemoglobin variants (HbS, HgC, etc)do  not significantly interfere with this assay.   However, presence of multiple variants may affect accuracy.       HLD:  Is currently taking Lipitor 20 and ASA 81 daily. H/o edema from statin years ago in [place], but has been tolerating Lipitor.  Lab Results   Component Value Date    LDLCALC 59.6 (L) 03/16/2020     The ASCVD Risk score (Ceenick CHRISTIE Jr., et al., 2013) failed to calculate for the following reasons:    The valid total cholesterol range is 130 to 320 mg/dL            Not addressed today.  PVD:  Status post right femoral bypass around 2012 in [place].  No claudication.  Quit tobacco 15 years ago.  Continue ASA, statin.       CKD 3:  Stopped NSAIDs.  H/o ACE/ARB angioedema.  Renal ultrasound:  No hydro or nephrolithiasis  Establish care with Nephrology.  Likely d/t HTN, DM2. Stable. Following with Nephro.    Vitamin D deficiency: Is taking OTC Vit D, MVI.   Normalized. Cont OTC Vit D, MVI.   Lab Results   Component Value Date    DKPCTFVP29HA 42 03/16/2020    LPWRSHHH27SA 17 (L) 04/18/2019     Normocytic anemia:  Borderline low. S/p hyst.  Borderline low.  +CKD. Iron panel wnl.  Lab Results   Component Value Date    IRON 93 03/16/2020    TIBC 420 03/16/2020    FERRITIN 43 03/16/2020     Lab Results   Component Value Date    [id number] 331 03/16/2020     No results found for: FOLATE    GERD:  No sx daily. Avoiding food triggers.  Controlled. Advised to avoid dietary triggers. Decrease PPI to PRN.    Colon polyp:  C scope 2017 with 2 polyps.  Repeat in 3 years.  She refused to see GI.  Fit kit negative .    Cervical DDD, FMA:  S/p dissectomy 20 years ago. Not much  "improvement on lyrica or gabapentin.  Follows with Pain Med for epidural.    Review of Systems   Constitutional: Negative for fever and unexpected weight change.   HENT: Negative for rhinorrhea and sneezing.    Eyes: Negative for redness and itching.   Respiratory: Negative for shortness of breath and wheezing.    Cardiovascular: Negative for chest pain and leg swelling.   Gastrointestinal: Positive for diarrhea. Negative for abdominal pain.   Genitourinary: Negative for dysuria and menstrual problem.   Musculoskeletal: Positive for arthralgias and neck pain. Negative for gait problem.   Skin: Negative for color change and rash.   Neurological: Negative for dizziness and light-headedness.   Hematological: Negative for adenopathy.   Psychiatric/Behavioral: Negative for confusion. The patient is not nervous/anxious.        I personally reviewed Past Medical History, Past Surgical History, Social History, and Family History.    Objective:      Vitals:    06/16/20 0844   BP: (!) 122/58   Pulse: 66   SpO2: 97%   Weight: 76.2 kg (167 lb 15.9 oz)   Height: 5' 10" (1.778 m)      Physical Exam  Constitutional:       General: She is not in acute distress.     Appearance: She is well-developed. She is not diaphoretic.   HENT:      Head: Normocephalic and atraumatic.      Nose: Nose normal.      Mouth/Throat:      Pharynx: No oropharyngeal exudate.   Eyes:      General: No scleral icterus.        Right eye: No discharge.         Left eye: No discharge.   Neck:      Musculoskeletal: Neck supple.      Thyroid: No thyromegaly.      Trachea: No tracheal deviation.   Cardiovascular:      Rate and Rhythm: Normal rate and regular rhythm.      Heart sounds: Normal heart sounds. No murmur.   Pulmonary:      Effort: Pulmonary effort is normal. No respiratory distress.      Breath sounds: Normal breath sounds. No wheezing.   Abdominal:      General: Bowel sounds are normal. There is no distension.      Palpations: Abdomen is soft.      " Tenderness: There is no abdominal tenderness.   Musculoskeletal:         General: No deformity.   Lymphadenopathy:      Cervical: No cervical adenopathy.   Skin:     General: Skin is warm and dry.      Findings: No erythema.   Neurological:      Mental Status: She is alert.      Cranial Nerves: No cranial nerve deficit.      Gait: Gait normal.   Psychiatric:         Behavior: Behavior normal.           Lab Results   Component Value Date    WBC 12.80 (H) 11/29/2019    HGB 11.2 (L) 11/29/2019    HCT 34.0 (L) 11/29/2019     11/29/2019    CHOL 122 03/16/2020    TRIG 67 03/16/2020    HDL 49 03/16/2020    ALT 19 06/09/2020    AST 23 06/09/2020     06/09/2020    K 3.7 06/09/2020     06/09/2020    CREATININE 1.3 06/09/2020    BUN 21 06/09/2020    CO2 24 06/09/2020    TSH 1.908 04/18/2019    HGBA1C 7.6 (H) 03/16/2020       The ASCVD Risk score (Cee PRATIK Jr., et al., 2013) failed to calculate for the following reasons:    The valid total cholesterol range is 130 to 320 mg/dL    (Imaging have been independently reviewed)   Mammogram without evidence of malignancy, BI-RADS 1, TC score low.    Assessment:       1. Chronic right shoulder pain    2. Essential hypertension    3. Type 2 diabetes mellitus with stage 3 chronic kidney disease, without long-term current use of insulin    4. Mixed hyperlipidemia    5. Need for pneumococcal vaccine    6. Need for zoster vaccine          Plan:       Meenu was seen today for diabetes, follow-up and arthritis.    Diagnoses and all orders for this visit:    Chronic right shoulder pain  Comments:  Suspect RA. Ordered AI panel. Refer to PT, Rheum. Voltaren gel. Avoid NSAIDs d/t CKD.  Orders:  -     Sedimentation rate; Future  -     C-Reactive Protein; Future  -     MERVAT Screen w/Reflex; Future  -     Anti-DNA antibody, double-stranded; Future  -     Cyclic Citrullinated Peptide Antibody, IgG; Future  -     Ambulatory referral/consult to Rheumatology; Future  -      Ambulatory referral/consult to Physical/Occupational Therapy; Future  -     Rheumatoid factor; Future  -     diclofenac sodium (VOLTAREN) 1 % Gel; Apply 2 g topically 4 (four) times daily as needed. For pain    Essential hypertension  Comments:  Controlled. Cont Coreg 6.25 BID, Norvasc 10. H/o DM, but h/o ACE angioedema.    Type 2 diabetes mellitus with stage 3 chronic kidney disease, without long-term current use of insulin  Comments:  Controlled. Switch Metformin 500 BID to 500 XR d/t GI upset. Cont glimepiride. Could consider Trulicity. Check A1C q6 mo.  Orders:  -     Hemoglobin A1C; Future  -     Microalbumin/creatinine urine ratio; Future  -     metFORMIN (GLUCOPHAGE-XR) 500 MG XR 24hr tablet; Take 1 tablet (500 mg total) by mouth daily with breakfast.  -     Hemoglobin A1C; Future  -     Microalbumin/creatinine urine ratio; Future    Mixed hyperlipidemia  Comments:  Controlled. Cont Lipitor 20, ASA.  Orders:  -     aspirin (ECOTRIN) 81 MG EC tablet; Take 1 tablet (81 mg total) by mouth once daily. for 365 doses  -     atorvastatin (LIPITOR) 20 MG tablet; Take 1 tablet (20 mg total) by mouth once daily.    Need for pneumococcal vaccine  Comments:  Advised to obtain vaccine at Pharmacy. No records on [facility].    Need for zoster vaccine  Comments:  Advised to obtain vaccine at Pharmacy. She will check vaccination record with her pharmacy.         Side effects of medication(s) were discussed in detail and patient voiced understanding.  Patient will call back for any issues or complications.     RTC in 6 month(s) or sooner PRN for DM with labs prior.

## 2020-06-17 DIAGNOSIS — Z12.11 SCREENING FOR COLORECTAL CANCER: Primary | ICD-10-CM

## 2020-06-17 DIAGNOSIS — Z12.12 SCREENING FOR COLORECTAL CANCER: Primary | ICD-10-CM

## 2020-07-06 ENCOUNTER — CLINICAL SUPPORT (OUTPATIENT)
Dept: REHABILITATION | Facility: OTHER | Age: 70
End: 2020-07-06
Attending: INTERNAL MEDICINE
Payer: MEDICARE

## 2020-07-06 DIAGNOSIS — M25.511 CHRONIC RIGHT SHOULDER PAIN: ICD-10-CM

## 2020-07-06 DIAGNOSIS — G89.29 CHRONIC RIGHT SHOULDER PAIN: ICD-10-CM

## 2020-07-06 PROCEDURE — 97110 THERAPEUTIC EXERCISES: CPT | Mod: PN | Performed by: PHYSICAL THERAPIST

## 2020-07-06 PROCEDURE — 97162 PT EVAL MOD COMPLEX 30 MIN: CPT | Mod: PN | Performed by: PHYSICAL THERAPIST

## 2020-07-06 NOTE — PLAN OF CARE
[facility]  Physical Therapy Initial Evaluation    Date: 7/6/2020   Name: Meenu Guy  Clinic Number: [id number]    Therapy Diagnosis:   Encounter Diagnosis   Name Primary?    Chronic right shoulder pain      Physician: Lali Marvin MD    Physician Orders: PT Eval and Treat   Medical Diagnosis from Referral: M25.511,G89.29 (ICD-10-CM) - Chronic right shoulder pain   Evaluation Date: 7/6/2020  Authorization Period Expiration: 6/16/2021  Plan of Care Expiration: 9/30/2020  Visit # / Visits authorized: 1/ 1    Time In: 1035am  Time Out: 11:15am  Total Appointment Time (timed & untimed codes): 30 minutes    Precautions: Standard    Subjective   Date of onset: years, recent exacerbation to bring her Dr. 6/16/2020  History of current condition - Meenu reports: Dealing with R shoulder pain for years and was seeing a pain management Dr in [place] for RA. Hx of disc disease in her neck and was getting injections with pain management last year. S/p neck fusion 20 years ago. Pain worse in the morning and multiple joint stiffness. She takes a hot shower just to get going in the morning. Also has relief when she wraps her right wrist, her R shoulder feels better. Having a pain that travels down her R arm from her neck and numbness all 5 fingers. No N/V, headaches, blurred vision, fine motor changes.      Medical History:   Past Medical History:   Diagnosis Date    Diabetes mellitus, type 2     Hypertension        Surgical History:   Meenu Guy  has a past surgical history that includes Hysterectomy; Back surgery; Cholecystectomy; Disc removal; and Epidural steroid injection (N/A, 10/30/2019).    Medications:   Meenu has a current medication list which includes the following prescription(s): amlodipine, aspirin, atorvastatin, blood sugar diagnostic, blood-glucose meter, carvedilol, diclofenac sodium, fluticasone propionate, gabapentin, glimepiride, lancets, lidocaine, metformin,  "and omeprazole.    Allergies:   Review of patient's allergies indicates:   Allergen Reactions    Statins-hmg-coa reductase inhibitors Edema     Tolerating Lipitor    Ace inhibitors Swelling        Imaging, MRI studies:     Impression:     1. Postoperative changes from anterior cervical fusion C3 through C7 without significant central canal spinal stenosis.  There is however bilateral foraminal stenotic disease at multiple intervertebral disc spaces as above.  2. Broad-based disc protrusion and osteophyte complex C7-T1 disc space and T1-T2 disc spaces as above.  There is associated severe foraminal stenotic changes at these levels.       [place] Therapy[place]   Social History:  lives with their spouse  Occupation: retired/ was on disability due to her low back  Prior Level of Function: independent with all ADL's  Current Level of Function: difficulty wiping with , fixing her hair, reaching overhead.     Pain:  Current 8/10, worst 10/10, best 5/10   Location: right neck, shoulder, and wrist  Description: Aching, Burning and Numb  Aggravating Factors: Morning, Lifting and reaching  Easing Factors: massage, pain medication, rest and wrist bandage    Pts goals: "relief" and to be able to reach behind her back    Objective     Observation:calm and pleasant mood  Posture:rounded shoulders  Palpation: TTP R upper trapezius, AC joint, wrist    Shoulder AROM (PROM)  Left  Right    Flexion:    160  80(150)*     Abduction:    160  70(130)*  Internal Rotation @ 90:  (40)  (20)*  External Rotation @ 90:  (90)  (70)*  ER at side    50  40    Functional reaches:  Internal rotation   L5  Unable/ greater trochanter with ERP  External rotation   C7  C7    * indicates pain with movement, Measured in degrees    Cervical Spine Active ROM, measured in degrees with inclinometer, * Indicates pain with movement    Flexion: 40  Extension:30  Left Side Bend:10  Right Side Bend:10  Left rotation:40  Right " rotation:20*    MMT:    Left  Right    Shoulder:       Flexion:   4/5  3-/5     Abduction:   5/5  3-/5  External Rotation:  4+/5  3+/5    Internal Rotation:  5/5  4/5    Elbow:  Flexion:    5/5  4/5  Extension:   5/5  5/5    Wrist:  Flexion:   5/5  5/5  Extension:   5/5  5/5      Joint Mobility: unable to tolerate    Special Tests: ULTT: negative median, ulnar, radial. Increased shoulder pain in all test positions without radiculopathy      Limitation/Restriction for FOTO shoulder Survey    Therapist reviewed FOTO scores for Meenu Guy on 7/6/2020.   FOTO documents entered into [employer] - see Media section.    Limitation Score: 57%         TREATMENT   Treatment Time In: 11:00am  Treatment Time Out: 11:10am  Total Treatment time (time-based codes) separate from Evaluation: 10 minutes    Meenu received therapeutic exercises to develop strength, endurance and ROM for 10 minutes including:    Table slides Shoulder flexion 2 x 10  Table slides shoulder abduction 2 x 10  Scapula retractions x 10 (tactile cues for correct performance)  Supine AAROM shoulder ER with dowel 2 x 10    Home Exercises and Patient Education Provided    Education provided:   - HEP    Written Home Exercises Provided: yes.  Exercises were reviewed and Meenu was able to demonstrate them prior to the end of the session.  Meenu demonstrated good  understanding of the education provided.     See EMR under Patient Instructions for exercises provided 7/6/2020.    Assessment   Meenu is a 70 y.o. female referred to outpatient Physical Therapy with a medical diagnosis of R shoulder pain. Pt presents with decreased cervical spine and R shoulder AROM/PROM, RUE weakness, poor postural awareness. Pt with pertinent hx of cervical fusion and s/p TACOS C7-T1 10/2019.     Pt prognosis is Fair.   Pt will benefit from skilled outpatient Physical Therapy to address the deficits stated above and in the chart below, provide pt/family education, and to maximize  pt's level of independence.     Plan of care discussed with patient: Yes  Pt's spiritual, cultural and educational needs considered and patient is agreeable to the plan of care and goals as stated below:     Anticipated Barriers for therapy: chronicity of condition    Medical Necessity is demonstrated by the following  History  Co-morbidities and personal factors that may impact the plan of care Co-morbidities:   prior neck surgery, RA    Personal Factors:   no deficits     moderate   Examination  Body Structures and Functions, activity limitations and participation restrictions that may impact the plan of care Body Regions:   neck  upper extremities    Body Systems:    ROM  strength  motor control    Participation Restrictions:   Self care, lifting, reaching    Activity limitations:   Learning and applying knowledge  no deficits    General Tasks and Commands  no deficits    Communication  no deficits    Mobility  lifting and carrying objects    Self care  caring for body parts (brushing teeth, shaving, grooming)  toileting    Domestic Life  doing house work (cleaning house, washing dishes, laundry)    Interactions/Relationships  no deficits    Life Areas  no deficits    Community and Social Life  recreation and leisure         high   Clinical Presentation unstable clinical presentation with unpredictable characteristics high   Decision Making/ Complexity Score: moderate     Goals:  Short Term Goals (6 Weeks):   1. Patient will increase Shoulder flexion AROM to 130 degrees or greater to improve functional reach  2. Patient will increased cervical rotation R AROM by 20 degrees or greater to improve ADL's such as turning to look behind her  3. Patient to report decreased pain in R shoulder with ADL's by 2 points on 10 point scale  Long Term Goals (12 Weeks):   1. Patient to have decreased subjective report of disability as noted by a score of <40% on the FOTO shoulder questionnaire   2. Patient to be independent with  home exercise program for improved self management of condition  3. Patient will increase strength in R upper extremity to 4/5 to improve tolerance to all functional activities       Plan   Plan of care Certification: 7/6/2020 to 9/30/2020.    Outpatient Physical Therapy 2 times weekly for 8 weeks to include the following interventions: Manual Therapy, Moist Heat/ Ice, Neuromuscular Re-ed, Patient Education, Therapeutic Activites and Therapeutic Exercise.     Radha Gallardo, PT

## 2020-07-09 ENCOUNTER — CLINICAL SUPPORT (OUTPATIENT)
Dept: REHABILITATION | Facility: OTHER | Age: 70
End: 2020-07-09
Attending: INTERNAL MEDICINE
Payer: MEDICARE

## 2020-07-09 DIAGNOSIS — G89.29 CHRONIC RIGHT SHOULDER PAIN: Primary | ICD-10-CM

## 2020-07-09 DIAGNOSIS — M25.511 CHRONIC RIGHT SHOULDER PAIN: Primary | ICD-10-CM

## 2020-07-09 PROCEDURE — 97110 THERAPEUTIC EXERCISES: CPT | Mod: PN,CQ

## 2020-07-09 NOTE — PROGRESS NOTES
"    Physical Therapy Daily Treatment Note     Name: Meenu Luciano Pony  Clinic Number: [id number]    Therapy Diagnosis:   Encounter Diagnosis   Name Primary?    Chronic right shoulder pain Yes     Physician: Lali Marvin MD    Visit Date: 7/9/2020    Physician Orders: PT Eval and Treat   Medical Diagnosis from Referral: M25.511,G89.29 (ICD-10-CM) - Chronic right shoulder pain   Evaluation Date: 7/6/2020  Authorization Period Expiration: 6/16/2021  Plan of Care Expiration: 9/30/2020  Visit # / Visits authorized: 2/ 1          Time In: 1603  Time Out: 1648  Total Billable Time: 45 minutes    Precautions: Standard      Subjective     Pt reports: feeling some soreness in her R shoulder. States she has been performing her HEP as directed.   She was compliant with home exercise program.  Response to previous treatment: Tolerated well  Functional change: none  Prior Level of Function: independent with all ADL's  Current Level of Function: difficulty wiping with , fixing her hair, reaching overhead.      Pain:  Current 6/10, worst 10/10, best 5/10   Location: right neck, shoulder, and wrist  Description: Aching, Burning and Numb  Aggravating Factors: Morning, Lifting and reaching  Easing Factors: massage, pain medication, rest and wrist bandage     Pts goals: "relief" and to be able to reach behind her back      Objective     Meenu received therapeutic exercises to develop strength, endurance and ROM for 45 minutes including:     Table slides Shoulder flexion 2 x 10  Table slides shoulder abduction 2 x 10  Scapula retractions 2 x 10 (tactile cues for correct performance)  Supine AAROM shoulder ER with dowel 2 x 10  +Shld iso 4 way flex/abd/er/ir 20x3" ea  +IR belt strech 3x20"  +Supine wand serratus punch 2x10   +Supine shld flexion w/wand 2x10      Meenu received the following manual therapy techniques: Joint mobilizations, Myofacial release and Soft tissue Mobilization were applied to the: 00 for 00 minutes, " including:  NP        Meenu received hot pack for 00 minutes to NP.    Meenu received cold pack for 00 minutes to NP.      **Taken at Initial Evaluation**                7/6/2020    Observation:calm and pleasant mood  Posture:rounded shoulders  Palpation: TTP R upper trapezius, AC joint, wrist     Shoulder AROM (PROM)                   Left                  Right     Flexion:                                               160                  80(150)*                                    Abduction:                                           160                  70(130)*  Internal Rotation @ 90:                       (40)                  (20)*  External Rotation @ 90:                     (90)                  (70)*  ER at side                                           50                    40     Functional reaches:  Internal rotation                                   L5                    Unable/ greater trochanter with ERP  External rotation                                  C7                    C7     * indicates pain with movement, Measured in degrees     Cervical Spine Active ROM, measured in degrees with inclinometer, * Indicates pain with movement     Flexion: 40  Extension:30  Left Side Bend:10  Right Side Bend:10  Left rotation:40  Right rotation:20*     MMT:                                       Left                  Right     Shoulder:                                                           Flexion:                                   4/5                   3-/5                                Abduction:                               5/5                   3-/5  External Rotation:                   4+/5                 3+/5                   Internal Rotation:                    5/5                   4/5     Elbow:  Flexion:                                   5/5                   4/5  Extension:                               5/5                   5/5     Wrist:  Flexion:                                   5/5                    5/5  Extension:                               5/5 5/5          Home Exercises Provided and Patient Education Provided     Education provided:   - Rationale for there-ex. Posture and form.     Written Home Exercises Provided: yes.  Exercises were reviewed and Meenu was able to demonstrate them prior to the end of the session.  Meenu demonstrated good  understanding of the education provided.     See EMR under Patient Instructions for exercises provided prior visit and 7/9/2020    Assessment     Pt tolerated exercise fair. Muscle weakness in deltoids and scapular muscles is notable. Pt required frequent verbal/tactile cues to correct form. PT/PTA face to face conference with evaluating therapist concerning pt status/TX. Will continue to work on scapular stabilization and promote improved glenohumeral joint mobility. Pt received updated HEP with education on proper execution.     Meenu is progressing well towards her goals.      Pt prognosis is Fair.      Pt will continue to benefit from skilled outpatient physical therapy to address the deficits listed in the problem list box on initial evaluation, provide pt/family education and to maximize pt's level of independence in the home and community environment.     Pt's spiritual, cultural and educational needs considered and pt agreeable to plan of care and goals.     Anticipated Barriers for therapy: chronicity of condition    Goals:  Short Term Goals (6 Weeks):   1. Patient will increase Shoulder flexion AROM to 130 degrees or greater to improve functional reach  2. Patient will increased cervical rotation R AROM by 20 degrees or greater to improve ADL's such as turning to look behind her  3. Patient to report decreased pain in R shoulder with ADL's by 2 points on 10 point scale  Long Term Goals (12 Weeks):   1. Patient to have decreased subjective report of disability as noted by a score of <40% on the FOTO shoulder questionnaire    2. Patient to be independent with home exercise program for improved self management of condition  3. Patient will increase strength in R upper extremity to 4/5 to improve tolerance to all functional activities          Plan     Plan of care Certification: 7/6/2020 to 9/30/2020.    Continue with current POC for further strengthening, flexibility, improve ROM and ADL performance. Will progress functional there-ex as tolerated.        Outpatient Physical Therapy 2 times weekly for 8 weeks to include the following interventions: Manual Therapy, Moist Heat/ Ice, Neuromuscular Re-ed, Patient Education, Therapeutic Activites and Therapeutic Exercise.       Abebe Khan, PTA

## 2020-07-17 ENCOUNTER — CLINICAL SUPPORT (OUTPATIENT)
Dept: REHABILITATION | Facility: OTHER | Age: 70
End: 2020-07-17
Attending: INTERNAL MEDICINE
Payer: MEDICARE

## 2020-07-17 DIAGNOSIS — G89.29 CHRONIC RIGHT SHOULDER PAIN: Primary | ICD-10-CM

## 2020-07-17 DIAGNOSIS — M25.511 CHRONIC RIGHT SHOULDER PAIN: Primary | ICD-10-CM

## 2020-07-17 PROCEDURE — 97110 THERAPEUTIC EXERCISES: CPT | Mod: PN,CQ

## 2020-07-17 NOTE — PROGRESS NOTES
"    Physical Therapy Daily Treatment Note     Name: Meenu Luciano [place]  Clinic Number: [id number]    Therapy Diagnosis:   Encounter Diagnosis   Name Primary?    Chronic right shoulder pain Yes     Physician: Lali Marvin MD    Visit Date: 7/17/2020    Physician Orders: PT Eval and Treat   Medical Diagnosis from Referral: M25.511,G89.29 (ICD-10-CM) - Chronic right shoulder pain   Evaluation Date: 7/6/2020  Authorization Period Expiration: 6/16/2021  Plan of Care Expiration: 9/30/2020  Visit # / Visits authorized: 3/ 1     Time In: 1200  Time Out: 1245  Total Billable Time: 45 minutes    Precautions: Standard      Subjective     Pt reports: feeling well. States she has been using the elliptical and bike while at the gym. States she is not having any pain today.   She was compliant with home exercise program.  Response to previous treatment: Tolerated well  Functional change: able to reach better.   Prior Level of Function: independent with all ADL's  Current Level of Function: difficulty wiping with , fixing her hair, reaching overhead.      Pain:  Current 0/10, worst 10/10, best 5/10   Location: right neck, shoulder, and wrist  Description: Aching, Burning and Numb  Aggravating Factors: Morning, Lifting and reaching  Easing Factors: massage, pain medication, rest and wrist bandage     Pts goals: "relief" and to be able to reach behind her back       Objective     Meenu received therapeutic exercises to develop strength, endurance and ROM for 45 minutes including:     Table slides Shoulder flexion 2'   Table slides shoulder abduction 2'  Scapula retractions 2 x 10 (tactile cues for correct performance)  Supine AAROM shoulder ER with dowel 2 x 10  Shld iso 4 way flex/abd/er/ir 20x3" ea- NT  IR belt strech 3x20"  Supine wand serratus punch 2x10   Supine shld flexion w/wand 2x10   +Rows 20x OTB  + Shld ext 20x OTB  +ER/IR 20x OTB  +SL ER 1# 20x      Meenu received the following manual therapy techniques: Joint " mobilizations, Myofacial release and Soft tissue Mobilization were applied to the: 00 for 00 minutes, including:  NP        Meenu received hot pack for 00 minutes to NP.    Meenu received cold pack for 00 minutes to NP.      **Taken at Initial Evaluation**                7/6/2020    Observation:calm and pleasant mood  Posture:rounded shoulders  Palpation: TTP R upper trapezius, AC joint, wrist     Shoulder AROM (PROM)                   Left                  Right     Flexion:                                               160                  80(150)*                                    Abduction:                                           160                  70(130)*  Internal Rotation @ 90:                       (40)                  (20)*  External Rotation @ 90:                     (90)                  (70)*  ER at side                                           50                    40     Functional reaches:  Internal rotation                                   L5                    Unable/ greater trochanter with ERP  External rotation                                  C7                    C7     * indicates pain with movement, Measured in degrees     Cervical Spine Active ROM, measured in degrees with inclinometer, * Indicates pain with movement     Flexion: 40  Extension:30  Left Side Bend:10  Right Side Bend:10  Left rotation:40  Right rotation:20*     MMT:                                       Left                  Right     Shoulder:                                                           Flexion:                                   4/5                   3-/5                                Abduction:                               5/5                   3-/5  External Rotation:                   4+/5                 3+/5                   Internal Rotation:                    5/5                   4/5     Elbow:  Flexion:                                   5/5                   4/5  Extension:                                5/5 5/5     Wrist:  Flexion:                                   5/5 5/5  Extension:                               5/5 5/5          Home Exercises Provided and Patient Education Provided     Education provided:   - Rationale for there-ex. Posture and form.     Written Home Exercises Provided: yes.  Exercises were reviewed and Meenu was able to demonstrate them prior to the end of the session.  Meenu demonstrated good  understanding of the education provided.     See EMR under Patient Instructions for exercises provided prior visit, 7/9/2020 and 7/17/2020    Assessment     Pt tolerated exercise well. Pt reported increased activity at the gym without pain. Pt requested an increase in resistance as she said she is performing resistance exercise outside of PT. Shld T-band exercises were added this visit with good performance noted.  Pt received updated HEP /instruction regarding  added exercises. Will continue to work on scapular stabilization and promote improved glenohumeral joint mobility. Pt received updated HEP with education on proper execution.      Meenu is progressing well towards her goals.      Pt prognosis is Fair.      Pt will continue to benefit from skilled outpatient physical therapy to address the deficits listed in the problem list box on initial evaluation, provide pt/family education and to maximize pt's level of independence in the home and community environment.     Pt's spiritual, cultural and educational needs considered and pt agreeable to plan of care and goals.     Anticipated Barriers for therapy: chronicity of condition    Goals:  Short Term Goals (6 Weeks):   1. Patient will increase Shoulder flexion AROM to 130 degrees or greater to improve functional reach  2. Patient will increased cervical rotation R AROM by 20 degrees or greater to improve ADL's such as turning to look behind her  3. Patient to report decreased pain  in R shoulder with ADL's by 2 points on 10 point scale  Long Term Goals (12 Weeks):   1. Patient to have decreased subjective report of disability as noted by a score of <40% on the FOTO shoulder questionnaire   2. Patient to be independent with home exercise program for improved self management of condition  3. Patient will increase strength in R upper extremity to 4/5 to improve tolerance to all functional activities          Plan     Plan of care Certification: 7/6/2020 to 9/30/2020.    Continue with current POC for further strengthening, flexibility, improve ROM and ADL performance. Will progress functional there-ex as tolerated.        Outpatient Physical Therapy 2 times weekly for 8 weeks to include the following interventions: Manual Therapy, Moist Heat/ Ice, Neuromuscular Re-ed, Patient Education, Therapeutic Activites and Therapeutic Exercise.       Abebe Khan, PTA

## 2020-07-23 ENCOUNTER — CLINICAL SUPPORT (OUTPATIENT)
Dept: REHABILITATION | Facility: OTHER | Age: 70
End: 2020-07-23
Attending: INTERNAL MEDICINE
Payer: MEDICARE

## 2020-07-23 DIAGNOSIS — G89.29 CHRONIC RIGHT SHOULDER PAIN: Primary | ICD-10-CM

## 2020-07-23 DIAGNOSIS — M25.511 CHRONIC RIGHT SHOULDER PAIN: Primary | ICD-10-CM

## 2020-07-23 PROCEDURE — 97110 THERAPEUTIC EXERCISES: CPT | Mod: PN | Performed by: PHYSICAL THERAPIST

## 2020-07-23 NOTE — PROGRESS NOTES
"    Physical Therapy Daily Treatment Note     Name: Meenu Luciano [place]  Clinic Number: [id number]    Therapy Diagnosis:   Encounter Diagnosis   Name Primary?    Chronic right shoulder pain Yes     Physician: Lali Marvin MD    Visit Date: 7/23/2020    Physician Orders: PT Eval and Treat   Medical Diagnosis from Referral: M25.511,G89.29 (ICD-10-CM) - Chronic right shoulder pain   Evaluation Date: 7/6/2020  Authorization Period Expiration: 6/16/2021  Plan of Care Expiration: 9/30/2020  Visit # / Visits authorized: 4/ 6    Time In: 2:35pm  Time Out: 3:10pm  Total Billable Time: 35 minutes    Precautions: Standard      Subjective     Pt reports: feeling much better. She requests to drop down to once a week since she is doing the exercises at home.   She was compliant with home exercise program.  Response to previous treatment: Tolerated well  Functional change: able to reach better.   Prior Level of Function: independent with all ADL's  Current Level of Function: difficulty wiping with , fixing her hair, reaching overhead.      Pain:  Current 0/10, worst 10/10, best 5/10   Location: right neck, shoulder, and wrist  Description: Aching, Burning and Numb  Aggravating Factors: Morning, Lifting and reaching  Easing Factors: massage, pain medication, rest and wrist bandage     Pts goals: "relief" and to be able to reach behind her back       Objective     Meenu received therapeutic exercises to develop strength, endurance and ROM for 30 minutes including:     Table slides Shoulder flexion 2'   Table slides shoulder abduction 2'  Scapula retractions 2 x 10 (tactile cues for correct performance)  Supine AAROM shoulder ER with dowel 2 x 10 (seated today)  Shld iso 4 way flex/abd/er/ir 20x3" ea- NT  IR belt strech 3x20"  Supine wand serratus punch 2x10 (blue)  Supine shld flexion w/wand 2x10   Rows 20x OTB  Shld ext 20x OTB  ER/IR 20x OTB  SL ER 2# 20x    Meenu received the following manual therapy techniques: Joint " mobilizations, Myofacial release and Soft tissue Mobilization were applied to the: 00 for 00 minutes, including:  NP      Meenu received hot pack for 00 minutes to NP.    Meenu received cold pack for 00 minutes to NP.      **Taken at Initial Evaluation**                7/6/2020    Observation:calm and pleasant mood  Posture:rounded shoulders  Palpation: TTP R upper trapezius, AC joint, wrist     Shoulder AROM (PROM)                   Left                  Right     Flexion:                                               160                  80(150)*                                    Abduction:                                           160                  70(130)*  Internal Rotation @ 90:                       (40)                  (20)*  External Rotation @ 90:                     (90)                  (70)*  ER at side                                           50                    40     Functional reaches:  Internal rotation                                   L5                    Unable/ greater trochanter with ERP  External rotation                                  C7                    C7     * indicates pain with movement, Measured in degrees     Cervical Spine Active ROM, measured in degrees with inclinometer, * Indicates pain with movement     Flexion: 40  Extension:30  Left Side Bend:10  Right Side Bend:10  Left rotation:40  Right rotation:20*     MMT:                                       Left                  Right     Shoulder:                                                           Flexion:                                   4/5                   3-/5                                Abduction:                               5/5                   3-/5  External Rotation:                   4+/5                 3+/5                   Internal Rotation:                    5/5                   4/5     Elbow:  Flexion:                                   5/5                   4/5  Extension:                                5/5 5/5     Wrist:  Flexion:                                   5/5 5/5  Extension:                               5/5                   5/5          Home Exercises Provided and Patient Education Provided     Education provided:   - Rationale for there-ex. Posture and form.     Written Home Exercises Provided: yes.  Exercises were reviewed and Meenu was able to demonstrate them prior to the end of the session.  Meenu demonstrated good  understanding of the education provided.     See EMR under Patient Instructions for exercises provided prior visit, 7/9/2020 and 7/17/2020    Assessment     Pt demonstrating improvements in overhead reach and functional reach in IR. Requiring max verbal/ tactile cueing for proper performance of technique. Decreased frequency to 1 time per week per pt request and transition to discaharge independence HEP     Meenu is progressing well towards her goals.      Pt prognosis is Fair.      Pt will continue to benefit from skilled outpatient physical therapy to address the deficits listed in the problem list box on initial evaluation, provide pt/family education and to maximize pt's level of independence in the home and community environment.     Pt's spiritual, cultural and educational needs considered and pt agreeable to plan of care and goals.     Anticipated Barriers for therapy: chronicity of condition    Goals:  Short Term Goals (6 Weeks):   1. Patient will increase Shoulder flexion AROM to 130 degrees or greater to improve functional reach  2. Patient will increased cervical rotation R AROM by 20 degrees or greater to improve ADL's such as turning to look behind her  3. Patient to report decreased pain in R shoulder with ADL's by 2 points on 10 point scale  Long Term Goals (12 Weeks):   1. Patient to have decreased subjective report of disability as noted by a score of <40% on the FOTO shoulder questionnaire   2. Patient to be  independent with home exercise program for improved self management of condition  3. Patient will increase strength in R upper extremity to 4/5 to improve tolerance to all functional activities          Plan     Plan of care Certification: 7/6/2020 to 9/30/2020.    Continue with current POC for further strengthening, flexibility, improve ROM and ADL performance. Will progress functional there-ex as tolerated.        Outpatient Physical Therapy 2 times weekly for 8 weeks to include the following interventions: Manual Therapy, Moist Heat/ Ice, Neuromuscular Re-ed, Patient Education, Therapeutic Activites and Therapeutic Exercise.       Radha Gallardo, PT

## 2020-07-29 ENCOUNTER — CLINICAL SUPPORT (OUTPATIENT)
Dept: REHABILITATION | Facility: OTHER | Age: 70
End: 2020-07-29
Attending: INTERNAL MEDICINE
Payer: MEDICARE

## 2020-07-29 DIAGNOSIS — M25.511 CHRONIC RIGHT SHOULDER PAIN: Primary | ICD-10-CM

## 2020-07-29 DIAGNOSIS — G89.29 CHRONIC RIGHT SHOULDER PAIN: Primary | ICD-10-CM

## 2020-07-29 PROCEDURE — 97110 THERAPEUTIC EXERCISES: CPT | Mod: PN,CQ

## 2020-07-29 NOTE — PROGRESS NOTES
"    Physical Therapy Daily Treatment Note     Name: Meenu Luciano [place]  Clinic Number: [id number]    Therapy Diagnosis:   No diagnosis found.  Physician: Lali Marvin MD    Visit Date: 7/29/2020    Physician Orders: PT Eval and Treat   Medical Diagnosis from Referral: M25.511,G89.29 (ICD-10-CM) - Chronic right shoulder pain   Evaluation Date: 7/6/2020  Authorization Period Expiration: 6/16/2021  Plan of Care Expiration: 9/30/2020  Visit # / Visits authorized: 5/ 6    Time In: 1130  Time Out: 1215  Total Billable Time: 45 minutes    Precautions: Standard      Subjective     Pt reports: feeling much better. States she wants today to be her last PT session as she is feeling much better.   She was compliant with home exercise program.  Response to previous treatment: Tolerated well  Functional change: able to reach better.   Prior Level of Function: independent with all ADL's  Current Level of Function: difficulty wiping with , fixing her hair, reaching overhead.      Pain:  Current 0/10, worst 10/10, best 5/10   Location: right neck, shoulder, and wrist  Description: Aching, Burning and Numb  Aggravating Factors: Morning, Lifting and reaching  Easing Factors: massage, pain medication, rest and wrist bandage     Pts goals: "relief" and to be able to reach behind her back       Objective     Meenu received therapeutic exercises to develop strength, endurance and ROM for 30 minutes including:     Table slides Shoulder flexion 2'   Table slides shoulder abduction 2'  Scapula retractions 2 x 10 (tactile cues for correct performance)  Supine AAROM shoulder ER with dowel 2 x 10 (seated today)  Shld iso 4 way flex/abd/er/ir 20x3" ea- NT  IR belt strech 3x20"  Supine wand serratus punch 2x10 (blue)  Supine shld flexion w/wand 2x10   Rows 20x OTB  Shld ext 20x OTB   ER/IR 20x OTB   SL ER 2# 20x    Meenu received the following manual therapy techniques: Joint mobilizations, Myofacial release and Soft tissue Mobilization were " applied to the: 00 for 00 minutes, including:  NP      Meenu received hot pack for 00 minutes to NP.    Meenu received cold pack for 00 minutes to NP.      **Taken at Initial Evaluation**                7/6/2020    Observation:calm and pleasant mood  Posture:rounded shoulders  Palpation: TTP R upper trapezius, AC joint, wrist     Shoulder AROM (PROM)                   Left                  Right     Flexion:                                               160                  80(150)*                                    Abduction:                                           160                  70(130)*  Internal Rotation @ 90:                       (40)                  (20)*  External Rotation @ 90:                     (90)                  (70)*  ER at side                                           50                    40     Functional reaches:  Internal rotation                                   L5                    Unable/ greater trochanter with ERP  External rotation                                  C7                    C7     * indicates pain with movement, Measured in degrees     Cervical Spine Active ROM, measured in degrees with inclinometer, * Indicates pain with movement     Flexion: 40  Extension:30  Left Side Bend:10  Right Side Bend:10  Left rotation:40  Right rotation:20*     MMT:                                       Left                  Right     Shoulder:                                                           Flexion:                                   4/5                   3-/5                                Abduction:                               5/5                   3-/5  External Rotation:                   4+/5                 3+/5                   Internal Rotation:                    5/5                   4/5     Elbow:  Flexion:                                   5/5                   4/5  Extension:                               5/5                   5/5     Wrist:  Flexion:                                    5/5 5/5  Extension:                               5/5 5/5          Home Exercises Provided and Patient Education Provided     Education provided:   - Rationale for there-ex. Posture and form.     Written Home Exercises Provided: yes.  Exercises were reviewed and Meenu was able to demonstrate them prior to the end of the session.  Meenu demonstrated good  understanding of the education provided.     See EMR under Patient Instructions for exercises provided prior visit, 7/9/2020 and 7/17/2020    Assessment     Pt requested to be D/C from PT today. Supervising PT performed Re-assessment and updated objective. AROM appears to have improved and tolerance with resisted exercises has also improved.      Meenu is progressing well towards her goals.      Pt prognosis is Fair.      Pt will continue to benefit from skilled outpatient physical therapy to address the deficits listed in the problem list box on initial evaluation, provide pt/family education and to maximize pt's level of independence in the home and community environment.     Pt's spiritual, cultural and educational needs considered and pt agreeable to plan of care and goals.     Anticipated Barriers for therapy: chronicity of condition    Goals:  Short Term Goals (6 Weeks):   1. Patient will increase Shoulder flexion AROM to 130 degrees or greater to improve functional reach  2. Patient will increased cervical rotation R AROM by 20 degrees or greater to improve ADL's such as turning to look behind her  3. Patient to report decreased pain in R shoulder with ADL's by 2 points on 10 point scale  Long Term Goals (12 Weeks):   1. Patient to have decreased subjective report of disability as noted by a score of <40% on the FOTO shoulder questionnaire   2. Patient to be independent with home exercise program for improved self management of condition  3. Patient will increase strength in R upper extremity  to 4/5 to improve tolerance to all functional activities          Plan     Plan of care Certification: 7/6/2020 to 9/30/2020.    D/C to HEP per pt request.       Outpatient Physical Therapy 2 times weekly for 8 weeks to include the following interventions: Manual Therapy, Moist Heat/ Ice, Neuromuscular Re-ed, Patient Education, Therapeutic Activites and Therapeutic Exercise.       Abebe Khan, PTA

## 2020-07-29 NOTE — PROGRESS NOTES
Outpatient Therapy Discharge Summary     Name: Meenu Luciano [facility] Number: [id number]    Therapy Diagnosis:   Encounter Diagnosis   Name Primary?    Chronic right shoulder pain Yes     Physician: Lali Marvin MD       Physician Orders: PT Eval and Treat   Medical Diagnosis from Referral: M25.511,G89.29 (ICD-10-CM) - Chronic right shoulder pain   Evaluation Date: 7/6/2020    Date of Last visit: 7/29/2020  Total Visits Received: 5  Cancelled Visits: 1  No Show Visits: 0    Assessment      Shoulder AROM (PROM)                   Left                  Right     Flexion:                                               160                  150                                   Abduction:                                           160                  145       Functional reaches:  Internal rotation                                   L1                    L5  External rotation                                  C7                    C7     * indicates pain with movement, Measured in degrees      MMT:                                       Left                  Right     Shoulder:                                                           Flexion:                                   4/5                   4/5                                Abduction:                               5/5                   4/5  External Rotation:                   4+/5                 4/5                   Internal Rotation:                    5/5                   4+/5     Elbow:  Flexion:                                   5/5                   5/5  Extension:                               5/5                   5/5    FOTO:20%      Patient reporting resolution of R shoulder pain and able to complete all her ADL's without limitations.     Goals: met    Short Term Goals (6 Weeks):   1. Patient will increase Shoulder flexion AROM to 130 degrees or greater to improve functional reach- met  2. Patient will increased cervical rotation R AROM by 20  degrees or greater to improve ADL's such as turning to look behind her- met  3. Patient to report decreased pain in R shoulder with ADL's by 2 points on 10 point scale- met  Long Term Goals (12 Weeks):   1. Patient to have decreased subjective report of disability as noted by a score of <40% on the FOTO shoulder questionnaire - met  2. Patient to be independent with home exercise program for improved self management of condition- met  3. Patient will increase strength in R upper extremity to 4/5 to improve tolerance to all functional activities - met      Discharge reason: Patient is now asymptomatic    Plan   This patient is discharged from Physical Therapy    Radha Gallardo, PT

## 2020-08-03 ENCOUNTER — LAB VISIT (OUTPATIENT)
Dept: LAB | Facility: OTHER | Age: 70
End: 2020-08-03
Payer: MEDICARE

## 2020-08-03 DIAGNOSIS — G89.29 CHRONIC RIGHT SHOULDER PAIN: ICD-10-CM

## 2020-08-03 DIAGNOSIS — I12.9 HYPERTENSIVE KIDNEY DISEASE WITH STAGE 3 CHRONIC KIDNEY DISEASE: ICD-10-CM

## 2020-08-03 DIAGNOSIS — N18.30 TYPE 2 DIABETES MELLITUS WITH STAGE 3 CHRONIC KIDNEY DISEASE, WITHOUT LONG-TERM CURRENT USE OF INSULIN: ICD-10-CM

## 2020-08-03 DIAGNOSIS — E55.9 VITAMIN D DEFICIENCY: ICD-10-CM

## 2020-08-03 DIAGNOSIS — E11.22 TYPE 2 DIABETES MELLITUS WITH STAGE 3 CHRONIC KIDNEY DISEASE, WITHOUT LONG-TERM CURRENT USE OF INSULIN: ICD-10-CM

## 2020-08-03 DIAGNOSIS — N18.30 HYPERTENSIVE KIDNEY DISEASE WITH STAGE 3 CHRONIC KIDNEY DISEASE: ICD-10-CM

## 2020-08-03 DIAGNOSIS — N18.30 CKD (CHRONIC KIDNEY DISEASE) STAGE 3, GFR 30-59 ML/MIN: ICD-10-CM

## 2020-08-03 DIAGNOSIS — M25.511 CHRONIC RIGHT SHOULDER PAIN: ICD-10-CM

## 2020-08-03 LAB
ALBUMIN SERPL BCP-MCNC: 3.9 G/DL (ref 3.5–5.2)
ANION GAP SERPL CALC-SCNC: 14 MMOL/L (ref 8–16)
BASOPHILS # BLD AUTO: 0.01 K/UL (ref 0–0.2)
BASOPHILS NFR BLD: 0.2 % (ref 0–1.9)
BUN SERPL-MCNC: 23 MG/DL (ref 8–23)
CALCIUM SERPL-MCNC: 10.2 MG/DL (ref 8.7–10.5)
CHLORIDE SERPL-SCNC: 100 MMOL/L (ref 95–110)
CO2 SERPL-SCNC: 25 MMOL/L (ref 23–29)
CREAT SERPL-MCNC: 1.4 MG/DL (ref 0.5–1.4)
CRP SERPL-MCNC: 1 MG/L (ref 0–8.2)
DIFFERENTIAL METHOD: ABNORMAL
EOSINOPHIL # BLD AUTO: 0.2 K/UL (ref 0–0.5)
EOSINOPHIL NFR BLD: 3.7 % (ref 0–8)
ERYTHROCYTE [DISTWIDTH] IN BLOOD BY AUTOMATED COUNT: 11.4 % (ref 11.5–14.5)
ERYTHROCYTE [SEDIMENTATION RATE] IN BLOOD: 25 MM/HR (ref 0–20)
EST. GFR  (AFRICAN AMERICAN): 44 ML/MIN/1.73 M^2
EST. GFR  (NON AFRICAN AMERICAN): 38 ML/MIN/1.73 M^2
GLUCOSE SERPL-MCNC: 192 MG/DL (ref 70–110)
HCT VFR BLD AUTO: 38.5 % (ref 37–48.5)
HGB BLD-MCNC: 12.5 G/DL (ref 12–16)
IMM GRANULOCYTES # BLD AUTO: 0.01 K/UL (ref 0–0.04)
IMM GRANULOCYTES NFR BLD AUTO: 0.2 % (ref 0–0.5)
LYMPHOCYTES # BLD AUTO: 2.3 K/UL (ref 1–4.8)
LYMPHOCYTES NFR BLD: 37.3 % (ref 18–48)
MCH RBC QN AUTO: 28.7 PG (ref 27–31)
MCHC RBC AUTO-ENTMCNC: 32.5 G/DL (ref 32–36)
MCV RBC AUTO: 89 FL (ref 82–98)
MONOCYTES # BLD AUTO: 0.5 K/UL (ref 0.3–1)
MONOCYTES NFR BLD: 7.3 % (ref 4–15)
NEUTROPHILS # BLD AUTO: 3.2 K/UL (ref 1.8–7.7)
NEUTROPHILS NFR BLD: 51.3 % (ref 38–73)
NRBC BLD-RTO: 0 /100 WBC
PHOSPHATE SERPL-MCNC: 4.2 MG/DL (ref 2.7–4.5)
PLATELET # BLD AUTO: 199 K/UL (ref 150–350)
PMV BLD AUTO: 13 FL (ref 9.2–12.9)
POTASSIUM SERPL-SCNC: 4 MMOL/L (ref 3.5–5.1)
PTH-INTACT SERPL-MCNC: 57.1 PG/ML (ref 9–77)
RBC # BLD AUTO: 4.35 M/UL (ref 4–5.4)
RHEUMATOID FACT SERPL-ACNC: <10 IU/ML (ref 0–15)
SODIUM SERPL-SCNC: 139 MMOL/L (ref 136–145)
URATE SERPL-MCNC: 7 MG/DL (ref 2.4–5.7)
WBC # BLD AUTO: 6.19 K/UL (ref 3.9–12.7)

## 2020-08-03 PROCEDURE — 86140 C-REACTIVE PROTEIN: CPT

## 2020-08-03 PROCEDURE — 84165 PROTEIN E-PHORESIS SERUM: CPT

## 2020-08-03 PROCEDURE — 86334 IMMUNOFIX E-PHORESIS SERUM: CPT | Mod: 26,,, | Performed by: PATHOLOGY

## 2020-08-03 PROCEDURE — 85651 RBC SED RATE NONAUTOMATED: CPT

## 2020-08-03 PROCEDURE — 80069 RENAL FUNCTION PANEL: CPT

## 2020-08-03 PROCEDURE — 86235 NUCLEAR ANTIGEN ANTIBODY: CPT | Mod: 59

## 2020-08-03 PROCEDURE — 84165 PATHOLOGIST INTERPRETATION SPE: ICD-10-PCS | Mod: 26,,, | Performed by: PATHOLOGY

## 2020-08-03 PROCEDURE — 86334 IMMUNOFIX E-PHORESIS SERUM: CPT

## 2020-08-03 PROCEDURE — 36415 COLL VENOUS BLD VENIPUNCTURE: CPT

## 2020-08-03 PROCEDURE — 86334 PATHOLOGIST INTERPRETATION IFE: ICD-10-PCS | Mod: 26,,, | Performed by: PATHOLOGY

## 2020-08-03 PROCEDURE — 83520 IMMUNOASSAY QUANT NOS NONAB: CPT | Mod: 59

## 2020-08-03 PROCEDURE — 83970 ASSAY OF PARATHORMONE: CPT

## 2020-08-03 PROCEDURE — 86803 HEPATITIS C AB TEST: CPT

## 2020-08-03 PROCEDURE — 84550 ASSAY OF BLOOD/URIC ACID: CPT

## 2020-08-03 PROCEDURE — 83036 HEMOGLOBIN GLYCOSYLATED A1C: CPT

## 2020-08-03 PROCEDURE — 82306 VITAMIN D 25 HYDROXY: CPT

## 2020-08-03 PROCEDURE — 85025 COMPLETE CBC W/AUTO DIFF WBC: CPT

## 2020-08-03 PROCEDURE — 86225 DNA ANTIBODY NATIVE: CPT

## 2020-08-03 PROCEDURE — 84165 PROTEIN E-PHORESIS SERUM: CPT | Mod: 26,,, | Performed by: PATHOLOGY

## 2020-08-03 PROCEDURE — 86039 ANTINUCLEAR ANTIBODIES (ANA): CPT

## 2020-08-03 PROCEDURE — 87340 HEPATITIS B SURFACE AG IA: CPT

## 2020-08-03 PROCEDURE — 86038 ANTINUCLEAR ANTIBODIES: CPT

## 2020-08-03 PROCEDURE — 86431 RHEUMATOID FACTOR QUANT: CPT

## 2020-08-03 PROCEDURE — 86200 CCP ANTIBODY: CPT

## 2020-08-04 ENCOUNTER — LAB VISIT (OUTPATIENT)
Dept: LAB | Facility: OTHER | Age: 70
End: 2020-08-04
Attending: INTERNAL MEDICINE
Payer: MEDICARE

## 2020-08-04 DIAGNOSIS — N18.30 TYPE 2 DIABETES MELLITUS WITH STAGE 3 CHRONIC KIDNEY DISEASE, WITHOUT LONG-TERM CURRENT USE OF INSULIN: ICD-10-CM

## 2020-08-04 DIAGNOSIS — E11.22 TYPE 2 DIABETES MELLITUS WITH STAGE 3 CHRONIC KIDNEY DISEASE, WITHOUT LONG-TERM CURRENT USE OF INSULIN: ICD-10-CM

## 2020-08-04 LAB
25(OH)D3+25(OH)D2 SERPL-MCNC: 41 NG/ML (ref 30–96)
ALBUMIN SERPL ELPH-MCNC: 3.93 G/DL (ref 3.35–5.55)
ALBUMIN/CREAT UR: 16.6 UG/MG (ref 0–30)
ALPHA1 GLOB SERPL ELPH-MCNC: 0.29 G/DL (ref 0.17–0.41)
ALPHA2 GLOB SERPL ELPH-MCNC: 0.9 G/DL (ref 0.43–0.99)
B-GLOBULIN SERPL ELPH-MCNC: 0.95 G/DL (ref 0.5–1.1)
CCP AB SER IA-ACNC: <0.5 U/ML
CREAT UR-MCNC: 114.7 MG/DL (ref 15–325)
ESTIMATED AVG GLUCOSE: 163 MG/DL (ref 68–131)
GAMMA GLOB SERPL ELPH-MCNC: 1.23 G/DL (ref 0.67–1.58)
HBA1C MFR BLD HPLC: 7.3 % (ref 4–5.6)
HBV SURFACE AG SERPL QL IA: NEGATIVE
HCV AB SERPL QL IA: NEGATIVE
INTERPRETATION SERPL IFE-IMP: NORMAL
KAPPA LC SER QL IA: 2.86 MG/DL (ref 0.33–1.94)
KAPPA LC/LAMBDA SER IA: 1.42 (ref 0.26–1.65)
LAMBDA LC SER QL IA: 2.01 MG/DL (ref 0.57–2.63)
MICROALBUMIN UR DL<=1MG/L-MCNC: 19 UG/ML
PATHOLOGIST INTERPRETATION IFE: NORMAL
PATHOLOGIST INTERPRETATION SPE: NORMAL
PROT SERPL-MCNC: 7.3 G/DL (ref 6–8.4)

## 2020-08-04 PROCEDURE — 82043 UR ALBUMIN QUANTITATIVE: CPT

## 2020-08-05 ENCOUNTER — PATIENT OUTREACH (OUTPATIENT)
Dept: ADMINISTRATIVE | Facility: OTHER | Age: 70
End: 2020-08-05

## 2020-08-05 LAB
ANA PATTERN 1: NORMAL
ANA SER QL IF: POSITIVE
ANA TITR SER IF: NORMAL {TITER}
DSDNA AB SER-ACNC: NORMAL [IU]/ML

## 2020-08-06 ENCOUNTER — TELEPHONE (OUTPATIENT)
Dept: INTERNAL MEDICINE | Facility: CLINIC | Age: 70
End: 2020-08-06

## 2020-08-06 ENCOUNTER — OFFICE VISIT (OUTPATIENT)
Dept: NEPHROLOGY | Facility: CLINIC | Age: 70
End: 2020-08-06
Payer: MEDICARE

## 2020-08-06 DIAGNOSIS — E11.22 TYPE 2 DIABETES MELLITUS WITH STAGE 3 CHRONIC KIDNEY DISEASE, WITHOUT LONG-TERM CURRENT USE OF INSULIN: ICD-10-CM

## 2020-08-06 DIAGNOSIS — N18.30 TYPE 2 DIABETES MELLITUS WITH STAGE 3 CHRONIC KIDNEY DISEASE, WITHOUT LONG-TERM CURRENT USE OF INSULIN: ICD-10-CM

## 2020-08-06 DIAGNOSIS — N18.30 HYPERTENSIVE KIDNEY DISEASE WITH STAGE 3 CHRONIC KIDNEY DISEASE: Primary | ICD-10-CM

## 2020-08-06 DIAGNOSIS — N18.30 CKD (CHRONIC KIDNEY DISEASE) STAGE 3, GFR 30-59 ML/MIN: ICD-10-CM

## 2020-08-06 DIAGNOSIS — I12.9 HYPERTENSIVE KIDNEY DISEASE WITH STAGE 3 CHRONIC KIDNEY DISEASE: Primary | ICD-10-CM

## 2020-08-06 LAB
ANTI SM ANTIBODY: 0.2 RATIO (ref 0–0.99)
ANTI SM/RNP ANTIBODY: 0.19 RATIO (ref 0–0.99)
ANTI-SM INTERPRETATION: NEGATIVE
ANTI-SM/RNP INTERPRETATION: NEGATIVE
ANTI-SSA ANTIBODY: 0.2 RATIO (ref 0–0.99)
ANTI-SSA INTERPRETATION: NEGATIVE
ANTI-SSB ANTIBODY: 0.11 RATIO (ref 0–0.99)
ANTI-SSB INTERPRETATION: NEGATIVE
DSDNA AB SER-ACNC: NORMAL [IU]/ML

## 2020-08-06 PROCEDURE — 99214 PR OFFICE/OUTPT VISIT, EST, LEVL IV, 30-39 MIN: ICD-10-PCS | Mod: 95,,, | Performed by: INTERNAL MEDICINE

## 2020-08-06 PROCEDURE — 1159F PR MEDICATION LIST DOCUMENTED IN MEDICAL RECORD: ICD-10-PCS | Mod: 95,,, | Performed by: INTERNAL MEDICINE

## 2020-08-06 PROCEDURE — 1101F PT FALLS ASSESS-DOCD LE1/YR: CPT | Mod: CPTII,95,, | Performed by: INTERNAL MEDICINE

## 2020-08-06 PROCEDURE — 99499 RISK ADDL DX/OHS AUDIT: ICD-10-PCS | Mod: 95,,, | Performed by: INTERNAL MEDICINE

## 2020-08-06 PROCEDURE — 1101F PR PT FALLS ASSESS DOC 0-1 FALLS W/OUT INJ PAST YR: ICD-10-PCS | Mod: CPTII,95,, | Performed by: INTERNAL MEDICINE

## 2020-08-06 PROCEDURE — 1159F MED LIST DOCD IN RCRD: CPT | Mod: 95,,, | Performed by: INTERNAL MEDICINE

## 2020-08-06 PROCEDURE — 99214 OFFICE O/P EST MOD 30 MIN: CPT | Mod: 95,,, | Performed by: INTERNAL MEDICINE

## 2020-08-06 PROCEDURE — 3051F PR MOST RECENT HEMOGLOBIN A1C LEVEL 7.0 - < 8.0%: ICD-10-PCS | Mod: CPTII,95,, | Performed by: INTERNAL MEDICINE

## 2020-08-06 PROCEDURE — 99499 UNLISTED E&M SERVICE: CPT | Mod: 95,,, | Performed by: INTERNAL MEDICINE

## 2020-08-06 PROCEDURE — 3051F HG A1C>EQUAL 7.0%<8.0%: CPT | Mod: CPTII,95,, | Performed by: INTERNAL MEDICINE

## 2020-08-06 NOTE — PROGRESS NOTES
Requested updates within Care Everywhere.  Patient's chart was reviewed for overdue ISIDRO topics.  Immunizations reconciled.

## 2020-08-06 NOTE — TELEPHONE ENCOUNTER
[facility] to [facility]  Spoke to the Pt and explained that [facility] already contacted her insurance and do not need a pa  Also colonoscopy is scheduled 08/21  Pt verbally understood and had no further concerns

## 2020-08-06 NOTE — LETTER
August 6, 2020      Lali Marvin MD  [address]  [address]  [facility] [address]           [facility]  [address]  [place] [address]  Phone: [phone]  Fax: [phone]          Patient: Meenu Guy   MR Number: [id number]   YOB: 1950   Date of Visit: 8/6/2020       Dear Dr. Lali Marvin:    Thank you for referring Meenu Guy to me for evaluation. Attached you will find relevant portions of my assessment and plan of care.    If you have questions, please do not hesitate to call me. I look forward to following Meenu Guy along with you.    Sincerely,    Xavier Garcia MD    Enclosure  CC:  No Recipients    If you would like to receive this communication electronically, please contact [email or web address] or [phone] to request more information on [employer] Link access.    For providers and/or their staff who would like to refer a patient to [facility], please contact us through our one-stop-shop provider referral line, [facility], at [phone].    If you feel you have received this communication in error or would no longer like to receive these types of communications, please e-mail [email or web address]

## 2020-08-06 NOTE — TELEPHONE ENCOUNTER
[facility]    [address] [place] [address]    Phone:  [phone]    Fax:  [phone]                                       After Visit Summary   1/31/2018    Darnell Grant    MRN: [id number]           After Visit Summary Signature Page     I have received my discharge instructions, and my questions have been answered. I have discussed any challenges I see with this plan with the nurse or doctor.    ..........................................................................................................................................  Patient/Patient Representative Signature      ..........................................................................................................................................  Patient Representative Print Name and Relationship to Patient    ..................................................               ................................................  Date                                            Time    ..........................................................................................................................................  Reviewed by Signature/Title    ...................................................              ..............................................  Date                                                            Time           ----- Message from Queenie Pérez sent at 8/6/2020  2:23 PM CDT -----  Contact: LOUIE MASON [[id number]]  Name of Who is Calling: LOUIE MASON [[id number]]      What is the request in detail: Patient would like prior authorization for colonoscopy sent to insurance company. Please call       Can the clinic reply by [facility]: no      What Number to [facility] Back [facility] [phone]

## 2020-08-06 NOTE — PROGRESS NOTES
The patient location is: home  The chief complaint leading to consultation is: CKD  Visit type: audiovisual    Face to Face time with patient: 14 minutes  30 minutes of total time spent on the encounter, which includes face to face time and non-face to face time preparing to see the patient (eg, review of tests), Obtaining and/or reviewing separately obtained history, Documenting clinical information in the electronic or other health record, Independently interpreting results (not separately reported) and communicating results to the patient/family/caregiver, or Care coordination (not separately reported).     Each patient to whom he or she provides medical services by telemedicine is:  (1) informed of the relationship between the physician and patient and the respective role of any other health care provider with respect to management of the patient; and (2) notified that he or she may decline to receive medical services by telemedicine and may withdraw from such care at any time.      Subjective:   Patient ID: Meenu Guy is a 70 y.o. Black or  female who presents for follow up evaluation of CKD.    HPI   was evaluated today for new patient evaluation of CKD. She was seen on 5/5/20 in new patient evaluation.      She has been doing ok, trying to lose more weight. She was evaluated by her PCP in June 2020 for shoulder pain. She had joint stiffness and had suspicion for RA. PCP, Dr. Marvin obtained serologies which show positive MERVAT. Pt does not have any proteinuria/ microhematuria. Her CKD III has remained stable. She is referred to rheum already, appointment awaited.    Most recently she has been doing ok and does not have nausea/ vomiting/ flank pain/ cloudy urine/ leg swelling/ fever. She does not have orthostatic symptoms.     She has longstanding diabetes which has been suboptimally controlled, hypertension, prior long term use of NSAID for shoulder pain, diastolic dysfunction,  hyperlipidemia, PVD, S/P right femoral bypass in 2012, remote h/o tobacco smoking, GERD, chronic use of PPI  and other medical problems.     She has side effects from ACE-I which caused angioedema. She has microalbuminuria.     She does not check her BP at home. She was counseled to monitor BP at home and review it during medical visits and follow low salt diet.     She has had episode of KAREN in 11/2019, exact context unclear. But she had UTI, URI that time.     Available labs from [facility] since 1/2019 show CKD III with gradual worsening, episode of KAREN, episode of E coli UTI in 11/2019 treated with Augmentin.     Renal Function:  Lab Results   Component Value Date     (H) 08/03/2020    GLU 85 06/09/2020     08/03/2020     06/09/2020    K 4.0 08/03/2020    K 3.7 06/09/2020     08/03/2020     06/09/2020    CO2 25 08/03/2020    CO2 24 06/09/2020    BUN 23 08/03/2020    BUN 21 06/09/2020    CALCIUM 10.2 08/03/2020    CALCIUM 10.4 06/09/2020    CREATININE 1.4 08/03/2020    CREATININE 1.3 06/09/2020    ALBUMIN 3.9 08/03/2020    ALBUMIN 4.2 06/09/2020    PHOS 4.2 08/03/2020    PHOS 3.4 06/09/2020    ESTGFRAFRICA 44 (A) 08/03/2020    ESTGFRAFRICA 48 (A) 06/09/2020    EGFRNONAA 38 (A) 08/03/2020    EGFRNONAA 42 (A) 06/09/2020       Urinalysis:  Lab Results   Component Value Date    APPEARANCEUA Clear 08/04/2020    PHUR 6.0 08/04/2020    SPECGRAV 1.015 08/04/2020    PROTEINUA Negative 08/04/2020    GLUCUA Negative 08/04/2020    OCCULTUA Negative 08/04/2020    NITRITE Negative 08/04/2020    LEUKOCYTESUR Negative 08/04/2020       Protein/Creatinine Ratio:  Lab Results   Component Value Date    PROTEINURINE 9 08/04/2020    CREATRANDUR 114.5 08/04/2020    UTPCR 0.08 08/04/2020       CBC:  Lab Results   Component Value Date    WBC 6.19 08/03/2020    HGB 12.5 08/03/2020    HCT 38.5 08/03/2020       PTH:  Lab Results   Component Value Date    PTH 57.1 08/03/2020     Vit D 41  No paraprotein  Hep  C/B screen negative    US kidneys 3/16/20  9 and 9.3 cm kidneys  Size lower limits of normal  No hydronephrosis  No mass    Review of Systems   Constitutional: Negative for appetite change and chills.   HENT: Negative for congestion, facial swelling and sore throat.    Eyes: Negative for visual disturbance.   Respiratory: Negative for cough, chest tightness and shortness of breath.    Cardiovascular: Negative for chest pain, palpitations and leg swelling.   Gastrointestinal: Negative for abdominal pain, nausea and vomiting.   Genitourinary: Negative for difficulty urinating, dysuria, flank pain, frequency, hematuria and urgency.   Musculoskeletal: Positive for arthralgias. Negative for back pain and joint swelling.   Skin: Negative for pallor and rash.   Allergic/Immunologic: Negative for immunocompromised state.   Neurological: Negative for dizziness, numbness and headaches.   Psychiatric/Behavioral: The patient is not nervous/anxious.        Objective:   Physical Exam  Limited due to virtual visit  Appears well and not in any distress  Respiration unlabored  Voice clear  Speech normal  Alert and oriented x 3    Assessment:     1. Hypertensive kidney disease with stage 3 chronic kidney disease    2. CKD (chronic kidney disease) stage 3, GFR 30-59 ml/min    3. Type 2 diabetes mellitus with stage 3 chronic kidney disease, without long-term current use of insulin        Plan:       Problem List Items Addressed This Visit        Renal/    CKD (chronic kidney disease) stage 3, GFR 30-59 ml/min    Relevant Orders    CBC auto differential    PTH, intact    Renal function panel    Vitamin D    Uric acid    Urinalysis    Protein / creatinine ratio, urine    Hypertensive kidney disease with stage 3 chronic kidney disease - Primary    Relevant Orders    CBC auto differential    PTH, intact    Renal function panel    Vitamin D    Uric acid    Urinalysis    Protein / creatinine ratio, urine       Endocrine    Type 2 diabetes  mellitus with stage 3 chronic kidney disease, without long-term current use of insulin    Relevant Orders    CBC auto differential    PTH, intact    Renal function panel    Vitamin D    Uric acid    Urinalysis    Protein / creatinine ratio, urine        Ms. Guy has CKD III with microalbuminuria due to longstanding diabetes, hypertension, atherosclerotic vascular disease (risk factor profile, prior vascular interventions, remote h/o tobacco smoking) and previous chronic use of NSAID for shoulder pain. She has had episodes of KAREN, context of this unknown.    I had a detailed discussion with patient about her CKD diagnosis, CKD staging, interpretation of serial labs pertaining to her kidneys, potential risk of progression of CKD. Possible etiology of her CKD, need for improved glycemic and BP control, strict low salt diet, avoiding any NSAID, having periodic monitoring of renal labs to assess and treat any electrolyte disturbance, acid base disorder, to follow progress of CKD with creatinine and eGFR. I stressed to have BP monitoring at home and to bring readings for review with future visits with MD.    CKD III stable per recent labs. Pt encouraged to monitor BP at home. She is advised low salt diet. She has positive MERVAT, rheum evaluation is pending. Doubt kidney involvement is connected to this, no hematuria/ proteinuria noted. She has CKD due to longstanding NSAID, DM, HTN.     - periodically monitor renal panel for electrolytes, acid base status, eGFR  - CKD staging, diagnosis, onset of CKD, potential risk of CKD progression, potential risk of KAREN due to volume depletion/ BROOKLYNN/ ATN due to hemodynamic changes d/w patient  - stress to follow low salt diet, keep well hydrated, follow low K diet in case of any dyskalemia, nutritional changes d/w patient   - trend PTH levels, vit D, phos, corrected Ca and treat as necessary   - trend urine studies for proteinuria  - obtain screening labs for hep C/B, labs to rule out  paraproteinemia   - avoid NSAID/ bactrim/ IV contrast/ gadolinium/ aminoglycoside/ fleet enema/ PPI where possible  - prior side effects listed for ACE-I in the form of angioedema, cannot use for her diabetes with CKD and microalbuminuria   - trend H/H periodically if CKD stage progresses  - avoid metformin use if creatinine > 1.5-1.6    Plan, labs, recommendations were discussed with patient, her questions were answered to her satisfaction.     RTC 4-6 months

## 2020-08-07 DIAGNOSIS — N18.30 TYPE 2 DIABETES MELLITUS WITH STAGE 3 CHRONIC KIDNEY DISEASE, WITHOUT LONG-TERM CURRENT USE OF INSULIN: Primary | ICD-10-CM

## 2020-08-07 DIAGNOSIS — E11.22 TYPE 2 DIABETES MELLITUS WITH STAGE 3 CHRONIC KIDNEY DISEASE, WITHOUT LONG-TERM CURRENT USE OF INSULIN: Primary | ICD-10-CM

## 2020-08-31 ENCOUNTER — OFFICE VISIT (OUTPATIENT)
Dept: RHEUMATOLOGY | Facility: CLINIC | Age: 70
End: 2020-08-31
Payer: MEDICARE

## 2020-08-31 ENCOUNTER — HOSPITAL ENCOUNTER (OUTPATIENT)
Dept: RADIOLOGY | Facility: HOSPITAL | Age: 70
Discharge: HOME | End: 2020-08-31
Attending: INTERNAL MEDICINE
Payer: MEDICARE

## 2020-08-31 VITALS
DIASTOLIC BLOOD PRESSURE: 65 MMHG | WEIGHT: 175.69 LBS | HEIGHT: 70 IN | TEMPERATURE: 65 F | SYSTOLIC BLOOD PRESSURE: 149 MMHG | BODY MASS INDEX: 25.15 KG/M2

## 2020-08-31 DIAGNOSIS — M25.511 CHRONIC RIGHT SHOULDER PAIN: ICD-10-CM

## 2020-08-31 DIAGNOSIS — G89.29 CHRONIC RIGHT SHOULDER PAIN: ICD-10-CM

## 2020-08-31 PROCEDURE — 73030 X-RAY EXAM OF SHOULDER: CPT | Mod: 26,50,, | Performed by: RADIOLOGY

## 2020-08-31 PROCEDURE — 3078F PR MOST RECENT DIASTOLIC BLOOD PRESSURE < 80 MM HG: ICD-10-PCS | Mod: CPTII,S$GLB,, | Performed by: INTERNAL MEDICINE

## 2020-08-31 PROCEDURE — 73030 XR SHOULDER COMPLETE 2 OR MORE VIEWS BILATERAL: ICD-10-PCS | Mod: 26,50,, | Performed by: RADIOLOGY

## 2020-08-31 PROCEDURE — 1159F PR MEDICATION LIST DOCUMENTED IN MEDICAL RECORD: ICD-10-PCS | Mod: S$GLB,,, | Performed by: INTERNAL MEDICINE

## 2020-08-31 PROCEDURE — 1101F PR PT FALLS ASSESS DOC 0-1 FALLS W/OUT INJ PAST YR: ICD-10-PCS | Mod: CPTII,S$GLB,, | Performed by: INTERNAL MEDICINE

## 2020-08-31 PROCEDURE — 3008F BODY MASS INDEX DOCD: CPT | Mod: CPTII,S$GLB,, | Performed by: INTERNAL MEDICINE

## 2020-08-31 PROCEDURE — 99999 PR PBB SHADOW E&M-EST. PATIENT-LVL V: ICD-10-PCS | Mod: PBBFAC,,, | Performed by: INTERNAL MEDICINE

## 2020-08-31 PROCEDURE — 3008F PR BODY MASS INDEX (BMI) DOCUMENTED: ICD-10-PCS | Mod: CPTII,S$GLB,, | Performed by: INTERNAL MEDICINE

## 2020-08-31 PROCEDURE — 3077F SYST BP >= 140 MM HG: CPT | Mod: CPTII,S$GLB,, | Performed by: INTERNAL MEDICINE

## 2020-08-31 PROCEDURE — 3078F DIAST BP <80 MM HG: CPT | Mod: CPTII,S$GLB,, | Performed by: INTERNAL MEDICINE

## 2020-08-31 PROCEDURE — 3077F PR MOST RECENT SYSTOLIC BLOOD PRESSURE >= 140 MM HG: ICD-10-PCS | Mod: CPTII,S$GLB,, | Performed by: INTERNAL MEDICINE

## 2020-08-31 PROCEDURE — 1159F MED LIST DOCD IN RCRD: CPT | Mod: S$GLB,,, | Performed by: INTERNAL MEDICINE

## 2020-08-31 PROCEDURE — 99205 PR OFFICE/OUTPT VISIT, NEW, LEVL V, 60-74 MIN: ICD-10-PCS | Mod: S$GLB,,, | Performed by: INTERNAL MEDICINE

## 2020-08-31 PROCEDURE — 99205 OFFICE O/P NEW HI 60 MIN: CPT | Mod: S$GLB,,, | Performed by: INTERNAL MEDICINE

## 2020-08-31 PROCEDURE — 99999 PR PBB SHADOW E&M-EST. PATIENT-LVL V: CPT | Mod: PBBFAC,,, | Performed by: INTERNAL MEDICINE

## 2020-08-31 PROCEDURE — 73030 X-RAY EXAM OF SHOULDER: CPT | Mod: TC,50

## 2020-08-31 PROCEDURE — 1101F PT FALLS ASSESS-DOCD LE1/YR: CPT | Mod: CPTII,S$GLB,, | Performed by: INTERNAL MEDICINE

## 2020-08-31 ASSESSMENT — ROUTINE ASSESSMENT OF PATIENT INDEX DATA (RAPID3)
AM STIFFNESS SCORE: 1, YES
PATIENT GLOBAL ASSESSMENT SCORE: 0
PAIN SCORE: 8.5
MDHAQ FUNCTION SCORE: 0.1
PSYCHOLOGICAL DISTRESS SCORE: 1.1
FATIGUE SCORE: 0
TOTAL RAPID3 SCORE: 2.94

## 2020-08-31 NOTE — PROGRESS NOTES
Rapid3 Question Responses and Scores 8/25/2020   MDHAQ Score 0.1   Psychologic Score 1.1   Pain Score 8.5   When you awakened in the morning OVER THE LAST WEEK, did you feel stiff? Yes   Fatigue Score 0   Global Health Score 0   RAPID3 Score 2.94

## 2020-08-31 NOTE — LETTER
August 31, 2020      Lali Marvin MD  [address]  [address]  [facility] [address]           [id number]  [address]  [facility] [address]  Phone: [phone]  Fax: [phone]          Patient: Meenu Guy   MR Number: [id number]   YOB: 1950   Date of Visit: 8/31/2020       Dear Dr. Lali Marvin:    Thank you for referring Meenu Guy to me for evaluation. Attached you will find relevant portions of my assessment and plan of care.    If you have questions, please do not hesitate to call me. I look forward to following Meenu Guy along with you.    Sincerely,    Tanisha Salazar MD    Enclosure  CC:  No Recipients    If you would like to receive this communication electronically, please contact [email or web address] or [phone] to request more information on [employer] Link access.    For providers and/or their staff who would like to refer a patient to [facility], please contact us through our one-stop-shop provider referral line, [facility], at [phone].    If you feel you have received this communication in error or would no longer like to receive these types of communications, please e-mail [email or web address]

## 2020-08-31 NOTE — PROGRESS NOTES
Subjective:       Patient ID: Meenu Guy is a 70 y.o. female.    Chief Complaint: Follow-up    HPI     70 year old with PMH of type II DM, CKD, HTN, PVD, cervical radiculopathy here for evaluation.  She has pain in right shoulder for 5 years. Pain has worsened over the years.   Pain level is as high as 10/10, aching and sometimes radiates down the arm.  Putting pressure makes pain worse. She takes 2 tylenol at night with mild improvement.  Denies oral ulcers, fevers, raynauds, phototosensitivity, or pleurisy.  Raising her arm or bringing her arm to the back makes her pain worse.  Denies any pain in hips or anywhere else. Denies headaches or jaw claudication. She reports that she saw orthopedic outside of [facility] and had steroid injection.  Reports that the pain after the injection was worse than before.  Denies any stiffness.    Review of Systems   Constitutional: Negative for activity change, appetite change, chills, diaphoresis, fatigue, fever and unexpected weight change.   HENT: Negative for congestion, ear discharge, ear pain, facial swelling, mouth sores, sinus pressure, sneezing, sore throat, tinnitus and trouble swallowing.    Eyes: Negative for photophobia, pain, discharge, redness, itching and visual disturbance.   Respiratory: Negative for apnea, cough, chest tightness, shortness of breath, wheezing and stridor.    Cardiovascular: Negative for chest pain and leg swelling.   Gastrointestinal: Negative for abdominal distention, abdominal pain, anal bleeding, blood in stool, constipation, diarrhea and nausea.   Endocrine: Negative for cold intolerance and heat intolerance.   Genitourinary: Negative for difficulty urinating, dysuria and genital sores.   Musculoskeletal: Positive for arthralgias. Negative for back pain, gait problem, joint swelling, myalgias, neck pain and neck stiffness.   Skin: Negative for color change, pallor, rash and wound.   Neurological: Negative for dizziness, seizures,  "light-headedness, numbness and headaches.   Hematological: Negative for adenopathy. Does not bruise/bleed easily.   Psychiatric/Behavioral: Negative for sleep disturbance. The patient is not nervous/anxious.            Objective:   BP (!) 149/65   Temp (!) 65 °F (18.3 °C)   Ht 5' 10" (1.778 m)   Wt 79.7 kg (175 lb 11.3 oz)   BMI 25.21 kg/m²      Physical Exam   Constitutional: She is oriented to person, place, and time.   HENT:   Head: Normocephalic and atraumatic.   Right Ear: External ear normal.   Left Ear: External ear normal.   Nose: Nose normal.   Mouth/Throat: Oropharynx is clear and moist. No oropharyngeal exudate.   Eyes: Conjunctivae and EOM are normal. Pupils are equal, round, and reactive to light. Right eye exhibits no discharge. Left eye exhibits no discharge. No scleral icterus.   Neck: Neck supple. No JVD present. No thyromegaly present.   Cardiovascular: Normal rate, regular rhythm, normal heart sounds and intact distal pulses.  Exam reveals no gallop and no friction rub.    No murmur heard.  Pulmonary/Chest: Effort normal and breath sounds normal. No respiratory distress. She has no wheezes. She has no rales. She exhibits no tenderness.   Abdominal: Soft. Bowel sounds are normal. She exhibits no distension and no mass. There is no abdominal tenderness. There is no rebound and no guarding.   Lymphadenopathy:     She has no cervical adenopathy.   Neurological: She is alert and oriented to person, place, and time. No cranial nerve deficit. Gait normal. Coordination normal.   Skin: Skin is dry. No rash noted. No erythema. No pallor.     Psychiatric: Affect and judgment normal.   Musculoskeletal: Tenderness present. No deformity or edema.      Comments: Pain with abduction of right arm to 150 degrees  Pain with adduction of right shoulder            Labs: reviewed  No data to display     Assessment:     70 year old with PMH of type II DM, CKD, HTN, PVD, cervical radiculopathy here for evaluation of " right shoulder pain.  She reports having right shoulder pain for 5 years. On exam, I do not see evidence of a systemic inflammatory arthritis.  I told her that her exam is concerning for right rotator cuff tear so I will get xrays and set her up with ortho.    She has +MERVAT but negative subserologies. I discussed with patient +MERVAT does not explain her symptoms and she does not have SLE.    1. Chronic right shoulder pain            Plan:       Problem List Items Addressed This Visit        Orthopedic    Chronic right shoulder pain      xrays  Sports medicine consult  **  one hour of face to face time spent with patient

## 2020-09-06 ENCOUNTER — PATIENT OUTREACH (OUTPATIENT)
Dept: ADMINISTRATIVE | Facility: OTHER | Age: 70
End: 2020-09-06

## 2020-09-08 ENCOUNTER — PATIENT OUTREACH (OUTPATIENT)
Dept: ADMINISTRATIVE | Facility: HOSPITAL | Age: 70
End: 2020-09-08

## 2020-09-09 ENCOUNTER — OFFICE VISIT (OUTPATIENT)
Dept: ORTHOPEDICS | Facility: CLINIC | Age: 70
End: 2020-09-09
Payer: MEDICARE

## 2020-09-09 VITALS
WEIGHT: 162.69 LBS | DIASTOLIC BLOOD PRESSURE: 65 MMHG | HEART RATE: 62 BPM | BODY MASS INDEX: 23.34 KG/M2 | SYSTOLIC BLOOD PRESSURE: 147 MMHG

## 2020-09-09 DIAGNOSIS — M19.011 PRIMARY OSTEOARTHRITIS OF RIGHT SHOULDER: Primary | ICD-10-CM

## 2020-09-09 DIAGNOSIS — M25.511 CHRONIC RIGHT SHOULDER PAIN: ICD-10-CM

## 2020-09-09 DIAGNOSIS — G89.29 CHRONIC RIGHT SHOULDER PAIN: ICD-10-CM

## 2020-09-09 PROCEDURE — 1125F PR PAIN SEVERITY QUANTIFIED, PAIN PRESENT: ICD-10-PCS | Mod: S$GLB,,, | Performed by: PHYSICIAN ASSISTANT

## 2020-09-09 PROCEDURE — 1159F PR MEDICATION LIST DOCUMENTED IN MEDICAL RECORD: ICD-10-PCS | Mod: S$GLB,,, | Performed by: PHYSICIAN ASSISTANT

## 2020-09-09 PROCEDURE — 3078F DIAST BP <80 MM HG: CPT | Mod: CPTII,S$GLB,, | Performed by: PHYSICIAN ASSISTANT

## 2020-09-09 PROCEDURE — 99999 PR PBB SHADOW E&M-EST. PATIENT-LVL IV: CPT | Mod: PBBFAC,,, | Performed by: PHYSICIAN ASSISTANT

## 2020-09-09 PROCEDURE — 1101F PR PT FALLS ASSESS DOC 0-1 FALLS W/OUT INJ PAST YR: ICD-10-PCS | Mod: CPTII,S$GLB,, | Performed by: PHYSICIAN ASSISTANT

## 2020-09-09 PROCEDURE — 3077F SYST BP >= 140 MM HG: CPT | Mod: CPTII,S$GLB,, | Performed by: PHYSICIAN ASSISTANT

## 2020-09-09 PROCEDURE — 3008F BODY MASS INDEX DOCD: CPT | Mod: CPTII,S$GLB,, | Performed by: PHYSICIAN ASSISTANT

## 2020-09-09 PROCEDURE — 99203 PR OFFICE/OUTPT VISIT, NEW, LEVL III, 30-44 MIN: ICD-10-PCS | Mod: S$GLB,,, | Performed by: PHYSICIAN ASSISTANT

## 2020-09-09 PROCEDURE — 1101F PT FALLS ASSESS-DOCD LE1/YR: CPT | Mod: CPTII,S$GLB,, | Performed by: PHYSICIAN ASSISTANT

## 2020-09-09 PROCEDURE — 99999 PR PBB SHADOW E&M-EST. PATIENT-LVL IV: ICD-10-PCS | Mod: PBBFAC,,, | Performed by: PHYSICIAN ASSISTANT

## 2020-09-09 PROCEDURE — 3008F PR BODY MASS INDEX (BMI) DOCUMENTED: ICD-10-PCS | Mod: CPTII,S$GLB,, | Performed by: PHYSICIAN ASSISTANT

## 2020-09-09 PROCEDURE — 3078F PR MOST RECENT DIASTOLIC BLOOD PRESSURE < 80 MM HG: ICD-10-PCS | Mod: CPTII,S$GLB,, | Performed by: PHYSICIAN ASSISTANT

## 2020-09-09 PROCEDURE — 3077F PR MOST RECENT SYSTOLIC BLOOD PRESSURE >= 140 MM HG: ICD-10-PCS | Mod: CPTII,S$GLB,, | Performed by: PHYSICIAN ASSISTANT

## 2020-09-09 PROCEDURE — 1159F MED LIST DOCD IN RCRD: CPT | Mod: S$GLB,,, | Performed by: PHYSICIAN ASSISTANT

## 2020-09-09 PROCEDURE — 99203 OFFICE O/P NEW LOW 30 MIN: CPT | Mod: S$GLB,,, | Performed by: PHYSICIAN ASSISTANT

## 2020-09-09 PROCEDURE — 1125F AMNT PAIN NOTED PAIN PRSNT: CPT | Mod: S$GLB,,, | Performed by: PHYSICIAN ASSISTANT

## 2020-09-09 NOTE — PROGRESS NOTES
SUBJECTIVE:     Chief Complaint & History of Present Illness:  Meenu Guy is a  New  patient 70 y.o. female who is seen here today with a complaint of    Chief Complaint   Patient presents with    Right Shoulder - Pain    .  She is here today for evaluation treatment of persistent intermittent pain and paresthesias in the right shoulder with radiation down the arm to the fingertips.  She reports she has had off and on problems with her shoulders going back multiple years has undergone cortisone injection of the shoulder little over 1 year ago but only had very short-term relief.  She states she is able to carry out most of her normal daily activities and range of motion with her shoulder but does have intermittent problems with soreness and pain that flare up usually once or twice a week at our relief shortly with over-the-counter Tylenol and massage.  On a scale of 1-10, with 10 being worst pain imaginable, he rates this pain as 1 on good days and 5 on bad days.  she describes the pain as sore and achy.    Review of patient's allergies indicates:   Allergen Reactions    Statins-hmg-coa reductase inhibitors Edema     Tolerating Lipitor    Ace inhibitors Swelling         Current Outpatient Medications   Medication Sig Dispense Refill    amLODIPine (NORVASC) 10 MG tablet Take 1 tablet (10 mg total) by mouth once daily. 90 tablet 3    aspirin (ECOTRIN) 81 MG EC tablet Take 1 tablet (81 mg total) by mouth once daily. for 365 doses 90 tablet 3    atorvastatin (LIPITOR) 20 MG tablet Take 1 tablet (20 mg total) by mouth once daily. 90 tablet 3    blood sugar diagnostic Strp 1 each by Misc.(Non-Drug; Combo Route) route 3 (three) times daily. 200 each 1    carvediloL (COREG) 6.25 MG tablet Take 1 tablet (6.25 mg total) by mouth 2 (two) times daily. 180 tablet 3    fluticasone propionate (FLONASE) 50 mcg/actuation nasal spray 1 spray (50 mcg total) by Each Nostril route 2 (two) times daily as needed. 15 g  0    glimepiride (AMARYL) 2 MG tablet Take 1 tablet (2 mg total) by mouth daily with breakfast. 90 tablet 3    lancets Misc 1 each by Misc.(Non-Drug; Combo Route) route 3 (three) times daily. 200 each 1    metFORMIN (GLUCOPHAGE-XR) 500 MG XR 24hr tablet Take 1 tablet (500 mg total) by mouth daily with breakfast. 30 tablet 11    omeprazole (PRILOSEC) 20 MG capsule Take 1 capsule (20 mg total) by mouth daily as needed. 30 capsule 11    blood-glucose meter kit Use as instructed 1 each 0     No current facility-administered medications for this visit.        Past Medical History:   Diagnosis Date    Diabetes mellitus, type 2     Hypertension        Past Surgical History:   Procedure Laterality Date    BACK SURGERY      CHOLECYSTECTOMY      DISC REMOVAL      EPIDURAL STEROID INJECTION N/A 10/30/2019    Procedure: INJECTION, STEROID, EPIDURAL, C7-T1 IL;  Surgeon: Rahul Bowie MD;  Location: [facility];  Service: Pain Management;  Laterality: N/A;    HYSTERECTOMY         Vital Signs (Most Recent)  Vitals:    09/09/20 0903   BP: (!) 147/65   Pulse: 62       Review of Systems:  ROS:  Constitutional: no fever or chills  Eyes: no visual changes  ENT: no nasal congestion or sore throat  Respiratory: no cough or shortness of breath  Cardiovascular: no chest pain or palpitations, Positive CAD peripheral vascular disease  Gastrointestinal: no nausea or vomiting, tolerating diet, Positive for GERD  Genitourinary: no hematuria or dysuria, Positive CKD stage 3  Integument/Breast: no rash or pruritis  Hematologic/Lymphatic: no easy bruising or lymphadenopathy, Positive normocytic anemia  Musculoskeletal: no arthralgias or myalgias  Neurological: no seizures or tremors, Positive cervical radiculopathy  Behavioral/Psych: no auditory or visual hallucinations  Endocrine: no heat or cold intolerance, Positive diabetes type 2 vitamin-D deficiency      OBJECTIVE:     PHYSICAL EXAM:    Weight: 73.8 kg (162 lb 11.2 oz),  General Appearance: Well nourished, well developed, in no acute distress.  Neurological: Mood & affect are normal.  Shoulder exam: right  Tenderness: biceps tendon, lateral acromial  ROM: forward flexion 180/180, extension 45/45, full abduction 180/180, abduction-glenohumeral 90/90, external rotation 50/50, pain at the extremes of mobility  Shoulder Strength: biceps 5/5, triceps 5/5, abduction 5/5, adduction 5/5, external rotation 5/5 with shoulder at side, flexion 5/5, and extension 5/5  positive for tenderness about the glenohumeral joint, positive for tenderness over the acromioclavicular joint and negative for impingement sign  Stability tests: anterior apprehension test negative and posterior apprehension test negative  Special Tests:Cross-chest abduction: negative                     RADIOGRAPHS:  X-rays taken today films reviewed by me demonstrate mild arthritic changes throughout the shoulder with mild glenohumeral joint space narrowing and early osteophytic spurring particularly at the edge of the acromion no evidence of fracture dislocation or other bony abnormalities    ASSESSMENT/PLAN:       ICD-10-CM ICD-9-CM   1. Chronic right shoulder pain  M25.511 719.41    G89.29 338.29       Plan: We discussed with the patient at length all the different treatment options available for her rightshoulder including anti-inflammatories, acetaminophen, rest, ice, Physical therapy to include strengthening exercise, occasional cortisone injections for temporary relief, arthroscopic surgical repair, and finally shoulder arthroplasty.   Patient is not wish to pursue any formal treatment for her shoulder at this point.  Will provide a prescription for compound pain cream for occasional breakthrough soreness and pain and follow-up p.r.n.

## 2020-09-09 NOTE — LETTER
September 9, 2020      Tanisha Salazar MD  [address]  [address]  [facility] [address]           [facility] 5th Fl  [address]  [facility] [address]  Phone: [phone]          Patient: Meenu Guy   MR Number: [id number]   YOB: 1950   Date of Visit: 9/9/2020       Dear Dr. Tanisha Salazar:    Thank you for referring Meenu Guy to me for evaluation. Attached you will find relevant portions of my assessment and plan of care.    If you have questions, please do not hesitate to call me. I look forward to following Meenu Guy along with you.    Sincerely,    Apolinar Cline PA-C    Enclosure  CC:  No Recipients    If you would like to receive this communication electronically, please contact [email or web address] or [phone] to request more information on [employer] Link access.    For providers and/or their staff who would like to refer a patient to [facility], please contact us through our one-stop-shop provider referral line, [facility], at [phone].    If you feel you have received this communication in error or would no longer like to receive these types of communications, please e-mail [email or web address]

## 2020-10-05 ENCOUNTER — PATIENT MESSAGE (OUTPATIENT)
Dept: INTERNAL MEDICINE | Facility: CLINIC | Age: 70
End: 2020-10-05

## 2020-10-15 ENCOUNTER — PATIENT OUTREACH (OUTPATIENT)
Dept: FAMILY MEDICINE | Facility: CLINIC | Age: 70
End: 2020-10-15

## 2020-11-16 ENCOUNTER — TELEPHONE (OUTPATIENT)
Dept: INTERNAL MEDICINE | Facility: CLINIC | Age: 70
End: 2020-11-16

## 2020-11-16 NOTE — TELEPHONE ENCOUNTER
lov 06/16/2020  CC: gas pain for 1 yr on and off that occurs at left and right side of her abdomen after eating    Confirmed she did a colonoscopy with [facility] already  States she takes gas x and it helps  Would like advise regarding gas pain  Confirmed she has a hx of gallbladder removal    R/s 6 mo f/u appt to 12/23 since PCP is not available on 12/17  Pt confirmed she will see it on her portal

## 2020-11-16 NOTE — TELEPHONE ENCOUNTER
----- Message from Lisa Chilel sent at 11/16/2020  9:12 AM CST -----  Who Called: LOUIE MASON is the reqeust in detail:Patient would like to schedule appt for stomach and gas pain. She states that when she eat she has belching episode. She states she takes Gas X to help ease pain that also assists with bowel movement. Please adviseCan the clinic reply by [facility]?: YesBest Call Back Number: [phone]

## 2020-11-18 NOTE — TELEPHONE ENCOUNTER
Informed pt that she should f/u with GI about the gas or with Dr. Marvin. Pt states that the gas has gone away and that she is feeling fine right now. Pt does states that she has some back pain because she fell recently on her back. Pt denies any other issues at this time. Offered pt an appt with Dr. Marvin to discuss the fall. Pt declined sooner appt and states that she wants to wait to see Dr Marvin on 12/23/20. Pt has no further questions or concerns.

## 2020-12-08 ENCOUNTER — TELEPHONE (OUTPATIENT)
Dept: ADMINISTRATIVE | Facility: HOSPITAL | Age: 70
End: 2020-12-08

## 2020-12-08 NOTE — TELEPHONE ENCOUNTER
The patient had an elevated on his/her last office visit.  The patient was contacted about scheduling a follow up  blood pressure nurse/PCP visit. She does have an upcoming appointment with Dr Marvin on 12/23/20.    Jeanne VALERIO  Clinical Care Coordinator  Internal Medicine  [facility] [place]  [phone]

## 2020-12-16 ENCOUNTER — LAB VISIT (OUTPATIENT)
Dept: LAB | Facility: OTHER | Age: 70
End: 2020-12-16
Attending: INTERNAL MEDICINE
Payer: MEDICARE

## 2020-12-16 DIAGNOSIS — E11.22 TYPE 2 DIABETES MELLITUS WITH STAGE 3 CHRONIC KIDNEY DISEASE, WITHOUT LONG-TERM CURRENT USE OF INSULIN: ICD-10-CM

## 2020-12-16 DIAGNOSIS — N18.30 TYPE 2 DIABETES MELLITUS WITH STAGE 3 CHRONIC KIDNEY DISEASE, WITHOUT LONG-TERM CURRENT USE OF INSULIN: ICD-10-CM

## 2020-12-16 LAB
ESTIMATED AVG GLUCOSE: 180 MG/DL (ref 68–131)
HBA1C MFR BLD HPLC: 7.9 % (ref 4–5.6)

## 2020-12-16 PROCEDURE — 36415 COLL VENOUS BLD VENIPUNCTURE: CPT

## 2020-12-16 PROCEDURE — 83036 HEMOGLOBIN GLYCOSYLATED A1C: CPT

## 2020-12-23 ENCOUNTER — OFFICE VISIT (OUTPATIENT)
Dept: INTERNAL MEDICINE | Facility: CLINIC | Age: 70
End: 2020-12-23
Payer: MEDICARE

## 2020-12-23 VITALS
WEIGHT: 174.19 LBS | SYSTOLIC BLOOD PRESSURE: 146 MMHG | RESPIRATION RATE: 18 BRPM | HEIGHT: 70 IN | HEART RATE: 60 BPM | DIASTOLIC BLOOD PRESSURE: 59 MMHG | BODY MASS INDEX: 24.94 KG/M2

## 2020-12-23 DIAGNOSIS — K21.9 GASTRO-ESOPHAGEAL REFLUX DISEASE WITHOUT ESOPHAGITIS: Primary | ICD-10-CM

## 2020-12-23 DIAGNOSIS — E11.22 TYPE 2 DIABETES MELLITUS WITH STAGE 3 CHRONIC KIDNEY DISEASE, WITHOUT LONG-TERM CURRENT USE OF INSULIN, UNSPECIFIED WHETHER STAGE 3A OR 3B CKD: ICD-10-CM

## 2020-12-23 DIAGNOSIS — N18.30 TYPE 2 DIABETES MELLITUS WITH STAGE 3 CHRONIC KIDNEY DISEASE, WITHOUT LONG-TERM CURRENT USE OF INSULIN, UNSPECIFIED WHETHER STAGE 3A OR 3B CKD: ICD-10-CM

## 2020-12-23 DIAGNOSIS — I10 ESSENTIAL HYPERTENSION: ICD-10-CM

## 2020-12-23 DIAGNOSIS — E78.2 MIXED HYPERLIPIDEMIA: ICD-10-CM

## 2020-12-23 PROBLEM — R14.2 BELCHING: Status: ACTIVE | Noted: 2020-12-23

## 2020-12-23 PROCEDURE — 1101F PT FALLS ASSESS-DOCD LE1/YR: CPT | Mod: CPTII,S$GLB,, | Performed by: INTERNAL MEDICINE

## 2020-12-23 PROCEDURE — 3077F PR MOST RECENT SYSTOLIC BLOOD PRESSURE >= 140 MM HG: ICD-10-PCS | Mod: CPTII,S$GLB,, | Performed by: INTERNAL MEDICINE

## 2020-12-23 PROCEDURE — 3077F SYST BP >= 140 MM HG: CPT | Mod: CPTII,S$GLB,, | Performed by: INTERNAL MEDICINE

## 2020-12-23 PROCEDURE — 99499 UNLISTED E&M SERVICE: CPT | Mod: S$GLB,,, | Performed by: INTERNAL MEDICINE

## 2020-12-23 PROCEDURE — 99499 RISK ADDL DX/OHS AUDIT: ICD-10-PCS | Mod: S$GLB,,, | Performed by: INTERNAL MEDICINE

## 2020-12-23 PROCEDURE — 3078F DIAST BP <80 MM HG: CPT | Mod: CPTII,S$GLB,, | Performed by: INTERNAL MEDICINE

## 2020-12-23 PROCEDURE — 3008F PR BODY MASS INDEX (BMI) DOCUMENTED: ICD-10-PCS | Mod: CPTII,S$GLB,, | Performed by: INTERNAL MEDICINE

## 2020-12-23 PROCEDURE — 3008F BODY MASS INDEX DOCD: CPT | Mod: CPTII,S$GLB,, | Performed by: INTERNAL MEDICINE

## 2020-12-23 PROCEDURE — 99215 OFFICE O/P EST HI 40 MIN: CPT | Mod: S$GLB,,, | Performed by: INTERNAL MEDICINE

## 2020-12-23 PROCEDURE — 3051F PR MOST RECENT HEMOGLOBIN A1C LEVEL 7.0 - < 8.0%: ICD-10-PCS | Mod: CPTII,S$GLB,, | Performed by: INTERNAL MEDICINE

## 2020-12-23 PROCEDURE — 3288F FALL RISK ASSESSMENT DOCD: CPT | Mod: CPTII,S$GLB,, | Performed by: INTERNAL MEDICINE

## 2020-12-23 PROCEDURE — 3051F HG A1C>EQUAL 7.0%<8.0%: CPT | Mod: CPTII,S$GLB,, | Performed by: INTERNAL MEDICINE

## 2020-12-23 PROCEDURE — 99215 PR OFFICE/OUTPT VISIT, EST, LEVL V, 40-54 MIN: ICD-10-PCS | Mod: S$GLB,,, | Performed by: INTERNAL MEDICINE

## 2020-12-23 PROCEDURE — 1125F AMNT PAIN NOTED PAIN PRSNT: CPT | Mod: S$GLB,,, | Performed by: INTERNAL MEDICINE

## 2020-12-23 PROCEDURE — 1159F MED LIST DOCD IN RCRD: CPT | Mod: S$GLB,,, | Performed by: INTERNAL MEDICINE

## 2020-12-23 PROCEDURE — 99999 PR PBB SHADOW E&M-EST. PATIENT-LVL IV: CPT | Mod: PBBFAC,,, | Performed by: INTERNAL MEDICINE

## 2020-12-23 PROCEDURE — 3078F PR MOST RECENT DIASTOLIC BLOOD PRESSURE < 80 MM HG: ICD-10-PCS | Mod: CPTII,S$GLB,, | Performed by: INTERNAL MEDICINE

## 2020-12-23 PROCEDURE — 3288F PR FALLS RISK ASSESSMENT DOCUMENTED: ICD-10-PCS | Mod: CPTII,S$GLB,, | Performed by: INTERNAL MEDICINE

## 2020-12-23 PROCEDURE — 1101F PR PT FALLS ASSESS DOC 0-1 FALLS W/OUT INJ PAST YR: ICD-10-PCS | Mod: CPTII,S$GLB,, | Performed by: INTERNAL MEDICINE

## 2020-12-23 PROCEDURE — 99999 PR PBB SHADOW E&M-EST. PATIENT-LVL IV: ICD-10-PCS | Mod: PBBFAC,,, | Performed by: INTERNAL MEDICINE

## 2020-12-23 PROCEDURE — 1159F PR MEDICATION LIST DOCUMENTED IN MEDICAL RECORD: ICD-10-PCS | Mod: S$GLB,,, | Performed by: INTERNAL MEDICINE

## 2020-12-23 PROCEDURE — 1125F PR PAIN SEVERITY QUANTIFIED, PAIN PRESENT: ICD-10-PCS | Mod: S$GLB,,, | Performed by: INTERNAL MEDICINE

## 2020-12-23 RX ORDER — METFORMIN HYDROCHLORIDE 500 MG/1
1000 TABLET, EXTENDED RELEASE ORAL
Qty: 180 TABLET | Refills: 3 | Status: SHIPPED | OUTPATIENT
Start: 2020-12-23 | End: 2021-12-23 | Stop reason: SDUPTHER

## 2020-12-23 NOTE — PATIENT INSTRUCTIONS
Tips to Control Acid Reflux    To control acid reflux, youll need to make some basic diet and lifestyle changes. The simple steps outlined below may be all youll need to ease discomfort.  Watch what you eat  · Avoid fatty foods and spicy foods.  · Eat fewer acidic foods, such as citrus and tomato-based foods. These can increase symptoms.  · Limit drinking alcohol, caffeine, and fizzy beverages. All increase acid reflux.  · Try limiting chocolate, peppermint, and spearmint. These can worsen acid reflux in some people.  Watch when you eat  · Avoid lying down for 3 hours after eating.  · Do not snack before going to bed.  Raise your head  Raising your head and upper body by 4 to 6 inches helps limit reflux when youre lying down. Put blocks under the head of your bed frame to raise it.  Other changes  · Lose weight, if you need to  · Dont exercise near bedtime  · Avoid tight-fitting clothes  · Limit aspirin and ibuprofen  · Stop smoking   Date Last Reviewed: 7/1/2016  © 4794-6264 [employer]. [address] [place] [address], [place] [address]. All rights reserved. This information is not intended as a substitute for professional medical care. Always follow your healthcare professional's instructions.

## 2020-12-23 NOTE — PROGRESS NOTES
Subjective:       Patient ID: Meenu Guy is a 70 y.o. female who  has a past medical history of Diabetes mellitus, type 2 and Hypertension.    Chief Complaint: Hypertension, Diabetes, and c/o belching     History was obtained from the patient and supplemented through chart review and her  who accompanied her.  -saw Nephrology for CKD 3.    -discussed care with Nephro.  -Reviewed outside records from [facility] RE cscope    HPI    Belching, GERD:  Int belching, postprandial gas pain at b/l abd x 1 year. Worse at night and awakes with it.  Worse with hot sauces, chocolate candy, fatty foods.  Chest soreness, but no burning.  No sour taste in her throat. Last meal after 6 AM.  GasX helps. Takes Mylanta. Stopped NSAIDs. Not much relief with PPI. Had cscope with [facility]. She was afraid to get EGD.    HTN:    No h/o CAD, arrhythmia. TTE  with normal EF, grade 2 diastolic dysfunction.    Pt's BP is elevated. Just took Coreg 6.25 b.i.d., Norvasc 10. abd discomfort as above. +DM2 with micro albuminuria, but H/o ACE angioedema.  Tolerating meds well. Pt denies CP, SOB, lightheadedness, dizziness, leg edema.  Home BP: normally controlled. Not recently.    Exercise: 1 hour/day. Treadmill in their building. Stopped d/t abd pain.    Diet: started drinking coke again, sweets.  Eats a lot of peppermints.    DM2:  Currently pt is taking glimepiride 2. Switched metformin 500 b.i.d. to 500 XR due to loose stools. Doing much better with loose stool. GFR 48.      Without elevated microalbumin creatinine ratio.  Not on an ACE/ARB d/t angioedema.   Retinal exams:    Foot exams:    Lab Results   Component Value Date/Time    HGBA1C 7.9 (H) 12/16/2020 07:02 AM    HGBA1C 7.3 (H) 08/03/2020 09:48 AM    HGBA1C 7.6 (H) 03/16/2020 10:05 AM     HLD:  Is currently taking Lipitor 20 and ASA 81 daily. H/o edema from statin years ago in [place], but has been tolerating Lipitor.  Lab Results   Component Value Date     LDLCALC 59.6 (L) 03/16/2020     The ASCVD Risk score (Verona PRATIK Jr., et al., 2013) failed to calculate for the following reasons:    The valid total cholesterol range is 130 to 320 mg/dL              Not addressed today.  PVD:  Status post right femoral bypass around 2012 in [place].  No claudication.  Quit tobacco 15 years ago.  Continue ASA, statin.       CKD 3:  Stopped NSAIDs.  H/o ACE/ARB angioedema.  Renal ultrasound:  No hydro or nephrolithiasis  Established care with Nephrology.  Stable.  Likely d/t HTN, DM2, history of NSAID use. Following with Nephro.    Vitamin D deficiency: Is taking OTC Vit D, MVI.   Normalized. Cont OTC Vit D, MVI.   Lab Results   Component Value Date    BPCCWKTB95TW 41 08/03/2020    ORZUESUT05PT 42 03/16/2020    NTRTXYSE10DA 17 (L) 04/18/2019     Normocytic anemia:  Borderline low. S/p hyst.  Borderline low.  +CKD. Iron panel wnl.  Lab Results   Component Value Date    IRON 93 03/16/2020    TIBC 420 03/16/2020    FERRITIN 43 03/16/2020     Lab Results   Component Value Date    SAKQSPKN22 331 03/16/2020     No results found for: FOLATE    Colon polyp:  C scope 2017 with 2 polyps.  Repeat in 3 years.  Repeat C scope with [facility]  with polyp.  Repeat in 5 years.    Cervical DDD, FMA:  S/p dissectomy 20 years ago. Not much improvement on lyrica or gabapentin.  Follows with Pain Med for epidural.    R shoulder pain, neck pain:  For years.  Mainly occurs at night.  Some hand tingling.  Stiffness.  Improves with hot water shower.  Decreased shoulder abduction.  Takes Tylenol.  X-ray with bilateral OA.  Saw Orthopedic and declined treatment.  Recommend compound cream. Improved.    +MERVAT:  Chronic shoulder pain as above.  Positive MERVAT, but negative sub serologies.  Not consistent with SLE.  Saw rheumatology who recommended follow-up with Sports Medicine/Ortho.    Review of Systems   Constitutional: Negative for fever and unexpected weight change.   HENT: Negative for rhinorrhea and  "sneezing.    Eyes: Negative for redness and itching.   Respiratory: Negative for shortness of breath and wheezing.    Cardiovascular: Negative for chest pain and leg swelling.   Gastrointestinal: Positive for abdominal pain. Negative for diarrhea.   Genitourinary: Negative for dysuria and menstrual problem.   Musculoskeletal: Negative for arthralgias and gait problem.   Skin: Negative for color change and rash.   Neurological: Negative for dizziness and light-headedness.   Hematological: Negative for adenopathy.   Psychiatric/Behavioral: Negative for confusion. The patient is not nervous/anxious.        I personally reviewed Past Medical History, Past Surgical History, Social History, and Family History.    Objective:      Vitals:    12/23/20 0915   BP: (!) 146/59   BP Location: Left arm   Patient Position: Sitting   BP Method: Large (Automatic)   Pulse: 60   Resp: 18   Weight: 79 kg (174 lb 2.6 oz)   Height: 5' 10" (1.778 m)      Physical Exam  Constitutional:       General: She is not in acute distress.     Appearance: She is well-developed. She is not diaphoretic.   HENT:      Head: Normocephalic and atraumatic.      Nose: Nose normal.      Mouth/Throat:      Pharynx: No oropharyngeal exudate.   Eyes:      General: No scleral icterus.        Right eye: No discharge.         Left eye: No discharge.   Neck:      Musculoskeletal: Neck supple.      Thyroid: No thyromegaly.      Trachea: No tracheal deviation.   Cardiovascular:      Rate and Rhythm: Normal rate and regular rhythm.      Heart sounds: Normal heart sounds. No murmur.   Pulmonary:      Effort: Pulmonary effort is normal. No respiratory distress.      Breath sounds: Normal breath sounds. No wheezing.   Abdominal:      General: Bowel sounds are normal. There is no distension.      Palpations: Abdomen is soft.      Tenderness: There is abdominal tenderness (slight epigastric discomfort).   Musculoskeletal:         General: No deformity.   Lymphadenopathy:    "   Cervical: No cervical adenopathy.   Skin:     General: Skin is warm and dry.      Findings: No erythema.   Neurological:      Mental Status: She is alert.      Cranial Nerves: No cranial nerve deficit.      Gait: Gait normal.   Psychiatric:         Behavior: Behavior normal.           Lab Results   Component Value Date    WBC 6.19 08/03/2020    HGB 12.5 08/03/2020    HCT 38.5 08/03/2020     08/03/2020    CHOL 122 03/16/2020    TRIG 67 03/16/2020    HDL 49 03/16/2020    ALT 19 06/09/2020    AST 23 06/09/2020     08/03/2020    K 4.0 08/03/2020     08/03/2020    CREATININE 1.4 08/03/2020    BUN 23 08/03/2020    CO2 25 08/03/2020    TSH 1.908 04/18/2019    HGBA1C 7.9 (H) 12/16/2020       The ASCVD Risk score (Orovillenick CHRISTIE Jr., et al., 2013) failed to calculate for the following reasons:    The valid total cholesterol range is 130 to 320 mg/dL    (Imaging have been independently reviewed)  Shoulder x-ray with mild OA bilaterally    Assessment:       1. Gastro-esophageal reflux disease without esophagitis    2. Essential hypertension    3. Type 2 diabetes mellitus with stage 3 chronic kidney disease, without long-term current use of insulin, unspecified whether stage 3a or 3b CKD    4. Mixed hyperlipidemia          Plan:       Meenu was seen today for hypertension, diabetes and c/o belching.    Diagnoses and all orders for this visit:    Gastro-esophageal reflux disease without esophagitis  Comments:  Persistent. Advised to avoid dietary triggers. Cont PPI PRN. Advised to see [facility] for EGD.    Essential hypertension  Comments:  Uncontrolled; just taking meds today. Cont Coreg 6.25 BID, Norvasc 10. H/o DM, but h/o ACE angioedema. Nurse visit for BP check in 1 week.    Type 2 diabetes mellitus with stage 3 chronic kidney disease, without long-term current use of insulin, unspecified whether stage 3a or 3b CKD  Comments:  Increased. Increase Metformin 1000 XR if tolerated. Cont glimepiride. A1C in 3 mo.  Foot exam. Will restart exercise, work on diet.  Orders:  -     Hemoglobin A1C; Future  -     Microalbumin/Creatinine Ratio, Urine; Future  -     metFORMIN (GLUCOPHAGE-XR) 500 MG ER 24hr tablet; Take 2 tablets (1,000 mg total) by mouth daily with breakfast.    Mixed hyperlipidemia  Comments:  Controlled. Cont Lipitor 20, ASA. FLP in 3 mo.  Orders:  -     Lipid Panel; Future    Other orders  -     Cancel: Ambulatory referral/consult to Diabetes Education; Future         Side effects of medication(s) were discussed in detail and patient voiced understanding.  Patient will call back for any issues or complications.     RTC in 3 month(s) or sooner PRN for DM with labs prior.

## 2020-12-31 ENCOUNTER — CLINICAL SUPPORT (OUTPATIENT)
Dept: INTERNAL MEDICINE | Facility: CLINIC | Age: 70
End: 2020-12-31
Payer: MEDICARE

## 2020-12-31 VITALS — DIASTOLIC BLOOD PRESSURE: 60 MMHG | HEART RATE: 65 BPM | OXYGEN SATURATION: 98 % | SYSTOLIC BLOOD PRESSURE: 130 MMHG

## 2020-12-31 PROCEDURE — 99999 PR PBB SHADOW E&M-EST. PATIENT-LVL I: ICD-10-PCS | Mod: PBBFAC,,,

## 2020-12-31 PROCEDURE — 99999 PR PBB SHADOW E&M-EST. PATIENT-LVL I: CPT | Mod: PBBFAC,,,

## 2020-12-31 NOTE — Clinical Note
Does patient have record of home blood pressure readings no. Readings have been averaging unknown.   Last dose of blood pressure medication was taken at 0800am.  Patient is asymptomatic.   BP: 130/60 , Pulse: 65 .

## 2020-12-31 NOTE — PROGRESS NOTES
Meenu Guy 70 y.o. female is here today for Blood Pressure check.   History of HTN yes.    Review of patient's allergies indicates:   Allergen Reactions    Statins-hmg-coa reductase inhibitors Edema     Tolerating Lipitor    Ace inhibitors Swelling     Creatinine   Date Value Ref Range Status   08/03/2020 1.4 0.5 - 1.4 mg/dL Final     Sodium   Date Value Ref Range Status   08/03/2020 139 136 - 145 mmol/L Final     Potassium   Date Value Ref Range Status   08/03/2020 4.0 3.5 - 5.1 mmol/L Final   ]  Patient verifies taking blood pressure medications on a regular basis at the same time of the day.     Current Outpatient Medications:     amLODIPine (NORVASC) 10 MG tablet, Take 1 tablet (10 mg total) by mouth once daily., Disp: 90 tablet, Rfl: 3    aspirin (ECOTRIN) 81 MG EC tablet, Take 1 tablet (81 mg total) by mouth once daily. for 365 doses, Disp: 90 tablet, Rfl: 3    atorvastatin (LIPITOR) 20 MG tablet, Take 1 tablet (20 mg total) by mouth once daily., Disp: 90 tablet, Rfl: 3    blood sugar diagnostic Strp, 1 each by Misc.(Non-Drug; Combo Route) route 3 (three) times daily., Disp: 200 each, Rfl: 1    blood-glucose meter kit, Use as instructed, Disp: 1 each, Rfl: 0    carvediloL (COREG) 6.25 MG tablet, Take 1 tablet (6.25 mg total) by mouth 2 (two) times daily., Disp: 180 tablet, Rfl: 3    fluticasone propionate (FLONASE) 50 mcg/actuation nasal spray, 1 spray (50 mcg total) by Each Nostril route 2 (two) times daily as needed., Disp: 15 g, Rfl: 0    glimepiride (AMARYL) 2 MG tablet, Take 1 tablet (2 mg total) by mouth daily with breakfast., Disp: 90 tablet, Rfl: 3    lancets Misc, 1 each by Misc.(Non-Drug; Combo Route) route 3 (three) times daily., Disp: 200 each, Rfl: 1    metFORMIN (GLUCOPHAGE-XR) 500 MG ER 24hr tablet, Take 2 tablets (1,000 mg total) by mouth daily with breakfast., Disp: 180 tablet, Rfl: 3    omeprazole (PRILOSEC) 20 MG capsule, Take 1 capsule (20 mg total) by mouth daily as  needed., Disp: 30 capsule, Rfl: 11  Does patient have record of home blood pressure readings no. Readings have been averaging unknown.   Last dose of blood pressure medication was taken at 0800am.  Patient is asymptomatic.   BP: 130/60 , Pulse: 65 .  Dr. Marvin notified.

## 2021-01-10 ENCOUNTER — IMMUNIZATION (OUTPATIENT)
Dept: INTERNAL MEDICINE | Facility: CLINIC | Age: 71
End: 2021-01-10
Payer: MEDICARE

## 2021-01-10 DIAGNOSIS — Z23 NEED FOR VACCINATION: ICD-10-CM

## 2021-01-10 PROCEDURE — 91300 COVID-19, MRNA, LNP-S, PF, 30 MCG/0.3 ML DOSE VACCINE: CPT | Mod: PBBFAC | Performed by: INTERNAL MEDICINE

## 2021-01-31 ENCOUNTER — IMMUNIZATION (OUTPATIENT)
Dept: INTERNAL MEDICINE | Facility: CLINIC | Age: 71
End: 2021-01-31
Payer: MEDICARE

## 2021-01-31 DIAGNOSIS — Z23 NEED FOR VACCINATION: Primary | ICD-10-CM

## 2021-01-31 PROCEDURE — 91300 COVID-19, MRNA, LNP-S, PF, 30 MCG/0.3 ML DOSE VACCINE: CPT | Mod: PBBFAC | Performed by: INTERNAL MEDICINE

## 2021-01-31 PROCEDURE — 0002A COVID-19, MRNA, LNP-S, PF, 30 MCG/0.3 ML DOSE VACCINE: CPT | Mod: PBBFAC | Performed by: INTERNAL MEDICINE

## 2021-02-23 ENCOUNTER — PATIENT MESSAGE (OUTPATIENT)
Dept: RHEUMATOLOGY | Facility: CLINIC | Age: 71
End: 2021-02-23

## 2021-03-18 ENCOUNTER — PATIENT MESSAGE (OUTPATIENT)
Dept: RESEARCH | Facility: HOSPITAL | Age: 71
End: 2021-03-18

## 2021-03-19 ENCOUNTER — LAB VISIT (OUTPATIENT)
Dept: LAB | Facility: OTHER | Age: 71
End: 2021-03-19
Attending: INTERNAL MEDICINE
Payer: MEDICARE

## 2021-03-19 DIAGNOSIS — E11.22 TYPE 2 DIABETES MELLITUS WITH STAGE 3 CHRONIC KIDNEY DISEASE, WITHOUT LONG-TERM CURRENT USE OF INSULIN, UNSPECIFIED WHETHER STAGE 3A OR 3B CKD: ICD-10-CM

## 2021-03-19 DIAGNOSIS — N18.30 TYPE 2 DIABETES MELLITUS WITH STAGE 3 CHRONIC KIDNEY DISEASE, WITHOUT LONG-TERM CURRENT USE OF INSULIN, UNSPECIFIED WHETHER STAGE 3A OR 3B CKD: ICD-10-CM

## 2021-03-19 LAB
ESTIMATED AVG GLUCOSE: 171 MG/DL (ref 68–131)
HBA1C MFR BLD: 7.6 % (ref 4–5.6)

## 2021-03-19 PROCEDURE — 83036 HEMOGLOBIN GLYCOSYLATED A1C: CPT | Performed by: INTERNAL MEDICINE

## 2021-03-19 PROCEDURE — 36415 COLL VENOUS BLD VENIPUNCTURE: CPT | Performed by: INTERNAL MEDICINE

## 2021-03-23 ENCOUNTER — OFFICE VISIT (OUTPATIENT)
Dept: INTERNAL MEDICINE | Facility: CLINIC | Age: 71
End: 2021-03-23
Payer: MEDICARE

## 2021-03-23 VITALS
BODY MASS INDEX: 24.3 KG/M2 | WEIGHT: 169.75 LBS | DIASTOLIC BLOOD PRESSURE: 60 MMHG | HEIGHT: 70 IN | SYSTOLIC BLOOD PRESSURE: 126 MMHG | HEART RATE: 61 BPM | OXYGEN SATURATION: 96 %

## 2021-03-23 DIAGNOSIS — R15.9 ENCOPRESIS: ICD-10-CM

## 2021-03-23 DIAGNOSIS — I10 ESSENTIAL HYPERTENSION: Primary | ICD-10-CM

## 2021-03-23 DIAGNOSIS — N18.30 STAGE 3 CHRONIC KIDNEY DISEASE, UNSPECIFIED WHETHER STAGE 3A OR 3B CKD: ICD-10-CM

## 2021-03-23 DIAGNOSIS — N18.30 TYPE 2 DIABETES MELLITUS WITH STAGE 3 CHRONIC KIDNEY DISEASE, WITHOUT LONG-TERM CURRENT USE OF INSULIN, UNSPECIFIED WHETHER STAGE 3A OR 3B CKD: ICD-10-CM

## 2021-03-23 DIAGNOSIS — E11.22 TYPE 2 DIABETES MELLITUS WITH STAGE 3 CHRONIC KIDNEY DISEASE, WITHOUT LONG-TERM CURRENT USE OF INSULIN, UNSPECIFIED WHETHER STAGE 3A OR 3B CKD: ICD-10-CM

## 2021-03-23 DIAGNOSIS — R04.0 EPISTAXIS: ICD-10-CM

## 2021-03-23 PROCEDURE — 3008F PR BODY MASS INDEX (BMI) DOCUMENTED: ICD-10-PCS | Mod: CPTII,S$GLB,, | Performed by: INTERNAL MEDICINE

## 2021-03-23 PROCEDURE — 3288F FALL RISK ASSESSMENT DOCD: CPT | Mod: CPTII,S$GLB,, | Performed by: INTERNAL MEDICINE

## 2021-03-23 PROCEDURE — 3288F PR FALLS RISK ASSESSMENT DOCUMENTED: ICD-10-PCS | Mod: CPTII,S$GLB,, | Performed by: INTERNAL MEDICINE

## 2021-03-23 PROCEDURE — 1126F PR PAIN SEVERITY QUANTIFIED, NO PAIN PRESENT: ICD-10-PCS | Mod: S$GLB,,, | Performed by: INTERNAL MEDICINE

## 2021-03-23 PROCEDURE — 99499 RISK ADDL DX/OHS AUDIT: ICD-10-PCS | Mod: S$GLB,,, | Performed by: INTERNAL MEDICINE

## 2021-03-23 PROCEDURE — 3078F PR MOST RECENT DIASTOLIC BLOOD PRESSURE < 80 MM HG: ICD-10-PCS | Mod: CPTII,S$GLB,, | Performed by: INTERNAL MEDICINE

## 2021-03-23 PROCEDURE — 99999 PR PBB SHADOW E&M-EST. PATIENT-LVL IV: ICD-10-PCS | Mod: PBBFAC,,, | Performed by: INTERNAL MEDICINE

## 2021-03-23 PROCEDURE — 99215 PR OFFICE/OUTPT VISIT, EST, LEVL V, 40-54 MIN: ICD-10-PCS | Mod: S$GLB,,, | Performed by: INTERNAL MEDICINE

## 2021-03-23 PROCEDURE — 1101F PT FALLS ASSESS-DOCD LE1/YR: CPT | Mod: CPTII,S$GLB,, | Performed by: INTERNAL MEDICINE

## 2021-03-23 PROCEDURE — 1126F AMNT PAIN NOTED NONE PRSNT: CPT | Mod: S$GLB,,, | Performed by: INTERNAL MEDICINE

## 2021-03-23 PROCEDURE — 3051F HG A1C>EQUAL 7.0%<8.0%: CPT | Mod: CPTII,S$GLB,, | Performed by: INTERNAL MEDICINE

## 2021-03-23 PROCEDURE — 1159F PR MEDICATION LIST DOCUMENTED IN MEDICAL RECORD: ICD-10-PCS | Mod: S$GLB,,, | Performed by: INTERNAL MEDICINE

## 2021-03-23 PROCEDURE — 99999 PR PBB SHADOW E&M-EST. PATIENT-LVL IV: CPT | Mod: PBBFAC,,, | Performed by: INTERNAL MEDICINE

## 2021-03-23 PROCEDURE — 3008F BODY MASS INDEX DOCD: CPT | Mod: CPTII,S$GLB,, | Performed by: INTERNAL MEDICINE

## 2021-03-23 PROCEDURE — 99215 OFFICE O/P EST HI 40 MIN: CPT | Mod: S$GLB,,, | Performed by: INTERNAL MEDICINE

## 2021-03-23 PROCEDURE — 3051F PR MOST RECENT HEMOGLOBIN A1C LEVEL 7.0 - < 8.0%: ICD-10-PCS | Mod: CPTII,S$GLB,, | Performed by: INTERNAL MEDICINE

## 2021-03-23 PROCEDURE — 3078F DIAST BP <80 MM HG: CPT | Mod: CPTII,S$GLB,, | Performed by: INTERNAL MEDICINE

## 2021-03-23 PROCEDURE — 1101F PR PT FALLS ASSESS DOC 0-1 FALLS W/OUT INJ PAST YR: ICD-10-PCS | Mod: CPTII,S$GLB,, | Performed by: INTERNAL MEDICINE

## 2021-03-23 PROCEDURE — 3074F PR MOST RECENT SYSTOLIC BLOOD PRESSURE < 130 MM HG: ICD-10-PCS | Mod: CPTII,S$GLB,, | Performed by: INTERNAL MEDICINE

## 2021-03-23 PROCEDURE — 1159F MED LIST DOCD IN RCRD: CPT | Mod: S$GLB,,, | Performed by: INTERNAL MEDICINE

## 2021-03-23 PROCEDURE — 99499 UNLISTED E&M SERVICE: CPT | Mod: S$GLB,,, | Performed by: INTERNAL MEDICINE

## 2021-03-23 PROCEDURE — 3074F SYST BP LT 130 MM HG: CPT | Mod: CPTII,S$GLB,, | Performed by: INTERNAL MEDICINE

## 2021-03-23 RX ORDER — PANTOPRAZOLE SODIUM 40 MG/1
40 TABLET, DELAYED RELEASE ORAL
COMMUNITY
Start: 2021-03-15 | End: 2023-07-21

## 2021-03-23 RX ORDER — GLIMEPIRIDE 4 MG/1
4 TABLET ORAL
Qty: 90 TABLET | Refills: 3 | Status: SHIPPED | OUTPATIENT
Start: 2021-03-23 | End: 2022-03-15

## 2021-03-23 RX ORDER — AMLODIPINE BESYLATE 10 MG/1
10 TABLET ORAL DAILY
Qty: 90 TABLET | Refills: 3 | Status: SHIPPED | OUTPATIENT
Start: 2021-03-23 | End: 2021-12-23 | Stop reason: SDUPTHER

## 2021-03-26 ENCOUNTER — PATIENT MESSAGE (OUTPATIENT)
Dept: RESEARCH | Facility: HOSPITAL | Age: 71
End: 2021-03-26

## 2021-04-07 ENCOUNTER — TELEPHONE (OUTPATIENT)
Dept: ADMINISTRATIVE | Facility: HOSPITAL | Age: 71
End: 2021-04-07

## 2021-04-22 ENCOUNTER — OFFICE VISIT (OUTPATIENT)
Dept: PODIATRY | Facility: CLINIC | Age: 71
End: 2021-04-22
Payer: MEDICARE

## 2021-04-22 VITALS
WEIGHT: 169 LBS | SYSTOLIC BLOOD PRESSURE: 162 MMHG | BODY MASS INDEX: 24.2 KG/M2 | HEIGHT: 70 IN | HEART RATE: 61 BPM | DIASTOLIC BLOOD PRESSURE: 69 MMHG

## 2021-04-22 DIAGNOSIS — E11.51 TYPE II DIABETES MELLITUS WITH PERIPHERAL CIRCULATORY DISORDER: Primary | ICD-10-CM

## 2021-04-22 DIAGNOSIS — E11.9 ENCOUNTER FOR DIABETIC FOOT EXAM: ICD-10-CM

## 2021-04-22 PROCEDURE — 3008F PR BODY MASS INDEX (BMI) DOCUMENTED: ICD-10-PCS | Mod: CPTII,S$GLB,, | Performed by: PODIATRIST

## 2021-04-22 PROCEDURE — 3051F PR MOST RECENT HEMOGLOBIN A1C LEVEL 7.0 - < 8.0%: ICD-10-PCS | Mod: CPTII,S$GLB,, | Performed by: PODIATRIST

## 2021-04-22 PROCEDURE — 99212 OFFICE O/P EST SF 10 MIN: CPT | Mod: S$GLB,,, | Performed by: PODIATRIST

## 2021-04-22 PROCEDURE — 1126F AMNT PAIN NOTED NONE PRSNT: CPT | Mod: S$GLB,,, | Performed by: PODIATRIST

## 2021-04-22 PROCEDURE — 99499 UNLISTED E&M SERVICE: CPT | Mod: S$GLB,,, | Performed by: PODIATRIST

## 2021-04-22 PROCEDURE — 1126F PR PAIN SEVERITY QUANTIFIED, NO PAIN PRESENT: ICD-10-PCS | Mod: S$GLB,,, | Performed by: PODIATRIST

## 2021-04-22 PROCEDURE — 1159F MED LIST DOCD IN RCRD: CPT | Mod: S$GLB,,, | Performed by: PODIATRIST

## 2021-04-22 PROCEDURE — 99999 PR PBB SHADOW E&M-EST. PATIENT-LVL III: ICD-10-PCS | Mod: PBBFAC,,, | Performed by: PODIATRIST

## 2021-04-22 PROCEDURE — 3051F HG A1C>EQUAL 7.0%<8.0%: CPT | Mod: CPTII,S$GLB,, | Performed by: PODIATRIST

## 2021-04-22 PROCEDURE — 99999 PR PBB SHADOW E&M-EST. PATIENT-LVL III: CPT | Mod: PBBFAC,,, | Performed by: PODIATRIST

## 2021-04-22 PROCEDURE — 1159F PR MEDICATION LIST DOCUMENTED IN MEDICAL RECORD: ICD-10-PCS | Mod: S$GLB,,, | Performed by: PODIATRIST

## 2021-04-22 PROCEDURE — 3008F BODY MASS INDEX DOCD: CPT | Mod: CPTII,S$GLB,, | Performed by: PODIATRIST

## 2021-04-22 PROCEDURE — 99499 RISK ADDL DX/OHS AUDIT: ICD-10-PCS | Mod: S$GLB,,, | Performed by: PODIATRIST

## 2021-04-22 PROCEDURE — 99212 PR OFFICE/OUTPT VISIT, EST, LEVL II, 10-19 MIN: ICD-10-PCS | Mod: S$GLB,,, | Performed by: PODIATRIST

## 2021-06-04 ENCOUNTER — TELEPHONE (OUTPATIENT)
Dept: INTERNAL MEDICINE | Facility: CLINIC | Age: 71
End: 2021-06-04

## 2021-06-09 ENCOUNTER — PATIENT OUTREACH (OUTPATIENT)
Dept: ADMINISTRATIVE | Facility: HOSPITAL | Age: 71
End: 2021-06-09

## 2021-06-09 DIAGNOSIS — Z12.31 ENCOUNTER FOR SCREENING MAMMOGRAM FOR BREAST CANCER: Primary | ICD-10-CM

## 2021-06-10 ENCOUNTER — TELEPHONE (OUTPATIENT)
Dept: ADMINISTRATIVE | Facility: HOSPITAL | Age: 71
End: 2021-06-10

## 2021-06-10 ENCOUNTER — OFFICE VISIT (OUTPATIENT)
Dept: INTERNAL MEDICINE | Facility: CLINIC | Age: 71
End: 2021-06-10
Payer: MEDICARE

## 2021-06-10 VITALS
HEIGHT: 70 IN | WEIGHT: 171.06 LBS | BODY MASS INDEX: 24.49 KG/M2 | SYSTOLIC BLOOD PRESSURE: 137 MMHG | DIASTOLIC BLOOD PRESSURE: 61 MMHG | HEART RATE: 55 BPM | OXYGEN SATURATION: 95 %

## 2021-06-10 DIAGNOSIS — K21.9 GASTRO-ESOPHAGEAL REFLUX DISEASE WITHOUT ESOPHAGITIS: ICD-10-CM

## 2021-06-10 DIAGNOSIS — E78.2 MIXED HYPERLIPIDEMIA: ICD-10-CM

## 2021-06-10 DIAGNOSIS — E11.22 TYPE 2 DIABETES MELLITUS WITH STAGE 3 CHRONIC KIDNEY DISEASE, WITHOUT LONG-TERM CURRENT USE OF INSULIN, UNSPECIFIED WHETHER STAGE 3A OR 3B CKD: ICD-10-CM

## 2021-06-10 DIAGNOSIS — N18.30 TYPE 2 DIABETES MELLITUS WITH STAGE 3 CHRONIC KIDNEY DISEASE, WITHOUT LONG-TERM CURRENT USE OF INSULIN, UNSPECIFIED WHETHER STAGE 3A OR 3B CKD: ICD-10-CM

## 2021-06-10 DIAGNOSIS — K59.1 FUNCTIONAL DIARRHEA: Primary | ICD-10-CM

## 2021-06-10 DIAGNOSIS — L30.9 DERMATITIS: ICD-10-CM

## 2021-06-10 DIAGNOSIS — J30.9 ALLERGIC RHINITIS, UNSPECIFIED SEASONALITY, UNSPECIFIED TRIGGER: ICD-10-CM

## 2021-06-10 PROCEDURE — 3288F PR FALLS RISK ASSESSMENT DOCUMENTED: ICD-10-PCS | Mod: CPTII,S$GLB,, | Performed by: INTERNAL MEDICINE

## 2021-06-10 PROCEDURE — 99215 OFFICE O/P EST HI 40 MIN: CPT | Mod: S$GLB,,, | Performed by: INTERNAL MEDICINE

## 2021-06-10 PROCEDURE — 3008F PR BODY MASS INDEX (BMI) DOCUMENTED: ICD-10-PCS | Mod: CPTII,S$GLB,, | Performed by: INTERNAL MEDICINE

## 2021-06-10 PROCEDURE — 3288F FALL RISK ASSESSMENT DOCD: CPT | Mod: CPTII,S$GLB,, | Performed by: INTERNAL MEDICINE

## 2021-06-10 PROCEDURE — 1101F PT FALLS ASSESS-DOCD LE1/YR: CPT | Mod: CPTII,S$GLB,, | Performed by: INTERNAL MEDICINE

## 2021-06-10 PROCEDURE — 99215 PR OFFICE/OUTPT VISIT, EST, LEVL V, 40-54 MIN: ICD-10-PCS | Mod: S$GLB,,, | Performed by: INTERNAL MEDICINE

## 2021-06-10 PROCEDURE — 3051F PR MOST RECENT HEMOGLOBIN A1C LEVEL 7.0 - < 8.0%: ICD-10-PCS | Mod: CPTII,S$GLB,, | Performed by: INTERNAL MEDICINE

## 2021-06-10 PROCEDURE — 1159F MED LIST DOCD IN RCRD: CPT | Mod: S$GLB,,, | Performed by: INTERNAL MEDICINE

## 2021-06-10 PROCEDURE — 1126F PR PAIN SEVERITY QUANTIFIED, NO PAIN PRESENT: ICD-10-PCS | Mod: S$GLB,,, | Performed by: INTERNAL MEDICINE

## 2021-06-10 PROCEDURE — 3008F BODY MASS INDEX DOCD: CPT | Mod: CPTII,S$GLB,, | Performed by: INTERNAL MEDICINE

## 2021-06-10 PROCEDURE — 1126F AMNT PAIN NOTED NONE PRSNT: CPT | Mod: S$GLB,,, | Performed by: INTERNAL MEDICINE

## 2021-06-10 PROCEDURE — 3051F HG A1C>EQUAL 7.0%<8.0%: CPT | Mod: CPTII,S$GLB,, | Performed by: INTERNAL MEDICINE

## 2021-06-10 PROCEDURE — 99999 PR PBB SHADOW E&M-EST. PATIENT-LVL III: CPT | Mod: PBBFAC,,, | Performed by: INTERNAL MEDICINE

## 2021-06-10 PROCEDURE — 99999 PR PBB SHADOW E&M-EST. PATIENT-LVL III: ICD-10-PCS | Mod: PBBFAC,,, | Performed by: INTERNAL MEDICINE

## 2021-06-10 PROCEDURE — 1101F PR PT FALLS ASSESS DOC 0-1 FALLS W/OUT INJ PAST YR: ICD-10-PCS | Mod: CPTII,S$GLB,, | Performed by: INTERNAL MEDICINE

## 2021-06-10 PROCEDURE — 1159F PR MEDICATION LIST DOCUMENTED IN MEDICAL RECORD: ICD-10-PCS | Mod: S$GLB,,, | Performed by: INTERNAL MEDICINE

## 2021-06-10 RX ORDER — FLUTICASONE PROPIONATE 50 MCG
1 SPRAY, SUSPENSION (ML) NASAL DAILY
Qty: 16 G | Refills: 11 | Status: SHIPPED | OUTPATIENT
Start: 2021-06-10 | End: 2022-08-11 | Stop reason: SDUPTHER

## 2021-06-10 RX ORDER — ATORVASTATIN CALCIUM 20 MG/1
20 TABLET, FILM COATED ORAL DAILY
Qty: 90 TABLET | Refills: 3 | Status: SHIPPED | OUTPATIENT
Start: 2021-06-10 | End: 2022-08-11 | Stop reason: SDUPTHER

## 2021-06-10 RX ORDER — CHOLESTYRAMINE 4 G/9G
4 POWDER, FOR SUSPENSION ORAL
Qty: 270 PACKET | Refills: 3 | Status: SHIPPED | OUTPATIENT
Start: 2021-06-10 | End: 2021-09-23

## 2021-06-10 RX ORDER — GUAIFENESIN 600 MG/1
600 TABLET, EXTENDED RELEASE ORAL 2 TIMES DAILY PRN
Qty: 30 TABLET | Refills: 2 | Status: SHIPPED | OUTPATIENT
Start: 2021-06-10 | End: 2021-09-23

## 2021-06-10 RX ORDER — MONTELUKAST SODIUM 10 MG/1
10 TABLET ORAL NIGHTLY
Qty: 30 TABLET | Refills: 11 | Status: SHIPPED | OUTPATIENT
Start: 2021-06-10 | End: 2021-07-10

## 2021-06-10 RX ORDER — KETOCONAZOLE 20 MG/G
CREAM TOPICAL DAILY
Qty: 15 G | Refills: 5 | Status: SHIPPED | OUTPATIENT
Start: 2021-06-10 | End: 2021-09-09 | Stop reason: SDUPTHER

## 2021-06-11 ENCOUNTER — LAB VISIT (OUTPATIENT)
Dept: LAB | Facility: OTHER | Age: 71
End: 2021-06-11
Attending: INTERNAL MEDICINE
Payer: MEDICARE

## 2021-06-11 DIAGNOSIS — K59.1 FUNCTIONAL DIARRHEA: ICD-10-CM

## 2021-06-11 PROCEDURE — 87427 SHIGA-LIKE TOXIN AG IA: CPT | Performed by: INTERNAL MEDICINE

## 2021-06-11 PROCEDURE — 87209 SMEAR COMPLEX STAIN: CPT | Performed by: INTERNAL MEDICINE

## 2021-06-11 PROCEDURE — 87046 STOOL CULTR AEROBIC BACT EA: CPT | Performed by: INTERNAL MEDICINE

## 2021-06-11 PROCEDURE — 87045 FECES CULTURE AEROBIC BACT: CPT | Performed by: INTERNAL MEDICINE

## 2021-06-11 PROCEDURE — 87329 GIARDIA AG IA: CPT | Performed by: INTERNAL MEDICINE

## 2021-06-11 PROCEDURE — 89055 LEUKOCYTE ASSESSMENT FECAL: CPT | Performed by: INTERNAL MEDICINE

## 2021-06-11 PROCEDURE — 83993 ASSAY FOR CALPROTECTIN FECAL: CPT | Performed by: INTERNAL MEDICINE

## 2021-06-12 LAB
CRYPTOSP AG STL QL IA: NEGATIVE
G LAMBLIA AG STL QL IA: NEGATIVE
WBC #/AREA STL HPF: NORMAL /[HPF]

## 2021-06-14 LAB
E COLI SXT1 STL QL IA: NEGATIVE
E COLI SXT2 STL QL IA: NEGATIVE
O+P STL MICRO: NORMAL

## 2021-06-15 LAB — BACTERIA STL CULT: NORMAL

## 2021-06-16 LAB — CALPROTECTIN STL-MCNT: 100.8 MCG/G

## 2021-06-18 ENCOUNTER — HOSPITAL ENCOUNTER (OUTPATIENT)
Dept: RADIOLOGY | Facility: OTHER | Age: 71
Discharge: HOME | End: 2021-06-18
Attending: INTERNAL MEDICINE
Payer: MEDICARE

## 2021-06-18 DIAGNOSIS — Z12.31 ENCOUNTER FOR SCREENING MAMMOGRAM FOR BREAST CANCER: ICD-10-CM

## 2021-06-18 PROCEDURE — 77067 SCR MAMMO BI INCL CAD: CPT | Mod: TC

## 2021-06-18 PROCEDURE — 77063 MAMMO DIGITAL SCREENING BILAT WITH TOMO: ICD-10-PCS | Mod: 26,,, | Performed by: RADIOLOGY

## 2021-06-18 PROCEDURE — 77063 BREAST TOMOSYNTHESIS BI: CPT | Mod: 26,,, | Performed by: RADIOLOGY

## 2021-06-18 PROCEDURE — 77067 MAMMO DIGITAL SCREENING BILAT WITH TOMO: ICD-10-PCS | Mod: 26,,, | Performed by: RADIOLOGY

## 2021-06-18 PROCEDURE — 77067 SCR MAMMO BI INCL CAD: CPT | Mod: 26,,, | Performed by: RADIOLOGY

## 2021-07-02 ENCOUNTER — TELEPHONE (OUTPATIENT)
Dept: INTERNAL MEDICINE | Facility: CLINIC | Age: 71
End: 2021-07-02

## 2021-07-18 ENCOUNTER — HOSPITAL ENCOUNTER (EMERGENCY)
Facility: OTHER | Age: 71
Discharge: HOME | End: 2021-07-18
Attending: EMERGENCY MEDICINE
Payer: MEDICARE

## 2021-07-18 VITALS
HEART RATE: 53 BPM | SYSTOLIC BLOOD PRESSURE: 186 MMHG | DIASTOLIC BLOOD PRESSURE: 78 MMHG | WEIGHT: 170 LBS | RESPIRATION RATE: 19 BRPM | OXYGEN SATURATION: 100 % | BODY MASS INDEX: 24.39 KG/M2 | TEMPERATURE: 98 F

## 2021-07-18 DIAGNOSIS — R04.0 ACUTE ANTERIOR EPISTAXIS: Primary | ICD-10-CM

## 2021-07-18 PROCEDURE — 99283 EMERGENCY DEPT VISIT LOW MDM: CPT

## 2021-07-18 PROCEDURE — 25000003 PHARM REV CODE 250: Performed by: EMERGENCY MEDICINE

## 2021-07-18 RX ORDER — OXYMETAZOLINE HCL 0.05 %
2 SPRAY, NON-AEROSOL (ML) NASAL
Status: COMPLETED | OUTPATIENT
Start: 2021-07-18 | End: 2021-07-18

## 2021-07-18 RX ADMIN — NASAL DECONGESTANT 2 SPRAY: 0.05 SPRAY NASAL at 07:07

## 2021-07-19 ENCOUNTER — TELEPHONE (OUTPATIENT)
Dept: OTOLARYNGOLOGY | Facility: CLINIC | Age: 71
End: 2021-07-19

## 2021-07-19 ENCOUNTER — TELEPHONE (OUTPATIENT)
Dept: INTERNAL MEDICINE | Facility: CLINIC | Age: 71
End: 2021-07-19

## 2021-07-19 DIAGNOSIS — R04.0 BLEEDING FROM THE NOSE: Primary | ICD-10-CM

## 2021-07-21 ENCOUNTER — PATIENT OUTREACH (OUTPATIENT)
Dept: ADMINISTRATIVE | Facility: OTHER | Age: 71
End: 2021-07-21

## 2021-07-22 ENCOUNTER — OFFICE VISIT (OUTPATIENT)
Dept: OTOLARYNGOLOGY | Facility: CLINIC | Age: 71
End: 2021-07-22
Payer: MEDICARE

## 2021-07-22 VITALS
HEART RATE: 66 BPM | WEIGHT: 167.31 LBS | TEMPERATURE: 97 F | SYSTOLIC BLOOD PRESSURE: 166 MMHG | DIASTOLIC BLOOD PRESSURE: 64 MMHG | BODY MASS INDEX: 24.01 KG/M2

## 2021-07-22 DIAGNOSIS — I10 ESSENTIAL HYPERTENSION: Primary | ICD-10-CM

## 2021-07-22 DIAGNOSIS — R04.0 EPISTAXIS: ICD-10-CM

## 2021-07-22 PROCEDURE — 99204 OFFICE O/P NEW MOD 45 MIN: CPT | Mod: 25,S$GLB,, | Performed by: OTOLARYNGOLOGY

## 2021-07-22 PROCEDURE — 3008F PR BODY MASS INDEX (BMI) DOCUMENTED: ICD-10-PCS | Mod: CPTII,S$GLB,, | Performed by: OTOLARYNGOLOGY

## 2021-07-22 PROCEDURE — 3077F PR MOST RECENT SYSTOLIC BLOOD PRESSURE >= 140 MM HG: ICD-10-PCS | Mod: CPTII,S$GLB,, | Performed by: OTOLARYNGOLOGY

## 2021-07-22 PROCEDURE — 30901 PR CTRL 2SEBLEED,ANTER,SIMPLE: ICD-10-PCS | Mod: RT,S$GLB,, | Performed by: OTOLARYNGOLOGY

## 2021-07-22 PROCEDURE — 3078F PR MOST RECENT DIASTOLIC BLOOD PRESSURE < 80 MM HG: ICD-10-PCS | Mod: CPTII,S$GLB,, | Performed by: OTOLARYNGOLOGY

## 2021-07-22 PROCEDURE — 99204 PR OFFICE/OUTPT VISIT, NEW, LEVL IV, 45-59 MIN: ICD-10-PCS | Mod: 25,S$GLB,, | Performed by: OTOLARYNGOLOGY

## 2021-07-22 PROCEDURE — 1126F PR PAIN SEVERITY QUANTIFIED, NO PAIN PRESENT: ICD-10-PCS | Mod: CPTII,S$GLB,, | Performed by: OTOLARYNGOLOGY

## 2021-07-22 PROCEDURE — 3078F DIAST BP <80 MM HG: CPT | Mod: CPTII,S$GLB,, | Performed by: OTOLARYNGOLOGY

## 2021-07-22 PROCEDURE — 3077F SYST BP >= 140 MM HG: CPT | Mod: CPTII,S$GLB,, | Performed by: OTOLARYNGOLOGY

## 2021-07-22 PROCEDURE — 3051F PR MOST RECENT HEMOGLOBIN A1C LEVEL 7.0 - < 8.0%: ICD-10-PCS | Mod: CPTII,S$GLB,, | Performed by: OTOLARYNGOLOGY

## 2021-07-22 PROCEDURE — 3008F BODY MASS INDEX DOCD: CPT | Mod: CPTII,S$GLB,, | Performed by: OTOLARYNGOLOGY

## 2021-07-22 PROCEDURE — 1159F MED LIST DOCD IN RCRD: CPT | Mod: CPTII,S$GLB,, | Performed by: OTOLARYNGOLOGY

## 2021-07-22 PROCEDURE — 1159F PR MEDICATION LIST DOCUMENTED IN MEDICAL RECORD: ICD-10-PCS | Mod: CPTII,S$GLB,, | Performed by: OTOLARYNGOLOGY

## 2021-07-22 PROCEDURE — 3051F HG A1C>EQUAL 7.0%<8.0%: CPT | Mod: CPTII,S$GLB,, | Performed by: OTOLARYNGOLOGY

## 2021-07-22 PROCEDURE — 30901 CONTROL OF NOSEBLEED: CPT | Mod: RT,S$GLB,, | Performed by: OTOLARYNGOLOGY

## 2021-07-22 PROCEDURE — 1126F AMNT PAIN NOTED NONE PRSNT: CPT | Mod: CPTII,S$GLB,, | Performed by: OTOLARYNGOLOGY

## 2021-08-24 DIAGNOSIS — I10 ESSENTIAL HYPERTENSION: ICD-10-CM

## 2021-08-25 RX ORDER — CARVEDILOL 6.25 MG/1
6.25 TABLET ORAL 2 TIMES DAILY
Qty: 180 TABLET | Refills: 3 | Status: SHIPPED | OUTPATIENT
Start: 2021-08-25 | End: 2022-08-11 | Stop reason: SDUPTHER

## 2021-09-08 ENCOUNTER — TELEPHONE (OUTPATIENT)
Dept: INTERNAL MEDICINE | Facility: CLINIC | Age: 71
End: 2021-09-08

## 2021-09-09 ENCOUNTER — OFFICE VISIT (OUTPATIENT)
Dept: INTERNAL MEDICINE | Facility: CLINIC | Age: 71
End: 2021-09-09
Attending: INTERNAL MEDICINE
Payer: MEDICARE

## 2021-09-09 VITALS
DIASTOLIC BLOOD PRESSURE: 78 MMHG | BODY MASS INDEX: 23.93 KG/M2 | OXYGEN SATURATION: 96 % | HEIGHT: 70 IN | HEART RATE: 62 BPM | WEIGHT: 167.13 LBS | SYSTOLIC BLOOD PRESSURE: 122 MMHG

## 2021-09-09 DIAGNOSIS — L30.9 DERMATITIS: ICD-10-CM

## 2021-09-09 PROCEDURE — 1126F AMNT PAIN NOTED NONE PRSNT: CPT | Mod: CPTII,S$GLB,, | Performed by: INTERNAL MEDICINE

## 2021-09-09 PROCEDURE — 3078F PR MOST RECENT DIASTOLIC BLOOD PRESSURE < 80 MM HG: ICD-10-PCS | Mod: CPTII,S$GLB,, | Performed by: INTERNAL MEDICINE

## 2021-09-09 PROCEDURE — 3008F PR BODY MASS INDEX (BMI) DOCUMENTED: ICD-10-PCS | Mod: CPTII,S$GLB,, | Performed by: INTERNAL MEDICINE

## 2021-09-09 PROCEDURE — 3066F PR DOCUMENTATION OF TREATMENT FOR NEPHROPATHY: ICD-10-PCS | Mod: CPTII,S$GLB,, | Performed by: INTERNAL MEDICINE

## 2021-09-09 PROCEDURE — 3061F NEG MICROALBUMINURIA REV: CPT | Mod: CPTII,S$GLB,, | Performed by: INTERNAL MEDICINE

## 2021-09-09 PROCEDURE — 3061F PR NEG MICROALBUMINURIA RESULT DOCUMENTED/REVIEW: ICD-10-PCS | Mod: CPTII,S$GLB,, | Performed by: INTERNAL MEDICINE

## 2021-09-09 PROCEDURE — 1159F PR MEDICATION LIST DOCUMENTED IN MEDICAL RECORD: ICD-10-PCS | Mod: CPTII,S$GLB,, | Performed by: INTERNAL MEDICINE

## 2021-09-09 PROCEDURE — 3051F PR MOST RECENT HEMOGLOBIN A1C LEVEL 7.0 - < 8.0%: ICD-10-PCS | Mod: CPTII,S$GLB,, | Performed by: INTERNAL MEDICINE

## 2021-09-09 PROCEDURE — 3051F HG A1C>EQUAL 7.0%<8.0%: CPT | Mod: CPTII,S$GLB,, | Performed by: INTERNAL MEDICINE

## 2021-09-09 PROCEDURE — 3074F PR MOST RECENT SYSTOLIC BLOOD PRESSURE < 130 MM HG: ICD-10-PCS | Mod: CPTII,S$GLB,, | Performed by: INTERNAL MEDICINE

## 2021-09-09 PROCEDURE — 3008F BODY MASS INDEX DOCD: CPT | Mod: CPTII,S$GLB,, | Performed by: INTERNAL MEDICINE

## 2021-09-09 PROCEDURE — 99999 PR PBB SHADOW E&M-EST. PATIENT-LVL IV: CPT | Mod: PBBFAC,,, | Performed by: INTERNAL MEDICINE

## 2021-09-09 PROCEDURE — 1159F MED LIST DOCD IN RCRD: CPT | Mod: CPTII,S$GLB,, | Performed by: INTERNAL MEDICINE

## 2021-09-09 PROCEDURE — 1101F PR PT FALLS ASSESS DOC 0-1 FALLS W/OUT INJ PAST YR: ICD-10-PCS | Mod: CPTII,S$GLB,, | Performed by: INTERNAL MEDICINE

## 2021-09-09 PROCEDURE — 1126F PR PAIN SEVERITY QUANTIFIED, NO PAIN PRESENT: ICD-10-PCS | Mod: CPTII,S$GLB,, | Performed by: INTERNAL MEDICINE

## 2021-09-09 PROCEDURE — 3078F DIAST BP <80 MM HG: CPT | Mod: CPTII,S$GLB,, | Performed by: INTERNAL MEDICINE

## 2021-09-09 PROCEDURE — 3288F FALL RISK ASSESSMENT DOCD: CPT | Mod: CPTII,S$GLB,, | Performed by: INTERNAL MEDICINE

## 2021-09-09 PROCEDURE — 99999 PR PBB SHADOW E&M-EST. PATIENT-LVL IV: ICD-10-PCS | Mod: PBBFAC,,, | Performed by: INTERNAL MEDICINE

## 2021-09-09 PROCEDURE — 1101F PT FALLS ASSESS-DOCD LE1/YR: CPT | Mod: CPTII,S$GLB,, | Performed by: INTERNAL MEDICINE

## 2021-09-09 PROCEDURE — 3066F NEPHROPATHY DOC TX: CPT | Mod: CPTII,S$GLB,, | Performed by: INTERNAL MEDICINE

## 2021-09-09 PROCEDURE — 3288F PR FALLS RISK ASSESSMENT DOCUMENTED: ICD-10-PCS | Mod: CPTII,S$GLB,, | Performed by: INTERNAL MEDICINE

## 2021-09-09 PROCEDURE — 3074F SYST BP LT 130 MM HG: CPT | Mod: CPTII,S$GLB,, | Performed by: INTERNAL MEDICINE

## 2021-09-09 PROCEDURE — 99214 PR OFFICE/OUTPT VISIT, EST, LEVL IV, 30-39 MIN: ICD-10-PCS | Mod: S$GLB,,, | Performed by: INTERNAL MEDICINE

## 2021-09-09 PROCEDURE — 99214 OFFICE O/P EST MOD 30 MIN: CPT | Mod: S$GLB,,, | Performed by: INTERNAL MEDICINE

## 2021-09-09 RX ORDER — TRIAMCINOLONE ACETONIDE 5 MG/G
CREAM TOPICAL 2 TIMES DAILY
Qty: 15 G | Refills: 0 | Status: SHIPPED | OUTPATIENT
Start: 2021-09-09 | End: 2021-12-23 | Stop reason: SDUPTHER

## 2021-09-09 RX ORDER — KETOCONAZOLE 20 MG/G
CREAM TOPICAL DAILY
Qty: 15 G | Refills: 5
Start: 2021-09-09 | End: 2021-12-23 | Stop reason: SDUPTHER

## 2021-09-10 ENCOUNTER — TELEPHONE (OUTPATIENT)
Dept: ADMINISTRATIVE | Facility: HOSPITAL | Age: 71
End: 2021-09-10

## 2021-09-10 ENCOUNTER — PATIENT OUTREACH (OUTPATIENT)
Dept: ADMINISTRATIVE | Facility: HOSPITAL | Age: 71
End: 2021-09-10

## 2021-09-23 ENCOUNTER — OFFICE VISIT (OUTPATIENT)
Dept: INTERNAL MEDICINE | Facility: CLINIC | Age: 71
End: 2021-09-23
Payer: MEDICARE

## 2021-09-23 ENCOUNTER — PATIENT MESSAGE (OUTPATIENT)
Dept: NEPHROLOGY | Facility: CLINIC | Age: 71
End: 2021-09-23

## 2021-09-23 VITALS
BODY MASS INDEX: 23.86 KG/M2 | WEIGHT: 166.69 LBS | SYSTOLIC BLOOD PRESSURE: 122 MMHG | HEIGHT: 70 IN | HEART RATE: 61 BPM | DIASTOLIC BLOOD PRESSURE: 76 MMHG | OXYGEN SATURATION: 98 %

## 2021-09-23 DIAGNOSIS — E78.2 MIXED HYPERLIPIDEMIA: ICD-10-CM

## 2021-09-23 DIAGNOSIS — I10 ESSENTIAL HYPERTENSION: ICD-10-CM

## 2021-09-23 DIAGNOSIS — L30.9 DERMATITIS: ICD-10-CM

## 2021-09-23 DIAGNOSIS — E11.51 TYPE 2 DIABETES MELLITUS WITH DIABETIC PERIPHERAL ANGIOPATHY WITHOUT GANGRENE, WITHOUT LONG-TERM CURRENT USE OF INSULIN: Primary | ICD-10-CM

## 2021-09-23 DIAGNOSIS — E66.3 OVERWEIGHT (BMI 25.0-29.9): ICD-10-CM

## 2021-09-23 DIAGNOSIS — Z23 INFLUENZA VACCINE NEEDED: ICD-10-CM

## 2021-09-23 DIAGNOSIS — N18.30 STAGE 3 CHRONIC KIDNEY DISEASE, UNSPECIFIED WHETHER STAGE 3A OR 3B CKD: ICD-10-CM

## 2021-09-23 PROCEDURE — 99499 UNLISTED E&M SERVICE: CPT | Mod: S$GLB,,, | Performed by: INTERNAL MEDICINE

## 2021-09-23 PROCEDURE — 3061F PR NEG MICROALBUMINURIA RESULT DOCUMENTED/REVIEW: ICD-10-PCS | Mod: CPTII,S$GLB,, | Performed by: INTERNAL MEDICINE

## 2021-09-23 PROCEDURE — 99215 OFFICE O/P EST HI 40 MIN: CPT | Mod: S$GLB,,, | Performed by: INTERNAL MEDICINE

## 2021-09-23 PROCEDURE — 3008F BODY MASS INDEX DOCD: CPT | Mod: CPTII,S$GLB,, | Performed by: INTERNAL MEDICINE

## 2021-09-23 PROCEDURE — 3288F FALL RISK ASSESSMENT DOCD: CPT | Mod: CPTII,S$GLB,, | Performed by: INTERNAL MEDICINE

## 2021-09-23 PROCEDURE — 99999 PR PBB SHADOW E&M-EST. PATIENT-LVL V: ICD-10-PCS | Mod: PBBFAC,,, | Performed by: INTERNAL MEDICINE

## 2021-09-23 PROCEDURE — 3288F PR FALLS RISK ASSESSMENT DOCUMENTED: ICD-10-PCS | Mod: CPTII,S$GLB,, | Performed by: INTERNAL MEDICINE

## 2021-09-23 PROCEDURE — 1160F RVW MEDS BY RX/DR IN RCRD: CPT | Mod: CPTII,S$GLB,, | Performed by: INTERNAL MEDICINE

## 2021-09-23 PROCEDURE — 1160F PR REVIEW ALL MEDS BY PRESCRIBER/CLIN PHARMACIST DOCUMENTED: ICD-10-PCS | Mod: CPTII,S$GLB,, | Performed by: INTERNAL MEDICINE

## 2021-09-23 PROCEDURE — 1126F PR PAIN SEVERITY QUANTIFIED, NO PAIN PRESENT: ICD-10-PCS | Mod: CPTII,S$GLB,, | Performed by: INTERNAL MEDICINE

## 2021-09-23 PROCEDURE — 99999 PR PBB SHADOW E&M-EST. PATIENT-LVL V: CPT | Mod: PBBFAC,,, | Performed by: INTERNAL MEDICINE

## 2021-09-23 PROCEDURE — 3061F NEG MICROALBUMINURIA REV: CPT | Mod: CPTII,S$GLB,, | Performed by: INTERNAL MEDICINE

## 2021-09-23 PROCEDURE — 3074F PR MOST RECENT SYSTOLIC BLOOD PRESSURE < 130 MM HG: ICD-10-PCS | Mod: CPTII,S$GLB,, | Performed by: INTERNAL MEDICINE

## 2021-09-23 PROCEDURE — 1159F MED LIST DOCD IN RCRD: CPT | Mod: CPTII,S$GLB,, | Performed by: INTERNAL MEDICINE

## 2021-09-23 PROCEDURE — 3078F PR MOST RECENT DIASTOLIC BLOOD PRESSURE < 80 MM HG: ICD-10-PCS | Mod: CPTII,S$GLB,, | Performed by: INTERNAL MEDICINE

## 2021-09-23 PROCEDURE — 3008F PR BODY MASS INDEX (BMI) DOCUMENTED: ICD-10-PCS | Mod: CPTII,S$GLB,, | Performed by: INTERNAL MEDICINE

## 2021-09-23 PROCEDURE — 1159F PR MEDICATION LIST DOCUMENTED IN MEDICAL RECORD: ICD-10-PCS | Mod: CPTII,S$GLB,, | Performed by: INTERNAL MEDICINE

## 2021-09-23 PROCEDURE — 99215 PR OFFICE/OUTPT VISIT, EST, LEVL V, 40-54 MIN: ICD-10-PCS | Mod: S$GLB,,, | Performed by: INTERNAL MEDICINE

## 2021-09-23 PROCEDURE — 3066F PR DOCUMENTATION OF TREATMENT FOR NEPHROPATHY: ICD-10-PCS | Mod: CPTII,S$GLB,, | Performed by: INTERNAL MEDICINE

## 2021-09-23 PROCEDURE — 99499 RISK ADDL DX/OHS AUDIT: ICD-10-PCS | Mod: S$GLB,,, | Performed by: INTERNAL MEDICINE

## 2021-09-23 PROCEDURE — 3051F PR MOST RECENT HEMOGLOBIN A1C LEVEL 7.0 - < 8.0%: ICD-10-PCS | Mod: CPTII,S$GLB,, | Performed by: INTERNAL MEDICINE

## 2021-09-23 PROCEDURE — 1101F PT FALLS ASSESS-DOCD LE1/YR: CPT | Mod: CPTII,S$GLB,, | Performed by: INTERNAL MEDICINE

## 2021-09-23 PROCEDURE — 1101F PR PT FALLS ASSESS DOC 0-1 FALLS W/OUT INJ PAST YR: ICD-10-PCS | Mod: CPTII,S$GLB,, | Performed by: INTERNAL MEDICINE

## 2021-09-23 PROCEDURE — 1126F AMNT PAIN NOTED NONE PRSNT: CPT | Mod: CPTII,S$GLB,, | Performed by: INTERNAL MEDICINE

## 2021-09-23 PROCEDURE — 3066F NEPHROPATHY DOC TX: CPT | Mod: CPTII,S$GLB,, | Performed by: INTERNAL MEDICINE

## 2021-09-23 PROCEDURE — 3051F HG A1C>EQUAL 7.0%<8.0%: CPT | Mod: CPTII,S$GLB,, | Performed by: INTERNAL MEDICINE

## 2021-09-23 PROCEDURE — 3074F SYST BP LT 130 MM HG: CPT | Mod: CPTII,S$GLB,, | Performed by: INTERNAL MEDICINE

## 2021-09-23 PROCEDURE — 3078F DIAST BP <80 MM HG: CPT | Mod: CPTII,S$GLB,, | Performed by: INTERNAL MEDICINE

## 2021-09-23 RX ORDER — ASPIRIN 81 MG/1
81 TABLET ORAL DAILY
Qty: 90 TABLET | Refills: 3 | Status: SHIPPED | OUTPATIENT
Start: 2021-09-23 | End: 2023-10-02

## 2021-09-23 RX ORDER — DULAGLUTIDE 0.75 MG/.5ML
0.75 INJECTION, SOLUTION SUBCUTANEOUS WEEKLY
Qty: 4 PEN | Refills: 11 | Status: CANCELLED | OUTPATIENT
Start: 2021-09-23 | End: 2022-09-23

## 2021-09-23 RX ORDER — EMPAGLIFLOZIN 10 MG/1
10 TABLET, FILM COATED ORAL DAILY
Qty: 30 TABLET | Refills: 11 | Status: SHIPPED | OUTPATIENT
Start: 2021-09-23 | End: 2022-08-11

## 2021-09-28 ENCOUNTER — TELEPHONE (OUTPATIENT)
Dept: INTERNAL MEDICINE | Facility: CLINIC | Age: 71
End: 2021-09-28

## 2021-09-28 DIAGNOSIS — N18.30 STAGE 3 CHRONIC KIDNEY DISEASE, UNSPECIFIED WHETHER STAGE 3A OR 3B CKD: Primary | ICD-10-CM

## 2021-09-28 DIAGNOSIS — E11.51 TYPE 2 DIABETES MELLITUS WITH DIABETIC PERIPHERAL ANGIOPATHY WITHOUT GANGRENE, WITHOUT LONG-TERM CURRENT USE OF INSULIN: Primary | ICD-10-CM

## 2021-09-28 RX ORDER — BLOOD-GLUCOSE SENSOR
1 EACH MISCELLANEOUS
Qty: 100 EACH | Refills: 11 | Status: SHIPPED | OUTPATIENT
Start: 2021-09-28 | End: 2023-04-11

## 2021-09-28 RX ORDER — BLOOD-GLUCOSE TRANSMITTER
1 EACH MISCELLANEOUS
Qty: 100 EACH | Refills: 11 | Status: SHIPPED | OUTPATIENT
Start: 2021-09-28 | End: 2023-10-02

## 2021-09-28 RX ORDER — BLOOD-GLUCOSE,RECEIVER,CONT
1 EACH MISCELLANEOUS
Qty: 1 EACH | Refills: 0 | Status: SHIPPED | OUTPATIENT
Start: 2021-09-28 | End: 2023-04-11

## 2021-09-29 ENCOUNTER — TELEPHONE (OUTPATIENT)
Dept: INTERNAL MEDICINE | Facility: CLINIC | Age: 71
End: 2021-09-29

## 2021-09-30 ENCOUNTER — TELEPHONE (OUTPATIENT)
Dept: INTERNAL MEDICINE | Facility: CLINIC | Age: 71
End: 2021-09-30

## 2021-10-04 ENCOUNTER — TELEPHONE (OUTPATIENT)
Dept: NEPHROLOGY | Facility: CLINIC | Age: 71
End: 2021-10-04

## 2021-10-04 ENCOUNTER — PATIENT MESSAGE (OUTPATIENT)
Dept: ADMINISTRATIVE | Facility: HOSPITAL | Age: 71
End: 2021-10-04

## 2021-10-05 ENCOUNTER — IMMUNIZATION (OUTPATIENT)
Dept: INTERNAL MEDICINE | Facility: CLINIC | Age: 71
End: 2021-10-05
Payer: MEDICARE

## 2021-10-05 ENCOUNTER — LAB VISIT (OUTPATIENT)
Dept: LAB | Facility: OTHER | Age: 71
End: 2021-10-05
Attending: INTERNAL MEDICINE
Payer: MEDICARE

## 2021-10-05 DIAGNOSIS — N18.30 CKD (CHRONIC KIDNEY DISEASE) STAGE 3, GFR 30-59 ML/MIN: ICD-10-CM

## 2021-10-05 DIAGNOSIS — I12.9 HYPERTENSIVE KIDNEY DISEASE WITH STAGE 3 CHRONIC KIDNEY DISEASE: ICD-10-CM

## 2021-10-05 DIAGNOSIS — Z23 NEED FOR VACCINATION: Primary | ICD-10-CM

## 2021-10-05 DIAGNOSIS — E11.22 TYPE 2 DIABETES MELLITUS WITH STAGE 3 CHRONIC KIDNEY DISEASE, WITHOUT LONG-TERM CURRENT USE OF INSULIN: ICD-10-CM

## 2021-10-05 DIAGNOSIS — N18.30 HYPERTENSIVE KIDNEY DISEASE WITH STAGE 3 CHRONIC KIDNEY DISEASE: ICD-10-CM

## 2021-10-05 DIAGNOSIS — N18.30 TYPE 2 DIABETES MELLITUS WITH STAGE 3 CHRONIC KIDNEY DISEASE, WITHOUT LONG-TERM CURRENT USE OF INSULIN: ICD-10-CM

## 2021-10-05 LAB
25(OH)D3+25(OH)D2 SERPL-MCNC: 50 NG/ML (ref 30–96)
ALBUMIN SERPL BCP-MCNC: 3.9 G/DL (ref 3.5–5.2)
ANION GAP SERPL CALC-SCNC: 12 MMOL/L (ref 8–16)
BASOPHILS # BLD AUTO: 0.01 K/UL (ref 0–0.2)
BASOPHILS NFR BLD: 0.1 % (ref 0–1.9)
BUN SERPL-MCNC: 18 MG/DL (ref 8–23)
CALCIUM SERPL-MCNC: 9.7 MG/DL (ref 8.7–10.5)
CHLORIDE SERPL-SCNC: 102 MMOL/L (ref 95–110)
CO2 SERPL-SCNC: 24 MMOL/L (ref 23–29)
CREAT SERPL-MCNC: 1.5 MG/DL (ref 0.5–1.4)
DIFFERENTIAL METHOD: ABNORMAL
EOSINOPHIL # BLD AUTO: 0.2 K/UL (ref 0–0.5)
EOSINOPHIL NFR BLD: 3 % (ref 0–8)
ERYTHROCYTE [DISTWIDTH] IN BLOOD BY AUTOMATED COUNT: 11.2 % (ref 11.5–14.5)
EST. GFR  (AFRICAN AMERICAN): 40 ML/MIN/1.73 M^2
EST. GFR  (NON AFRICAN AMERICAN): 35 ML/MIN/1.73 M^2
GLUCOSE SERPL-MCNC: 202 MG/DL (ref 70–110)
HCT VFR BLD AUTO: 38.4 % (ref 37–48.5)
HGB BLD-MCNC: 12.8 G/DL (ref 12–16)
IMM GRANULOCYTES # BLD AUTO: 0.02 K/UL (ref 0–0.04)
IMM GRANULOCYTES NFR BLD AUTO: 0.3 % (ref 0–0.5)
LYMPHOCYTES # BLD AUTO: 2 K/UL (ref 1–4.8)
LYMPHOCYTES NFR BLD: 27.9 % (ref 18–48)
MCH RBC QN AUTO: 29.4 PG (ref 27–31)
MCHC RBC AUTO-ENTMCNC: 33.3 G/DL (ref 32–36)
MCV RBC AUTO: 88 FL (ref 82–98)
MONOCYTES # BLD AUTO: 0.5 K/UL (ref 0.3–1)
MONOCYTES NFR BLD: 6.5 % (ref 4–15)
NEUTROPHILS # BLD AUTO: 4.5 K/UL (ref 1.8–7.7)
NEUTROPHILS NFR BLD: 62.2 % (ref 38–73)
NRBC BLD-RTO: 0 /100 WBC
PHOSPHATE SERPL-MCNC: 3.8 MG/DL (ref 2.7–4.5)
PLATELET # BLD AUTO: 197 K/UL (ref 150–450)
PMV BLD AUTO: 12.3 FL (ref 9.2–12.9)
POTASSIUM SERPL-SCNC: 4.4 MMOL/L (ref 3.5–5.1)
PTH-INTACT SERPL-MCNC: 103.9 PG/ML (ref 9–77)
RBC # BLD AUTO: 4.36 M/UL (ref 4–5.4)
SODIUM SERPL-SCNC: 138 MMOL/L (ref 136–145)
URATE SERPL-MCNC: 5.6 MG/DL (ref 2.4–5.7)
WBC # BLD AUTO: 7.27 K/UL (ref 3.9–12.7)

## 2021-10-05 PROCEDURE — 91300 COVID-19, MRNA, LNP-S, PF, 30 MCG/0.3 ML DOSE VACCINE: CPT | Mod: PBBFAC | Performed by: INTERNAL MEDICINE

## 2021-10-05 PROCEDURE — 83970 ASSAY OF PARATHORMONE: CPT | Performed by: INTERNAL MEDICINE

## 2021-10-05 PROCEDURE — 84550 ASSAY OF BLOOD/URIC ACID: CPT | Performed by: INTERNAL MEDICINE

## 2021-10-05 PROCEDURE — 85025 COMPLETE CBC W/AUTO DIFF WBC: CPT | Performed by: INTERNAL MEDICINE

## 2021-10-05 PROCEDURE — 80069 RENAL FUNCTION PANEL: CPT | Performed by: INTERNAL MEDICINE

## 2021-10-05 PROCEDURE — 36415 COLL VENOUS BLD VENIPUNCTURE: CPT | Performed by: INTERNAL MEDICINE

## 2021-10-05 PROCEDURE — 82306 VITAMIN D 25 HYDROXY: CPT | Performed by: INTERNAL MEDICINE

## 2021-10-05 PROCEDURE — 0003A COVID-19, MRNA, LNP-S, PF, 30 MCG/0.3 ML DOSE VACCINE: CPT | Mod: PBBFAC | Performed by: INTERNAL MEDICINE

## 2021-10-07 ENCOUNTER — OFFICE VISIT (OUTPATIENT)
Dept: NEPHROLOGY | Facility: CLINIC | Age: 71
End: 2021-10-07
Payer: MEDICARE

## 2021-10-07 VITALS
BODY MASS INDEX: 23.39 KG/M2 | DIASTOLIC BLOOD PRESSURE: 80 MMHG | SYSTOLIC BLOOD PRESSURE: 120 MMHG | OXYGEN SATURATION: 95 % | HEART RATE: 65 BPM | HEIGHT: 70 IN | WEIGHT: 163.38 LBS

## 2021-10-07 DIAGNOSIS — E21.3 HYPERPARATHYROIDISM: ICD-10-CM

## 2021-10-07 DIAGNOSIS — N18.32 TYPE 2 DIABETES MELLITUS WITH STAGE 3B CHRONIC KIDNEY DISEASE, WITHOUT LONG-TERM CURRENT USE OF INSULIN: ICD-10-CM

## 2021-10-07 DIAGNOSIS — E55.9 VITAMIN D DEFICIENCY: ICD-10-CM

## 2021-10-07 DIAGNOSIS — I12.9 HYPERTENSIVE KIDNEY DISEASE WITH STAGE 3B CHRONIC KIDNEY DISEASE: Primary | ICD-10-CM

## 2021-10-07 DIAGNOSIS — E11.22 TYPE 2 DIABETES MELLITUS WITH STAGE 3B CHRONIC KIDNEY DISEASE, WITHOUT LONG-TERM CURRENT USE OF INSULIN: ICD-10-CM

## 2021-10-07 DIAGNOSIS — N18.32 HYPERTENSIVE KIDNEY DISEASE WITH STAGE 3B CHRONIC KIDNEY DISEASE: Primary | ICD-10-CM

## 2021-10-07 DIAGNOSIS — N18.32 STAGE 3B CHRONIC KIDNEY DISEASE: ICD-10-CM

## 2021-10-07 PROCEDURE — 1160F PR REVIEW ALL MEDS BY PRESCRIBER/CLIN PHARMACIST DOCUMENTED: ICD-10-PCS | Mod: CPTII,S$GLB,, | Performed by: INTERNAL MEDICINE

## 2021-10-07 PROCEDURE — 3061F PR NEG MICROALBUMINURIA RESULT DOCUMENTED/REVIEW: ICD-10-PCS | Mod: CPTII,S$GLB,, | Performed by: INTERNAL MEDICINE

## 2021-10-07 PROCEDURE — 3051F PR MOST RECENT HEMOGLOBIN A1C LEVEL 7.0 - < 8.0%: ICD-10-PCS | Mod: CPTII,S$GLB,, | Performed by: INTERNAL MEDICINE

## 2021-10-07 PROCEDURE — 99214 OFFICE O/P EST MOD 30 MIN: CPT | Mod: S$GLB,,, | Performed by: INTERNAL MEDICINE

## 2021-10-07 PROCEDURE — 3008F PR BODY MASS INDEX (BMI) DOCUMENTED: ICD-10-PCS | Mod: CPTII,S$GLB,, | Performed by: INTERNAL MEDICINE

## 2021-10-07 PROCEDURE — 1159F PR MEDICATION LIST DOCUMENTED IN MEDICAL RECORD: ICD-10-PCS | Mod: CPTII,S$GLB,, | Performed by: INTERNAL MEDICINE

## 2021-10-07 PROCEDURE — 3008F BODY MASS INDEX DOCD: CPT | Mod: CPTII,S$GLB,, | Performed by: INTERNAL MEDICINE

## 2021-10-07 PROCEDURE — 3288F PR FALLS RISK ASSESSMENT DOCUMENTED: ICD-10-PCS | Mod: CPTII,S$GLB,, | Performed by: INTERNAL MEDICINE

## 2021-10-07 PROCEDURE — 3066F NEPHROPATHY DOC TX: CPT | Mod: CPTII,S$GLB,, | Performed by: INTERNAL MEDICINE

## 2021-10-07 PROCEDURE — 99214 PR OFFICE/OUTPT VISIT, EST, LEVL IV, 30-39 MIN: ICD-10-PCS | Mod: S$GLB,,, | Performed by: INTERNAL MEDICINE

## 2021-10-07 PROCEDURE — 3051F HG A1C>EQUAL 7.0%<8.0%: CPT | Mod: CPTII,S$GLB,, | Performed by: INTERNAL MEDICINE

## 2021-10-07 PROCEDURE — 99999 PR PBB SHADOW E&M-EST. PATIENT-LVL V: ICD-10-PCS | Mod: PBBFAC,,, | Performed by: INTERNAL MEDICINE

## 2021-10-07 PROCEDURE — 3066F PR DOCUMENTATION OF TREATMENT FOR NEPHROPATHY: ICD-10-PCS | Mod: CPTII,S$GLB,, | Performed by: INTERNAL MEDICINE

## 2021-10-07 PROCEDURE — 3079F PR MOST RECENT DIASTOLIC BLOOD PRESSURE 80-89 MM HG: ICD-10-PCS | Mod: CPTII,S$GLB,, | Performed by: INTERNAL MEDICINE

## 2021-10-07 PROCEDURE — 1101F PT FALLS ASSESS-DOCD LE1/YR: CPT | Mod: CPTII,S$GLB,, | Performed by: INTERNAL MEDICINE

## 2021-10-07 PROCEDURE — 1160F RVW MEDS BY RX/DR IN RCRD: CPT | Mod: CPTII,S$GLB,, | Performed by: INTERNAL MEDICINE

## 2021-10-07 PROCEDURE — 1126F AMNT PAIN NOTED NONE PRSNT: CPT | Mod: CPTII,S$GLB,, | Performed by: INTERNAL MEDICINE

## 2021-10-07 PROCEDURE — 1159F MED LIST DOCD IN RCRD: CPT | Mod: CPTII,S$GLB,, | Performed by: INTERNAL MEDICINE

## 2021-10-07 PROCEDURE — 3074F SYST BP LT 130 MM HG: CPT | Mod: CPTII,S$GLB,, | Performed by: INTERNAL MEDICINE

## 2021-10-07 PROCEDURE — 3061F NEG MICROALBUMINURIA REV: CPT | Mod: CPTII,S$GLB,, | Performed by: INTERNAL MEDICINE

## 2021-10-07 PROCEDURE — 99999 PR PBB SHADOW E&M-EST. PATIENT-LVL V: CPT | Mod: PBBFAC,,, | Performed by: INTERNAL MEDICINE

## 2021-10-07 PROCEDURE — 1126F PR PAIN SEVERITY QUANTIFIED, NO PAIN PRESENT: ICD-10-PCS | Mod: CPTII,S$GLB,, | Performed by: INTERNAL MEDICINE

## 2021-10-07 PROCEDURE — 3288F FALL RISK ASSESSMENT DOCD: CPT | Mod: CPTII,S$GLB,, | Performed by: INTERNAL MEDICINE

## 2021-10-07 PROCEDURE — 3074F PR MOST RECENT SYSTOLIC BLOOD PRESSURE < 130 MM HG: ICD-10-PCS | Mod: CPTII,S$GLB,, | Performed by: INTERNAL MEDICINE

## 2021-10-07 PROCEDURE — 1101F PR PT FALLS ASSESS DOC 0-1 FALLS W/OUT INJ PAST YR: ICD-10-PCS | Mod: CPTII,S$GLB,, | Performed by: INTERNAL MEDICINE

## 2021-10-07 PROCEDURE — 3079F DIAST BP 80-89 MM HG: CPT | Mod: CPTII,S$GLB,, | Performed by: INTERNAL MEDICINE

## 2021-11-09 ENCOUNTER — PES CALL (OUTPATIENT)
Dept: ADMINISTRATIVE | Facility: CLINIC | Age: 71
End: 2021-11-09
Payer: MEDICARE

## 2021-12-21 ENCOUNTER — LAB VISIT (OUTPATIENT)
Dept: LAB | Facility: OTHER | Age: 71
End: 2021-12-21
Attending: INTERNAL MEDICINE
Payer: MEDICARE

## 2021-12-21 DIAGNOSIS — E11.51 TYPE 2 DIABETES MELLITUS WITH DIABETIC PERIPHERAL ANGIOPATHY WITHOUT GANGRENE, WITHOUT LONG-TERM CURRENT USE OF INSULIN: ICD-10-CM

## 2021-12-21 LAB
ESTIMATED AVG GLUCOSE: 148 MG/DL (ref 68–131)
HBA1C MFR BLD: 6.8 % (ref 4–5.6)

## 2021-12-21 PROCEDURE — 36415 COLL VENOUS BLD VENIPUNCTURE: CPT | Performed by: INTERNAL MEDICINE

## 2021-12-21 PROCEDURE — 83036 HEMOGLOBIN GLYCOSYLATED A1C: CPT | Performed by: INTERNAL MEDICINE

## 2021-12-23 ENCOUNTER — OFFICE VISIT (OUTPATIENT)
Dept: INTERNAL MEDICINE | Facility: CLINIC | Age: 71
End: 2021-12-23
Payer: MEDICARE

## 2021-12-23 ENCOUNTER — TELEPHONE (OUTPATIENT)
Dept: INTERNAL MEDICINE | Facility: CLINIC | Age: 71
End: 2021-12-23

## 2021-12-23 VITALS
WEIGHT: 165.13 LBS | HEIGHT: 70 IN | DIASTOLIC BLOOD PRESSURE: 60 MMHG | HEART RATE: 69 BPM | OXYGEN SATURATION: 97 % | SYSTOLIC BLOOD PRESSURE: 120 MMHG | BODY MASS INDEX: 23.64 KG/M2

## 2021-12-23 DIAGNOSIS — G95.89 MYELOMALACIA: ICD-10-CM

## 2021-12-23 DIAGNOSIS — I10 ESSENTIAL HYPERTENSION: ICD-10-CM

## 2021-12-23 DIAGNOSIS — M19.012 PRIMARY OSTEOARTHRITIS OF BOTH SHOULDERS: Primary | ICD-10-CM

## 2021-12-23 DIAGNOSIS — E11.22 TYPE 2 DIABETES MELLITUS WITH STAGE 3 CHRONIC KIDNEY DISEASE, WITHOUT LONG-TERM CURRENT USE OF INSULIN, UNSPECIFIED WHETHER STAGE 3A OR 3B CKD: ICD-10-CM

## 2021-12-23 DIAGNOSIS — I70.0 AORTIC ATHEROSCLEROSIS: ICD-10-CM

## 2021-12-23 DIAGNOSIS — M19.011 PRIMARY OSTEOARTHRITIS OF BOTH SHOULDERS: Primary | ICD-10-CM

## 2021-12-23 DIAGNOSIS — N18.30 TYPE 2 DIABETES MELLITUS WITH STAGE 3 CHRONIC KIDNEY DISEASE, WITHOUT LONG-TERM CURRENT USE OF INSULIN, UNSPECIFIED WHETHER STAGE 3A OR 3B CKD: ICD-10-CM

## 2021-12-23 DIAGNOSIS — L30.9 DERMATITIS: ICD-10-CM

## 2021-12-23 PROCEDURE — 3044F PR MOST RECENT HEMOGLOBIN A1C LEVEL <7.0%: ICD-10-PCS | Mod: CPTII,S$GLB,, | Performed by: INTERNAL MEDICINE

## 2021-12-23 PROCEDURE — 1101F PR PT FALLS ASSESS DOC 0-1 FALLS W/OUT INJ PAST YR: ICD-10-PCS | Mod: CPTII,S$GLB,, | Performed by: INTERNAL MEDICINE

## 2021-12-23 PROCEDURE — 99499 UNLISTED E&M SERVICE: CPT | Mod: S$GLB,,, | Performed by: INTERNAL MEDICINE

## 2021-12-23 PROCEDURE — 3074F PR MOST RECENT SYSTOLIC BLOOD PRESSURE < 130 MM HG: ICD-10-PCS | Mod: CPTII,S$GLB,, | Performed by: INTERNAL MEDICINE

## 2021-12-23 PROCEDURE — 3060F PR POS MICROALBUMINURIA RESULT DOCUMENTED/REVIEW: ICD-10-PCS | Mod: CPTII,S$GLB,, | Performed by: INTERNAL MEDICINE

## 2021-12-23 PROCEDURE — 1159F MED LIST DOCD IN RCRD: CPT | Mod: CPTII,S$GLB,, | Performed by: INTERNAL MEDICINE

## 2021-12-23 PROCEDURE — 99999 PR PBB SHADOW E&M-EST. PATIENT-LVL IV: ICD-10-PCS | Mod: PBBFAC,,, | Performed by: INTERNAL MEDICINE

## 2021-12-23 PROCEDURE — 99499 RISK ADDL DX/OHS AUDIT: ICD-10-PCS | Mod: S$GLB,,, | Performed by: INTERNAL MEDICINE

## 2021-12-23 PROCEDURE — 3078F DIAST BP <80 MM HG: CPT | Mod: CPTII,S$GLB,, | Performed by: INTERNAL MEDICINE

## 2021-12-23 PROCEDURE — 3288F PR FALLS RISK ASSESSMENT DOCUMENTED: ICD-10-PCS | Mod: CPTII,S$GLB,, | Performed by: INTERNAL MEDICINE

## 2021-12-23 PROCEDURE — 3044F HG A1C LEVEL LT 7.0%: CPT | Mod: CPTII,S$GLB,, | Performed by: INTERNAL MEDICINE

## 2021-12-23 PROCEDURE — 1160F PR REVIEW ALL MEDS BY PRESCRIBER/CLIN PHARMACIST DOCUMENTED: ICD-10-PCS | Mod: CPTII,S$GLB,, | Performed by: INTERNAL MEDICINE

## 2021-12-23 PROCEDURE — 1101F PT FALLS ASSESS-DOCD LE1/YR: CPT | Mod: CPTII,S$GLB,, | Performed by: INTERNAL MEDICINE

## 2021-12-23 PROCEDURE — 99215 PR OFFICE/OUTPT VISIT, EST, LEVL V, 40-54 MIN: ICD-10-PCS | Mod: S$GLB,,, | Performed by: INTERNAL MEDICINE

## 2021-12-23 PROCEDURE — 3008F PR BODY MASS INDEX (BMI) DOCUMENTED: ICD-10-PCS | Mod: CPTII,S$GLB,, | Performed by: INTERNAL MEDICINE

## 2021-12-23 PROCEDURE — 3074F SYST BP LT 130 MM HG: CPT | Mod: CPTII,S$GLB,, | Performed by: INTERNAL MEDICINE

## 2021-12-23 PROCEDURE — 99215 OFFICE O/P EST HI 40 MIN: CPT | Mod: S$GLB,,, | Performed by: INTERNAL MEDICINE

## 2021-12-23 PROCEDURE — 1125F AMNT PAIN NOTED PAIN PRSNT: CPT | Mod: CPTII,S$GLB,, | Performed by: INTERNAL MEDICINE

## 2021-12-23 PROCEDURE — 1125F PR PAIN SEVERITY QUANTIFIED, PAIN PRESENT: ICD-10-PCS | Mod: CPTII,S$GLB,, | Performed by: INTERNAL MEDICINE

## 2021-12-23 PROCEDURE — 3288F FALL RISK ASSESSMENT DOCD: CPT | Mod: CPTII,S$GLB,, | Performed by: INTERNAL MEDICINE

## 2021-12-23 PROCEDURE — 1160F RVW MEDS BY RX/DR IN RCRD: CPT | Mod: CPTII,S$GLB,, | Performed by: INTERNAL MEDICINE

## 2021-12-23 PROCEDURE — 3008F BODY MASS INDEX DOCD: CPT | Mod: CPTII,S$GLB,, | Performed by: INTERNAL MEDICINE

## 2021-12-23 PROCEDURE — 3078F PR MOST RECENT DIASTOLIC BLOOD PRESSURE < 80 MM HG: ICD-10-PCS | Mod: CPTII,S$GLB,, | Performed by: INTERNAL MEDICINE

## 2021-12-23 PROCEDURE — 3066F PR DOCUMENTATION OF TREATMENT FOR NEPHROPATHY: ICD-10-PCS | Mod: CPTII,S$GLB,, | Performed by: INTERNAL MEDICINE

## 2021-12-23 PROCEDURE — 99999 PR PBB SHADOW E&M-EST. PATIENT-LVL IV: CPT | Mod: PBBFAC,,, | Performed by: INTERNAL MEDICINE

## 2021-12-23 PROCEDURE — 1159F PR MEDICATION LIST DOCUMENTED IN MEDICAL RECORD: ICD-10-PCS | Mod: CPTII,S$GLB,, | Performed by: INTERNAL MEDICINE

## 2021-12-23 PROCEDURE — 3060F POS MICROALBUMINURIA REV: CPT | Mod: CPTII,S$GLB,, | Performed by: INTERNAL MEDICINE

## 2021-12-23 PROCEDURE — 3066F NEPHROPATHY DOC TX: CPT | Mod: CPTII,S$GLB,, | Performed by: INTERNAL MEDICINE

## 2021-12-23 RX ORDER — DICLOFENAC SODIUM 10 MG/G
2 GEL TOPICAL 4 TIMES DAILY PRN
Qty: 100 G | Refills: 11 | Status: SHIPPED | OUTPATIENT
Start: 2021-12-23 | End: 2022-08-11 | Stop reason: SDUPTHER

## 2021-12-23 RX ORDER — TRIAMCINOLONE ACETONIDE 5 MG/G
CREAM TOPICAL 2 TIMES DAILY PRN
Qty: 15 G | Refills: 0 | Status: SHIPPED | OUTPATIENT
Start: 2021-12-23 | End: 2022-03-08

## 2021-12-23 RX ORDER — AMLODIPINE BESYLATE 10 MG/1
10 TABLET ORAL DAILY
Qty: 90 TABLET | Refills: 3 | Status: SHIPPED | OUTPATIENT
Start: 2021-12-23 | End: 2022-08-11 | Stop reason: SDUPTHER

## 2021-12-23 RX ORDER — KETOCONAZOLE 20 MG/G
CREAM TOPICAL DAILY PRN
Qty: 15 G | Refills: 5 | Status: SHIPPED | OUTPATIENT
Start: 2021-12-23 | End: 2022-12-23

## 2021-12-23 RX ORDER — MONTELUKAST SODIUM 10 MG/1
TABLET ORAL
COMMUNITY
Start: 2021-12-14 | End: 2022-07-15

## 2021-12-23 RX ORDER — EMPAGLIFLOZIN 10 MG/1
10 TABLET, FILM COATED ORAL DAILY
Qty: 30 TABLET | Refills: 11 | Status: CANCELLED | OUTPATIENT
Start: 2021-12-23

## 2021-12-23 RX ORDER — METFORMIN HYDROCHLORIDE 500 MG/1
1000 TABLET, EXTENDED RELEASE ORAL
Qty: 180 TABLET | Refills: 3 | Status: SHIPPED | OUTPATIENT
Start: 2021-12-23 | End: 2022-12-27

## 2021-12-23 RX ORDER — INFLUENZA VACCINE, ADJUVANTED 15; 15; 15; 15 UG/.5ML; UG/.5ML; UG/.5ML; UG/.5ML
INJECTION, SUSPENSION INTRAMUSCULAR
COMMUNITY
Start: 2021-09-27 | End: 2021-12-23 | Stop reason: ALTCHOICE

## 2022-01-11 ENCOUNTER — OFFICE VISIT (OUTPATIENT)
Dept: ORTHOPEDICS | Facility: CLINIC | Age: 72
End: 2022-01-11
Payer: MEDICARE

## 2022-01-11 ENCOUNTER — HOSPITAL ENCOUNTER (OUTPATIENT)
Dept: RADIOLOGY | Facility: HOSPITAL | Age: 72
Discharge: HOME | End: 2022-01-11
Attending: PHYSICIAN ASSISTANT
Payer: MEDICARE

## 2022-01-11 VITALS
DIASTOLIC BLOOD PRESSURE: 64 MMHG | WEIGHT: 166 LBS | SYSTOLIC BLOOD PRESSURE: 145 MMHG | HEIGHT: 70 IN | HEART RATE: 61 BPM | BODY MASS INDEX: 23.77 KG/M2

## 2022-01-11 DIAGNOSIS — M54.2 CERVICAL PAIN (NECK): ICD-10-CM

## 2022-01-11 DIAGNOSIS — M25.512 CHRONIC LEFT SHOULDER PAIN: Primary | ICD-10-CM

## 2022-01-11 DIAGNOSIS — G89.29 CHRONIC LEFT SHOULDER PAIN: Primary | ICD-10-CM

## 2022-01-11 PROCEDURE — 72050 X-RAY EXAM NECK SPINE 4/5VWS: CPT | Mod: TC

## 2022-01-11 PROCEDURE — 99214 PR OFFICE/OUTPT VISIT, EST, LEVL IV, 30-39 MIN: ICD-10-PCS | Mod: 25,S$GLB,, | Performed by: PHYSICIAN ASSISTANT

## 2022-01-11 PROCEDURE — 3077F PR MOST RECENT SYSTOLIC BLOOD PRESSURE >= 140 MM HG: ICD-10-PCS | Mod: CPTII,S$GLB,, | Performed by: PHYSICIAN ASSISTANT

## 2022-01-11 PROCEDURE — 1101F PR PT FALLS ASSESS DOC 0-1 FALLS W/OUT INJ PAST YR: ICD-10-PCS | Mod: CPTII,S$GLB,, | Performed by: PHYSICIAN ASSISTANT

## 2022-01-11 PROCEDURE — 99999 PR PBB SHADOW E&M-EST. PATIENT-LVL V: ICD-10-PCS | Mod: PBBFAC,,, | Performed by: PHYSICIAN ASSISTANT

## 2022-01-11 PROCEDURE — 72050 XR CERVICAL SPINE COMPLETE 5 VIEW: ICD-10-PCS | Mod: 26,,, | Performed by: RADIOLOGY

## 2022-01-11 PROCEDURE — 99999 PR PBB SHADOW E&M-EST. PATIENT-LVL V: CPT | Mod: PBBFAC,,, | Performed by: PHYSICIAN ASSISTANT

## 2022-01-11 PROCEDURE — 1160F PR REVIEW ALL MEDS BY PRESCRIBER/CLIN PHARMACIST DOCUMENTED: ICD-10-PCS | Mod: CPTII,S$GLB,, | Performed by: PHYSICIAN ASSISTANT

## 2022-01-11 PROCEDURE — 1160F RVW MEDS BY RX/DR IN RCRD: CPT | Mod: CPTII,S$GLB,, | Performed by: PHYSICIAN ASSISTANT

## 2022-01-11 PROCEDURE — 3078F DIAST BP <80 MM HG: CPT | Mod: CPTII,S$GLB,, | Performed by: PHYSICIAN ASSISTANT

## 2022-01-11 PROCEDURE — 3008F PR BODY MASS INDEX (BMI) DOCUMENTED: ICD-10-PCS | Mod: CPTII,S$GLB,, | Performed by: PHYSICIAN ASSISTANT

## 2022-01-11 PROCEDURE — 3288F PR FALLS RISK ASSESSMENT DOCUMENTED: ICD-10-PCS | Mod: CPTII,S$GLB,, | Performed by: PHYSICIAN ASSISTANT

## 2022-01-11 PROCEDURE — 20610 DRAIN/INJ JOINT/BURSA W/O US: CPT | Mod: LT,S$GLB,, | Performed by: PHYSICIAN ASSISTANT

## 2022-01-11 PROCEDURE — 99214 OFFICE O/P EST MOD 30 MIN: CPT | Mod: 25,S$GLB,, | Performed by: PHYSICIAN ASSISTANT

## 2022-01-11 PROCEDURE — 3008F BODY MASS INDEX DOCD: CPT | Mod: CPTII,S$GLB,, | Performed by: PHYSICIAN ASSISTANT

## 2022-01-11 PROCEDURE — 3288F FALL RISK ASSESSMENT DOCD: CPT | Mod: CPTII,S$GLB,, | Performed by: PHYSICIAN ASSISTANT

## 2022-01-11 PROCEDURE — 20610 PR DRAIN/INJECT LARGE JOINT/BURSA: ICD-10-PCS | Mod: LT,S$GLB,, | Performed by: PHYSICIAN ASSISTANT

## 2022-01-11 PROCEDURE — 3078F PR MOST RECENT DIASTOLIC BLOOD PRESSURE < 80 MM HG: ICD-10-PCS | Mod: CPTII,S$GLB,, | Performed by: PHYSICIAN ASSISTANT

## 2022-01-11 PROCEDURE — 72050 X-RAY EXAM NECK SPINE 4/5VWS: CPT | Mod: 26,,, | Performed by: RADIOLOGY

## 2022-01-11 PROCEDURE — 3077F SYST BP >= 140 MM HG: CPT | Mod: CPTII,S$GLB,, | Performed by: PHYSICIAN ASSISTANT

## 2022-01-11 PROCEDURE — 1125F AMNT PAIN NOTED PAIN PRSNT: CPT | Mod: CPTII,S$GLB,, | Performed by: PHYSICIAN ASSISTANT

## 2022-01-11 PROCEDURE — 1159F PR MEDICATION LIST DOCUMENTED IN MEDICAL RECORD: ICD-10-PCS | Mod: CPTII,S$GLB,, | Performed by: PHYSICIAN ASSISTANT

## 2022-01-11 PROCEDURE — 1101F PT FALLS ASSESS-DOCD LE1/YR: CPT | Mod: CPTII,S$GLB,, | Performed by: PHYSICIAN ASSISTANT

## 2022-01-11 PROCEDURE — 1159F MED LIST DOCD IN RCRD: CPT | Mod: CPTII,S$GLB,, | Performed by: PHYSICIAN ASSISTANT

## 2022-01-11 PROCEDURE — 1125F PR PAIN SEVERITY QUANTIFIED, PAIN PRESENT: ICD-10-PCS | Mod: CPTII,S$GLB,, | Performed by: PHYSICIAN ASSISTANT

## 2022-01-11 RX ORDER — BETAMETHASONE SODIUM PHOSPHATE AND BETAMETHASONE ACETATE 3; 3 MG/ML; MG/ML
6 INJECTION, SUSPENSION INTRA-ARTICULAR; INTRALESIONAL; INTRAMUSCULAR; SOFT TISSUE
Status: COMPLETED | OUTPATIENT
Start: 2022-01-11 | End: 2022-01-11

## 2022-01-11 RX ADMIN — BETAMETHASONE SODIUM PHOSPHATE AND BETAMETHASONE ACETATE 6 MG: 3; 3 INJECTION, SUSPENSION INTRA-ARTICULAR; INTRALESIONAL; INTRAMUSCULAR; SOFT TISSUE at 11:01

## 2022-01-11 NOTE — PROGRESS NOTES
SUBJECTIVE:     Chief Complaint & History of Present Illness:  Meenu Guy is a  New  patient 71 y.o. female who is seen here today with a complaint of    Chief Complaint   Patient presents with    Left Shoulder - Pain    Right Shoulder - Pain    .  Patient is here today for evaluation treatment of bilateral shoulder pain left much more than right for the past several months.  She does not remember a specific trauma or injury to the area but does have significant pain in the left shoulder and arm.  Patient relates the pain begins near the base of the neck radiates across the shoulder down the arm to the wrist.  It is exacerbated by activities greater than shoulder height his difficulty with lifting caring.  Of note she does have an extensive history of multiple surgeries and treatments for severe degenerative changes in her cervical spine.  On a scale of 1-10, with 10 being worst pain imaginable, he rates this pain as 5 on good days and 9 on bad days.  she describes the pain as sore and achy.    Review of patient's allergies indicates:   Allergen Reactions    Statins-hmg-coa reductase inhibitors Edema     Tolerating Lipitor    Ace inhibitors Swelling         Current Outpatient Medications   Medication Sig Dispense Refill    amLODIPine (NORVASC) 10 MG tablet Take 1 tablet (10 mg total) by mouth once daily. 90 tablet 3    aspirin (ECOTRIN) 81 MG EC tablet Take 1 tablet (81 mg total) by mouth once daily. for 365 doses 90 tablet 3    atorvastatin (LIPITOR) 20 MG tablet Take 1 tablet (20 mg total) by mouth once daily. 90 tablet 3    blood sugar diagnostic Strp 1 each by Misc.(Non-Drug; Combo Route) route 3 (three) times daily. 200 each 11    blood-glucose meter,continuous (DEXCOM G6 ) Misc 1 each by Misc.(Non-Drug; Combo Route) route 4 (four) times daily before meals and nightly. 1 each 0    blood-glucose sensor (DEXCOM G6 SENSOR) Cici 1 each by Misc.(Non-Drug; Combo Route) route 4 (four)  times daily before meals and nightly. 100 each 11    blood-glucose transmitter (DEXCOM G6 TRANSMITTER) Cici 1 each by Misc.(Non-Drug; Combo Route) route 4 (four) times daily before meals and nightly. 100 each 11    carvediloL (COREG) 6.25 MG tablet Take 1 tablet (6.25 mg total) by mouth 2 (two) times daily. 180 tablet 3    diclofenac sodium (VOLTAREN) 1 % Gel Apply 2 g topically 4 (four) times daily as needed (pain). 100 g 11    empagliflozin (JARDIANCE) 10 mg tablet Take 1 tablet (10 mg total) by mouth once daily. 30 tablet 11    fluticasone propionate (FLONASE) 50 mcg/actuation nasal spray 1 spray (50 mcg total) by Each Nostril route once daily. 16 g 11    glimepiride (AMARYL) 4 MG tablet Take 1 tablet (4 mg total) by mouth daily with breakfast. 90 tablet 3    ketoconazole (NIZORAL) 2 % cream Apply topically daily as needed (rash). 15 g 5    lancets Misc 1 each by Misc.(Non-Drug; Combo Route) route 3 (three) times daily. 200 each 1    metFORMIN (GLUCOPHAGE-XR) 500 MG ER 24hr tablet Take 2 tablets (1,000 mg total) by mouth daily with breakfast. 180 tablet 3    montelukast (SINGULAIR) 10 mg tablet as needed.      pantoprazole (PROTONIX) 40 MG tablet Take 40 mg by mouth as needed.      triamcinolone acetonide 0.5% (KENALOG) 0.5 % Crea Apply topically 2 (two) times daily as needed (rash). 15 g 0    blood-glucose meter kit Use as instructed 1 each 0     No current facility-administered medications for this visit.       Past Medical History:   Diagnosis Date    Diabetes mellitus, type 2     Hypertension        Past Surgical History:   Procedure Laterality Date    BACK SURGERY      CHOLECYSTECTOMY      DISC REMOVAL      EPIDURAL STEROID INJECTION N/A 10/30/2019    Procedure: INJECTION, STEROID, EPIDURAL, C7-T1 IL;  Surgeon: Rahul Bowie MD;  Location: [facility];  Service: Pain Management;  Laterality: N/A;    HYSTERECTOMY         Vital Signs (Most Recent)  Vitals:    01/11/22 0828   BP: (!)  "145/64   Pulse: 61       Review of Systems:  ROS:  Constitutional: no fever or chills  Eyes: no visual changes  ENT: no nasal congestion or sore throat  Respiratory: no cough or shortness of breath  Cardiovascular: no chest pain or palpitations, Positive peripheral vascular disease, aortic atherosclerosis, CAD  Gastrointestinal: no nausea or vomiting, tolerating diet, Positive for GERD  Genitourinary: no hematuria or dysuria, Positive CKD stage 3  Integument/Breast: no rash or pruritis  Hematologic/Lymphatic: no easy bruising or lymphadenopathy, Positive normocytic anemia  Musculoskeletal: no arthralgias or myalgias, Positive chronic pain syndrome  Neurological: no seizures or tremors, Positive cervical radiculopathy, Myelomalacia  Behavioral/Psych: no auditory or visual hallucinations  Endocrine: no heat or cold intolerance, Positive diabetes type 2, vitamin-D deficiency, hyperparathyroidism      OBJECTIVE:     PHYSICAL EXAM:  Height: 5' 10" (177.8 cm) Weight: 75.3 kg (166 lb 0.1 oz), General Appearance: Well nourished, well developed, in no acute distress.  Neurological: Mood & affect are normal.  Shoulder exam: left  Tenderness: clavicle, biceps tendon, lateral acromial, posterior acromial, deltoid muscle, trapezius muscle  ROM: forward flexion 150/150, extension 45/45, full abduction 150/150, abduction-glenohumeral 70/70, external rotation 4040  Shoulder Strength: biceps 5/5, triceps 5/5, abduction 5/5, adduction 5/5, external rotation 5/5 with shoulder at side, flexion 5/5, and extension 5/5  positive for tenderness about the glenohumeral joint, positive for tenderness over the acromioclavicular joint and positive for impingement sign  Stability tests: anterior apprehension test negative and posterior apprehension test negative  Special Tests:Yergason's test: negative and Cache's test: negative       left greater than right tenderness: moderate, left greater than right paraspinous spasm, limitation of " flexion: severe, limitation of rotation to the right: fairly severe, limitation of rotation to the left: fairly severe, limitation of extension: fairly severe    Tenderness: Cervical   Swelling:  None       Range of Motion:      Flexion: 20     Extension: 0     Lateral Bend:   Right 15                              Left 20     Rotation:          Right 50                              Left 30       Reflexes     Biceps: Normal    Triceps: Normal       Sensation: Normal               RADIOGRAPHS:  X-rays of cervical spine taken today films reviewed by me date and straight postoperative changes throughout the cervical spine with interbody fusions C3 through C5 bony fusion C5-C6 C7    ASSESSMENT/PLAN:       ICD-10-CM ICD-9-CM   1. Cervical pain (neck)  M54.2 723.1       Plan: We discussed with the patient at length all the different treatment options available for her left shoulder including anti-inflammatories, acetaminophen, rest, ice, Physical therapy to include strengthening exercise, occasional cortisone injections for temporary relief, arthroscopic surgical repair, and finally shoulder arthroplasty.   Will proceed with therapeutic diagnostic cortisone injection left shoulder and a follow-up appointment with spine service for further evaluation and potential treatment options for cervical spine.    The injection site was identified and the skin was prepared with an ETOH solution. The    left  shoulder was injected with 1 ml of Celestone and 5 ml Lidocaine under sterile technique. Meenu Guy tolerated the procedure well, she was advised to rest the  shoulder  today, ice and support. she did receive immediate relief of the pain in and about her  shoulder  she was told this would be short lived and is secondary to the lidocaine. she may have an increase in her discomfort tonight followed by steady improvement over the next several days. It may take 1-3 weeks following the injection to get the full benefit of  the medication.  I will see her back in 3-6 months. Sooner if she has any problems or concerns.    Meenu Mustapha Guy was advised to monitor her blood sugars closely over the next several days. The steroid may cause a rise in them. If her glucose levels rise to a point she is uncomfortable or he is unable to control them is is to contact his PCP or go immediately to the emergency department.

## 2022-01-13 ENCOUNTER — TELEPHONE (OUTPATIENT)
Dept: INTERNAL MEDICINE | Facility: CLINIC | Age: 72
End: 2022-01-13
Payer: MEDICARE

## 2022-01-13 NOTE — TELEPHONE ENCOUNTER
She needs to ask the pharmacy medication assistance program.  They were helping her with the paperwork.

## 2022-01-13 NOTE — TELEPHONE ENCOUNTER
----- Message from Lela Feliz sent at 1/13/2022  8:37 AM CST -----  Regarding: medication concern  Name of Who is Calling: Meenu           What is the request in detail: Patient is requesting a call back in regards to her emailing her award letter to find out if her medication price can be lowered.           Can the clinic reply by [facility]: No           What Number to Call Back if not in [facility]: [phone]

## 2022-01-14 ENCOUNTER — PATIENT MESSAGE (OUTPATIENT)
Dept: INTERNAL MEDICINE | Facility: CLINIC | Age: 72
End: 2022-01-14
Payer: MEDICARE

## 2022-01-14 DIAGNOSIS — E11.22 TYPE 2 DIABETES MELLITUS WITH STAGE 3 CHRONIC KIDNEY DISEASE, WITHOUT LONG-TERM CURRENT USE OF INSULIN, UNSPECIFIED WHETHER STAGE 3A OR 3B CKD: Primary | ICD-10-CM

## 2022-01-14 DIAGNOSIS — N18.30 TYPE 2 DIABETES MELLITUS WITH STAGE 3 CHRONIC KIDNEY DISEASE, WITHOUT LONG-TERM CURRENT USE OF INSULIN, UNSPECIFIED WHETHER STAGE 3A OR 3B CKD: Primary | ICD-10-CM

## 2022-01-14 NOTE — TELEPHONE ENCOUNTER
I ordered a new referral to pharmacy medication assistance to help her with the Jardiance. They will contact her.  Please let her know. Thanks!

## 2022-01-18 DIAGNOSIS — K21.9 GASTRO-ESOPHAGEAL REFLUX DISEASE WITHOUT ESOPHAGITIS: ICD-10-CM

## 2022-01-18 NOTE — TELEPHONE ENCOUNTER
No new care gaps identified.  Powered by [employer]. Reference number: [id number].   1/18/2022 10:25:27 AM CST

## 2022-01-19 ENCOUNTER — PATIENT MESSAGE (OUTPATIENT)
Dept: PHARMACY | Facility: CLINIC | Age: 72
End: 2022-01-19
Payer: MEDICARE

## 2022-01-19 ENCOUNTER — TELEPHONE (OUTPATIENT)
Dept: PHARMACY | Facility: CLINIC | Age: 72
End: 2022-01-19
Payer: MEDICARE

## 2022-01-19 DIAGNOSIS — M50.30 DDD (DEGENERATIVE DISC DISEASE), CERVICAL: Primary | ICD-10-CM

## 2022-01-19 NOTE — TELEPHONE ENCOUNTER
Patient was previously referred back in December 2021. Assistance Technician Caty Sifuentes informed the patient what was needed to apply. The Jardiance application was also faxed to Dr Marvin's office [phone] (please verify if this is the correct fax number) for a signature/new script. We never heard back from the patient or Dr Marvin's office.      We will attempt to contact the patient again but **PLEASE NOTE**  The Pharmacy Patient Assistance Department can NOT submit any assistance applications without proof of income from the patient and a signature on the application from the prescribing physician.

## 2022-01-20 ENCOUNTER — TELEPHONE (OUTPATIENT)
Dept: INTERNAL MEDICINE | Facility: CLINIC | Age: 72
End: 2022-01-20
Payer: MEDICARE

## 2022-01-20 ENCOUNTER — HOSPITAL ENCOUNTER (OUTPATIENT)
Dept: RADIOLOGY | Facility: HOSPITAL | Age: 72
Discharge: HOME | End: 2022-01-20
Attending: ORTHOPAEDIC SURGERY
Payer: MEDICARE

## 2022-01-20 ENCOUNTER — OFFICE VISIT (OUTPATIENT)
Dept: ORTHOPEDICS | Facility: CLINIC | Age: 72
End: 2022-01-20
Payer: MEDICARE

## 2022-01-20 VITALS — BODY MASS INDEX: 23.43 KG/M2 | WEIGHT: 163.69 LBS | HEIGHT: 70 IN

## 2022-01-20 DIAGNOSIS — Z98.1 HX OF FUSION OF CERVICAL SPINE: Primary | ICD-10-CM

## 2022-01-20 DIAGNOSIS — M50.30 DDD (DEGENERATIVE DISC DISEASE), CERVICAL: ICD-10-CM

## 2022-01-20 DIAGNOSIS — M54.12 CERVICAL RADICULOPATHY: ICD-10-CM

## 2022-01-20 DIAGNOSIS — M48.02 SPINAL STENOSIS, CERVICAL REGION: ICD-10-CM

## 2022-01-20 PROCEDURE — 99999 PR PBB SHADOW E&M-EST. PATIENT-LVL IV: ICD-10-PCS | Mod: PBBFAC,,, | Performed by: ORTHOPAEDIC SURGERY

## 2022-01-20 PROCEDURE — 1101F PT FALLS ASSESS-DOCD LE1/YR: CPT | Mod: CPTII,S$GLB,, | Performed by: ORTHOPAEDIC SURGERY

## 2022-01-20 PROCEDURE — 3288F FALL RISK ASSESSMENT DOCD: CPT | Mod: CPTII,S$GLB,, | Performed by: ORTHOPAEDIC SURGERY

## 2022-01-20 PROCEDURE — 72050 XR CERVICAL SPINE AP LAT WITH FLEX EXTEN: ICD-10-PCS | Mod: 26,,, | Performed by: RADIOLOGY

## 2022-01-20 PROCEDURE — 1101F PR PT FALLS ASSESS DOC 0-1 FALLS W/OUT INJ PAST YR: ICD-10-PCS | Mod: CPTII,S$GLB,, | Performed by: ORTHOPAEDIC SURGERY

## 2022-01-20 PROCEDURE — 1160F RVW MEDS BY RX/DR IN RCRD: CPT | Mod: CPTII,S$GLB,, | Performed by: ORTHOPAEDIC SURGERY

## 2022-01-20 PROCEDURE — 99214 PR OFFICE/OUTPT VISIT, EST, LEVL IV, 30-39 MIN: ICD-10-PCS | Mod: S$GLB,,, | Performed by: ORTHOPAEDIC SURGERY

## 2022-01-20 PROCEDURE — 1159F MED LIST DOCD IN RCRD: CPT | Mod: CPTII,S$GLB,, | Performed by: ORTHOPAEDIC SURGERY

## 2022-01-20 PROCEDURE — 99214 OFFICE O/P EST MOD 30 MIN: CPT | Mod: S$GLB,,, | Performed by: ORTHOPAEDIC SURGERY

## 2022-01-20 PROCEDURE — 3008F PR BODY MASS INDEX (BMI) DOCUMENTED: ICD-10-PCS | Mod: CPTII,S$GLB,, | Performed by: ORTHOPAEDIC SURGERY

## 2022-01-20 PROCEDURE — 3008F BODY MASS INDEX DOCD: CPT | Mod: CPTII,S$GLB,, | Performed by: ORTHOPAEDIC SURGERY

## 2022-01-20 PROCEDURE — 1160F PR REVIEW ALL MEDS BY PRESCRIBER/CLIN PHARMACIST DOCUMENTED: ICD-10-PCS | Mod: CPTII,S$GLB,, | Performed by: ORTHOPAEDIC SURGERY

## 2022-01-20 PROCEDURE — 1125F AMNT PAIN NOTED PAIN PRSNT: CPT | Mod: CPTII,S$GLB,, | Performed by: ORTHOPAEDIC SURGERY

## 2022-01-20 PROCEDURE — 1159F PR MEDICATION LIST DOCUMENTED IN MEDICAL RECORD: ICD-10-PCS | Mod: CPTII,S$GLB,, | Performed by: ORTHOPAEDIC SURGERY

## 2022-01-20 PROCEDURE — 1125F PR PAIN SEVERITY QUANTIFIED, PAIN PRESENT: ICD-10-PCS | Mod: CPTII,S$GLB,, | Performed by: ORTHOPAEDIC SURGERY

## 2022-01-20 PROCEDURE — 72050 X-RAY EXAM NECK SPINE 4/5VWS: CPT | Mod: 26,,, | Performed by: RADIOLOGY

## 2022-01-20 PROCEDURE — 99999 PR PBB SHADOW E&M-EST. PATIENT-LVL IV: CPT | Mod: PBBFAC,,, | Performed by: ORTHOPAEDIC SURGERY

## 2022-01-20 PROCEDURE — 72050 X-RAY EXAM NECK SPINE 4/5VWS: CPT | Mod: TC

## 2022-01-20 PROCEDURE — 3288F PR FALLS RISK ASSESSMENT DOCUMENTED: ICD-10-PCS | Mod: CPTII,S$GLB,, | Performed by: ORTHOPAEDIC SURGERY

## 2022-01-20 RX ORDER — MELOXICAM 7.5 MG/1
7.5 TABLET ORAL DAILY
Qty: 60 TABLET | Refills: 1 | Status: SHIPPED | OUTPATIENT
Start: 2022-01-20 | End: 2022-06-14

## 2022-01-20 RX ORDER — GABAPENTIN 300 MG/1
300 CAPSULE ORAL 3 TIMES DAILY
Qty: 90 CAPSULE | Refills: 1 | Status: SHIPPED | OUTPATIENT
Start: 2022-01-20 | End: 2022-07-15 | Stop reason: SDUPTHER

## 2022-01-20 RX ORDER — CYCLOBENZAPRINE HCL 5 MG
5 TABLET ORAL 3 TIMES DAILY PRN
Qty: 60 TABLET | Refills: 1 | Status: SHIPPED | OUTPATIENT
Start: 2022-01-20 | End: 2022-08-17 | Stop reason: SDUPTHER

## 2022-01-20 NOTE — PROGRESS NOTES
DATE: 1/20/2022  PATIENT: Meenu Guy    Attending Physician: Jus Garay M.D.    CHIEF COMPLAINT: neck pain and BUE pain    HISTORY:  Meenu Guy is a 71 y.o. female w/ a hx of 3 anterior cervical fusions and 1 posterior lumbar fusion (25 years ago by Dr. Cruz) for initial evaluation of neck and bilateral (L>R) arm pain (Neck - 10, Arm - 10). Neck and arm pain is 50/50%. The pain has been present for 6 months. The patient describes the pain as sharp and it radiates to left fingers and to the right elbow; he has left but not right shoulder pain. The pain is worse with activity and improved by rest. There is no associated numbness and tingling. There is no subjective weakness. Prior treatments have included OTC meds, PT (1 yr ago without help), and C7-T1 TACOS in 2019 (helped a bit), but no recent surgery.     She had EMG/NCV done before her cervical surgeries 25 years ago; she had similar symptoms to now.    The Patient denies myelopathic symptoms such as handwriting changes or difficulty with buttons/coins/keys. Denies perineal paresthesias, bowel/bladder dysfunction.    The patient does not smoke or endorse IVDU. She has DM (HA1C of 6.8). The patient is not on any blood thinners and does not take chronic narcotics. She is a retired .    PAST MEDICAL/SURGICAL HISTORY:  Past Medical History:   Diagnosis Date    Diabetes mellitus, type 2     Hypertension      Past Surgical History:   Procedure Laterality Date    BACK SURGERY      CHOLECYSTECTOMY      DISC REMOVAL      EPIDURAL STEROID INJECTION N/A 10/30/2019    Procedure: INJECTION, STEROID, EPIDURAL, C7-T1 IL;  Surgeon: Rahul Bowie MD;  Location: [facility];  Service: Pain Management;  Laterality: N/A;    HYSTERECTOMY         Current Medications:   Current Outpatient Medications:     amLODIPine (NORVASC) 10 MG tablet, Take 1 tablet (10 mg total) by mouth once daily., Disp: 90 tablet, Rfl: 3    aspirin (ECOTRIN) 81 MG  EC tablet, Take 1 tablet (81 mg total) by mouth once daily. for 365 doses, Disp: 90 tablet, Rfl: 3    atorvastatin (LIPITOR) 20 MG tablet, Take 1 tablet (20 mg total) by mouth once daily., Disp: 90 tablet, Rfl: 3    blood sugar diagnostic Strp, 1 each by Misc.(Non-Drug; Combo Route) route 3 (three) times daily., Disp: 200 each, Rfl: 11    blood-glucose meter,continuous (DEXCOM G6 ) Misc, 1 each by Misc.(Non-Drug; Combo Route) route 4 (four) times daily before meals and nightly., Disp: 1 each, Rfl: 0    blood-glucose sensor (DEXCOM G6 SENSOR) Cici, 1 each by Misc.(Non-Drug; Combo Route) route 4 (four) times daily before meals and nightly., Disp: 100 each, Rfl: 11    blood-glucose transmitter (DEXCOM G6 TRANSMITTER) Cici, 1 each by Misc.(Non-Drug; Combo Route) route 4 (four) times daily before meals and nightly., Disp: 100 each, Rfl: 11    carvediloL (COREG) 6.25 MG tablet, Take 1 tablet (6.25 mg total) by mouth 2 (two) times daily., Disp: 180 tablet, Rfl: 3    diclofenac sodium (VOLTAREN) 1 % Gel, Apply 2 g topically 4 (four) times daily as needed (pain)., Disp: 100 g, Rfl: 11    empagliflozin (JARDIANCE) 10 mg tablet, Take 1 tablet (10 mg total) by mouth once daily., Disp: 30 tablet, Rfl: 11    fluticasone propionate (FLONASE) 50 mcg/actuation nasal spray, 1 spray (50 mcg total) by Each Nostril route once daily., Disp: 16 g, Rfl: 11    glimepiride (AMARYL) 4 MG tablet, Take 1 tablet (4 mg total) by mouth daily with breakfast., Disp: 90 tablet, Rfl: 3    ketoconazole (NIZORAL) 2 % cream, Apply topically daily as needed (rash)., Disp: 15 g, Rfl: 5    lancets Misc, 1 each by Misc.(Non-Drug; Combo Route) route 3 (three) times daily., Disp: 200 each, Rfl: 1    metFORMIN (GLUCOPHAGE-XR) 500 MG ER 24hr tablet, Take 2 tablets (1,000 mg total) by mouth daily with breakfast., Disp: 180 tablet, Rfl: 3    montelukast (SINGULAIR) 10 mg tablet, as needed., Disp: , Rfl:     pantoprazole (PROTONIX) 40 MG  "tablet, Take 40 mg by mouth as needed., Disp: , Rfl:     triamcinolone acetonide 0.5% (KENALOG) 0.5 % Crea, Apply topically 2 (two) times daily as needed (rash)., Disp: 15 g, Rfl: 0    blood-glucose meter kit, Use as instructed, Disp: 1 each, Rfl: 0    cyclobenzaprine (FLEXERIL) 5 MG tablet, Take 1 tablet (5 mg total) by mouth 3 (three) times daily as needed for Muscle spasms., Disp: 60 tablet, Rfl: 1    gabapentin (NEURONTIN) 300 MG capsule, Take 1 capsule (300 mg total) by mouth 3 (three) times daily., Disp: 90 capsule, Rfl: 1    meloxicam (MOBIC) 7.5 MG tablet, Take 1 tablet (7.5 mg total) by mouth once daily., Disp: 60 tablet, Rfl: 1    Social History:   Social History     Socioeconomic History    Marital status:    Tobacco Use    Smoking status: Former Smoker     Packs/day: 2.00     Quit date:      Years since quittin.0    Smokeless tobacco: Never Used   Substance and Sexual Activity    Alcohol use: Yes     Comment: socially    Drug use: No    Sexual activity: Yes     Partners: Male       REVIEW OF SYSTEMS:  Constitution: Negative. Negative for chills, fever and night sweats.   Cardiovascular: Negative for chest pain and syncope.   Respiratory: Negative for cough and shortness of breath.   Gastrointestinal: See HPI. Negative for nausea/vomiting. Negative for abdominal pain.  Genitourinary: See HPI. Negative for discoloration or dysuria.  Skin: Negative for dry skin, itching and rash.   Hematologic/Lymphatic: negative for bleeding/clotting disorders.   Musculoskeletal: Negative for falls and muscle weakness.   Neurological: See HPI. no history of seizures. no history of cranial surgery or shunts.  Endocrine: Negative for polydipsia, polyphagia and polyuria.   Allergic/Immunologic: Negative for hives and persistent infections.  Psychiatric/Behavioral: Negative for depression and insomnia.         EXAM:  Ht 5' 10" (1.778 m)   Wt 74.2 kg (163 lb 11.1 oz)   BMI 23.49 kg/m²     General: " The patient is a 71 y.o. female in no apparent distress, the patient is orientatied to person, place and time.  Psych: Normal mood and affect  HEENT: Vision grossly intact, hearing intact to the spoken word.  Lungs: Respirations unlabored.  Gait: Normal station and gait, no difficulty with toe or heel walk.   Skin: Cervical skin negative for rashes, lesions, hairy patches and surgical scars.  Range of motion: Cervical range of motion is acceptable. There is posterior cervical and left shoulder tenderness to palpation.  Spinal Balance: Global saggital and coronal spinal balance acceptable, no significant for scoliosis and kyphosis.  Musculoskeletal: No pain with the range of motion of the bilateral shoulders and elbows. Normal bulk and contour of the bilateral hands.  Vascular: Bilateral hands warm and well perfused, Radial pulses 2+ bilaterally.  Neurological: Normal strength and tone in all major motor groups in the bilateral upper and lower extremities. Normal sensation to light touch in the C5-T1 and L2-S1 dermatomes bilaterally.  Deep tendon reflexes symmetric 2+ in the bilateral upper and lower extremities.  Negative Inverted Radial Reflex and Worthy's bilaterally. Negative Babinski bilaterally.     IMAGING:   Today I personally reviewed AP, Lat and Flex/Ex  upright C-spine films that demonstrate C3-7 anterior cervical fusions; hardware C2-5 with loose screws.    MRI cervical from 2019 showed moderate-severe bilateral C3-5 foraminal stenosis, moderate R C5-6 foraminal stenosis, mild-moderate bilateral C6-7 foraminal stenosis, moderate-severe C7-T2 central and foraminal stenosis.    Body mass index is 23.49 kg/m².  Hemoglobin A1C   Date Value Ref Range Status   12/21/2021 6.8 (H) 4.0 - 5.6 % Final     Comment:     ADA Screening Guidelines:  5.7-6.4%  Consistent with prediabetes  >or=6.5%  Consistent with diabetes    High levels of fetal hemoglobin interfere with the HbA1C  assay. Heterozygous hemoglobin  variants (HbS, HgC, etc)do  not significantly interfere with this assay.   However, presence of multiple variants may affect accuracy.     06/18/2021 7.5 (H) 4.0 - 5.6 % Final     Comment:     ADA Screening Guidelines:  5.7-6.4%  Consistent with prediabetes  >or=6.5%  Consistent with diabetes    High levels of fetal hemoglobin interfere with the HbA1C  assay. Heterozygous hemoglobin variants (HbS, HgC, etc)do  not significantly interfere with this assay.   However, presence of multiple variants may affect accuracy.     03/19/2021 7.6 (H) 4.0 - 5.6 % Final     Comment:     ADA Screening Guidelines:  5.7-6.4%  Consistent with prediabetes  >or=6.5%  Consistent with diabetes    High levels of fetal hemoglobin interfere with the HbA1C  assay. Heterozygous hemoglobin variants (HbS, HgC, etc)do  not significantly interfere with this assay.   However, presence of multiple variants may affect accuracy.         ASSESSMENT/PLAN:  Hx of fusion of cervical spine    Cervical radiculopathy  -     CT Cervical Spine Without Contrast; Future; Expected date: 01/20/2022  -     MRI Cervical Spine Without Contrast; Future; Expected date: 01/20/2022  -     meloxicam (MOBIC) 7.5 MG tablet; Take 1 tablet (7.5 mg total) by mouth once daily.  Dispense: 60 tablet; Refill: 1  -     cyclobenzaprine (FLEXERIL) 5 MG tablet; Take 1 tablet (5 mg total) by mouth 3 (three) times daily as needed for Muscle spasms.  Dispense: 60 tablet; Refill: 1  -     gabapentin (NEURONTIN) 300 MG capsule; Take 1 capsule (300 mg total) by mouth 3 (three) times daily.  Dispense: 90 capsule; Refill: 1    Spinal stenosis, cervical region  -     CT Cervical Spine Without Contrast; Future; Expected date: 01/20/2022  -     MRI Cervical Spine Without Contrast; Future; Expected date: 01/20/2022  -     meloxicam (MOBIC) 7.5 MG tablet; Take 1 tablet (7.5 mg total) by mouth once daily.  Dispense: 60 tablet; Refill: 1  -     cyclobenzaprine (FLEXERIL) 5 MG tablet; Take 1 tablet  (5 mg total) by mouth 3 (three) times daily as needed for Muscle spasms.  Dispense: 60 tablet; Refill: 1  -     gabapentin (NEURONTIN) 300 MG capsule; Take 1 capsule (300 mg total) by mouth 3 (three) times daily.  Dispense: 90 capsule; Refill: 1      Follow up in about 2 weeks (around 2/3/2022).    Patient has cervical stenosis. I discussed the natural history of their diagnoses as well as surgical and nonsurgical treatment options. I educated the patient on the importance of core/back strengthening, correct posture, bending/lifting ergonomics, and low-impact aerobic exercises (walking, elliptical, and aquatherapy). I ordered MRI and CT cervical. I prescribed mobic, gabapentin and flexeril. Patient will follow up in 2 weeks for imaging review.    Jus Garay MD  Orthopaedic Spine Surgeon  Department of Orthopaedic Surgery  [phone]

## 2022-01-20 NOTE — TELEPHONE ENCOUNTER
I did not receive any paperwork for Jardiance.  To my staff, please coordinate to make sure the pharmacy assistance program has the correct fax number to refax the form to us.  Thanks!             Note sent to pharmacy assistance team

## 2022-01-24 ENCOUNTER — TELEPHONE (OUTPATIENT)
Dept: ORTHOPEDICS | Facility: CLINIC | Age: 72
End: 2022-01-24
Payer: MEDICARE

## 2022-01-24 NOTE — TELEPHONE ENCOUNTER
----- Message from Lucy Plasencia sent at 1/24/2022  9:17 AM CST -----  Type:  Patient Call Back    Who Called: Meenu      What is the reqeust in detail: PT is requesting a call back in regards to a doctors note to return to work on 02/01/2022. Pt to call her when it is ready and she will pick it up. Please Advise    Can the clinic reply by [facility]?    Best Call Back Number: [phone]/ [phone]

## 2022-01-27 ENCOUNTER — HOSPITAL ENCOUNTER (OUTPATIENT)
Dept: RADIOLOGY | Facility: HOSPITAL | Age: 72
Discharge: HOME | End: 2022-01-27
Attending: ORTHOPAEDIC SURGERY
Payer: MEDICARE

## 2022-01-27 DIAGNOSIS — M48.02 SPINAL STENOSIS, CERVICAL REGION: ICD-10-CM

## 2022-01-27 DIAGNOSIS — M54.12 CERVICAL RADICULOPATHY: ICD-10-CM

## 2022-01-27 PROCEDURE — 72125 CT CERVICAL SPINE WITHOUT CONTRAST: ICD-10-PCS | Mod: 26,,, | Performed by: RADIOLOGY

## 2022-01-27 PROCEDURE — 72125 CT NECK SPINE W/O DYE: CPT | Mod: 26,,, | Performed by: RADIOLOGY

## 2022-01-27 PROCEDURE — 72125 CT NECK SPINE W/O DYE: CPT | Mod: TC

## 2022-02-03 RX ORDER — OMEPRAZOLE 20 MG/1
20 CAPSULE, DELAYED RELEASE ORAL DAILY PRN
Qty: 90 CAPSULE | Refills: 3 | OUTPATIENT
Start: 2022-02-03

## 2022-02-03 NOTE — TELEPHONE ENCOUNTER
Quick DC. Inappropriate Request    Refill Authorization Note   Meenu Guy  is requesting a refill authorization.  Brief Assessment and Rationale for Refill:  Quick Discontinue  Medication Therapy Plan:  Omeprazole d/c by PCP 3/23/2021    Medication Reconciliation Completed:  No      Comments:   Pended Medication(s)       Requested Prescriptions     Refused Prescriptions Disp Refills    omeprazole (PRILOSEC) 20 MG capsule [Pharmacy Med Name: OMEPRAZOLE 20 MG CPDR 20 Capsule] 90 capsule 3     Sig: TAKE 1 CAPSULE (20 MG TOTAL) BY MOUTH DAILY AS NEEDED.     Refused By: MARI GRACIA     Reason for Refusal: Refill not appropriate        Duplicate Pended Encounter(s)/ Last Prescribed Details: (includes pharmacy & prescriber details)   omeprazole (PRILOSEC) 20 MG capsule [[id number]]  DISCONTINUED    Order Details  Dose: 20 mg Route: Oral Frequency: Daily PRN   Dispense Quantity: 30 capsule Refills: 11          Sig: Take 1 capsule (20 mg total) by mouth daily as needed.         Start Date: 03/16/20 End Date: 03/23/21   Discontinued by: Lali Marvin MD on 3/23/2021 09:22   Reason: Cost of medication                Note composed:10:38 AM 02/03/2022

## 2022-02-14 DIAGNOSIS — N18.30 TYPE 2 DIABETES MELLITUS WITH STAGE 3 CHRONIC KIDNEY DISEASE, WITHOUT LONG-TERM CURRENT USE OF INSULIN, UNSPECIFIED WHETHER STAGE 3A OR 3B CKD: Primary | ICD-10-CM

## 2022-02-14 DIAGNOSIS — K21.9 GASTRO-ESOPHAGEAL REFLUX DISEASE WITHOUT ESOPHAGITIS: ICD-10-CM

## 2022-02-14 DIAGNOSIS — E11.22 TYPE 2 DIABETES MELLITUS WITH STAGE 3 CHRONIC KIDNEY DISEASE, WITHOUT LONG-TERM CURRENT USE OF INSULIN, UNSPECIFIED WHETHER STAGE 3A OR 3B CKD: Primary | ICD-10-CM

## 2022-02-14 RX ORDER — OMEPRAZOLE 20 MG/1
20 CAPSULE, DELAYED RELEASE ORAL DAILY PRN
Qty: 30 CAPSULE | Refills: 11 | Status: SHIPPED | OUTPATIENT
Start: 2022-02-14 | End: 2023-05-24

## 2022-02-14 RX ORDER — INSULIN PUMP SYRINGE, 3 ML
EACH MISCELLANEOUS
Qty: 1 EACH | Refills: 0 | Status: SHIPPED | OUTPATIENT
Start: 2022-02-14 | End: 2024-07-22

## 2022-02-14 NOTE — TELEPHONE ENCOUNTER
Care Due:                  Date            Visit Type   Department     Provider  --------------------------------------------------------------------------------                                EP -                              PRIMARY      [facility] INTERNAL  Last Visit: 12-      CARE ([facility])   KEN Marvin                              EP -                              PRIMARY      [facility] INTERNAL  Next Visit: 04-      CARE ([facility])   KEN Marvin                                                            Last  Test          Frequency    Reason                     Performed    Due Date  --------------------------------------------------------------------------------    CMP.........  12 months..  atorvastatin.............  06- 06-    Lipid Panel.  12 months..  atorvastatin.............  03- 03-    Powered by [employer]. Reference number: [id number].   2/14/2022 10:09:50 AM CST

## 2022-02-14 NOTE — TELEPHONE ENCOUNTER
----- Message from Anthony Lester sent at 2/14/2022 10:00 AM CST -----  Regarding: Refill  Contact: LOUIE MASON [[id number]]      Can the clinic reply in [facility]: no      Please refill the medication(s) listed below. Please call the patient when the prescription(s) is ready for  at this phone number  Click to [phone]      Medication #1  omeprazole (PRILOSEC) 20 MG capsule     Medication #2  diabetic kit requested (testing 2x day)      [email or web address] [address]

## 2022-02-16 ENCOUNTER — PES CALL (OUTPATIENT)
Dept: ADMINISTRATIVE | Facility: CLINIC | Age: 72
End: 2022-02-16
Payer: MEDICARE

## 2022-02-21 ENCOUNTER — OFFICE VISIT (OUTPATIENT)
Dept: INTERNAL MEDICINE | Facility: CLINIC | Age: 72
End: 2022-02-21
Payer: MEDICARE

## 2022-02-21 VITALS
SYSTOLIC BLOOD PRESSURE: 160 MMHG | WEIGHT: 161.81 LBS | OXYGEN SATURATION: 97 % | BODY MASS INDEX: 25.4 KG/M2 | HEART RATE: 69 BPM | DIASTOLIC BLOOD PRESSURE: 72 MMHG | HEIGHT: 67 IN

## 2022-02-21 DIAGNOSIS — E11.22 TYPE 2 DIABETES MELLITUS WITH STAGE 3B CHRONIC KIDNEY DISEASE, WITHOUT LONG-TERM CURRENT USE OF INSULIN: ICD-10-CM

## 2022-02-21 DIAGNOSIS — E78.2 MIXED HYPERLIPIDEMIA: ICD-10-CM

## 2022-02-21 DIAGNOSIS — N18.32 STAGE 3B CHRONIC KIDNEY DISEASE: ICD-10-CM

## 2022-02-21 DIAGNOSIS — K63.5 POLYP OF COLON, UNSPECIFIED PART OF COLON, UNSPECIFIED TYPE: ICD-10-CM

## 2022-02-21 DIAGNOSIS — E21.3 HYPERPARATHYROIDISM: ICD-10-CM

## 2022-02-21 DIAGNOSIS — I73.9 PERIPHERAL VASCULAR DISEASE: ICD-10-CM

## 2022-02-21 DIAGNOSIS — Z00.00 ENCOUNTER FOR PREVENTIVE HEALTH EXAMINATION: Primary | ICD-10-CM

## 2022-02-21 DIAGNOSIS — K59.1 FUNCTIONAL DIARRHEA: ICD-10-CM

## 2022-02-21 DIAGNOSIS — G95.89 MYELOMALACIA: ICD-10-CM

## 2022-02-21 DIAGNOSIS — N18.32 HYPERTENSIVE KIDNEY DISEASE WITH STAGE 3B CHRONIC KIDNEY DISEASE: ICD-10-CM

## 2022-02-21 DIAGNOSIS — M79.7 FIBROMYALGIA: ICD-10-CM

## 2022-02-21 DIAGNOSIS — M25.511 CHRONIC RIGHT SHOULDER PAIN: ICD-10-CM

## 2022-02-21 DIAGNOSIS — M19.041 PRIMARY OSTEOARTHRITIS OF RIGHT HAND: ICD-10-CM

## 2022-02-21 DIAGNOSIS — I10 ESSENTIAL HYPERTENSION: ICD-10-CM

## 2022-02-21 DIAGNOSIS — M54.12 CERVICAL RADICULOPATHY: ICD-10-CM

## 2022-02-21 DIAGNOSIS — D64.9 NORMOCYTIC ANEMIA: ICD-10-CM

## 2022-02-21 DIAGNOSIS — I70.0 AORTIC ATHEROSCLEROSIS: ICD-10-CM

## 2022-02-21 DIAGNOSIS — N18.32 TYPE 2 DIABETES MELLITUS WITH STAGE 3B CHRONIC KIDNEY DISEASE, WITHOUT LONG-TERM CURRENT USE OF INSULIN: ICD-10-CM

## 2022-02-21 DIAGNOSIS — E55.9 VITAMIN D DEFICIENCY: ICD-10-CM

## 2022-02-21 DIAGNOSIS — K21.9 GASTRO-ESOPHAGEAL REFLUX DISEASE WITHOUT ESOPHAGITIS: ICD-10-CM

## 2022-02-21 DIAGNOSIS — I12.9 HYPERTENSIVE KIDNEY DISEASE WITH STAGE 3B CHRONIC KIDNEY DISEASE: ICD-10-CM

## 2022-02-21 DIAGNOSIS — G89.29 CHRONIC RIGHT SHOULDER PAIN: ICD-10-CM

## 2022-02-21 DIAGNOSIS — R14.2 BELCHING: ICD-10-CM

## 2022-02-21 DIAGNOSIS — E11.51 TYPE 2 DIABETES MELLITUS WITH DIABETIC PERIPHERAL ANGIOPATHY WITHOUT GANGRENE, WITHOUT LONG-TERM CURRENT USE OF INSULIN: ICD-10-CM

## 2022-02-21 PROCEDURE — 1159F MED LIST DOCD IN RCRD: CPT | Mod: CPTII,S$GLB,, | Performed by: NURSE PRACTITIONER

## 2022-02-21 PROCEDURE — 3077F SYST BP >= 140 MM HG: CPT | Mod: CPTII,S$GLB,, | Performed by: NURSE PRACTITIONER

## 2022-02-21 PROCEDURE — G0439 PPPS, SUBSEQ VISIT: HCPCS | Mod: S$GLB,,, | Performed by: NURSE PRACTITIONER

## 2022-02-21 PROCEDURE — 99499 UNLISTED E&M SERVICE: CPT | Mod: S$GLB,,, | Performed by: NURSE PRACTITIONER

## 2022-02-21 PROCEDURE — 1101F PT FALLS ASSESS-DOCD LE1/YR: CPT | Mod: CPTII,S$GLB,, | Performed by: NURSE PRACTITIONER

## 2022-02-21 PROCEDURE — 3078F PR MOST RECENT DIASTOLIC BLOOD PRESSURE < 80 MM HG: ICD-10-PCS | Mod: CPTII,S$GLB,, | Performed by: NURSE PRACTITIONER

## 2022-02-21 PROCEDURE — 3288F FALL RISK ASSESSMENT DOCD: CPT | Mod: CPTII,S$GLB,, | Performed by: NURSE PRACTITIONER

## 2022-02-21 PROCEDURE — 99499 RISK ADDL DX/OHS AUDIT: ICD-10-PCS | Mod: S$GLB,,, | Performed by: NURSE PRACTITIONER

## 2022-02-21 PROCEDURE — 3077F PR MOST RECENT SYSTOLIC BLOOD PRESSURE >= 140 MM HG: ICD-10-PCS | Mod: CPTII,S$GLB,, | Performed by: NURSE PRACTITIONER

## 2022-02-21 PROCEDURE — 3078F DIAST BP <80 MM HG: CPT | Mod: CPTII,S$GLB,, | Performed by: NURSE PRACTITIONER

## 2022-02-21 PROCEDURE — 3008F PR BODY MASS INDEX (BMI) DOCUMENTED: ICD-10-PCS | Mod: CPTII,S$GLB,, | Performed by: NURSE PRACTITIONER

## 2022-02-21 PROCEDURE — 99999 PR PBB SHADOW E&M-EST. PATIENT-LVL V: ICD-10-PCS | Mod: PBBFAC,,, | Performed by: NURSE PRACTITIONER

## 2022-02-21 PROCEDURE — 1159F PR MEDICATION LIST DOCUMENTED IN MEDICAL RECORD: ICD-10-PCS | Mod: CPTII,S$GLB,, | Performed by: NURSE PRACTITIONER

## 2022-02-21 PROCEDURE — 3008F BODY MASS INDEX DOCD: CPT | Mod: CPTII,S$GLB,, | Performed by: NURSE PRACTITIONER

## 2022-02-21 PROCEDURE — 1160F RVW MEDS BY RX/DR IN RCRD: CPT | Mod: CPTII,S$GLB,, | Performed by: NURSE PRACTITIONER

## 2022-02-21 PROCEDURE — 99999 PR PBB SHADOW E&M-EST. PATIENT-LVL V: CPT | Mod: PBBFAC,,, | Performed by: NURSE PRACTITIONER

## 2022-02-21 PROCEDURE — 1170F FXNL STATUS ASSESSED: CPT | Mod: CPTII,S$GLB,, | Performed by: NURSE PRACTITIONER

## 2022-02-21 PROCEDURE — G0439 PR MEDICARE ANNUAL WELLNESS SUBSEQUENT VISIT: ICD-10-PCS | Mod: S$GLB,,, | Performed by: NURSE PRACTITIONER

## 2022-02-21 PROCEDURE — 1101F PR PT FALLS ASSESS DOC 0-1 FALLS W/OUT INJ PAST YR: ICD-10-PCS | Mod: CPTII,S$GLB,, | Performed by: NURSE PRACTITIONER

## 2022-02-21 PROCEDURE — 1170F PR FUNCTIONAL STATUS ASSESSED: ICD-10-PCS | Mod: CPTII,S$GLB,, | Performed by: NURSE PRACTITIONER

## 2022-02-21 PROCEDURE — 1160F PR REVIEW ALL MEDS BY PRESCRIBER/CLIN PHARMACIST DOCUMENTED: ICD-10-PCS | Mod: CPTII,S$GLB,, | Performed by: NURSE PRACTITIONER

## 2022-02-21 PROCEDURE — 3288F PR FALLS RISK ASSESSMENT DOCUMENTED: ICD-10-PCS | Mod: CPTII,S$GLB,, | Performed by: NURSE PRACTITIONER

## 2022-02-21 NOTE — PATIENT INSTRUCTIONS
Counseling and Referral of Other Preventative  (Italic type indicates deductible and co-insurance are waived)    Patient Name: Meenu Guy  Today's Date: 2/21/2022    Health Maintenance       Date Due Completion Date    Pneumococcal Vaccines (Age 65+) (1 of 1 - PPSV23) 07/13/2021 7/13/2020    Lipid Panel 03/19/2022 3/19/2021    Foot Exam 04/22/2022 4/22/2021    Override on 9/26/2019: Done    Mammogram 06/18/2022 6/18/2021    Hemoglobin A1c 06/21/2022 12/21/2021    DEXA SCAN 07/25/2022 7/25/2019    Eye Exam 10/01/2022 10/1/2021    Diabetes Urine Screening 12/21/2022 12/21/2021    TETANUS VACCINE 04/19/2029 4/19/2019    Colorectal Cancer Screening 08/21/2030 8/21/2020    Override on 1/1/2017: Done ([place], [place] and had 2 polyps)        No orders of the defined types were placed in this encounter.    The following information is provided to all patients.  This information is to help you find resources for any of the problems found today that may be affecting your health:                Living healthy guide: www.[facility].[place].gov      Understanding Diabetes: www.diabetes.org      Eating healthy: www.cdc.gov/healthyweight      CDC home safety checklist: www.cdc.gov/steadi/patient.html      Agency on Aging: www.goea.[place].[facility]      Alcoholics anonymous (AA): www.aa.org      Physical Activity: www.jeff.nih.gov/pv1wgit      Tobacco use: www.quitwithusla.org

## 2022-02-21 NOTE — PROGRESS NOTES
"  Meenu Guy presented for a  Medicare AWV and comprehensive Health Risk Assessment today. The following components were reviewed and updated:    · Medical history  · Family History  · Social history  · Allergies and Current Medications  · Health Risk Assessment  · Health Maintenance  · Care Team         ** See Completed Assessments for Annual Wellness Visit within the encounter summary.**         The following assessments were completed:  · Living Situation  · CAGE  · Depression Screening  · Timed Get Up and Go  · Whisper Test  · Cognitive Function Screening  · Nutrition Screening  · ADL Screening  · PAQ Screening            Vitals:    02/21/22 1524 02/21/22 1544   BP: (!) 166/72 (!) 160/72   BP Location: Right arm    Patient Position: Sitting    Pulse: 69    SpO2: 97%    Weight: 73.4 kg (161 lb 13.1 oz)    Height: 5' 7" (1.702 m)      Body mass index is 25.34 kg/m².     Physical Exam  Constitutional:       Appearance: She is well-developed.   HENT:      Head: Normocephalic and atraumatic. Not macrocephalic and not microcephalic. No raccoon eyes, Wright's sign, abrasion, contusion, right periorbital erythema, left periorbital erythema or laceration. Hair is normal.      Right Ear: No decreased hearing noted. No laceration, drainage, swelling or tenderness. No middle ear effusion. No foreign body. No mastoid tenderness. No hemotympanum. Tympanic membrane is not injected, scarred, perforated, erythematous, retracted or bulging. Tympanic membrane has normal mobility.      Left Ear: No decreased hearing noted. No laceration, drainage, swelling or tenderness.  No middle ear effusion. No foreign body. No mastoid tenderness. No hemotympanum. Tympanic membrane is not injected, scarred, perforated, erythematous, retracted or bulging. Tympanic membrane has normal mobility.      Nose: Nose normal. No nasal deformity, laceration or mucosal edema.      Mouth/Throat:      Pharynx: Uvula midline.   Eyes:      General: Lids are " normal. No scleral icterus.     Conjunctiva/sclera: Conjunctivae normal.   Neck:      Thyroid: No thyroid mass or thyromegaly.      Trachea: Trachea normal.   Cardiovascular:      Rate and Rhythm: Normal rate and regular rhythm.      Heart sounds: No murmur heard.    No friction rub. No gallop.   Pulmonary:      Effort: Pulmonary effort is normal. No respiratory distress.      Breath sounds: Normal breath sounds. No stridor. No wheezing, rhonchi or rales.   Chest:      Chest wall: No tenderness.   Abdominal:      Palpations: Abdomen is soft.   Musculoskeletal:         General: Normal range of motion.      Cervical back: Neck supple. No edema or erythema. No spinous process tenderness or muscular tenderness. Normal range of motion.   Lymphadenopathy:      Head:      Right side of head: No submental, submandibular, tonsillar, preauricular or posterior auricular adenopathy.      Left side of head: No submental, submandibular, tonsillar, preauricular, posterior auricular or occipital adenopathy.   Skin:     General: Skin is warm and dry.   Neurological:      Mental Status: She is alert and oriented to person, place, and time.   Psychiatric:         Behavior: Behavior normal.         Thought Content: Thought content normal.         Judgment: Judgment normal.             Diagnoses and health risks identified today and associated recommendations/orders:    1. Encounter for preventive health examination  Annual Health Risk Assessment (HRA) visit today.  Counseling and referral of health maintenance and preventative health measures performed.  Patient given annual wellness paperwork to take home.  Encouraged to return in 1 year for subsequent HRA visit.     2. Stage 3b chronic kidney disease  Chronic. Stable. Continue current treatment plan as previously prescribed by PCP.    3. Peripheral vascular disease  Chronic. Stable. Continue current treatment plan as previously prescribed by PCP.    4. Aortic  atherosclerosis  Chronic. Stable. Continue current treatment plan as previously prescribed by PCP.    5. Mixed hyperlipidemia  Chronic. Stable. Continue current treatment plan as previously prescribed by PCP.    6. Essential hypertension  Chronic. Stable. Uncontrolled. Encouraged to increase exercise as tolerated (moderate-intensity aerobic activity and muscle-strengthening activities) improve diet to heart healthy, low sodium diet. Continue current treatment plan as previously prescribed by PCP.    7. Hyperparathyroidism  Chronic. Stable. Last ZRV=381.9 from 08/06/20. Continue current treatment plan as previously prescribed by PCP.    8. Type 2 diabetes mellitus with diabetic peripheral angiopathy without gangrene, without long-term current use of insulin  Chronic. Stable. Controlled. Last Hgb A1c=6.8 from 12/21/21. Continue current treatment plan as previously prescribed by PCP.    9. Type 2 diabetes mellitus with stage 3b chronic kidney disease, without long-term current use of insulin  Chronic. Stable. Continue current treatment plan as previously prescribed by PCP.    10. Normocytic anemia  Chronic. Stable. Continue current treatment plan as previously prescribed by PCP.    11. Hypertensive kidney disease with stage 3b chronic kidney disease  Chronic. Stable. Continue current treatment plan as previously prescribed by PCP.    12. Myelomalacia  Chronic. Stable. Continue current treatment plan as previously prescribed by PCP.    13. Cervical radiculopathy  Chronic. Stable. Continue current treatment plan as previously prescribed by PCP.    14. Vitamin D deficiency  Chronic. Stable. Continue current treatment plan as previously prescribed by PCP.    15. Functional diarrhea  Chronic. Stable. Continue current treatment plan as previously prescribed by PCP.    16. Gastro-esophageal reflux disease without esophagitis  Chronic. Stable. Continue current treatment plan as previously prescribed by PCP.    17. Polyp of  colon, unspecified part of colon, unspecified type  Chronic. Stable. Continue current treatment plan as previously prescribed by PCP.    18. Belching  Chronic. Stable. Continue current treatment plan as previously prescribed by PCP.    19. Fibromyalgia  Chronic. Stable. Continue current treatment plan as previously prescribed by PCP.    20. Primary osteoarthritis of right hand  Chronic. Stable. Continue current treatment plan as previously prescribed by PCP.    21. Chronic right shoulder pain  Chronic. Stable. Continue current treatment plan as previously prescribed by PCP.        Provided Meenu with a 5-10 year written screening schedule and personal prevention plan. Recommendations were developed using the USPSTF age appropriate recommendations. Education, counseling, and referrals were provided as needed. After Visit Summary printed and given to patient which includes a list of additional screenings\tests needed.      I offered to discuss end of life issues, including information on how to make advance directives that the patient could use to name someone who would make medical decisions on their behalf if they became too ill to make themselves.    ___Patient declined  _X_Patient is interested, I provided paper work and offered to discuss.    Follow up in about 1 year (around 2/21/2023).    Mayank Garay NP  I offered to discuss advanced care planning, including how to pick a person who would make decisions for you if you were unable to make them for yourself, called a health care power of , and what kind of decisions you might make such as use of life sustaining treatments such as ventilators and tube feeding when faced with a life limiting illness recorded on a living will that they will need to know. (How you want to be cared for as you near the end of your natural life)     X Patient is interested in learning more about how to make advanced directives.  I provided them paperwork and offered to  discuss this with them.

## 2022-03-02 ENCOUNTER — PATIENT OUTREACH (OUTPATIENT)
Dept: ADMINISTRATIVE | Facility: HOSPITAL | Age: 72
End: 2022-03-02
Payer: MEDICARE

## 2022-03-02 ENCOUNTER — TELEPHONE (OUTPATIENT)
Dept: INTERNAL MEDICINE | Facility: CLINIC | Age: 72
End: 2022-03-02
Payer: MEDICARE

## 2022-03-02 NOTE — TELEPHONE ENCOUNTER
Pt reports bp taken on 03/01/22 at 118/70.    Joslyn Tomlinson  Panel Care Coordinator  [facility]  P: [phone]  F: [phone]

## 2022-03-07 ENCOUNTER — TELEPHONE (OUTPATIENT)
Dept: INTERNAL MEDICINE | Facility: CLINIC | Age: 72
End: 2022-03-07
Payer: MEDICARE

## 2022-03-07 NOTE — TELEPHONE ENCOUNTER
----- Message from Tameka Ley sent at 3/7/2022 10:33 AM CST -----  Regarding: Requesting a call back  Contact: LOUIE MASON [[id number]]  Name of Who is Calling:LOUIE MASON [[id number]]          What is the request in detail: pt states her wresting is swollen, and itching and she would like to be seen today. Pt is requesting a call back, Please advise           Can the clinic reply by [facility]: No           What [facility]: [phone]

## 2022-03-08 ENCOUNTER — OFFICE VISIT (OUTPATIENT)
Dept: INTERNAL MEDICINE | Facility: CLINIC | Age: 72
End: 2022-03-08
Payer: MEDICARE

## 2022-03-08 ENCOUNTER — PATIENT MESSAGE (OUTPATIENT)
Dept: RESEARCH | Facility: OTHER | Age: 72
End: 2022-03-08
Payer: MEDICARE

## 2022-03-08 VITALS
HEART RATE: 60 BPM | HEIGHT: 67 IN | SYSTOLIC BLOOD PRESSURE: 140 MMHG | OXYGEN SATURATION: 93 % | BODY MASS INDEX: 25.67 KG/M2 | WEIGHT: 163.56 LBS | DIASTOLIC BLOOD PRESSURE: 68 MMHG

## 2022-03-08 DIAGNOSIS — L30.9 DERMATITIS: Primary | ICD-10-CM

## 2022-03-08 PROCEDURE — 1125F AMNT PAIN NOTED PAIN PRSNT: CPT | Mod: CPTII,S$GLB,, | Performed by: PHYSICIAN ASSISTANT

## 2022-03-08 PROCEDURE — 99214 PR OFFICE/OUTPT VISIT, EST, LEVL IV, 30-39 MIN: ICD-10-PCS | Mod: S$GLB,,, | Performed by: PHYSICIAN ASSISTANT

## 2022-03-08 PROCEDURE — 1160F PR REVIEW ALL MEDS BY PRESCRIBER/CLIN PHARMACIST DOCUMENTED: ICD-10-PCS | Mod: CPTII,S$GLB,, | Performed by: PHYSICIAN ASSISTANT

## 2022-03-08 PROCEDURE — 3008F PR BODY MASS INDEX (BMI) DOCUMENTED: ICD-10-PCS | Mod: CPTII,S$GLB,, | Performed by: PHYSICIAN ASSISTANT

## 2022-03-08 PROCEDURE — 99999 PR PBB SHADOW E&M-EST. PATIENT-LVL IV: CPT | Mod: PBBFAC,,, | Performed by: PHYSICIAN ASSISTANT

## 2022-03-08 PROCEDURE — 1101F PR PT FALLS ASSESS DOC 0-1 FALLS W/OUT INJ PAST YR: ICD-10-PCS | Mod: CPTII,S$GLB,, | Performed by: PHYSICIAN ASSISTANT

## 2022-03-08 PROCEDURE — 1160F RVW MEDS BY RX/DR IN RCRD: CPT | Mod: CPTII,S$GLB,, | Performed by: PHYSICIAN ASSISTANT

## 2022-03-08 PROCEDURE — 3288F FALL RISK ASSESSMENT DOCD: CPT | Mod: CPTII,S$GLB,, | Performed by: PHYSICIAN ASSISTANT

## 2022-03-08 PROCEDURE — 3288F PR FALLS RISK ASSESSMENT DOCUMENTED: ICD-10-PCS | Mod: CPTII,S$GLB,, | Performed by: PHYSICIAN ASSISTANT

## 2022-03-08 PROCEDURE — 3008F BODY MASS INDEX DOCD: CPT | Mod: CPTII,S$GLB,, | Performed by: PHYSICIAN ASSISTANT

## 2022-03-08 PROCEDURE — 1159F MED LIST DOCD IN RCRD: CPT | Mod: CPTII,S$GLB,, | Performed by: PHYSICIAN ASSISTANT

## 2022-03-08 PROCEDURE — 3077F SYST BP >= 140 MM HG: CPT | Mod: CPTII,S$GLB,, | Performed by: PHYSICIAN ASSISTANT

## 2022-03-08 PROCEDURE — 99999 PR PBB SHADOW E&M-EST. PATIENT-LVL IV: ICD-10-PCS | Mod: PBBFAC,,, | Performed by: PHYSICIAN ASSISTANT

## 2022-03-08 PROCEDURE — 1101F PT FALLS ASSESS-DOCD LE1/YR: CPT | Mod: CPTII,S$GLB,, | Performed by: PHYSICIAN ASSISTANT

## 2022-03-08 PROCEDURE — 3078F DIAST BP <80 MM HG: CPT | Mod: CPTII,S$GLB,, | Performed by: PHYSICIAN ASSISTANT

## 2022-03-08 PROCEDURE — 3078F PR MOST RECENT DIASTOLIC BLOOD PRESSURE < 80 MM HG: ICD-10-PCS | Mod: CPTII,S$GLB,, | Performed by: PHYSICIAN ASSISTANT

## 2022-03-08 PROCEDURE — 3077F PR MOST RECENT SYSTOLIC BLOOD PRESSURE >= 140 MM HG: ICD-10-PCS | Mod: CPTII,S$GLB,, | Performed by: PHYSICIAN ASSISTANT

## 2022-03-08 PROCEDURE — 1125F PR PAIN SEVERITY QUANTIFIED, PAIN PRESENT: ICD-10-PCS | Mod: CPTII,S$GLB,, | Performed by: PHYSICIAN ASSISTANT

## 2022-03-08 PROCEDURE — 99214 OFFICE O/P EST MOD 30 MIN: CPT | Mod: S$GLB,,, | Performed by: PHYSICIAN ASSISTANT

## 2022-03-08 PROCEDURE — 1159F PR MEDICATION LIST DOCUMENTED IN MEDICAL RECORD: ICD-10-PCS | Mod: CPTII,S$GLB,, | Performed by: PHYSICIAN ASSISTANT

## 2022-03-08 RX ORDER — HYDROCORTISONE 25 MG/G
CREAM TOPICAL 2 TIMES DAILY
Qty: 20 G | Refills: 0 | Status: SHIPPED | OUTPATIENT
Start: 2022-03-08 | End: 2022-08-17

## 2022-03-08 NOTE — PROGRESS NOTES
INTERNAL MEDICINE URGENT VISIT NOTE    CHIEF COMPLAINT     Chief Complaint   Patient presents with    Rash     Rash to left wrist and swelling .for three days.States pain had gone away, but has now returned       HPI     Meenu Guy is a 71 y.o. female who presents for an urgent visit today.      PCP is Lali Marvin MD, patient is new to me.     She reports that she has right wrist swelling and itching. She reports that this started two days. She denies trauma or injury to the wrist. No swelling in the fingers.   She is right hand dominant. She reports that she had a similar rash to the left forearm years ago, topical steroids resolved it.  She reports that she has applied topical steroid but admits that she didn't think it helped, now she reports that it does seem to be  improving. She is concerned however that even though there is decreased itching, the rash seems to be spreading.   She denies fever, chills, nausea, vomiting, chest pain or SOB. No new medications, foods or exposures. No bleeding.   No hand weakness or open wounds.     Past Medical History:  Past Medical History:   Diagnosis Date    Diabetes mellitus, type 2     Hypertension        Home Medications:  Prior to Admission medications    Medication Sig Start Date End Date Taking? Authorizing Provider   amLODIPine (NORVASC) 10 MG tablet Take 1 tablet (10 mg total) by mouth once daily. 12/23/21   Lali Marvin MD   aspirin (ECOTRIN) 81 MG EC tablet Take 1 tablet (81 mg total) by mouth once daily. for 365 doses 9/23/21 9/23/22  Lali Marvin MD   atorvastatin (LIPITOR) 20 MG tablet Take 1 tablet (20 mg total) by mouth once daily. 6/10/21   Lali Marvin MD   blood sugar diagnostic Strp 1 each by Misc.(Non-Drug; Combo Route) route 3 (three) times daily. 12/23/21   Lali Marvin MD   blood-glucose meter kit Use as instructed 2/14/22 7/22/24  Spencer Hewitt MD   blood-glucose meter,continuous (DEXCOM G6 ) Misc 1 each by  Misc.(Non-Drug; Combo Route) route 4 (four) times daily before meals and nightly. 9/28/21 9/28/22  Lali Marvin MD   blood-glucose sensor (DEXCOM G6 SENSOR) Cici 1 each by Misc.(Non-Drug; Combo Route) route 4 (four) times daily before meals and nightly. 9/28/21 9/28/22  Lali Marvin MD   blood-glucose transmitter (DEXCOM G6 TRANSMITTER) Cici 1 each by Misc.(Non-Drug; Combo Route) route 4 (four) times daily before meals and nightly. 9/28/21 9/28/22  Lali Marvin MD   carvediloL (COREG) 6.25 MG tablet Take 1 tablet (6.25 mg total) by mouth 2 (two) times daily. 8/25/21   Spencer Hewitt MD   cyclobenzaprine (FLEXERIL) 5 MG tablet Take 1 tablet (5 mg total) by mouth 3 (three) times daily as needed for Muscle spasms. 1/20/22   Jus Garay MD   diclofenac sodium (VOLTAREN) 1 % Gel Apply 2 g topically 4 (four) times daily as needed (pain). 12/23/21   Lali Marvin MD   empagliflozin (JARDIANCE) 10 mg tablet Take 1 tablet (10 mg total) by mouth once daily. 9/23/21   Lali Marvin MD   fluticasone propionate (FLONASE) 50 mcg/actuation nasal spray 1 spray (50 mcg total) by Each Nostril route once daily. 6/10/21   Lali Marvin MD   gabapentin (NEURONTIN) 300 MG capsule Take 1 capsule (300 mg total) by mouth 3 (three) times daily. 1/20/22   Jus Garay MD   glimepiride (AMARYL) 4 MG tablet Take 1 tablet (4 mg total) by mouth daily with breakfast. 3/23/21 3/23/22  Lali Marvin MD   ketoconazole (NIZORAL) 2 % cream Apply topically daily as needed (rash). 12/23/21   Lali Marvin MD   lancets Misc 1 each by Misc.(Non-Drug; Combo Route) route 3 (three) times daily. 4/18/19   Gustavo Wallace MD   meloxicam (MOBIC) 7.5 MG tablet Take 1 tablet (7.5 mg total) by mouth once daily. 1/20/22   Jus Garay MD   metFORMIN (GLUCOPHAGE-XR) 500 MG ER 24hr tablet Take 2 tablets (1,000 mg total) by mouth daily with breakfast. 12/23/21 12/23/22  Lali Marvin MD   montelukast (SINGULAIR) 10 mg tablet as needed.  "12/14/21   Historical Provider   omeprazole (PRILOSEC) 20 MG capsule Take 1 capsule (20 mg total) by mouth daily as needed. 2/14/22   Spencer Hewitt MD   pantoprazole (PROTONIX) 40 MG tablet Take 40 mg by mouth as needed. 3/15/21   Historical Provider   triamcinolone acetonide 0.5% (KENALOG) 0.5 % Crea Apply topically 2 (two) times daily as needed (rash). 12/23/21   Lali Marvin MD       Review of Systems:  Review of Systems   Constitutional: Negative for chills and fever.   HENT: Negative for sore throat and trouble swallowing.    Eyes: Negative for visual disturbance.   Respiratory: Negative for cough and shortness of breath.    Cardiovascular: Negative for chest pain.   Gastrointestinal: Negative for abdominal pain, constipation, diarrhea, nausea and vomiting.   Genitourinary: Negative for dysuria and flank pain.   Musculoskeletal: Negative for back pain, neck pain and neck stiffness.   Skin: Negative for rash.             Neurological: Negative for dizziness, syncope, weakness and headaches.   Psychiatric/Behavioral: Negative for confusion.       Health Maintainence:   Immunizations:  Health Maintenance       Date Due Completion Date    Pneumococcal Vaccines (Age 65+) (1 of 1 - PPSV23) 07/13/2021 7/13/2020    Foot Exam 04/22/2022 4/22/2021    Override on 9/26/2019: Done    Lipid Panel 03/19/2022 3/19/2021    Mammogram 06/18/2022 6/18/2021    Hemoglobin A1c 06/21/2022 12/21/2021    DEXA Scan 07/25/2022 7/25/2019    Eye Exam 10/01/2022 10/1/2021    Diabetes Urine Screening 12/21/2022 12/21/2021    TETANUS VACCINE 04/19/2029 4/19/2019    Colorectal Cancer Screening 08/21/2030 8/21/2020    Override on 1/1/2017: Done ([place], [place] and had 2 polyps)           PHYSICAL EXAM     BP (!) 140/68 (BP Location: Left arm, Patient Position: Sitting, BP Method: Large (Manual))   Pulse 60   Ht 5' 7" (1.702 m)   Wt 74.2 kg (163 lb 9.3 oz)   SpO2 (!) 93%   BMI 25.62 kg/m²     Physical Exam  Vitals and nursing note " reviewed.   Constitutional:       Appearance: Normal appearance.   HENT:      Head: Normocephalic and atraumatic.      Nose: Nose normal.      Mouth/Throat:      Pharynx: Oropharynx is clear.   Eyes:      Conjunctiva/sclera: Conjunctivae normal.   Cardiovascular:      Rate and Rhythm: Normal rate and regular rhythm.      Pulses: Normal pulses.   Pulmonary:      Effort: No respiratory distress.   Abdominal:      Tenderness: There is no abdominal tenderness.   Musculoskeletal:         General: Normal range of motion.      Cervical back: No rigidity.   Skin:     General: Skin is warm and dry.      Capillary Refill: Capillary refill takes less than 2 seconds.      Findings: No rash.      Comments: Area of slight macular papular rash, no pustules. No open wounds. No TTP induration or fluctuance.   Normal wrist ROM  Normal finger exam.  Right hand dominant.    Neurological:      General: No focal deficit present.      Mental Status: She is alert.      Gait: Gait normal.   Psychiatric:         Mood and Affect: Mood normal.         LABS     Lab Results   Component Value Date    HGBA1C 6.8 (H) 12/21/2021     CMP  Sodium   Date Value Ref Range Status   10/05/2021 138 136 - 145 mmol/L Final     Potassium   Date Value Ref Range Status   10/05/2021 4.4 3.5 - 5.1 mmol/L Final     Chloride   Date Value Ref Range Status   10/05/2021 102 95 - 110 mmol/L Final     CO2   Date Value Ref Range Status   10/05/2021 24 23 - 29 mmol/L Final     Glucose   Date Value Ref Range Status   10/05/2021 202 (H) 70 - 110 mg/dL Final     BUN   Date Value Ref Range Status   10/05/2021 18 8 - 23 mg/dL Final     Creatinine   Date Value Ref Range Status   10/05/2021 1.5 (H) 0.5 - 1.4 mg/dL Final     Calcium   Date Value Ref Range Status   10/05/2021 9.7 8.7 - 10.5 mg/dL Final     Total Protein   Date Value Ref Range Status   06/09/2020 8.1 6.0 - 8.4 g/dL Final     Albumin   Date Value Ref Range Status   10/05/2021 3.9 3.5 - 5.2 g/dL Final     Total  Bilirubin   Date Value Ref Range Status   06/09/2020 0.8 0.1 - 1.0 mg/dL Final     Comment:     For infants and newborns, interpretation of results should be based  on gestational age, weight and in agreement with clinical  observations.  Premature Infant recommended reference ranges:  Up to 24 hours.............<8.0 mg/dL  Up to 48 hours............<12.0 mg/dL  3-5 days..................<15.0 mg/dL  6-29 days.................<15.0 mg/dL       Alkaline Phosphatase   Date Value Ref Range Status   06/09/2020 53 (L) 55 - 135 U/L Final     AST   Date Value Ref Range Status   06/09/2020 23 10 - 40 U/L Final     ALT   Date Value Ref Range Status   06/09/2020 19 10 - 44 U/L Final     Anion Gap   Date Value Ref Range Status   10/05/2021 12 8 - 16 mmol/L Final     eGFR if    Date Value Ref Range Status   10/05/2021 40 (A) >60 mL/min/1.73 m^2 Final     eGFR if non    Date Value Ref Range Status   10/05/2021 35 (A) >60 mL/min/1.73 m^2 Final     Comment:     Calculation used to obtain the estimated glomerular filtration  rate (eGFR) is the CKD-EPI equation.        Lab Results   Component Value Date    WBC 7.27 10/05/2021    HGB 12.8 10/05/2021    HCT 38.4 10/05/2021    MCV 88 10/05/2021     10/05/2021     Lab Results   Component Value Date    CHOL 119 (L) 03/19/2021    CHOL 122 03/16/2020    CHOL 109 (L) 04/18/2019     Lab Results   Component Value Date    HDL 42 03/19/2021    HDL 49 03/16/2020    HDL 57 04/18/2019     Lab Results   Component Value Date    LDLCALC 61.8 (L) 03/19/2021    LDLCALC 59.6 (L) 03/16/2020    LDLCALC 37.6 (L) 04/18/2019     Lab Results   Component Value Date    TRIG 76 03/19/2021    TRIG 67 03/16/2020    TRIG 72 04/18/2019     Lab Results   Component Value Date    CHOLHDL 35.3 03/19/2021    CHOLHDL 40.2 03/16/2020    CHOLHDL 52.3 (H) 04/18/2019     Lab Results   Component Value Date    TSH 2.199 06/18/2021       ASSESSMENT/PLAN     Meenu Guy is a 71 y.o.  female     Meenu was seen today for rash -nonspecific dermatitis, likely contact, will continue topical steroid. Pt to monitor closely, RTC in 2-3 weeks for symptom re-check. Low threshold to refer to derm if symptoms worsen without etiology identified and despite this treatment plan.     Diagnoses and all orders for this visit:    Dermatitis    Other orders  -     hydrocortisone 2.5 % cream; Apply topically 2 (two) times daily.        Follow up with PCP in 2-3 weeks for symptom re-check      Patient was counseled on when and how to seek emergent care.       Carolina Pendleton PA-C   Department of Internal Medicine - [facility]   10:33 AM

## 2022-03-14 DIAGNOSIS — N18.30 TYPE 2 DIABETES MELLITUS WITH STAGE 3 CHRONIC KIDNEY DISEASE, WITHOUT LONG-TERM CURRENT USE OF INSULIN, UNSPECIFIED WHETHER STAGE 3A OR 3B CKD: ICD-10-CM

## 2022-03-14 DIAGNOSIS — E11.22 TYPE 2 DIABETES MELLITUS WITH STAGE 3 CHRONIC KIDNEY DISEASE, WITHOUT LONG-TERM CURRENT USE OF INSULIN, UNSPECIFIED WHETHER STAGE 3A OR 3B CKD: ICD-10-CM

## 2022-03-14 NOTE — TELEPHONE ENCOUNTER
No new care gaps identified.  Powered by [employer]. Reference number: [id number].   3/14/2022 12:48:51 PM CDT

## 2022-03-15 RX ORDER — GLIMEPIRIDE 4 MG/1
4 TABLET ORAL
Qty: 90 TABLET | Refills: 3 | Status: SHIPPED | OUTPATIENT
Start: 2022-03-15 | End: 2023-03-13

## 2022-03-15 NOTE — TELEPHONE ENCOUNTER

## 2022-05-27 ENCOUNTER — IMMUNIZATION (OUTPATIENT)
Dept: PRIMARY CARE CLINIC | Facility: CLINIC | Age: 72
End: 2022-05-27
Payer: MEDICARE

## 2022-05-27 DIAGNOSIS — Z23 NEED FOR VACCINATION: Primary | ICD-10-CM

## 2022-05-27 PROCEDURE — 91305 COVID-19, MRNA, LNP-S, PF, 30 MCG/0.3 ML DOSE VACCINE (PFIZER): CPT | Mod: PBBFAC | Performed by: INTERNAL MEDICINE

## 2022-05-30 ENCOUNTER — PATIENT MESSAGE (OUTPATIENT)
Dept: ADMINISTRATIVE | Facility: HOSPITAL | Age: 72
End: 2022-05-30
Payer: MEDICARE

## 2022-06-01 ENCOUNTER — TELEPHONE (OUTPATIENT)
Dept: INTERNAL MEDICINE | Facility: CLINIC | Age: 72
End: 2022-06-01
Payer: MEDICARE

## 2022-06-01 DIAGNOSIS — Z12.31 ENCOUNTER FOR SCREENING MAMMOGRAM FOR BREAST CANCER: Primary | ICD-10-CM

## 2022-06-01 NOTE — TELEPHONE ENCOUNTER
Spoke with pt and scheduled mammogram.    ----- Message from Gustavo Escoto sent at 6/1/2022  4:23 PM CDT -----      Name of Who is Calling: LOUIE MASON [[id number]]      What is the request in detail: Pt called to speak with the nurse.Please contact to further discuss and advise.          Can the clinic reply by [facility]: N      What [facility]: LOUIE MASON [[id number]]

## 2022-06-07 ENCOUNTER — LAB VISIT (OUTPATIENT)
Dept: LAB | Facility: OTHER | Age: 72
End: 2022-06-07
Attending: INTERNAL MEDICINE
Payer: MEDICARE

## 2022-06-07 DIAGNOSIS — E55.9 VITAMIN D DEFICIENCY: ICD-10-CM

## 2022-06-07 DIAGNOSIS — E21.3 HYPERPARATHYROIDISM: ICD-10-CM

## 2022-06-07 DIAGNOSIS — N18.32 STAGE 3B CHRONIC KIDNEY DISEASE: ICD-10-CM

## 2022-06-07 DIAGNOSIS — N18.30 TYPE 2 DIABETES MELLITUS WITH STAGE 3 CHRONIC KIDNEY DISEASE, WITHOUT LONG-TERM CURRENT USE OF INSULIN, UNSPECIFIED WHETHER STAGE 3A OR 3B CKD: ICD-10-CM

## 2022-06-07 DIAGNOSIS — N18.32 HYPERTENSIVE KIDNEY DISEASE WITH STAGE 3B CHRONIC KIDNEY DISEASE: ICD-10-CM

## 2022-06-07 DIAGNOSIS — I70.0 AORTIC ATHEROSCLEROSIS: ICD-10-CM

## 2022-06-07 DIAGNOSIS — I12.9 HYPERTENSIVE KIDNEY DISEASE WITH STAGE 3B CHRONIC KIDNEY DISEASE: ICD-10-CM

## 2022-06-07 DIAGNOSIS — E11.22 TYPE 2 DIABETES MELLITUS WITH STAGE 3B CHRONIC KIDNEY DISEASE, WITHOUT LONG-TERM CURRENT USE OF INSULIN: ICD-10-CM

## 2022-06-07 DIAGNOSIS — N18.32 TYPE 2 DIABETES MELLITUS WITH STAGE 3B CHRONIC KIDNEY DISEASE, WITHOUT LONG-TERM CURRENT USE OF INSULIN: ICD-10-CM

## 2022-06-07 DIAGNOSIS — E11.22 TYPE 2 DIABETES MELLITUS WITH STAGE 3 CHRONIC KIDNEY DISEASE, WITHOUT LONG-TERM CURRENT USE OF INSULIN, UNSPECIFIED WHETHER STAGE 3A OR 3B CKD: ICD-10-CM

## 2022-06-07 LAB
25(OH)D3+25(OH)D2 SERPL-MCNC: 31 NG/ML (ref 30–96)
ALBUMIN SERPL BCP-MCNC: 3.8 G/DL (ref 3.5–5.2)
ANION GAP SERPL CALC-SCNC: 12 MMOL/L (ref 8–16)
BASOPHILS # BLD AUTO: 0.01 K/UL (ref 0–0.2)
BASOPHILS NFR BLD: 0.2 % (ref 0–1.9)
BUN SERPL-MCNC: 20 MG/DL (ref 8–23)
CALCIUM SERPL-MCNC: 10.1 MG/DL (ref 8.7–10.5)
CHLORIDE SERPL-SCNC: 102 MMOL/L (ref 95–110)
CHOLEST SERPL-MCNC: 119 MG/DL (ref 120–199)
CHOLEST/HDLC SERPL: 2.8 {RATIO} (ref 2–5)
CO2 SERPL-SCNC: 26 MMOL/L (ref 23–29)
CREAT SERPL-MCNC: 1.4 MG/DL (ref 0.5–1.4)
DIFFERENTIAL METHOD: NORMAL
EOSINOPHIL # BLD AUTO: 0.2 K/UL (ref 0–0.5)
EOSINOPHIL NFR BLD: 3.8 % (ref 0–8)
ERYTHROCYTE [DISTWIDTH] IN BLOOD BY AUTOMATED COUNT: 11.9 % (ref 11.5–14.5)
EST. GFR  (AFRICAN AMERICAN): 43 ML/MIN/1.73 M^2
EST. GFR  (NON AFRICAN AMERICAN): 38 ML/MIN/1.73 M^2
ESTIMATED AVG GLUCOSE: 151 MG/DL (ref 68–131)
GLUCOSE SERPL-MCNC: 121 MG/DL (ref 70–110)
HBA1C MFR BLD: 6.9 % (ref 4–5.6)
HCT VFR BLD AUTO: 40.6 % (ref 37–48.5)
HDLC SERPL-MCNC: 43 MG/DL (ref 40–75)
HDLC SERPL: 36.1 % (ref 20–50)
HGB BLD-MCNC: 13.2 G/DL (ref 12–16)
IMM GRANULOCYTES # BLD AUTO: 0.02 K/UL (ref 0–0.04)
IMM GRANULOCYTES NFR BLD AUTO: 0.3 % (ref 0–0.5)
LDLC SERPL CALC-MCNC: 57.2 MG/DL (ref 63–159)
LYMPHOCYTES # BLD AUTO: 2.1 K/UL (ref 1–4.8)
LYMPHOCYTES NFR BLD: 34.6 % (ref 18–48)
MCH RBC QN AUTO: 29.5 PG (ref 27–31)
MCHC RBC AUTO-ENTMCNC: 32.5 G/DL (ref 32–36)
MCV RBC AUTO: 91 FL (ref 82–98)
MONOCYTES # BLD AUTO: 0.6 K/UL (ref 0.3–1)
MONOCYTES NFR BLD: 9.6 % (ref 4–15)
NEUTROPHILS # BLD AUTO: 3.1 K/UL (ref 1.8–7.7)
NEUTROPHILS NFR BLD: 51.5 % (ref 38–73)
NONHDLC SERPL-MCNC: 76 MG/DL
NRBC BLD-RTO: 0 /100 WBC
PHOSPHATE SERPL-MCNC: 4.1 MG/DL (ref 2.7–4.5)
PLATELET # BLD AUTO: 197 K/UL (ref 150–450)
PMV BLD AUTO: 12.8 FL (ref 9.2–12.9)
POTASSIUM SERPL-SCNC: 4.2 MMOL/L (ref 3.5–5.1)
PTH-INTACT SERPL-MCNC: 90.8 PG/ML (ref 9–77)
RBC # BLD AUTO: 4.48 M/UL (ref 4–5.4)
SODIUM SERPL-SCNC: 140 MMOL/L (ref 136–145)
TRIGL SERPL-MCNC: 94 MG/DL (ref 30–150)
URATE SERPL-MCNC: 6.5 MG/DL (ref 2.4–5.7)
WBC # BLD AUTO: 6.02 K/UL (ref 3.9–12.7)

## 2022-06-07 PROCEDURE — 82306 VITAMIN D 25 HYDROXY: CPT | Performed by: INTERNAL MEDICINE

## 2022-06-07 PROCEDURE — 85025 COMPLETE CBC W/AUTO DIFF WBC: CPT | Performed by: INTERNAL MEDICINE

## 2022-06-07 PROCEDURE — 83036 HEMOGLOBIN GLYCOSYLATED A1C: CPT | Performed by: INTERNAL MEDICINE

## 2022-06-07 PROCEDURE — 80061 LIPID PANEL: CPT | Performed by: INTERNAL MEDICINE

## 2022-06-07 PROCEDURE — 83970 ASSAY OF PARATHORMONE: CPT | Performed by: INTERNAL MEDICINE

## 2022-06-07 PROCEDURE — 80069 RENAL FUNCTION PANEL: CPT | Performed by: INTERNAL MEDICINE

## 2022-06-07 PROCEDURE — 36415 COLL VENOUS BLD VENIPUNCTURE: CPT | Performed by: INTERNAL MEDICINE

## 2022-06-07 PROCEDURE — 84550 ASSAY OF BLOOD/URIC ACID: CPT | Performed by: INTERNAL MEDICINE

## 2022-06-07 NOTE — PROGRESS NOTES
Slight fluctuations in kidney function over time but nothing major.  Potassium normal.  Calcium high normal.   Tentatively return in august.  Thanks.  Copy to PCP, Dr. Marvin   Thanks.

## 2022-06-22 ENCOUNTER — OFFICE VISIT (OUTPATIENT)
Dept: ORTHOPEDICS | Facility: CLINIC | Age: 72
End: 2022-06-22
Payer: MEDICARE

## 2022-06-22 VITALS — BODY MASS INDEX: 25.35 KG/M2 | WEIGHT: 161.5 LBS | HEIGHT: 67 IN

## 2022-06-22 DIAGNOSIS — Z98.1 HX OF FUSION OF CERVICAL SPINE: ICD-10-CM

## 2022-06-22 DIAGNOSIS — M25.512 CHRONIC LEFT SHOULDER PAIN: Primary | ICD-10-CM

## 2022-06-22 DIAGNOSIS — G89.29 CHRONIC LEFT SHOULDER PAIN: Primary | ICD-10-CM

## 2022-06-22 PROCEDURE — 1160F PR REVIEW ALL MEDS BY PRESCRIBER/CLIN PHARMACIST DOCUMENTED: ICD-10-PCS | Mod: CPTII,S$GLB,, | Performed by: ORTHOPAEDIC SURGERY

## 2022-06-22 PROCEDURE — 3008F PR BODY MASS INDEX (BMI) DOCUMENTED: ICD-10-PCS | Mod: CPTII,S$GLB,, | Performed by: ORTHOPAEDIC SURGERY

## 2022-06-22 PROCEDURE — 99214 OFFICE O/P EST MOD 30 MIN: CPT | Mod: S$GLB,,, | Performed by: ORTHOPAEDIC SURGERY

## 2022-06-22 PROCEDURE — 3066F NEPHROPATHY DOC TX: CPT | Mod: CPTII,S$GLB,, | Performed by: ORTHOPAEDIC SURGERY

## 2022-06-22 PROCEDURE — 1101F PT FALLS ASSESS-DOCD LE1/YR: CPT | Mod: CPTII,S$GLB,, | Performed by: ORTHOPAEDIC SURGERY

## 2022-06-22 PROCEDURE — 1125F AMNT PAIN NOTED PAIN PRSNT: CPT | Mod: CPTII,S$GLB,, | Performed by: ORTHOPAEDIC SURGERY

## 2022-06-22 PROCEDURE — 3061F PR NEG MICROALBUMINURIA RESULT DOCUMENTED/REVIEW: ICD-10-PCS | Mod: CPTII,S$GLB,, | Performed by: ORTHOPAEDIC SURGERY

## 2022-06-22 PROCEDURE — 1159F PR MEDICATION LIST DOCUMENTED IN MEDICAL RECORD: ICD-10-PCS | Mod: CPTII,S$GLB,, | Performed by: ORTHOPAEDIC SURGERY

## 2022-06-22 PROCEDURE — 1160F RVW MEDS BY RX/DR IN RCRD: CPT | Mod: CPTII,S$GLB,, | Performed by: ORTHOPAEDIC SURGERY

## 2022-06-22 PROCEDURE — 3288F PR FALLS RISK ASSESSMENT DOCUMENTED: ICD-10-PCS | Mod: CPTII,S$GLB,, | Performed by: ORTHOPAEDIC SURGERY

## 2022-06-22 PROCEDURE — 99214 PR OFFICE/OUTPT VISIT, EST, LEVL IV, 30-39 MIN: ICD-10-PCS | Mod: S$GLB,,, | Performed by: ORTHOPAEDIC SURGERY

## 2022-06-22 PROCEDURE — 3288F FALL RISK ASSESSMENT DOCD: CPT | Mod: CPTII,S$GLB,, | Performed by: ORTHOPAEDIC SURGERY

## 2022-06-22 PROCEDURE — 3044F HG A1C LEVEL LT 7.0%: CPT | Mod: CPTII,S$GLB,, | Performed by: ORTHOPAEDIC SURGERY

## 2022-06-22 PROCEDURE — 1125F PR PAIN SEVERITY QUANTIFIED, PAIN PRESENT: ICD-10-PCS | Mod: CPTII,S$GLB,, | Performed by: ORTHOPAEDIC SURGERY

## 2022-06-22 PROCEDURE — 1101F PR PT FALLS ASSESS DOC 0-1 FALLS W/OUT INJ PAST YR: ICD-10-PCS | Mod: CPTII,S$GLB,, | Performed by: ORTHOPAEDIC SURGERY

## 2022-06-22 PROCEDURE — 99999 PR PBB SHADOW E&M-EST. PATIENT-LVL IV: CPT | Mod: PBBFAC,,, | Performed by: ORTHOPAEDIC SURGERY

## 2022-06-22 PROCEDURE — 1159F MED LIST DOCD IN RCRD: CPT | Mod: CPTII,S$GLB,, | Performed by: ORTHOPAEDIC SURGERY

## 2022-06-22 PROCEDURE — 99999 PR PBB SHADOW E&M-EST. PATIENT-LVL IV: ICD-10-PCS | Mod: PBBFAC,,, | Performed by: ORTHOPAEDIC SURGERY

## 2022-06-22 PROCEDURE — 3044F PR MOST RECENT HEMOGLOBIN A1C LEVEL <7.0%: ICD-10-PCS | Mod: CPTII,S$GLB,, | Performed by: ORTHOPAEDIC SURGERY

## 2022-06-22 PROCEDURE — 3061F NEG MICROALBUMINURIA REV: CPT | Mod: CPTII,S$GLB,, | Performed by: ORTHOPAEDIC SURGERY

## 2022-06-22 PROCEDURE — 3066F PR DOCUMENTATION OF TREATMENT FOR NEPHROPATHY: ICD-10-PCS | Mod: CPTII,S$GLB,, | Performed by: ORTHOPAEDIC SURGERY

## 2022-06-22 PROCEDURE — 3008F BODY MASS INDEX DOCD: CPT | Mod: CPTII,S$GLB,, | Performed by: ORTHOPAEDIC SURGERY

## 2022-06-22 NOTE — PROGRESS NOTES
"DATE: 6/22/2022  PATIENT: Meenu Guy    Attending Physician: Jus Garay M.D.    HISTORY:  Meenu Guy is a 71 y.o. female w/ a hx of 3 anterior cervical fusions and 1 posterior lumbar fusion (25 years ago by Dr. Cruz) who returns to me today CT/MRI review. Patient could not complete MRI due to discomfort. She has been doing ok during the day, but the left shoulder hurts quite a bit at night. Her neck is not very bothersome. She has gotten left shoulder injections in the past, and the last one provided decent relief.     She is traveling to [facility] for her bday coming up with her . She would like to avoid neck surgery.    PMH/PSH/FamHx/SocHx:  Unchanged from prior visit    ROS:  Positive for neck and L shoulder pain  Denies myelopathic symptoms, perineal paresthesias, bowel or bladder incontinence    EXAM:  Ht 5' 7" (1.702 m)   Wt 73.2 kg (161 lb 7.8 oz)   BMI 25.29 kg/m²     My physical examination was notable for the following findings: motor intact BUE; SILT. L shoulder pain with ABER/ABIR, + Rufina's    IMAGING:  Today I independently reviewed the following images and my interpretations are as follows:    Previous AP and Lat upright C-spine films showed  C3-7 anterior cervical fusions; hardware C2-5 with loose screws.    MRI cervical from 2019 showed moderate-severe bilateral C3-5 foraminal stenosis, moderate R C5-6 foraminal stenosis, mild-moderate bilateral C6-7 foraminal stenosis, moderate-severe C7-T2 central and foraminal stenosis.    CT cervical showed C3 loose screws. Incomplete fusion from C3-5. C5-7 is fused.    ASSESSMENT/PLAN:  Patient has cervical pseudoarthrosis. I educated the patient on the importance of core/back strengthening, correct posture, bending/lifting ergonomics, and low-impact aerobic exercises (walking, elliptical, and aquatherapy). Surgery would involve PCDF for the nonunion, but she is not very symptomatic, so we will continue nonoperative management. " Continue medications. I referred her to Sports Medicine for L shoulder pain. RTC PRN.    Jus Garay MD  Orthopaedic Spine Surgeon  Department of Orthopaedic Surgery  [phone]

## 2022-07-15 ENCOUNTER — PATIENT MESSAGE (OUTPATIENT)
Dept: ORTHOPEDICS | Facility: CLINIC | Age: 72
End: 2022-07-15
Payer: MEDICARE

## 2022-07-15 ENCOUNTER — TELEPHONE (OUTPATIENT)
Dept: INTERNAL MEDICINE | Facility: CLINIC | Age: 72
End: 2022-07-15
Payer: MEDICARE

## 2022-07-15 DIAGNOSIS — M48.02 SPINAL STENOSIS, CERVICAL REGION: ICD-10-CM

## 2022-07-15 DIAGNOSIS — L30.9 DERMATITIS: Primary | ICD-10-CM

## 2022-07-15 DIAGNOSIS — M54.12 CERVICAL RADICULOPATHY: ICD-10-CM

## 2022-07-15 RX ORDER — MONTELUKAST SODIUM 10 MG/1
TABLET ORAL
Qty: 30 TABLET | Refills: 11 | Status: SHIPPED | OUTPATIENT
Start: 2022-07-15

## 2022-07-15 RX ORDER — GABAPENTIN 300 MG/1
300 CAPSULE ORAL 3 TIMES DAILY
Qty: 90 CAPSULE | Refills: 1 | Status: SHIPPED | OUTPATIENT
Start: 2022-07-15 | End: 2022-11-02 | Stop reason: SDUPTHER

## 2022-07-15 NOTE — TELEPHONE ENCOUNTER
Care Due:                  Date            Visit Type   Department     Provider  --------------------------------------------------------------------------------                                EP -                              PRIMARY      [facility] INTERNAL  Last Visit: 12-      CARE ([facility])   MEDICINE       Lali Marvin  Next Visit: None Scheduled  None         None Found                                                            Last  Test          Frequency    Reason                     Performed    Due Date  --------------------------------------------------------------------------------    CMP.........  12 months..  atorvastatin.............  Not Found    Overdue    [employer] Embedded Care Gaps. Reference number: [id number]. 7/15/2022   10:59:33 AM CDT

## 2022-07-15 NOTE — TELEPHONE ENCOUNTER
I inform Ms. Guy Dr. Marvin has sent over both Gabapentin and Montelukast today with refills.   I also schedule Ms. Guy's follow up appt. Since her appt was cancelled three months ago she informed me.

## 2022-07-15 NOTE — TELEPHONE ENCOUNTER
----- Message from Kendra Prescott sent at 7/15/2022 11:21 AM CDT -----  Regarding: Refill Request  Who Called:LOUIE MASON [[id number]]        Refill or New Rx Refill         RX Name and Strength: gabapentin (NEURONTIN) 300 MG capsule        How is the patient currently taking it? (ex. 1XDay):  Route: Take 1 capsule (300 mg total) by mouth 3 (three) times daily. - Oral        Is this a 30 day or 90 day RX:  90 day         Preferred Pharmacy with phone number: [facility] [place], [place] - [address] [place] [address] [facility]  [phone]         Local or Mail Order: Local         Ordering Provider: Jus Garay         Would the patient rather a call back or a response via [facility]? no         Best Call Back Number: [phone]        Additional Information:

## 2022-07-15 NOTE — TELEPHONE ENCOUNTER
----- Message from Kendra Prescott sent at 7/15/2022 11:19 AM CDT -----  Regarding: Refill Request  Who Called:LOUIE MASON [[id number]]        Refill or New Rx Refill         RX Name and Strength: montelukast (SINGULAIR) 10 mg tablet         How is the patient currently taking it? (ex. 1XDay):: as needed.        Is this a 30 day or 90 day RX:        [facility] with phone number[facility] [address] [facility]        Local or Mail Order: Local         Ordering Provider: Lali Marvin         Would the patient rather a call back or a response via [facility]? No         Best Call Back Number:   [phone]      Additional Information:

## 2022-07-21 ENCOUNTER — HOSPITAL ENCOUNTER (OUTPATIENT)
Dept: RADIOLOGY | Facility: HOSPITAL | Age: 72
Discharge: HOME | End: 2022-07-21
Attending: INTERNAL MEDICINE
Payer: MEDICARE

## 2022-07-21 VITALS — WEIGHT: 161 LBS | HEIGHT: 67 IN | BODY MASS INDEX: 25.27 KG/M2

## 2022-07-21 DIAGNOSIS — Z12.31 ENCOUNTER FOR SCREENING MAMMOGRAM FOR BREAST CANCER: ICD-10-CM

## 2022-07-21 PROCEDURE — 77063 BREAST TOMOSYNTHESIS BI: CPT | Mod: 26,,, | Performed by: RADIOLOGY

## 2022-07-21 PROCEDURE — 77067 SCR MAMMO BI INCL CAD: CPT | Mod: 26,,, | Performed by: RADIOLOGY

## 2022-07-21 PROCEDURE — 77067 MAMMO DIGITAL SCREENING BILAT WITH TOMO: ICD-10-PCS | Mod: 26,,, | Performed by: RADIOLOGY

## 2022-07-21 PROCEDURE — 77063 BREAST TOMOSYNTHESIS BI: CPT | Mod: TC

## 2022-07-21 PROCEDURE — 77067 SCR MAMMO BI INCL CAD: CPT | Mod: TC

## 2022-07-21 PROCEDURE — 77063 MAMMO DIGITAL SCREENING BILAT WITH TOMO: ICD-10-PCS | Mod: 26,,, | Performed by: RADIOLOGY

## 2022-07-22 ENCOUNTER — PATIENT OUTREACH (OUTPATIENT)
Dept: ADMINISTRATIVE | Facility: HOSPITAL | Age: 72
End: 2022-07-22
Payer: MEDICARE

## 2022-08-04 DIAGNOSIS — L30.9 DERMATITIS: Primary | ICD-10-CM

## 2022-08-05 RX ORDER — HYDROCORTISONE 25 MG/G
CREAM TOPICAL
Qty: 30 G | Refills: 0 | OUTPATIENT
Start: 2022-08-05

## 2022-08-05 RX ORDER — HYDROCORTISONE 25 MG/G
CREAM TOPICAL 2 TIMES DAILY PRN
Qty: 20 G | Refills: 1 | Status: SHIPPED | OUTPATIENT
Start: 2022-08-05 | End: 2022-12-23

## 2022-08-11 ENCOUNTER — OFFICE VISIT (OUTPATIENT)
Dept: INTERNAL MEDICINE | Facility: CLINIC | Age: 72
End: 2022-08-11
Payer: MEDICARE

## 2022-08-11 VITALS
OXYGEN SATURATION: 96 % | HEART RATE: 67 BPM | HEIGHT: 67 IN | DIASTOLIC BLOOD PRESSURE: 58 MMHG | SYSTOLIC BLOOD PRESSURE: 118 MMHG | BODY MASS INDEX: 25.19 KG/M2 | WEIGHT: 160.5 LBS

## 2022-08-11 DIAGNOSIS — I10 ESSENTIAL HYPERTENSION: ICD-10-CM

## 2022-08-11 DIAGNOSIS — J30.9 ALLERGIC RHINITIS, UNSPECIFIED SEASONALITY, UNSPECIFIED TRIGGER: ICD-10-CM

## 2022-08-11 DIAGNOSIS — Z13.820 SCREENING FOR OSTEOPOROSIS: ICD-10-CM

## 2022-08-11 DIAGNOSIS — M19.012 PRIMARY OSTEOARTHRITIS OF BOTH SHOULDERS: ICD-10-CM

## 2022-08-11 DIAGNOSIS — M19.011 PRIMARY OSTEOARTHRITIS OF BOTH SHOULDERS: ICD-10-CM

## 2022-08-11 DIAGNOSIS — E11.51 TYPE 2 DIABETES MELLITUS WITH DIABETIC PERIPHERAL ANGIOPATHY WITHOUT GANGRENE, WITHOUT LONG-TERM CURRENT USE OF INSULIN: ICD-10-CM

## 2022-08-11 DIAGNOSIS — E78.2 MIXED HYPERLIPIDEMIA: ICD-10-CM

## 2022-08-11 DIAGNOSIS — R58 ECCHYMOSIS: Primary | ICD-10-CM

## 2022-08-11 DIAGNOSIS — N18.30 STAGE 3 CHRONIC KIDNEY DISEASE, UNSPECIFIED WHETHER STAGE 3A OR 3B CKD: ICD-10-CM

## 2022-08-11 PROCEDURE — 3061F PR NEG MICROALBUMINURIA RESULT DOCUMENTED/REVIEW: ICD-10-PCS | Mod: CPTII,S$GLB,, | Performed by: INTERNAL MEDICINE

## 2022-08-11 PROCEDURE — 3008F PR BODY MASS INDEX (BMI) DOCUMENTED: ICD-10-PCS | Mod: CPTII,S$GLB,, | Performed by: INTERNAL MEDICINE

## 2022-08-11 PROCEDURE — 1125F PR PAIN SEVERITY QUANTIFIED, PAIN PRESENT: ICD-10-PCS | Mod: CPTII,S$GLB,, | Performed by: INTERNAL MEDICINE

## 2022-08-11 PROCEDURE — 99499 RISK ADDL DX/OHS AUDIT: ICD-10-PCS | Mod: S$GLB,,, | Performed by: INTERNAL MEDICINE

## 2022-08-11 PROCEDURE — 3078F PR MOST RECENT DIASTOLIC BLOOD PRESSURE < 80 MM HG: ICD-10-PCS | Mod: CPTII,S$GLB,, | Performed by: INTERNAL MEDICINE

## 2022-08-11 PROCEDURE — 1100F PTFALLS ASSESS-DOCD GE2>/YR: CPT | Mod: CPTII,S$GLB,, | Performed by: INTERNAL MEDICINE

## 2022-08-11 PROCEDURE — 3074F SYST BP LT 130 MM HG: CPT | Mod: CPTII,S$GLB,, | Performed by: INTERNAL MEDICINE

## 2022-08-11 PROCEDURE — 3044F PR MOST RECENT HEMOGLOBIN A1C LEVEL <7.0%: ICD-10-PCS | Mod: CPTII,S$GLB,, | Performed by: INTERNAL MEDICINE

## 2022-08-11 PROCEDURE — 99215 PR OFFICE/OUTPT VISIT, EST, LEVL V, 40-54 MIN: ICD-10-PCS | Mod: S$GLB,,, | Performed by: INTERNAL MEDICINE

## 2022-08-11 PROCEDURE — 3078F DIAST BP <80 MM HG: CPT | Mod: CPTII,S$GLB,, | Performed by: INTERNAL MEDICINE

## 2022-08-11 PROCEDURE — 99499 UNLISTED E&M SERVICE: CPT | Mod: S$GLB,,, | Performed by: INTERNAL MEDICINE

## 2022-08-11 PROCEDURE — 1159F PR MEDICATION LIST DOCUMENTED IN MEDICAL RECORD: ICD-10-PCS | Mod: CPTII,S$GLB,, | Performed by: INTERNAL MEDICINE

## 2022-08-11 PROCEDURE — 1100F PR PT FALLS ASSESS DOC 2+ FALLS/FALL W/INJURY/YR: ICD-10-PCS | Mod: CPTII,S$GLB,, | Performed by: INTERNAL MEDICINE

## 2022-08-11 PROCEDURE — 1159F MED LIST DOCD IN RCRD: CPT | Mod: CPTII,S$GLB,, | Performed by: INTERNAL MEDICINE

## 2022-08-11 PROCEDURE — 3288F PR FALLS RISK ASSESSMENT DOCUMENTED: ICD-10-PCS | Mod: CPTII,S$GLB,, | Performed by: INTERNAL MEDICINE

## 2022-08-11 PROCEDURE — 3061F NEG MICROALBUMINURIA REV: CPT | Mod: CPTII,S$GLB,, | Performed by: INTERNAL MEDICINE

## 2022-08-11 PROCEDURE — 3288F FALL RISK ASSESSMENT DOCD: CPT | Mod: CPTII,S$GLB,, | Performed by: INTERNAL MEDICINE

## 2022-08-11 PROCEDURE — 99215 OFFICE O/P EST HI 40 MIN: CPT | Mod: S$GLB,,, | Performed by: INTERNAL MEDICINE

## 2022-08-11 PROCEDURE — 99999 PR PBB SHADOW E&M-EST. PATIENT-LVL V: CPT | Mod: PBBFAC,,, | Performed by: INTERNAL MEDICINE

## 2022-08-11 PROCEDURE — 1160F PR REVIEW ALL MEDS BY PRESCRIBER/CLIN PHARMACIST DOCUMENTED: ICD-10-PCS | Mod: CPTII,S$GLB,, | Performed by: INTERNAL MEDICINE

## 2022-08-11 PROCEDURE — 99999 PR PBB SHADOW E&M-EST. PATIENT-LVL V: ICD-10-PCS | Mod: PBBFAC,,, | Performed by: INTERNAL MEDICINE

## 2022-08-11 PROCEDURE — 3066F NEPHROPATHY DOC TX: CPT | Mod: CPTII,S$GLB,, | Performed by: INTERNAL MEDICINE

## 2022-08-11 PROCEDURE — 3008F BODY MASS INDEX DOCD: CPT | Mod: CPTII,S$GLB,, | Performed by: INTERNAL MEDICINE

## 2022-08-11 PROCEDURE — 3066F PR DOCUMENTATION OF TREATMENT FOR NEPHROPATHY: ICD-10-PCS | Mod: CPTII,S$GLB,, | Performed by: INTERNAL MEDICINE

## 2022-08-11 PROCEDURE — 1160F RVW MEDS BY RX/DR IN RCRD: CPT | Mod: CPTII,S$GLB,, | Performed by: INTERNAL MEDICINE

## 2022-08-11 PROCEDURE — 3074F PR MOST RECENT SYSTOLIC BLOOD PRESSURE < 130 MM HG: ICD-10-PCS | Mod: CPTII,S$GLB,, | Performed by: INTERNAL MEDICINE

## 2022-08-11 PROCEDURE — 3044F HG A1C LEVEL LT 7.0%: CPT | Mod: CPTII,S$GLB,, | Performed by: INTERNAL MEDICINE

## 2022-08-11 PROCEDURE — 1125F AMNT PAIN NOTED PAIN PRSNT: CPT | Mod: CPTII,S$GLB,, | Performed by: INTERNAL MEDICINE

## 2022-08-11 RX ORDER — DICLOFENAC SODIUM 10 MG/G
2 GEL TOPICAL 4 TIMES DAILY PRN
Qty: 100 G | Refills: 11 | Status: SHIPPED | OUTPATIENT
Start: 2022-08-11 | End: 2022-12-23

## 2022-08-11 RX ORDER — KETOCONAZOLE 20 MG/G
CREAM TOPICAL DAILY PRN
Qty: 15 G | Refills: 5 | Status: CANCELLED | OUTPATIENT
Start: 2022-08-11

## 2022-08-11 RX ORDER — AMLODIPINE BESYLATE 10 MG/1
10 TABLET ORAL DAILY
Qty: 90 TABLET | Refills: 3 | Status: SHIPPED | OUTPATIENT
Start: 2022-08-11 | End: 2023-09-11

## 2022-08-11 RX ORDER — HYDROCORTISONE 25 MG/G
CREAM TOPICAL 2 TIMES DAILY
Qty: 20 G | Refills: 0 | Status: CANCELLED | OUTPATIENT
Start: 2022-08-11

## 2022-08-11 RX ORDER — FLUTICASONE PROPIONATE 50 MCG
1 SPRAY, SUSPENSION (ML) NASAL DAILY
Qty: 16 G | Refills: 11 | Status: SHIPPED | OUTPATIENT
Start: 2022-08-11 | End: 2023-10-02

## 2022-08-11 RX ORDER — ATORVASTATIN CALCIUM 20 MG/1
20 TABLET, FILM COATED ORAL DAILY
Qty: 90 TABLET | Refills: 3 | Status: SHIPPED | OUTPATIENT
Start: 2022-08-11 | End: 2023-09-05

## 2022-08-11 RX ORDER — DAPAGLIFLOZIN 10 MG/1
10 TABLET, FILM COATED ORAL DAILY
Qty: 30 TABLET | Refills: 11 | Status: SHIPPED | OUTPATIENT
Start: 2022-08-11 | End: 2022-12-23

## 2022-08-11 RX ORDER — EMPAGLIFLOZIN 10 MG/1
10 TABLET, FILM COATED ORAL DAILY
Qty: 30 TABLET | Refills: 11 | Status: CANCELLED | OUTPATIENT
Start: 2022-08-11

## 2022-08-11 RX ORDER — CARVEDILOL 6.25 MG/1
6.25 TABLET ORAL 2 TIMES DAILY
Qty: 180 TABLET | Refills: 3 | Status: SHIPPED | OUTPATIENT
Start: 2022-08-11 | End: 2023-09-20

## 2022-08-11 NOTE — PROGRESS NOTES
Subjective:       Patient ID: Meenu Guy is a 72 y.o. female who  has a past medical history of Diabetes mellitus, type 2 and Hypertension.    Chief Complaint: Diabetes and Rash     History was obtained from the patient and supplemented through chart review and her  who accompanied her.  Following with Orthopedics for left shoulder pain.    HPI    L wrist ecchymosis with mild edema.  Mild TTP.  Does not recall trauma.  Has been applying topical creams.    DM2:    Currently pt is taking glimepiride 4, metformin 1000 XR d/t loose stools. GFR 44.      Rx Jardiance d/t CKD, but increased in cost. Didn't qualify for cost assitsance.  Victoza was also expensive.        Diet: stopped cokes. Doesn't eat much. Admits to junk food. Waffles with syrup. Eats sweets since her  brings it home.  Eats a lot of peppermints. Likes watermelon. Decreased sweets. Wheat bread. Eats crackers with tea for breakfast. Not much starches.     Exercise: 1 hour/day. Treadmill in their building. Arm lifts, sit ups. Has an exercise hula hoop.    Normal microalbumin creatinine ratio.  Not on an ACE/ARB d/t angioedema.   Retinal exams:  10/1/21 c Dr. Antonio.  Foot exams:  4/2021  DM edu: 10/2019.  She declined seeing them again.  Lab Results   Component Value Date/Time    HGBA1C 6.9 (H) 06/07/2022 07:56 AM    HGBA1C 6.8 (H) 12/21/2021 07:05 AM    HGBA1C 7.5 (H) 06/18/2021 07:25 AM     HTN:    No h/o CAD, arrhythmia. TTE  with normal EF, grade 2 diastolic dysfunction.    Pt's BP is controlled. Took meds 1 hour ago. On Coreg 6.25 b.i.d., Norvasc 10.  +DM2, but H/o ACE angioedema.  Tolerating meds well.   Home /75.    Aortic Atherosclerosis, HLD:    Is currently taking Lipitor 20 and ASA 81 daily. H/o edema from statin years ago in [place], but has been tolerating Lipitor.  Lab Results   Component Value Date    LDLCALC 57.2 (L) 06/07/2022     The ASCVD Risk score (Cee CHRISTIE Jr., et al., 2013) failed to calculate  for the following reasons:    The valid total cholesterol range is 130 to 320 mg/dL     CKD 3:  Stopped NSAIDs. H/o ACE/ARB angioedema.  Renal ultrasound:  No hydro or nephrolithiasis  Following with Nephrology. Slight fluctuations in renal fnct. Planning on F/u 8/2022.    L shoulder OA:  For years, worsened.  Mainly occurs at night and can quite severe.  Some hand tingling.  Stiffness, tightness.  Improves with hot water showers.  Not much relief with massage. Pain with shoulder abducting and bringing her arm towards her chest.  Takes Tylenol s relief.    X-ray with bilateral OA.  Saw Orthopedic and declined treatment.  Recommend compound cream, which was expensive and did not help. Recommended core strengthening.  Conservative management at this time.  Referred to Sports Medicine for left shoulder pain.    AR:  Chronic.  H/o recurrent epistaxis, shavon during drier weather.  Has difficulty expectorating mucous, post nasal drip in the AM. Using flonase BID, but not correctly. Has protective pillow cases against fomites.             Not addressed today.  PVD:  Status post right femoral bypass around 2012 in [place].  No claudication.  Quit tobacco 15 years ago. Has Kelton horse muscle spasms.   Continue ASA, statin. Encouraged exercise, stretching.    Vitamin D deficiency: +CKD. Is taking OTC Vit D, MVI.   Normalized. +CKD. Cont OTC Vit D, MVI. Nephro monitoring.  Lab Results   Component Value Date    [id number] 31 06/07/2022    [id number] 50 10/05/2021    [id number] 41 08/03/2020     Functional diarrhea:  H/o choley. Resulted in constipation and loose stools.     Has epigastric and b/l flank discomfort/tightness, which improves after BM. Chronic bloating, belching.  H/o constipation, but much lately. Now with frequent soft stools. Not watery.  Sometimes 4/day. +odor. Slightly greasy.  No food triggers.  Trying to avoid acidic food, fatty foods d/t GERD.  She is concerned for pancreatic insufficiency. No weight  loss.      No fever, N/V. No hematochezia, melena. Decreased appetite. No recent antibiotics.   Denies dysuria, hematuria, cloudy appearing urine, urinary odor.     Takes Mylanta and GasX.  Metformin was switched to 1000 XR d/t loose stools.    C scope 2017 with 2 polyps.  Repeat in 3 years.  Repeat C scope with [facility]  with polyp.  Repeat in 5 years. Also had EGD 8/2020 that was reportedly normal.   Worsened. TSH, TTG wnl.  Stool studies neg. Calprotectin elevated, suggesting inflammatory diarrhea. Try cholestyramine d/t h/o choley, probiotic. F/u with [facility].    Belching, GERD:  Chronic intermittent belching, postprandial gas pain at b/l abd. Worse at night and awakes with it.  Worse with hot sauces, chocolate candy, fatty foods.  Avoiding food triggers. Chest soreness, but no burning.  No sour taste in her throat. Last meal after 6 AM.  GasX helps. Takes Mylanta. Stopped NSAIDs.     Had cscope with [facility] as above. EGD was normal. Has prilosec.  Avoid dietary triggers. Cont PPI PRN. F/u with [facility]    Colon polyp:  C scope 2017 with 2 polyps.  Repeat in 3 years.  Repeat C scope with [facility]  with polyp.  Repeat in 5 years.    Cervical DDD, FMA:  S/p dissectomy 20 years ago. Not much improvement on lyrica or gabapentin.  Follows with Pain Med for epidural.    +MERVAT:  Chronic shoulder pain as above.  Positive MERVAT, but negative sub serologies.  Not consistent with SLE.  Saw rheumatology who recommended follow-up with Sports Medicine/Ortho.    Dermatits:  To the chest, R axilla, left forearm. Admits to taking hot showers. Has ketoconazole and Kenalog cream.  Improved.  Has topical hydrocortisone for likely contact dermatitis.  Improved. Likely atopic dermatitis. Rec shorter, tepid showers followed by moisturizer and then vaseline. Not fully addressed.  Refilled topicals p.r.n..    Review of Systems   Constitutional: Negative for activity change.   Respiratory: Negative for wheezing.   "  Cardiovascular: Negative for leg swelling.   Musculoskeletal: Positive for arthralgias. Negative for gait problem.   Skin: Positive for color change. Negative for wound.   Hematological: Negative for adenopathy.   Psychiatric/Behavioral: Negative for confusion. The patient is not nervous/anxious.        I personally reviewed Past Medical History, Past Surgical History, Social History, and Family History.    Objective:      Vitals:    08/11/22 1025 08/11/22 1104   BP: (!) 142/58 (!) 118/58   BP Location: Right arm Left arm   Pulse: 67    SpO2: 96%    Weight: 72.8 kg (160 lb 7.9 oz)    Height: 5' 7" (1.702 m)       Physical Exam  Constitutional:       General: She is not in acute distress.     Appearance: She is well-developed. She is not diaphoretic.   HENT:      Head: Normocephalic and atraumatic.   Eyes:      General: No scleral icterus.        Right eye: No discharge.         Left eye: No discharge.   Neck:      Thyroid: No thyromegaly.      Trachea: No tracheal deviation.   Cardiovascular:      Rate and Rhythm: Normal rate and regular rhythm.      Heart sounds: Normal heart sounds. No murmur heard.  Pulmonary:      Effort: Pulmonary effort is normal. No respiratory distress.      Breath sounds: Normal breath sounds. No wheezing.   Abdominal:      General: Bowel sounds are normal. There is no distension.      Palpations: Abdomen is soft.      Tenderness: There is no abdominal tenderness.   Musculoskeletal:         General: No deformity.      Cervical back: Neck supple.      Right lower leg: No edema.      Left lower leg: No edema.   Lymphadenopathy:      Cervical: No cervical adenopathy.   Skin:     General: Skin is warm and dry.      Findings: No erythema or rash.          Neurological:      Mental Status: She is alert.      Gait: Gait normal.   Psychiatric:         Behavior: Behavior normal.           Lab Results   Component Value Date    WBC 6.02 06/07/2022    HGB 13.2 06/07/2022    HCT 40.6 06/07/2022    PLT " 197 06/07/2022    CHOL 119 (L) 06/07/2022    TRIG 94 06/07/2022    HDL 43 06/07/2022    ALT 19 06/09/2020    AST 23 06/09/2020     06/07/2022    K 4.2 06/07/2022     06/07/2022    CREATININE 1.4 06/07/2022    BUN 20 06/07/2022    CO2 26 06/07/2022    TSH 2.199 06/18/2021    HGBA1C 6.9 (H) 06/07/2022       The ASCVD Risk score (Cee CHRISTIE Jr., et al., 2013) failed to calculate for the following reasons:    The valid total cholesterol range is 130 to 320 mg/dL    (Imaging have been independently reviewed)  7/2022 Mammogram without evidence of malignancy, BI-RADS 1, TC score low.    Assessment:       1. Ecchymosis    2. Type 2 diabetes mellitus with diabetic peripheral angiopathy without gangrene, without long-term current use of insulin    3. Essential hypertension    4. Mixed hyperlipidemia    5. Stage 3 chronic kidney disease, unspecified whether stage 3a or 3b CKD    6. Screening for osteoporosis    7. Primary osteoarthritis of both shoulders    8. Allergic rhinitis, unspecified seasonality, unspecified trigger          Plan:       Meenu was seen today for diabetes and rash.    Diagnoses and all orders for this visit:    Ecchymosis  Comments:  May take Tylenol, apply icy hot PRN, ice.     Type 2 diabetes mellitus with diabetic peripheral angiopathy without gangrene, without long-term current use of insulin  Comments:  A1C borderline high. BG controlled. Switch Jardiance to Farxiga if covered. Pharmacy assistance to help c cost. Consider D/C glimepiride. A1c 1 mo.  Orders:  -     Hemoglobin A1C; Future  -     Microalbumin/Creatinine Ratio, Urine; Future  -     dapagliflozin (FARXIGA) 10 mg tablet; Take 1 tablet (10 mg total) by mouth once daily.  -     Ambulatory referral/consult to Pharmacy Assistance; Future  -     Ambulatory referral/consult to Diabetes Education; Future    Essential hypertension  Comments:  Controlled. Cont Coreg 6.25 BID, Norvasc 10. + DM, but h/o ACE angioedema. Monitor home  BP.  Orders:  -     carvediloL (COREG) 6.25 MG tablet; Take 1 tablet (6.25 mg total) by mouth 2 (two) times daily.  -     amLODIPine (NORVASC) 10 MG tablet; Take 1 tablet (10 mg total) by mouth once daily.    Mixed hyperlipidemia  Comments:  FLP controlled. Cont Lipitor 20, ASA. Monitor FLP annually.  Orders:  -     atorvastatin (LIPITOR) 20 MG tablet; Take 1 tablet (20 mg total) by mouth once daily.    Stage 3 chronic kidney disease, unspecified whether stage 3a or 3b CKD  Comments:  Stable.  D/t h/o HTN, DM, h/o chronic NSAID use.  Will rashida to resched f/u with Nephrology.  SGLT2 as above.  Orders:  -     dapagliflozin (FARXIGA) 10 mg tablet; Take 1 tablet (10 mg total) by mouth once daily.    Screening for osteoporosis  -     DXA Bone Density Spine And Hip; Future    Primary osteoarthritis of both shoulders  Comments:  Persistent. Refilled Voltaren gel PRN. F/u c Ortho, Sports Med.  Orders:  -     diclofenac sodium (VOLTAREN) 1 % Gel; Apply 2 g topically 4 (four) times daily as needed (pain).    Allergic rhinitis, unspecified seasonality, unspecified trigger  Comments:  DDx environmental, dust mites. Refilled Flonase.  Orders:  -     fluticasone propionate (FLONASE) 50 mcg/actuation nasal spray; 1 spray (50 mcg total) by Each Nostril route once daily.    Other orders  The following orders have not been finalized:  -     Cancel: empagliflozin (JARDIANCE) 10 mg tablet  -     Cancel: hydrocortisone 2.5 % cream  -     Cancel: ketoconazole (NIZORAL) 2 % cream         Side effects of medication(s) were discussed in detail and patient voiced understanding.  Patient will call back for any issues or complications.     I have spent a total of 55 minutes with the patient as well as reviewing the chart/medical record and placing orders on the day of the visit. Discussed ecchymosis, DM, HTN.     RTC 1 month(s) or sooner PRN for DM with labs prior. In person or telephone.

## 2022-08-12 ENCOUNTER — TELEPHONE (OUTPATIENT)
Dept: ADMINISTRATIVE | Facility: OTHER | Age: 72
End: 2022-08-12
Payer: MEDICARE

## 2022-08-12 NOTE — TELEPHONE ENCOUNTER
Left voice message for patient to return call to schedule appointment from referral to [facility] department.  Maeve BROWN [phone]

## 2022-08-16 ENCOUNTER — TELEPHONE (OUTPATIENT)
Dept: INTERNAL MEDICINE | Facility: CLINIC | Age: 72
End: 2022-08-16
Payer: MEDICARE

## 2022-08-16 ENCOUNTER — HOSPITAL ENCOUNTER (OUTPATIENT)
Dept: RADIOLOGY | Facility: OTHER | Age: 72
Discharge: HOME | End: 2022-08-16
Attending: INTERNAL MEDICINE
Payer: MEDICARE

## 2022-08-16 DIAGNOSIS — Z13.820 SCREENING FOR OSTEOPOROSIS: ICD-10-CM

## 2022-08-16 DIAGNOSIS — E11.51 TYPE 2 DIABETES MELLITUS WITH DIABETIC PERIPHERAL ANGIOPATHY WITHOUT GANGRENE, WITHOUT LONG-TERM CURRENT USE OF INSULIN: Primary | ICD-10-CM

## 2022-08-16 PROCEDURE — 77080 DXA BONE DENSITY AXIAL: CPT | Mod: TC

## 2022-08-16 PROCEDURE — 77080 DXA BONE DENSITY AXIAL: CPT | Mod: 26,,, | Performed by: RADIOLOGY

## 2022-08-16 PROCEDURE — 77080 DEXA BONE DENSITY SPINE HIP: ICD-10-PCS | Mod: 26,,, | Performed by: RADIOLOGY

## 2022-08-16 NOTE — TELEPHONE ENCOUNTER
----- Message from Luis Sheehan sent at 8/16/2022  9:17 AM CDT -----  Name of Who is Calling: LOUIE MASON         What is the request in detail: The patient is calling to speak to the nurse in regards to the patient requesting a note to return to work this Monday due to the patient being out sick since her appointment. Please advise          Can the clinic reply by [facility]: No         What Number to Call Back if not in [facility]: [phone]

## 2022-08-16 NOTE — TELEPHONE ENCOUNTER
May write a letter stating that she was seen in clinic on 8/11/22, but NOT for time off.  If she feels sick, then she should schedule an in person appt to evaluate since this was not discussed during our last visit.    I ordered a referral to pharmacy medication assistance to see if she qualifies for any coupons or cost saving programs.  They will contact her.

## 2022-08-16 NOTE — TELEPHONE ENCOUNTER
----- Message from Aiden Fox sent at 8/16/2022  4:13 PM CDT -----  Name of Who is Calling: LOUIE MASON [[id number]]            What is the request in detail: Patient is requesting call back to get a paper to request being off til Monday. Also the new medicine that was prescribed for diabetes is higher than previous and would like to switch              Can the clinic reply by [facility]: no              What Number to Call Back if not in [facility]: [phone]

## 2022-08-17 ENCOUNTER — OFFICE VISIT (OUTPATIENT)
Dept: INTERNAL MEDICINE | Facility: CLINIC | Age: 72
End: 2022-08-17
Payer: MEDICARE

## 2022-08-17 ENCOUNTER — TELEPHONE (OUTPATIENT)
Dept: PHARMACY | Facility: CLINIC | Age: 72
End: 2022-08-17
Payer: MEDICARE

## 2022-08-17 VITALS — DIASTOLIC BLOOD PRESSURE: 75 MMHG | SYSTOLIC BLOOD PRESSURE: 134 MMHG

## 2022-08-17 DIAGNOSIS — G89.29 CHRONIC RIGHT SHOULDER PAIN: ICD-10-CM

## 2022-08-17 DIAGNOSIS — M25.511 CHRONIC RIGHT SHOULDER PAIN: ICD-10-CM

## 2022-08-17 DIAGNOSIS — S60.219A ECCHYMOSIS OF WRIST: Primary | ICD-10-CM

## 2022-08-17 DIAGNOSIS — M48.02 SPINAL STENOSIS, CERVICAL REGION: ICD-10-CM

## 2022-08-17 PROCEDURE — 3061F NEG MICROALBUMINURIA REV: CPT | Mod: CPTII,95,, | Performed by: INTERNAL MEDICINE

## 2022-08-17 PROCEDURE — 3066F PR DOCUMENTATION OF TREATMENT FOR NEPHROPATHY: ICD-10-PCS | Mod: CPTII,95,, | Performed by: INTERNAL MEDICINE

## 2022-08-17 PROCEDURE — 1125F PR PAIN SEVERITY QUANTIFIED, PAIN PRESENT: ICD-10-PCS | Mod: CPTII,95,, | Performed by: INTERNAL MEDICINE

## 2022-08-17 PROCEDURE — 3044F HG A1C LEVEL LT 7.0%: CPT | Mod: CPTII,95,, | Performed by: INTERNAL MEDICINE

## 2022-08-17 PROCEDURE — 1101F PT FALLS ASSESS-DOCD LE1/YR: CPT | Mod: CPTII,95,, | Performed by: INTERNAL MEDICINE

## 2022-08-17 PROCEDURE — 3288F PR FALLS RISK ASSESSMENT DOCUMENTED: ICD-10-PCS | Mod: CPTII,95,, | Performed by: INTERNAL MEDICINE

## 2022-08-17 PROCEDURE — 3078F DIAST BP <80 MM HG: CPT | Mod: CPTII,95,, | Performed by: INTERNAL MEDICINE

## 2022-08-17 PROCEDURE — 1125F AMNT PAIN NOTED PAIN PRSNT: CPT | Mod: CPTII,95,, | Performed by: INTERNAL MEDICINE

## 2022-08-17 PROCEDURE — 3061F PR NEG MICROALBUMINURIA RESULT DOCUMENTED/REVIEW: ICD-10-PCS | Mod: CPTII,95,, | Performed by: INTERNAL MEDICINE

## 2022-08-17 PROCEDURE — 3044F PR MOST RECENT HEMOGLOBIN A1C LEVEL <7.0%: ICD-10-PCS | Mod: CPTII,95,, | Performed by: INTERNAL MEDICINE

## 2022-08-17 PROCEDURE — 3075F PR MOST RECENT SYSTOLIC BLOOD PRESS GE 130-139MM HG: ICD-10-PCS | Mod: CPTII,95,, | Performed by: INTERNAL MEDICINE

## 2022-08-17 PROCEDURE — 3078F PR MOST RECENT DIASTOLIC BLOOD PRESSURE < 80 MM HG: ICD-10-PCS | Mod: CPTII,95,, | Performed by: INTERNAL MEDICINE

## 2022-08-17 PROCEDURE — 3066F NEPHROPATHY DOC TX: CPT | Mod: CPTII,95,, | Performed by: INTERNAL MEDICINE

## 2022-08-17 PROCEDURE — 99443 PR PHYSICIAN TELEPHONE EVALUATION 21-30 MIN: ICD-10-PCS | Mod: 95,,, | Performed by: INTERNAL MEDICINE

## 2022-08-17 PROCEDURE — 3075F SYST BP GE 130 - 139MM HG: CPT | Mod: CPTII,95,, | Performed by: INTERNAL MEDICINE

## 2022-08-17 PROCEDURE — 99443 PR PHYSICIAN TELEPHONE EVALUATION 21-30 MIN: CPT | Mod: 95,,, | Performed by: INTERNAL MEDICINE

## 2022-08-17 PROCEDURE — 1159F PR MEDICATION LIST DOCUMENTED IN MEDICAL RECORD: ICD-10-PCS | Mod: CPTII,95,, | Performed by: INTERNAL MEDICINE

## 2022-08-17 PROCEDURE — 1101F PR PT FALLS ASSESS DOC 0-1 FALLS W/OUT INJ PAST YR: ICD-10-PCS | Mod: CPTII,95,, | Performed by: INTERNAL MEDICINE

## 2022-08-17 PROCEDURE — 1160F PR REVIEW ALL MEDS BY PRESCRIBER/CLIN PHARMACIST DOCUMENTED: ICD-10-PCS | Mod: CPTII,95,, | Performed by: INTERNAL MEDICINE

## 2022-08-17 PROCEDURE — 3288F FALL RISK ASSESSMENT DOCD: CPT | Mod: CPTII,95,, | Performed by: INTERNAL MEDICINE

## 2022-08-17 PROCEDURE — 1159F MED LIST DOCD IN RCRD: CPT | Mod: CPTII,95,, | Performed by: INTERNAL MEDICINE

## 2022-08-17 PROCEDURE — 1160F RVW MEDS BY RX/DR IN RCRD: CPT | Mod: CPTII,95,, | Performed by: INTERNAL MEDICINE

## 2022-08-17 RX ORDER — MELOXICAM 7.5 MG/1
7.5 TABLET ORAL DAILY
Qty: 7 TABLET | Refills: 0 | Status: SHIPPED | OUTPATIENT
Start: 2022-08-17 | End: 2022-08-24

## 2022-08-17 RX ORDER — CYCLOBENZAPRINE HCL 5 MG
5 TABLET ORAL 3 TIMES DAILY PRN
Qty: 60 TABLET | Refills: 3 | Status: SHIPPED | OUTPATIENT
Start: 2022-08-17 | End: 2023-07-21

## 2022-08-17 NOTE — PROGRESS NOTES
Audio Only Telehealth Visit     The patient location is: [place]  The chief complaint leading to consultation is: left arm pain, Neck Pain, and Shoulder Pain    Visit type: Virtual visit with audio only (telephone)     The reason for the audio only service rather than synchronous audio and video virtual visit was related to technical difficulties or patient preference/necessity.     Each patient to whom I provide medical services by telemedicine is:  (1) informed of the relationship between the physician and patient and the respective role of any other health care provider with respect to management of the patient; and (2) notified that they may decline to receive medical services by telemedicine and may withdraw from such care at any time. Patient verbally consented to receive this service via voice-only telephone call.    This service was not originating from a related E/M service provided within the previous 7 days nor will  to an E/M service or procedure within the next 24 hours or my soonest available appointment.  Prevailing standard of care was able to be met in this audio-only visit.      Subjective:       Patient ID: Meenu Guy is a 72 y.o. female who  has a past medical history of Diabetes mellitus, type 2 and Hypertension.    Chief Complaint: left arm pain, Neck Pain, and Shoulder Pain     History was obtained from the patient and supplemented through chart review  There were no ER or clinic visits since our last appointment.    She does housework for some seniors.    HPI    L wrist ecchymosis:  Acute. With mild edema.  Mild TTP.  Does not recall trauma.  Has been applying topical creams.  On exam last week, slight diffuse echymosis c slight diffuse edema at the anterior and posterior aspect of the L wrist. NTTP. No erythema, increased warmth, induration, discharge.    Color/ecchymosis has been improving. Has been applying aloe vera.    L shoulder OA, cervical stenosis:  For years.  Mainly  occurs at night and can quite severe.  Some hand tingling.  Stiffness, tightness.  Improves with hot water showers.  Not much relief with massage. Pain with shoulder abducting and bringing her arm towards her chest.  Takes Tylenol s relief.    X-ray with bilateral OA. Improved with injection in the past. Saw Orthopedic and declined treatment.  Recommend compound cream, which was expensive and did not help. Recommended core strengthening.  Conservative management at this time.  Referred to Sports Medicine for left shoulder pain.    Increased neck and shoulder pain at night. Feels tight.  Takes gabapentin.  H/o neck surgery and hardware was removed.   She is requesting a letter to excuse from work d/t flare of chronic pain.            Not addressed today.  DM2:    Currently pt is taking glimepiride 4, metformin 1000 XR d/t loose stools. GFR 44.      Rx Jardiance d/t CKD, but increased in cost. Didn't qualify for cost assistance.  Victoza was also expensive.        Diet: stopped cokes. Doesn't eat much. Admits to junk food. Waffles with syrup. Eats sweets since her  brings it home.  Eats a lot of peppermints. Likes watermelon. Decreased sweets. Wheat bread. Eats crackers with tea for breakfast. Not much starches.     Exercise: 1 hour/day. Treadmill in their building. Arm lifts, sit ups. Has an exercise hula hoop.    Normal microalbumin creatinine ratio.  Not on an ACE/ARB d/t angioedema.   Retinal exams:  10/1/21 c Dr. Antonio.  Foot exams:  4/2021  DM edu: 10/2019.  She declined seeing them again.  A1C borderline high. BG controlled. Switch Jardiance to Farxiga if covered. Pharmacy assistance to help c cost. Consider D/C glimepiride. A1c 1 mo.  Lab Results   Component Value Date/Time    HGBA1C 6.9 (H) 06/07/2022 07:56 AM    HGBA1C 6.8 (H) 12/21/2021 07:05 AM    HGBA1C 7.5 (H) 06/18/2021 07:25 AM     HTN:    No h/o CAD, arrhythmia. TTE  with normal EF, grade 2 diastolic dysfunction.    Pt's BP is  controlled. Took meds 1 hour ago. On Coreg 6.25 b.i.d., Norvasc 10.  +DM2, but H/o ACE angioedema.  Tolerating meds well.   Home /75.  Controlled. Cont Coreg 6.25 BID, Norvasc 10. + DM, but h/o ACE angioedema. Monitor home BP.    Aortic Atherosclerosis, HLD:    Is currently taking Lipitor 20 and ASA 81 daily. H/o edema from statin years ago in [place], but has been tolerating Lipitor.  FLP controlled. Cont Lipitor 20, ASA. Monitor FLP annually.  Lab Results   Component Value Date    LDLCALC 57.2 (L) 06/07/2022     The ASCVD Risk score (Beaver Damnick CHRISTIE Jr., et al., 2013) failed to calculate for the following reasons:    The valid total cholesterol range is 130 to 320 mg/dL     CKD 3:  Stopped NSAIDs. H/o ACE/ARB angioedema.  Renal ultrasound:  No hydro or nephrolithiasis  Following with Nephrology. Slight fluctuations in renal fnct. Planning on F/u 8/2022.  Stable.  D/t h/o HTN, DM, h/o chronic NSAID use.  Will need to resched f/u with Nephrology.  SGLT2 as above.    AR:  Chronic.  H/o recurrent epistaxis, shavon during drier weather.  Has difficulty expectorating mucous, post nasal drip in the AM. Using flonase BID, but not correctly. Has protective pillow cases against fomites.  DDx environmental, dust mites. Refilled Flonase.    PVD:  Status post right femoral bypass around 2012 in [place].  No claudication.  Quit tobacco 15 years ago. Has Kelton horse muscle spasms.   Continue ASA, statin. Encouraged exercise, stretching.    Vitamin D deficiency: +CKD. Is taking OTC Vit D, MVI.   Normalized. +CKD. Cont OTC Vit D, MVI. Nephro monitoring.  Lab Results   Component Value Date    [id number] 31 06/07/2022    [id number] 50 10/05/2021    [id number] 41 08/03/2020     Functional diarrhea:  H/o choley. Resulted in constipation and loose stools.     Has epigastric and b/l flank discomfort/tightness, which improves after BM. Chronic bloating, belching.  H/o constipation, but much lately. Now with frequent soft stools. Not  watery.  Sometimes 4/day. +odor. Slightly greasy.  No food triggers.  Trying to avoid acidic food, fatty foods d/t GERD.  She is concerned for pancreatic insufficiency. No weight loss.      No fever, N/V. No hematochezia, melena. Decreased appetite. No recent antibiotics.   Denies dysuria, hematuria, cloudy appearing urine, urinary odor.     Takes Mylanta and GasX.  Metformin was switched to 1000 XR d/t loose stools.    C scope 2017 with 2 polyps.  Repeat in 3 years.  Repeat C scope with [facility]  with polyp.  Repeat in 5 years. Also had EGD 8/2020 that was reportedly normal.   Worsened. TSH, TTG wnl.  Stool studies neg. Calprotectin elevated, suggesting inflammatory diarrhea. Try cholestyramine d/t h/o choley, probiotic. F/u with [facility].    Belching, GERD:  Chronic intermittent belching, postprandial gas pain at b/l abd. Worse at night and awakes with it.  Worse with hot sauces, chocolate candy, fatty foods.  Avoiding food triggers. Chest soreness, but no burning.  No sour taste in her throat. Last meal after 6 AM.  GasX helps. Takes Mylanta. Stopped NSAIDs.     Had cscope with [facility] as above. EGD was normal. Has prilosec.  Avoid dietary triggers. Cont PPI PRN. F/u with [facility]    Colon polyp:  C scope 2017 with 2 polyps.  Repeat in 3 years.  Repeat C scope with [facility]  with polyp.  Repeat in 5 years.    Cervical DDD, FMA:  S/p dissectomy 20 years ago. Not much improvement on lyrica or gabapentin.  Follows with Pain Med for epidural.    +MERVAT:  Chronic shoulder pain as above.  Positive MERVAT, but negative sub serologies.  Not consistent with SLE.  Saw rheumatology who recommended follow-up with Sports Medicine/Ortho.    Dermatits:  To the chest, R axilla, left forearm. Admits to taking hot showers. Has ketoconazole and Kenalog cream.  Improved.  Has topical hydrocortisone for likely contact dermatitis.  Improved. Likely atopic dermatitis. Rec shorter, tepid showers followed by moisturizer and  then vaseline. Not fully addressed.  Refilled topicals p.r.n..    Review of Systems   Constitutional: Positive for activity change.   Musculoskeletal: Positive for arthralgias and neck pain. Negative for gait problem.   Skin: Positive for color change. Negative for wound.   Psychiatric/Behavioral: Positive for sleep disturbance. Negative for confusion.       I personally reviewed Past Medical History, Past Surgical History, Social History, and Family History.    Objective:      Vitals:    08/17/22 1400   BP: 134/75      Physical Exam  Pulmonary:      Effort: No respiratory distress.           Lab Results   Component Value Date    WBC 6.02 06/07/2022    HGB 13.2 06/07/2022    HCT 40.6 06/07/2022     06/07/2022    CHOL 119 (L) 06/07/2022    TRIG 94 06/07/2022    HDL 43 06/07/2022    ALT 19 06/09/2020    AST 23 06/09/2020     06/07/2022    K 4.2 06/07/2022     06/07/2022    CREATININE 1.4 06/07/2022    BUN 20 06/07/2022    CO2 26 06/07/2022    TSH 2.199 06/18/2021    HGBA1C 6.9 (H) 06/07/2022       The ASCVD Risk score (Lorena PRATIK Jr., et al., 2013) failed to calculate for the following reasons:    The valid total cholesterol range is 130 to 320 mg/dL    (Imaging have been independently reviewed)  DXA 08/16/2022 with normal BMD. Low FRAX.    Assessment:       1. Ecchymosis of wrist    2. Chronic right shoulder pain    3. Spinal stenosis, cervical region          Plan:       Meenu was seen today for left arm pain, neck pain and shoulder pain.    Diagnoses and all orders for this visit:    Ecchymosis of wrist  Comments:  Improving. May take Tylenol, apply icy hot, ice PRN.    Chronic right shoulder pain  Comments:  Acute on chronic. Course of Mobic, m relaxant PRN; discussed potential sedation. Reschedule c Ortho to consider steroid injection.  Orders:  -     meloxicam (MOBIC) 7.5 MG tablet; Take 1 tablet (7.5 mg total) by mouth once daily. for 7 days  -     cyclobenzaprine (FLEXERIL) 5 MG tablet; Take 1  tablet (5 mg total) by mouth 3 (three) times daily as needed for Muscle spasms.    Spinal stenosis, cervical region  Comments:  Acute on chronic. Plan as above. Provided letter for light duty this week.  Orders:  -     meloxicam (MOBIC) 7.5 MG tablet; Take 1 tablet (7.5 mg total) by mouth once daily. for 7 days  -     cyclobenzaprine (FLEXERIL) 5 MG tablet; Take 1 tablet (5 mg total) by mouth 3 (three) times daily as needed for Muscle spasms.         Side effects of medication(s) were discussed in detail and patient voiced understanding.  Patient will call back for any issues or complications.     RTC 1 month(s) or sooner PRN for DM with labs prior. In person or telephone.

## 2022-08-17 NOTE — TELEPHONE ENCOUNTER
Patient will bring in her needed documents for the Farxiga drug program, once all documents are received it can take up to 2-4 weeks.

## 2022-08-17 NOTE — TELEPHONE ENCOUNTER
Spoke to Ms. Guy and informed her that per Dr. Marvin that May write a letter stating that she was seen in clinic on 8/11/22, but NOT for time off.  If she feels sick, then she should schedule an in person appt to evaluate since this was not discussed during our last visit.     I ordered a referral to pharmacy medication assistance to see if she qualifies for any coupons or cost saving programs.  They will contact her    Patient states that she is not worried about Pharmacy assistance and the she did discuss her arm pain in her office visit and was told by physician to use a cream that she forgot the name.  Patient wants to talk to the physician.  Audio appt scheduled for today.

## 2022-08-17 NOTE — LETTER
August 17, 2022      [facility]  [address]  [facility] [address]  Phone: [phone]  Fax: [phone]       Patient: Meenu Guy   YOB: 1950  Date of Visit: 08/17/2022    To Whom It May Concern:    Javad Guy  was at [facility] on 08/17/2022. The patient may return to work/school on 8/17 with restrictions to be on light duty for the week of August 15th. If you have any questions or concerns, or if I can be of further assistance, please do not hesitate to contact me.    Sincerely,    Buck Pisano MA

## 2022-08-25 ENCOUNTER — TELEPHONE (OUTPATIENT)
Dept: INTERNAL MEDICINE | Facility: CLINIC | Age: 72
End: 2022-08-25
Payer: MEDICARE

## 2022-08-25 NOTE — TELEPHONE ENCOUNTER
----- Message from Aiden Fxo sent at 8/25/2022 10:01 AM CDT -----  Name of Who is Calling:             What is the request in detail: Patient is requesting call back TO SEE IF SHE COULD GET A 2 WEEK SUPPLY OF empagliflozin (JARDIANCE) 10 mg tablet until insurance can pay            Can the clinic reply by [facility]: no              What Number to Call Back if not in [facility]: [phone]

## 2022-08-26 NOTE — TELEPHONE ENCOUNTER
Patient: Dc Martin    Procedure: Procedure(s):  COLONOSCOPY intraoperative  laparoscopic cecectomy       Anesthesia Type:  General    Note:     Postop Pain Control: Uneventful            Sign Out: Well controlled pain   PONV: No   Neuro/Psych: Uneventful            Sign Out: Acceptable/Baseline neuro status   Airway/Respiratory: Uneventful            Sign Out: Acceptable/Baseline resp. status   CV/Hemodynamics: Uneventful            Sign Out: Acceptable CV status; No obvious hypovolemia; No obvious fluid overload   Other NRE: NONE   DID A NON-ROUTINE EVENT OCCUR? No           Last vitals:  Vitals Value Taken Time   /68 08/26/22 1505   Temp 99.1  F (37.3  C) 08/26/22 1500   Pulse 57 08/26/22 1507   Resp 0 08/26/22 1507   SpO2 95 % 08/26/22 1509   Vitals shown include unvalidated device data.    Electronically Signed By: Farzad Fraser DO  August 26, 2022  3:54 PM   Requested medication has been pended and routed to Dr. Marvin  for approval. LOV 12/23/2021.

## 2022-09-16 ENCOUNTER — LAB VISIT (OUTPATIENT)
Dept: LAB | Facility: OTHER | Age: 72
End: 2022-09-16
Attending: INTERNAL MEDICINE
Payer: MEDICARE

## 2022-09-16 DIAGNOSIS — E11.51 TYPE 2 DIABETES MELLITUS WITH DIABETIC PERIPHERAL ANGIOPATHY WITHOUT GANGRENE, WITHOUT LONG-TERM CURRENT USE OF INSULIN: ICD-10-CM

## 2022-09-16 LAB
ALBUMIN/CREAT UR: 189.3 UG/MG (ref 0–30)
CREAT UR-MCNC: 82.4 MG/DL (ref 15–325)
MICROALBUMIN UR DL<=1MG/L-MCNC: 156 UG/ML

## 2022-09-16 PROCEDURE — 82570 ASSAY OF URINE CREATININE: CPT | Performed by: INTERNAL MEDICINE

## 2022-09-20 ENCOUNTER — TELEPHONE (OUTPATIENT)
Dept: INTERNAL MEDICINE | Facility: CLINIC | Age: 72
End: 2022-09-20
Payer: MEDICARE

## 2022-09-20 NOTE — TELEPHONE ENCOUNTER
----- Message from Shantel Jerome sent at 9/20/2022 11:29 AM CDT -----  Regarding: Return call    Type:  Patient Returning Call    Who Called: LOUIE MASON [[id number]]    Who Left Message for Patient:  ACACIA TAYLOR     Does the patient know what this is regarding?: Y    Can the clinic reply in [facility]: No    Best Call Back Number:[phone]    Additional Information:she confirms

## 2022-09-21 ENCOUNTER — OFFICE VISIT (OUTPATIENT)
Dept: INTERNAL MEDICINE | Facility: CLINIC | Age: 72
End: 2022-09-21
Payer: MEDICARE

## 2022-09-21 ENCOUNTER — TELEPHONE (OUTPATIENT)
Dept: INTERNAL MEDICINE | Facility: CLINIC | Age: 72
End: 2022-09-21

## 2022-09-21 VITALS — SYSTOLIC BLOOD PRESSURE: 134 MMHG | DIASTOLIC BLOOD PRESSURE: 75 MMHG

## 2022-09-21 DIAGNOSIS — E11.51 TYPE 2 DIABETES MELLITUS WITH DIABETIC PERIPHERAL ANGIOPATHY WITHOUT GANGRENE, WITHOUT LONG-TERM CURRENT USE OF INSULIN: ICD-10-CM

## 2022-09-21 DIAGNOSIS — F43.21 GRIEF: Primary | ICD-10-CM

## 2022-09-21 DIAGNOSIS — N18.32 STAGE 3B CHRONIC KIDNEY DISEASE: ICD-10-CM

## 2022-09-21 PROCEDURE — 99443 PR PHYSICIAN TELEPHONE EVALUATION 21-30 MIN: CPT | Mod: 95,,, | Performed by: INTERNAL MEDICINE

## 2022-09-21 PROCEDURE — 3060F POS MICROALBUMINURIA REV: CPT | Mod: CPTII,95,, | Performed by: INTERNAL MEDICINE

## 2022-09-21 PROCEDURE — 3288F FALL RISK ASSESSMENT DOCD: CPT | Mod: CPTII,95,, | Performed by: INTERNAL MEDICINE

## 2022-09-21 PROCEDURE — 1159F MED LIST DOCD IN RCRD: CPT | Mod: CPTII,95,, | Performed by: INTERNAL MEDICINE

## 2022-09-21 PROCEDURE — 3066F NEPHROPATHY DOC TX: CPT | Mod: CPTII,95,, | Performed by: INTERNAL MEDICINE

## 2022-09-21 PROCEDURE — 1159F PR MEDICATION LIST DOCUMENTED IN MEDICAL RECORD: ICD-10-PCS | Mod: CPTII,95,, | Performed by: INTERNAL MEDICINE

## 2022-09-21 PROCEDURE — 3075F SYST BP GE 130 - 139MM HG: CPT | Mod: CPTII,95,, | Performed by: INTERNAL MEDICINE

## 2022-09-21 PROCEDURE — 3078F PR MOST RECENT DIASTOLIC BLOOD PRESSURE < 80 MM HG: ICD-10-PCS | Mod: CPTII,95,, | Performed by: INTERNAL MEDICINE

## 2022-09-21 PROCEDURE — 3288F PR FALLS RISK ASSESSMENT DOCUMENTED: ICD-10-PCS | Mod: CPTII,95,, | Performed by: INTERNAL MEDICINE

## 2022-09-21 PROCEDURE — 1126F PR PAIN SEVERITY QUANTIFIED, NO PAIN PRESENT: ICD-10-PCS | Mod: CPTII,95,, | Performed by: INTERNAL MEDICINE

## 2022-09-21 PROCEDURE — 1101F PT FALLS ASSESS-DOCD LE1/YR: CPT | Mod: CPTII,95,, | Performed by: INTERNAL MEDICINE

## 2022-09-21 PROCEDURE — 1126F AMNT PAIN NOTED NONE PRSNT: CPT | Mod: CPTII,95,, | Performed by: INTERNAL MEDICINE

## 2022-09-21 PROCEDURE — 3060F PR POS MICROALBUMINURIA RESULT DOCUMENTED/REVIEW: ICD-10-PCS | Mod: CPTII,95,, | Performed by: INTERNAL MEDICINE

## 2022-09-21 PROCEDURE — 1160F RVW MEDS BY RX/DR IN RCRD: CPT | Mod: CPTII,95,, | Performed by: INTERNAL MEDICINE

## 2022-09-21 PROCEDURE — 3051F HG A1C>EQUAL 7.0%<8.0%: CPT | Mod: CPTII,95,, | Performed by: INTERNAL MEDICINE

## 2022-09-21 PROCEDURE — 1160F PR REVIEW ALL MEDS BY PRESCRIBER/CLIN PHARMACIST DOCUMENTED: ICD-10-PCS | Mod: CPTII,95,, | Performed by: INTERNAL MEDICINE

## 2022-09-21 PROCEDURE — 3075F PR MOST RECENT SYSTOLIC BLOOD PRESS GE 130-139MM HG: ICD-10-PCS | Mod: CPTII,95,, | Performed by: INTERNAL MEDICINE

## 2022-09-21 PROCEDURE — 99443 PR PHYSICIAN TELEPHONE EVALUATION 21-30 MIN: ICD-10-PCS | Mod: 95,,, | Performed by: INTERNAL MEDICINE

## 2022-09-21 PROCEDURE — 3066F PR DOCUMENTATION OF TREATMENT FOR NEPHROPATHY: ICD-10-PCS | Mod: CPTII,95,, | Performed by: INTERNAL MEDICINE

## 2022-09-21 PROCEDURE — 3051F PR MOST RECENT HEMOGLOBIN A1C LEVEL 7.0 - < 8.0%: ICD-10-PCS | Mod: CPTII,95,, | Performed by: INTERNAL MEDICINE

## 2022-09-21 PROCEDURE — 3078F DIAST BP <80 MM HG: CPT | Mod: CPTII,95,, | Performed by: INTERNAL MEDICINE

## 2022-09-21 PROCEDURE — 1101F PR PT FALLS ASSESS DOC 0-1 FALLS W/OUT INJ PAST YR: ICD-10-PCS | Mod: CPTII,95,, | Performed by: INTERNAL MEDICINE

## 2022-09-21 NOTE — TELEPHONE ENCOUNTER
----- Message from Lali Marvin MD sent at 9/21/2022  9:10 AM CDT -----  1. Please schedule visit in 3 months for DM, HTN. In person.  2. Schedule labs about a week before our visit.  3. Ordered referral to DM education.  Please assist the patient with scheduling.  Thank you!   4. Please reschedule with Nephrology, Podiatry.  Thanks!     normal/no clubbing/no cyanosis/no pedal edema

## 2022-09-21 NOTE — PROGRESS NOTES
Audio Only Telehealth Visit     The patient location is: [place]  The chief complaint leading to consultation is: Diabetes    Visit type: Virtual visit with audio only (telephone)     The reason for the audio only service rather than synchronous audio and video virtual visit was related to technical difficulties or patient preference/necessity.     Each patient to whom I provide medical services by telemedicine is:  (1) informed of the relationship between the physician and patient and the respective role of any other health care provider with respect to management of the patient; and (2) notified that they may decline to receive medical services by telemedicine and may withdraw from such care at any time. Patient verbally consented to receive this service via voice-only telephone call.    This service was not originating from a related E/M service provided within the previous 7 days nor will  to an E/M service or procedure within the next 24 hours or my soonest available appointment.  Prevailing standard of care was able to be met in this audio-only visit.      Subjective:       Patient ID: Meenu Guy is a 72 y.o. female who  has a past medical history of Diabetes mellitus, type 2 and Hypertension.    Chief Complaint: Diabetes     History was obtained from the patient and supplemented through chart review  There were no ER or clinic visits since our last appointment.    She does housework for some seniors.    HPI    Grief:  2 recent deaths. Has intermittent grief, depressed mood. She feels ok.    DM2:    Currently pt is taking glimepiride 4, metformin 1000 XR d/t loose stools. GFR 44.    No longer taking Jardiance d/t cost. +CKD.  Did not receive Farxiga. Pharmacy assistance reached out to her. She submitted documents. Hasn't heard from them yet.   Victoza was also expensive.    Doesn't check BG often. , 90.    Diet: Doesn't eat much. Switched cakes/candy/sweets to watermelon, grapes. Brown rice.  Salads. Air fryer. Avoids starchy foods.    Breakfast: not often. crackers, coffee.  Lunch: none  Dinner: gravy, brown rice, veggie.  Snacks: rarely. No grazing.  Drinks: stopped cokes. Water. Crystal lite. No fruit juice.  ETOH:  not often d/t GERD.    Exercise: 1 hour/day. Treadmill in their building. Arm lifts, sit ups. Has an exercise hula hoop.    Elevated microalbumin creatinine ratio.  Not on an ACE/ARB d/t angioedema.   Retinal exams:  Dr. Antonio. 9/2022.  Foot exams:  4/2021  DM edu: 10/2019.  She previously declined seeing them.  Lab Results   Component Value Date/Time    HGBA1C 7.0 (H) 09/16/2022 07:30 AM    HGBA1C 6.9 (H) 06/07/2022 07:56 AM    HGBA1C 6.8 (H) 12/21/2021 07:05 AM     Lab Results   Component Value Date    AUKDSHTX74 331 03/16/2020     CKD 3:  Stopped NSAIDs. H/o ACE/ARB angioedema.  Renal ultrasound:  No hydro or nephrolithiasis  Following with Nephrology. Slight fluctuations in renal fnct. Planning on F/u 8/2022.              Not addressed today.  HTN:    No h/o CAD, arrhythmia. TTE  with normal EF, grade 2 diastolic dysfunction.    Pt's BP is controlled. On Coreg 6.25 b.i.d., Norvasc 10.  +DM2, but H/o ACE angioedema.  Tolerating meds well.   Home /75.  Controlled. Cont Coreg 6.25 BID, Norvasc 10. + DM, but h/o ACE angioedema. Monitor home BP.    Aortic Atherosclerosis, HLD:    Is currently taking Lipitor 20 and ASA 81 daily. H/o edema from statin years ago in [place], but has been tolerating Lipitor.  FLP controlled. Cont Lipitor 20, ASA. Monitor FLP annually.  Lab Results   Component Value Date    LDLCALC 57.2 (L) 06/07/2022     The ASCVD Risk score (Flower MANUEL, et al., 2019) failed to calculate for the following reasons:    The valid total cholesterol range is 130 to 320 mg/dL     AR:  Chronic.  H/o recurrent epistaxis, shavon during drier weather.  Has difficulty expectorating mucous, post nasal drip in the AM. Using flonase BID, but not correctly. Has protective pillow cases  against fomites.  DDx environmental, dust mites. Refilled Flonase.    PVD:  Status post right femoral bypass around 2012 in [place].  No claudication.  Quit tobacco 15 years ago. Has Kelton horse muscle spasms.   Continue ASA, statin. Encouraged exercise, stretching.    Vitamin D deficiency: +CKD. Is taking OTC Vit D, MVI.   Normalized. +CKD. Cont OTC Vit D, MVI. Nephro monitoring.  Lab Results   Component Value Date    JQXSESWK39HV 31 06/07/2022    MTRDKZQP12JG 50 10/05/2021    XODZSWLU05IC 41 08/03/2020     Functional diarrhea:  H/o choley. Resulted in constipation and loose stools.     Has epigastric and b/l flank discomfort/tightness, which improves after BM. Chronic bloating, belching.  H/o constipation, but much lately. Now with frequent soft stools. Not watery.  Sometimes 4/day. +odor. Slightly greasy.  No food triggers.  Trying to avoid acidic food, fatty foods d/t GERD.  She is concerned for pancreatic insufficiency. No weight loss.      No fever, N/V. No hematochezia, melena. Decreased appetite. No recent antibiotics.   Denies dysuria, hematuria, cloudy appearing urine, urinary odor.     Takes Mylanta and GasX.  Metformin was switched to 1000 XR d/t loose stools.    C scope 2017 with 2 polyps.  Repeat in 3 years.  Repeat C scope with [facility]  with polyp.  Repeat in 5 years. Also had EGD 8/2020 that was reportedly normal.   Worsened. TSH, TTG wnl.  Stool studies neg. Calprotectin elevated, suggesting inflammatory diarrhea. Try cholestyramine d/t h/o choley, probiotic. F/u with [facility].    Belching, GERD:  Chronic intermittent belching, postprandial gas pain at b/l abd. Worse at night and awakes with it.  Worse with hot sauces, chocolate candy, fatty foods.  Avoiding food triggers. Chest soreness, but no burning.  No sour taste in her throat. Last meal after 6 AM.  GasX helps. Takes Mylanta. Stopped NSAIDs.     Had cscope with [facility] as above. EGD was normal. Has prilosec.  Avoid dietary triggers.  Cont PPI PRN. F/u with [facility]    Colon polyp:  C scope 2017 with 2 polyps.  Repeat in 3 years.  Repeat C scope with [facility]  with polyp.  Repeat in 5 years.    Cervical DDD, FMA:  S/p dissectomy 20 years ago. Not much improvement on lyrica or gabapentin.  Follows with Pain Med for epidural.    +MERVAT:  Chronic shoulder pain as below.  Positive MERVAT, but negative sub serologies.  Not consistent with SLE.  Saw rheumatology who recommended follow-up with Sports Medicine/Ortho.    L shoulder OA, cervical stenosis:  For years.  Mainly occurs at night and can quite severe.  Some hand tingling.  Stiffness, tightness.  Improves with hot water showers.  Not much relief with massage. Pain with shoulder abducting and bringing her arm towards her chest.  Takes Tylenol s relief.    X-ray with bilateral OA. Improved with injection in the past. Saw Orthopedic and declined treatment.  Recommend compound cream, which was expensive and did not help. Recommended core strengthening.  Conservative management at this time.  Referred to Sports Medicine for left shoulder pain.    Increased neck and shoulder pain at night. Feels tight.  Takes gabapentin.  H/o neck surgery and hardware was removed.   Acute on chronic. Course of Mobic, m relaxant PRN; discussed potential sedation. Reschedule c Ortho to consider steroid injection.    Dermatits:  To the chest, R axilla, left forearm. Admits to taking hot showers. Has ketoconazole and Kenalog cream.  Improved.  Has topical hydrocortisone for likely contact dermatitis.  Improved. Likely atopic dermatitis. Rec shorter, tepid showers followed by moisturizer and then vaseline. Not fully addressed.  Refilled topicals p.r.n..    Review of Systems   Constitutional:  Negative for activity change and appetite change.   Musculoskeletal:  Positive for arthralgias.   Psychiatric/Behavioral:  Positive for dysphoric mood. Negative for confusion.        I personally reviewed Past Medical History, Past  Surgical History, Social History, and Family History.    Objective:      Vitals:    09/21/22 0903   BP: 134/75        Physical Exam  Pulmonary:      Effort: No respiratory distress.         Lab Results   Component Value Date    WBC 6.02 06/07/2022    HGB 13.2 06/07/2022    HCT 40.6 06/07/2022     06/07/2022    CHOL 119 (L) 06/07/2022    TRIG 94 06/07/2022    HDL 43 06/07/2022    ALT 19 06/09/2020    AST 23 06/09/2020     06/07/2022    K 4.2 06/07/2022     06/07/2022    CREATININE 1.4 06/07/2022    BUN 20 06/07/2022    CO2 26 06/07/2022    TSH 2.199 06/18/2021    HGBA1C 7.0 (H) 09/16/2022       The ASCVD Risk score (Flower MANUEL, et al., 2019) failed to calculate for the following reasons:    The valid total cholesterol range is 130 to 320 mg/dL    (Imaging have been independently reviewed)  DXA 08/16/2022 with normal BMD. Low FRAX.    Assessment:       1. Grief    2. Type 2 diabetes mellitus with diabetic peripheral angiopathy without gangrene, without long-term current use of insulin    3. Stage 3b chronic kidney disease          Plan:       Meenu was seen today for diabetes.    Diagnoses and all orders for this visit:    Grief  Comments:  Offered support.    Type 2 diabetes mellitus with diabetic peripheral angiopathy without gangrene, without long-term current use of insulin  Comments:  A1C elevated. Messaging Pharmacy assistance to check on status of Farxiga; if not covered, will switch SGLT2. A1C in 3 mo. DM edu to review diet. Foot exam.  Orders:  -     Hemoglobin A1C; Future  -     Microalbumin/Creatinine Ratio, Urine; Future  -     Ambulatory referral/consult to Diabetes Education; Future    Stage 3b chronic kidney disease  Comments:  Stable.  D/t h/o HTN, DM, h/o chronic NSAID use.  Resched f/u with Nephrology.  SGLT2 as above.       Side effects of medication(s) were discussed in detail and patient voiced understanding.  Patient will call back for any issues or complications.     RTC 3  month(s) or sooner PRN for HTN, DM with labs prior. In person.

## 2022-09-22 ENCOUNTER — IMMUNIZATION (OUTPATIENT)
Dept: INTERNAL MEDICINE | Facility: CLINIC | Age: 72
End: 2022-09-22
Payer: MEDICARE

## 2022-09-22 DIAGNOSIS — Z23 NEED FOR VACCINATION: Primary | ICD-10-CM

## 2022-09-22 PROCEDURE — 91312 COVID-19, MRNA, LNP-S, BIVALENT BOOSTER, PF, 30 MCG/0.3 ML DOSE: CPT | Mod: S$GLB,,, | Performed by: INTERNAL MEDICINE

## 2022-09-22 PROCEDURE — 0124A COVID-19, MRNA, LNP-S, BIVALENT BOOSTER, PF, 30 MCG/0.3 ML DOSE: CPT | Mod: CV19,PBBFAC | Performed by: INTERNAL MEDICINE

## 2022-09-22 PROCEDURE — 91312 COVID-19, MRNA, LNP-S, BIVALENT BOOSTER, PF, 30 MCG/0.3 ML DOSE: ICD-10-PCS | Mod: S$GLB,,, | Performed by: INTERNAL MEDICINE

## 2022-09-23 NOTE — TELEPHONE ENCOUNTER
Spoke to Ms. Guy to schedule lab appt.  Patient states that she is leaving to go to her Brother's viewing, but had a few minutes to schedule appt.  Confirmed appt date and time.  Instructed to call the office for any questions or concerns.

## 2022-09-26 ENCOUNTER — PATIENT OUTREACH (OUTPATIENT)
Dept: ADMINISTRATIVE | Facility: HOSPITAL | Age: 72
End: 2022-09-26
Payer: MEDICARE

## 2022-10-03 ENCOUNTER — TELEPHONE (OUTPATIENT)
Dept: INTERNAL MEDICINE | Facility: CLINIC | Age: 72
End: 2022-10-03
Payer: MEDICARE

## 2022-10-03 NOTE — TELEPHONE ENCOUNTER
----- Message from Kirby Guthrie sent at 10/3/2022  8:29 AM CDT -----  Name of Who is Calling: LOUIE MASON [[id number]]           What is the request in detail: Patient is requesting a call back to discuss medication, farxiga.  Patient states that she has been throwing up and needs to know if she needs to continue taking the medication.  Please assist.           Can the clinic reply by [facility]: NO           What Number to Call Back if not in [facility]: [phone]

## 2022-10-03 NOTE — TELEPHONE ENCOUNTER
Returned pt's call.  Pt began Farxiga yesterday morning and had 4 episodes of diarrhea during the day, then resolved.     Had dinner at night and went to bed. Once in bed, began belching a lot. She states she took med for acid reflux in case that was the issue but it dd not help.  Belching continued and pt thought she might need to vomit to help. Around midnight or so, pt vomited x 1, small amount.    Belching continued, so pt took Mylanta and that helped. Pt also states she had a burning with the belching.      Pt unsure if she soul continue Farxiga or stop. Will hold until she hears from Provider. Pt aware that her PCP is out today and may have a covering provider review. Pt appreciative.

## 2022-10-04 NOTE — TELEPHONE ENCOUNTER
I haven't received any forms to sign. Could you check our faxes?  If it's not there, could you ask Catymadina Sifuentes to resend it to us and verify that she has the correct fax number?  Thank you!

## 2022-10-04 NOTE — TELEPHONE ENCOUNTER
I'm sorry that she hasn't been feeling well.  I wonder if she has a stomach bug since it's not a common side effect of Farxiga. She can hold Farxiga for now though and retry it when she feels better.

## 2022-10-05 ENCOUNTER — TELEPHONE (OUTPATIENT)
Dept: ADMINISTRATIVE | Facility: OTHER | Age: 72
End: 2022-10-05
Payer: MEDICARE

## 2022-10-05 NOTE — TELEPHONE ENCOUNTER
Spoke to Ms. Guy family and informed her that per Dr. Marvin that I'm sorry that she hasn't been feeling well.  I wonder if she has a stomach bug since it's not a common side effect of Farxiga. She can hold Farxiga for now though and retry it when she feels better.    Family member states understanding and that she will pass on the message to Ms. Guy.

## 2022-10-05 NOTE — TELEPHONE ENCOUNTER
Spoke to patient who declined to reschedule Diabetes Management appointment of 9-27-22 at this time. States she will call back to reschedule.

## 2022-10-31 ENCOUNTER — TELEPHONE (OUTPATIENT)
Dept: INTERNAL MEDICINE | Facility: CLINIC | Age: 72
End: 2022-10-31
Payer: MEDICARE

## 2022-10-31 NOTE — TELEPHONE ENCOUNTER
Spoke to Pharmacy and states they would like to know if patient needs a new Rx card for Farxiga  Rx.  In formed pharmacy that I did not know how that work with the Rx card.  Pharmacy states she will call the patient to see what she can do.

## 2022-10-31 NOTE — TELEPHONE ENCOUNTER
----- Message from Tameka Ley sent at 10/31/2022 12:14 PM CDT -----  Regarding: Pharmacy call  Contact: :Nataliya with [facility]  Name of Who is Calling:Nataliya with [facility]           What is the request in detail:  She states the pt needs a new coupon for a RX, please advise they are not sure what the pt is referring to .  Please advise         Can the clinic reply by [facility]:          What Number to Call Back if not in [facility]: [phone]

## 2022-11-02 ENCOUNTER — TELEPHONE (OUTPATIENT)
Dept: INTERNAL MEDICINE | Facility: CLINIC | Age: 72
End: 2022-11-02
Payer: MEDICARE

## 2022-11-02 DIAGNOSIS — N18.32 STAGE 3B CHRONIC KIDNEY DISEASE: ICD-10-CM

## 2022-11-02 DIAGNOSIS — E11.51 TYPE 2 DIABETES MELLITUS WITH DIABETIC PERIPHERAL ANGIOPATHY WITHOUT GANGRENE, WITHOUT LONG-TERM CURRENT USE OF INSULIN: Primary | ICD-10-CM

## 2022-11-02 NOTE — TELEPHONE ENCOUNTER
Please see telephone encounter from 8/17/22.    We can try switching this to a similar medication called Invokana.    I ordered a referral to pharmacy medication assistance again to see if she qualifies for any coupons or cost saving programs.  They will contact her.

## 2022-11-02 NOTE — TELEPHONE ENCOUNTER
Spoke to MsHenry Brooks and informed her per Dr. Marvin that We can try switching this to a similar medication called Invokana.    I ordered a referral to pharmacy medication assistance again to see if she qualifies for any coupons or cost saving programs.  They will contact her.    Patient states understanding with no further questions or concerns.

## 2022-11-02 NOTE — TELEPHONE ENCOUNTER
----- Message from Stella Gunter sent at 11/2/2022  9:54 AM CDT -----   Name of Who is Calling:     What is the request in detail:  patient request call back in reference to cholesterol medication /patient took last pill today and pharmacy only filled for one month  / patient state medication was supposed to be prescribed for a year supply  Please contact to further discuss and advise      Can the clinic reply by [facility]:     What Number to Call Back if not in [facility]:  [phone]

## 2022-11-02 NOTE — TELEPHONE ENCOUNTER
"Returned pt's call.  Pt states she's calling about her Farxiga (not chol med as message states).    States she is unable to  this med as it was "only approved" for one month.    Called pharmacy who states she has refills, but when run it tells them "Only one coupon per person" and the cost is astronomical.     Pt has taken last dose and needs medication refill.  "

## 2022-11-02 NOTE — TELEPHONE ENCOUNTER
We didn't receive any forms.  Could you check on the status of Farxiga and update the patient?      To my staff, please let the patient know that we're asking the Pharmacy Assistance program to reach out to her.

## 2022-11-10 ENCOUNTER — TELEPHONE (OUTPATIENT)
Dept: INTERNAL MEDICINE | Facility: CLINIC | Age: 72
End: 2022-11-10
Payer: MEDICARE

## 2022-11-10 ENCOUNTER — TELEPHONE (OUTPATIENT)
Dept: PHARMACY | Facility: CLINIC | Age: 72
End: 2022-11-10
Payer: MEDICARE

## 2022-11-10 NOTE — TELEPHONE ENCOUNTER
Spoke with patient today about the Invokana, she stated that she is waiting for a PA to ghet completed on the medication.

## 2022-11-10 NOTE — TELEPHONE ENCOUNTER
----- Message from Aiden Fox sent at 11/10/2022 10:04 AM CST -----  Name of Who is Calling: LOUIE MASON [[id number]]            What is the request in detail: Patient is requesting call back to get prior authorization for canagliflozin (INVOKANA) 100 mg Tab tablet to get filled              Can the clinic reply by [facility]: no              What Number to Call Back if not in [facility]: [phone]

## 2022-11-11 ENCOUNTER — TELEPHONE (OUTPATIENT)
Dept: INTERNAL MEDICINE | Facility: CLINIC | Age: 72
End: 2022-11-11
Payer: MEDICARE

## 2022-11-11 NOTE — TELEPHONE ENCOUNTER
LOUIE ISABELLA (Key: [id number]) - [id number]  Invokana 100MG tablets       Status: PA Response - Approved    Created: November 2nd, 2022 [phone]    Sent: November 10th, 2022      Approved on November 10  Request Reference Number: [id number]. INVOKANA TAB 100MG is approved through 12/31/2023. Your patient may now fill this prescription and it will be covered.      Left a message that PA approval obtained for Invokanna, and to call  for any questions

## 2022-11-16 ENCOUNTER — TELEPHONE (OUTPATIENT)
Dept: INTERNAL MEDICINE | Facility: CLINIC | Age: 72
End: 2022-11-16
Payer: MEDICARE

## 2022-11-16 NOTE — TELEPHONE ENCOUNTER
This is in regards to the patient's call today.    Did she get this message? Did she retry picking up the Invokana?

## 2022-11-16 NOTE — TELEPHONE ENCOUNTER
Are there any forms for Farxiga?  Could you check the fax box?    Ms. Sifuentes, could you refax it to our office? I never received it.  Thank you!

## 2022-11-16 NOTE — TELEPHONE ENCOUNTER
Returned call to Ms. Guy. States the cost of the Invokanna is  too high at $150 and she just can't afford it. States she did not pay anything for the previously ordered medication of Farxiga. States has been out of medication for 3 weeks.

## 2022-11-16 NOTE — TELEPHONE ENCOUNTER
Returned pt's call.  Pt states that she went to  Invokana and it was $150. She can't afford that.    I see there are multiple messages about this floating around (unsure why).  Pt states she tried to pick it up today and the $150 was the cost quoted to her today.    Needs alt or suggestions for cost.

## 2022-11-16 NOTE — TELEPHONE ENCOUNTER
----- Message from Gustavo Escoto sent at 11/16/2022  3:00 PM CST -----      Name of Who is Calling: LOUIE MASON [[id number]]      What is the request in detail: Pt called to speak with the office regarding medications being too expensive.Please contact to further discuss and advise.          Can the clinic reply by [facility]: N      What Number to Call Back [facility] not [facility]: [phone]

## 2022-11-18 ENCOUNTER — TELEPHONE (OUTPATIENT)
Dept: INTERNAL MEDICINE | Facility: CLINIC | Age: 72
End: 2022-11-18
Payer: MEDICARE

## 2022-11-18 NOTE — TELEPHONE ENCOUNTER
We were finally able to get this form!  Signed form for Farxiga. It's on Buck's desk. Please fax and let the patient know. Thank you!

## 2022-11-18 NOTE — TELEPHONE ENCOUNTER
Spoke with the pt and gave her the number to pharmacy assistance to see if they can fax the forms over to us.

## 2022-11-30 ENCOUNTER — TELEPHONE (OUTPATIENT)
Dept: INTERNAL MEDICINE | Facility: CLINIC | Age: 72
End: 2022-11-30
Payer: MEDICARE

## 2022-11-30 NOTE — TELEPHONE ENCOUNTER
Patient was informed that per THAIS Barlow there are no openings for an appointment today. Patient was asked if she will like to be seen at different facility and she states that she will report to Urgent Care. Thanks.

## 2022-11-30 NOTE — TELEPHONE ENCOUNTER
----- Message from Shantel Jerome sent at 11/30/2022  7:40 AM CST -----  Regarding: Patient call back  Who called:LOUIE MASON [[id number]]    What is the request in detail: Patient is requesting a call back. Pt is congested and would like to be seen today. She would only like to see Dr. Marvin. She would like to further discuss.  Please advise.    Can the clinic reply by [facility]? No    Best call back number: [phone]    Additional Information: N/A

## 2022-12-07 NOTE — TELEPHONE ENCOUNTER
LOUIE MASON (Key: [id number]) - [id number]  Invokana 100MG tablets       Status: PA Request    Created: November 2nd, 2022 [phone]    Sent: November 10th, 2022   Consent (Near Eyelid Margin)/Introductory Paragraph: The rationale for Mohs was explained to the patient and consent was obtained. The risks, benefits and alternatives to therapy were discussed in detail. Specifically, the risks of ectropion or eyelid deformity, infection, scarring, bleeding, prolonged wound healing, incomplete removal, allergy to anesthesia, nerve injury and recurrence were addressed. Prior to the procedure, the treatment site was clearly identified and confirmed by the patient. All components of Universal Protocol/PAUSE Rule completed.

## 2022-12-16 ENCOUNTER — LAB VISIT (OUTPATIENT)
Dept: LAB | Facility: OTHER | Age: 72
End: 2022-12-16
Attending: INTERNAL MEDICINE
Payer: MEDICARE

## 2022-12-16 DIAGNOSIS — E11.51 TYPE 2 DIABETES MELLITUS WITH DIABETIC PERIPHERAL ANGIOPATHY WITHOUT GANGRENE, WITHOUT LONG-TERM CURRENT USE OF INSULIN: ICD-10-CM

## 2022-12-16 LAB
ALBUMIN/CREAT UR: ABNORMAL UG/MG (ref 0–30)
CREAT UR-MCNC: >450 MG/DL (ref 15–325)
ESTIMATED AVG GLUCOSE: 160 MG/DL (ref 68–131)
HBA1C MFR BLD: 7.2 % (ref 4–5.6)
MICROALBUMIN UR DL<=1MG/L-MCNC: 243 UG/ML

## 2022-12-16 PROCEDURE — 82570 ASSAY OF URINE CREATININE: CPT | Performed by: INTERNAL MEDICINE

## 2022-12-16 PROCEDURE — 82043 UR ALBUMIN QUANTITATIVE: CPT | Performed by: INTERNAL MEDICINE

## 2022-12-16 PROCEDURE — 36415 COLL VENOUS BLD VENIPUNCTURE: CPT | Performed by: INTERNAL MEDICINE

## 2022-12-16 PROCEDURE — 83036 HEMOGLOBIN GLYCOSYLATED A1C: CPT | Performed by: INTERNAL MEDICINE

## 2022-12-21 ENCOUNTER — TELEPHONE (OUTPATIENT)
Dept: INTERNAL MEDICINE | Facility: CLINIC | Age: 72
End: 2022-12-21
Payer: MEDICARE

## 2022-12-23 ENCOUNTER — TELEPHONE (OUTPATIENT)
Dept: INTERNAL MEDICINE | Facility: CLINIC | Age: 72
End: 2022-12-23
Payer: MEDICARE

## 2022-12-23 ENCOUNTER — OFFICE VISIT (OUTPATIENT)
Dept: INTERNAL MEDICINE | Facility: CLINIC | Age: 72
End: 2022-12-23
Payer: MEDICARE

## 2022-12-23 ENCOUNTER — PATIENT MESSAGE (OUTPATIENT)
Dept: INTERNAL MEDICINE | Facility: CLINIC | Age: 72
End: 2022-12-23

## 2022-12-23 ENCOUNTER — TELEPHONE (OUTPATIENT)
Dept: INTERNAL MEDICINE | Facility: CLINIC | Age: 72
End: 2022-12-23

## 2022-12-23 DIAGNOSIS — Z00.00 ANNUAL PHYSICAL EXAM: ICD-10-CM

## 2022-12-23 DIAGNOSIS — N18.32 TYPE 2 DIABETES MELLITUS WITH STAGE 3B CHRONIC KIDNEY DISEASE, WITHOUT LONG-TERM CURRENT USE OF INSULIN: ICD-10-CM

## 2022-12-23 DIAGNOSIS — M25.511 CHRONIC RIGHT SHOULDER PAIN: Primary | ICD-10-CM

## 2022-12-23 DIAGNOSIS — N18.32 STAGE 3B CHRONIC KIDNEY DISEASE: ICD-10-CM

## 2022-12-23 DIAGNOSIS — G89.29 CHRONIC RIGHT SHOULDER PAIN: Primary | ICD-10-CM

## 2022-12-23 DIAGNOSIS — E11.22 TYPE 2 DIABETES MELLITUS WITH STAGE 3B CHRONIC KIDNEY DISEASE, WITHOUT LONG-TERM CURRENT USE OF INSULIN: ICD-10-CM

## 2022-12-23 PROCEDURE — 3051F HG A1C>EQUAL 7.0%<8.0%: CPT | Mod: CPTII,95,, | Performed by: INTERNAL MEDICINE

## 2022-12-23 PROCEDURE — 3288F PR FALLS RISK ASSESSMENT DOCUMENTED: ICD-10-PCS | Mod: CPTII,95,, | Performed by: INTERNAL MEDICINE

## 2022-12-23 PROCEDURE — 99443 PR PHYSICIAN TELEPHONE EVALUATION 21-30 MIN: CPT | Mod: 95,,, | Performed by: INTERNAL MEDICINE

## 2022-12-23 PROCEDURE — 1159F PR MEDICATION LIST DOCUMENTED IN MEDICAL RECORD: ICD-10-PCS | Mod: CPTII,95,, | Performed by: INTERNAL MEDICINE

## 2022-12-23 PROCEDURE — 1160F RVW MEDS BY RX/DR IN RCRD: CPT | Mod: CPTII,95,, | Performed by: INTERNAL MEDICINE

## 2022-12-23 PROCEDURE — 3288F FALL RISK ASSESSMENT DOCD: CPT | Mod: CPTII,95,, | Performed by: INTERNAL MEDICINE

## 2022-12-23 PROCEDURE — 3051F PR MOST RECENT HEMOGLOBIN A1C LEVEL 7.0 - < 8.0%: ICD-10-PCS | Mod: CPTII,95,, | Performed by: INTERNAL MEDICINE

## 2022-12-23 PROCEDURE — 3061F PR NEG MICROALBUMINURIA RESULT DOCUMENTED/REVIEW: ICD-10-PCS | Mod: CPTII,95,, | Performed by: INTERNAL MEDICINE

## 2022-12-23 PROCEDURE — 1159F MED LIST DOCD IN RCRD: CPT | Mod: CPTII,95,, | Performed by: INTERNAL MEDICINE

## 2022-12-23 PROCEDURE — 1160F PR REVIEW ALL MEDS BY PRESCRIBER/CLIN PHARMACIST DOCUMENTED: ICD-10-PCS | Mod: CPTII,95,, | Performed by: INTERNAL MEDICINE

## 2022-12-23 PROCEDURE — 1125F PR PAIN SEVERITY QUANTIFIED, PAIN PRESENT: ICD-10-PCS | Mod: CPTII,95,, | Performed by: INTERNAL MEDICINE

## 2022-12-23 PROCEDURE — 1101F PT FALLS ASSESS-DOCD LE1/YR: CPT | Mod: CPTII,95,, | Performed by: INTERNAL MEDICINE

## 2022-12-23 PROCEDURE — 3066F PR DOCUMENTATION OF TREATMENT FOR NEPHROPATHY: ICD-10-PCS | Mod: CPTII,95,, | Performed by: INTERNAL MEDICINE

## 2022-12-23 PROCEDURE — 3066F NEPHROPATHY DOC TX: CPT | Mod: CPTII,95,, | Performed by: INTERNAL MEDICINE

## 2022-12-23 PROCEDURE — 1101F PR PT FALLS ASSESS DOC 0-1 FALLS W/OUT INJ PAST YR: ICD-10-PCS | Mod: CPTII,95,, | Performed by: INTERNAL MEDICINE

## 2022-12-23 PROCEDURE — 99443 PR PHYSICIAN TELEPHONE EVALUATION 21-30 MIN: ICD-10-PCS | Mod: 95,,, | Performed by: INTERNAL MEDICINE

## 2022-12-23 PROCEDURE — 1125F AMNT PAIN NOTED PAIN PRSNT: CPT | Mod: CPTII,95,, | Performed by: INTERNAL MEDICINE

## 2022-12-23 PROCEDURE — 3061F NEG MICROALBUMINURIA REV: CPT | Mod: CPTII,95,, | Performed by: INTERNAL MEDICINE

## 2022-12-23 RX ORDER — DULAGLUTIDE 0.75 MG/.5ML
0.75 INJECTION, SOLUTION SUBCUTANEOUS WEEKLY
Qty: 4 PEN | Refills: 11 | Status: SHIPPED | OUTPATIENT
Start: 2022-12-23 | End: 2023-02-01

## 2022-12-23 NOTE — TELEPHONE ENCOUNTER
-- Karena Mccall     Preferred Pharmacy: Pharmacy  [id number] [address] McLaren Greater Lansing Called and scheduled pt for 1/31/23 for 9:15 am.

## 2022-12-23 NOTE — TELEPHONE ENCOUNTER
----- Message from Lali Marvin MD sent at 12/23/2022  1:07 PM CST -----  1. Please schedule visit in 3 months for DM, HTN. In person  2. Lab about a week before our visit.  3. Reschedule Diabetes education (in-person or telephone), Podiatry, Nephrology.  4. Please let her know that I couldn't order the pill for diabetes called Ertugliflozin because of her kidneys.  I have switched this to Trulicity, which is injected once a week.  I also ordered the pharmacy medication assistance program to look into cost.    Thank you!

## 2022-12-23 NOTE — PROGRESS NOTES
Audio Only Telehealth Visit     The patient location is: [place]  The chief complaint leading to consultation is: Arm Pain and Diabetes    Visit type: Virtual visit with audio only (telephone)     The reason for the audio only service rather than synchronous audio and video virtual visit was related to technical difficulties or patient preference/necessity.     Each patient to whom I provide medical services by telemedicine is:  (1) informed of the relationship between the physician and patient and the respective role of any other health care provider with respect to management of the patient; and (2) notified that they may decline to receive medical services by telemedicine and may withdraw from such care at any time. Patient verbally consented to receive this service via voice-only telephone call.    This service was not originating from a related E/M service provided within the previous 7 days nor will  to an E/M service or procedure within the next 24 hours or my soonest available appointment.  Prevailing standard of care was able to be met in this audio-only visit.      Subjective:       Patient ID: Meenu Guy is a 72 y.o. female who  has a past medical history of Diabetes mellitus, type 2 and Hypertension.    Chief Complaint: Arm Pain and Diabetes    History was obtained from the patient and supplemented through chart review  There were no ER or clinic visits since our last appointment.    She does housework for some seniors.    HPI    RUE, shoulder pain:  Worse when going to bed.    Saw Ortho in the past for chronic L shoulder OA, cervical stenosis.  X-ray with bilateral OA. Improved with injection in the past. Saw Orthopedic and declined treatment.  Recommend compound cream, which was expensive and did not help. Recommended core strengthening.  Conservative management at this time.  Referred to Sports Medicine for left shoulder pain.    Has gabapentin, Flexeril p.r.n..  H/o neck surgery and  hardware was removed.     DM2:    Currently pt is taking glimepiride 4, metformin 1000 XR d/t loose stools.   GFR 44.  +CKD.      Off Jardiance d/t cost.   Has refills of Farxiga, but only allowed 1 coupon and cost is very high.   PA for Invokana was approved, but $150.   Victoza was also expensive.    Doesn't check BG often. AM , 120   d/t eating cabbage and brown rice.    Drinks: stopped cokes. Water. Crystal lite. No fruit juice.  ETOH:  not often d/t GERD.    Exercise: 1 hour/day. Treadmill in their building. Arm lifts, sit ups. Has an exercise hula hoop.    Elevated microalbumin creatinine ratio.  Not on an ACE/ARB d/t angioedema.   Retinal exams:  Dr. Antonio. 9/2022.  Foot exams:  4/2021  DM edu: 10/2019.  She previously declined seeing them.  No showed   Lab Results   Component Value Date/Time    HGBA1C 7.2 (H) 12/16/2022 07:38 AM    HGBA1C 7.0 (H) 09/16/2022 07:30 AM    HGBA1C 6.9 (H) 06/07/2022 07:56 AM     Lab Results   Component Value Date    SXUNJOLU21 331 03/16/2020     CKD 3:  Stopped NSAIDs. H/o ACE/ARB angioedema.  Renal ultrasound:  No hydro or nephrolithiasis  Following with Nephrology. Slight fluctuations in renal fnct. Planning on F/u 8/2022.              Not addressed today.  HTN:    No h/o CAD, arrhythmia. TTE  with normal EF, grade 2 diastolic dysfunction.    Pt's BP is controlled. On Coreg 6.25 b.i.d., Norvasc 10.  +DM2, but H/o ACE angioedema.  Tolerating meds well.   Home /75. Not checking regularly.  Doesn't check.  Controlled. Cont Coreg 6.25 BID, Norvasc 10. + DM, but h/o ACE angioedema. Monitor home BP.    Aortic Atherosclerosis, HLD:    Is currently taking Lipitor 20 and ASA 81 daily. H/o edema from statin years ago in [place], but has been tolerating Lipitor.  FLP controlled. Cont Lipitor 20, ASA. Monitor FLP annually.  Lab Results   Component Value Date    LDLCALC 57.2 (L) 06/07/2022     The ASCVD Risk score (Flower MANUEL, et al., 2019) failed to calculate for  the following reasons:    The valid total cholesterol range is 130 to 320 mg/dL     AR:  Chronic.  H/o recurrent epistaxis, shavon during drier weather.  Has difficulty expectorating mucous, post nasal drip in the AM. Using flonase BID, but not correctly. Has protective pillow cases against fomites.  DDx environmental, dust mites. Refilled Flonase.    PVD:  Status post right femoral bypass around 2012 in [place].  No claudication.  Quit tobacco 15 years ago. Has Kelton horse muscle spasms.   Continue ASA, statin. Encouraged exercise, stretching.    Vitamin D deficiency: +CKD. Is taking OTC Vit D, MVI.   Normalized. +CKD. Cont OTC Vit D, MVI. Nephro monitoring.  Lab Results   Component Value Date    OHRSNQGH94RM 31 06/07/2022    ZFYXONZV72DD 50 10/05/2021    SPBCHBML63PE 41 08/03/2020     Functional diarrhea:  H/o choley. Resulted in constipation and loose stools.     C scope 2017 with 2 polyps.  Repeat in 3 years.  Repeat C scope with [facility]  with polyp.  Repeat in 5 years. Also had EGD 8/2020 that was reportedly normal.   TSH, TTG wnl.  Stool studies neg. Try cholestyramine d/t h/o choley, probiotic. F/u with [facility].    Belching, GERD:  Chronic intermittent belching, postprandial gas pain at b/l abd. Worse at night and awakes with it.  Worse with hot sauces, chocolate candy, fatty foods.  Avoiding food triggers. Chest soreness, but no burning.  No sour taste in her throat. Last meal after 6 AM.  GasX helps. Takes Mylanta. Stopped NSAIDs.     Had cscope with [facility] as above. EGD was normal. Has prilosec.  Avoid dietary triggers. Cont PPI PRN. F/u with [facility]    Colon polyp:  C scope 2017 with 2 polyps.    Repeat C scope with [facility]  with polyp.  Repeat in 5 years.    Cervical DDD, FMA:  S/p dissectomy 20 years ago. Not much improvement on lyrica or gabapentin.  Follows with Pain Med for epidural.    +MERVAT:  Chronic shoulder pain as below.  Positive MERVAT, but negative sub serologies.  Not  consistent with SLE.  Saw rheumatology who recommended follow-up with Sports Medicine/Ortho.    Review of Systems   Constitutional:  Negative for activity change and appetite change.   Musculoskeletal:  Positive for arthralgias.   Psychiatric/Behavioral:  Negative for confusion.        I personally reviewed Past Medical History, Past Surgical History, Social History, and Family History.    Objective:      There were no vitals filed for this visit.       Physical Exam  Pulmonary:      Effort: No respiratory distress.         Lab Results   Component Value Date    WBC 6.02 06/07/2022    HGB 13.2 06/07/2022    HCT 40.6 06/07/2022     06/07/2022    CHOL 119 (L) 06/07/2022    TRIG 94 06/07/2022    HDL 43 06/07/2022    ALT 19 06/09/2020    AST 23 06/09/2020     06/07/2022    K 4.2 06/07/2022     06/07/2022    CREATININE 1.4 06/07/2022    BUN 20 06/07/2022    CO2 26 06/07/2022    TSH 2.199 06/18/2021    HGBA1C 7.2 (H) 12/16/2022       The ASCVD Risk score (Flower DK, et al., 2019) failed to calculate for the following reasons:    The valid total cholesterol range is 130 to 320 mg/dL    (Imaging have been independently reviewed)  DXA 08/16/2022 with normal BMD. Low FRAX.    Assessment:       1. Chronic right shoulder pain    2. Type 2 diabetes mellitus with stage 3b chronic kidney disease, without long-term current use of insulin    3. Stage 3b chronic kidney disease    4. Annual physical exam            Plan:       Meenu was seen today for arm pain and diabetes.    Diagnoses and all orders for this visit:    Chronic right shoulder pain  Comments:  Chronic. Reschedule c Ortho to discuss treatment options.  Orders:  -     Ambulatory referral/consult to Orthopedics; Future    Type 2 diabetes mellitus with stage 3b chronic kidney disease, without long-term current use of insulin  Comments:  A1C still elevated. Limited options d/t CKD. Try Trulicity. Pharmacy med assistance to look into cost. A1C 3 mo.  Resched DM edu, podiatry.   Orders:  -     Discontinue: ertugliflozin 5 mg Tab; Take 5 mg by mouth once daily.  -     Hemoglobin A1C; Future  -     Cancel: Ambulatory referral/consult to Pharmacy Assistance; Future  -     dulaglutide (TRULICITY) 0.75 mg/0.5 mL pen injector; Inject 0.75 mg into the skin once a week.  -     Ambulatory referral/consult to Pharmacy Assistance; Future    Stage 3b chronic kidney disease  Comments:  Stable.  D/t h/o HTN, DM, h/o chronic NSAID use.  Resched f/u with Nephrology.  SGLT2 unaffordable.     Annual physical exam  -     Hemoglobin A1C; Future           Side effects of medication(s) were discussed in detail and patient voiced understanding.  Patient will call back for any issues or complications.     RTC 3 month(s) or sooner PRN for HTN, DM with labs prior. In person.

## 2022-12-27 ENCOUNTER — TELEPHONE (OUTPATIENT)
Dept: PHARMACY | Facility: CLINIC | Age: 72
End: 2022-12-27
Payer: MEDICARE

## 2022-12-27 DIAGNOSIS — N18.30 TYPE 2 DIABETES MELLITUS WITH STAGE 3 CHRONIC KIDNEY DISEASE, WITHOUT LONG-TERM CURRENT USE OF INSULIN, UNSPECIFIED WHETHER STAGE 3A OR 3B CKD: ICD-10-CM

## 2022-12-27 DIAGNOSIS — E11.22 TYPE 2 DIABETES MELLITUS WITH STAGE 3 CHRONIC KIDNEY DISEASE, WITHOUT LONG-TERM CURRENT USE OF INSULIN, UNSPECIFIED WHETHER STAGE 3A OR 3B CKD: ICD-10-CM

## 2022-12-27 RX ORDER — METFORMIN HYDROCHLORIDE 500 MG/1
1000 TABLET, EXTENDED RELEASE ORAL
Qty: 180 TABLET | Refills: 3 | Status: SHIPPED | OUTPATIENT
Start: 2022-12-27 | End: 2023-12-27

## 2022-12-27 NOTE — TELEPHONE ENCOUNTER
Refill Routing Note   Medication(s) are not appropriate for processing by [facility] for the following reason(s):      - Required laboratory values are abnormal    [facility] action(s):  Defer Medication-related problems identified: Requires labs     Medication Therapy Plan: Labs (CMP) outdated; eGFR abnormal  Medication reconciliation completed: No     Appointments  past 12m or future 3m with PCP    Date Provider   Last Visit   12/23/2022 Lali Marvin MD   Next Visit   4/11/2023 Lali Marvin MD   ED visits in past 90 days: 0        Note composed:4:47 PM 12/27/2022

## 2022-12-27 NOTE — TELEPHONE ENCOUNTER
Care Due:                  Date            Visit Type   Department     Provider  --------------------------------------------------------------------------------                                ESTABLISHED                              PATIENT -    [facility] INTERNAL  Last Visit: 12-      [facility]      MEDICINE       Lali Marvin                              EP -                              PRIMARY      [facility] INTERNAL  Next Visit: 04-      CARE ([facility])   MEDICINE       Lali Marvin                                                            Last  Test          Frequency    Reason                     Performed    Due Date  --------------------------------------------------------------------------------    CMP.........  12 months..  atorvastatin.............  Not Found    Overdue    [employer] Embedded Care Gaps. Reference number: [id number]. 12/27/2022   10:29:40 AM CST

## 2022-12-27 NOTE — TELEPHONE ENCOUNTER
Hello,       A Patient Assistance Application for Meenu Guy - MRN  [id number] was faxed to your office @ [phone]. Please have Lali Marvin review the application to ensure the prescription is correct. If correct, sign and fax the application back to the Pharmacy Patient Assistance Team @[phone].      If changes need to be made to this application, please let me know so corrections can be made, and the application will be faxed back to you for approval and signature.         Thank you,    Caty Sifuentes

## 2022-12-28 NOTE — TELEPHONE ENCOUNTER
Provider Staff:     Action is required for this patient.   Please see care gap opportunities below in Care Due Message.     Thanks!  [facility]     Appointments      Date Provider   Last Visit   12/23/2022 Lali Marvin MD   Next Visit   4/11/2023 Lali Marvin MD     Note composed:9:53 AM 12/28/2022

## 2023-01-11 NOTE — TELEPHONE ENCOUNTER
I have informed Meenu Guy she has been approved in the [employer] Patient Assistance Program through 12/31/2023. A 90 day supply of Farxiga will be shipped to [facility] in 7-10 business days w/ 3 refills remaining.

## 2023-01-20 DIAGNOSIS — M25.511 RIGHT SHOULDER PAIN, UNSPECIFIED CHRONICITY: Primary | ICD-10-CM

## 2023-01-26 DIAGNOSIS — M79.641 BILATERAL HAND PAIN: Primary | ICD-10-CM

## 2023-01-26 DIAGNOSIS — M79.642 BILATERAL HAND PAIN: Primary | ICD-10-CM

## 2023-01-31 ENCOUNTER — TELEPHONE (OUTPATIENT)
Dept: INTERNAL MEDICINE | Facility: CLINIC | Age: 73
End: 2023-01-31
Payer: MEDICARE

## 2023-01-31 ENCOUNTER — HOSPITAL ENCOUNTER (OUTPATIENT)
Dept: RADIOLOGY | Facility: OTHER | Age: 73
Discharge: HOME | End: 2023-01-31
Attending: ORTHOPAEDIC SURGERY
Payer: MEDICARE

## 2023-01-31 ENCOUNTER — OFFICE VISIT (OUTPATIENT)
Dept: ORTHOPEDICS | Facility: CLINIC | Age: 73
End: 2023-01-31
Payer: MEDICARE

## 2023-01-31 DIAGNOSIS — M25.511 RIGHT SHOULDER PAIN, UNSPECIFIED CHRONICITY: ICD-10-CM

## 2023-01-31 DIAGNOSIS — M25.511 CHRONIC RIGHT SHOULDER PAIN: ICD-10-CM

## 2023-01-31 DIAGNOSIS — G89.29 CHRONIC RIGHT SHOULDER PAIN: ICD-10-CM

## 2023-01-31 DIAGNOSIS — G56.03 CARPAL TUNNEL SYNDROME, BILATERAL: Primary | ICD-10-CM

## 2023-01-31 DIAGNOSIS — M54.2 NECK PAIN: ICD-10-CM

## 2023-01-31 DIAGNOSIS — M79.641 BILATERAL HAND PAIN: ICD-10-CM

## 2023-01-31 DIAGNOSIS — M79.642 BILATERAL HAND PAIN: ICD-10-CM

## 2023-01-31 PROCEDURE — 73130 X-RAY EXAM OF HAND: CPT | Mod: TC,50,FY

## 2023-01-31 PROCEDURE — 1159F MED LIST DOCD IN RCRD: CPT | Mod: CPTII,S$GLB,, | Performed by: ORTHOPAEDIC SURGERY

## 2023-01-31 PROCEDURE — 99999 PR PBB SHADOW E&M-EST. PATIENT-LVL III: ICD-10-PCS | Mod: PBBFAC,,, | Performed by: ORTHOPAEDIC SURGERY

## 2023-01-31 PROCEDURE — 99204 OFFICE O/P NEW MOD 45 MIN: CPT | Mod: S$GLB,,, | Performed by: ORTHOPAEDIC SURGERY

## 2023-01-31 PROCEDURE — 1159F PR MEDICATION LIST DOCUMENTED IN MEDICAL RECORD: ICD-10-PCS | Mod: CPTII,S$GLB,, | Performed by: ORTHOPAEDIC SURGERY

## 2023-01-31 PROCEDURE — 73030 X-RAY EXAM OF SHOULDER: CPT | Mod: TC,FY,RT

## 2023-01-31 PROCEDURE — 73130 X-RAY EXAM OF HAND: CPT | Mod: 26,50,, | Performed by: RADIOLOGY

## 2023-01-31 PROCEDURE — 99999 PR PBB SHADOW E&M-EST. PATIENT-LVL III: CPT | Mod: PBBFAC,,, | Performed by: ORTHOPAEDIC SURGERY

## 2023-01-31 PROCEDURE — 73130 XR HAND COMPLETE 3 VIEWS BILATERAL: ICD-10-PCS | Mod: 26,50,, | Performed by: RADIOLOGY

## 2023-01-31 PROCEDURE — 73030 X-RAY EXAM OF SHOULDER: CPT | Mod: 26,RT,, | Performed by: RADIOLOGY

## 2023-01-31 PROCEDURE — 73030 XR SHOULDER TRAUMA 3 VIEW RIGHT: ICD-10-PCS | Mod: 26,RT,, | Performed by: RADIOLOGY

## 2023-01-31 PROCEDURE — 99204 PR OFFICE/OUTPT VISIT, NEW, LEVL IV, 45-59 MIN: ICD-10-PCS | Mod: S$GLB,,, | Performed by: ORTHOPAEDIC SURGERY

## 2023-01-31 NOTE — TELEPHONE ENCOUNTER
----- Message from Tameka Ley sent at 1/30/2023  9:33 AM CST -----  Regarding: Rx conuslt  Contact: LOUIE MASON [[id number]]  Name of Who is Calling: Louie Brooks            What is the request in detail:   Pt states she is requesting a call back in regards to her rx, she states it came today and wants to know if she should start the medication . Please advise         Can the clinic reply by [facility]: No           What Number to Call Back [facility]:    [place]   - [phone]

## 2023-01-31 NOTE — PROGRESS NOTES
"Subjective:      Patient ID: Meenu Guy is a 72 y.o. female.    Chief Complaint: Pain of the Left Hand, Pain of the Right Hand, and Pain of the Right Shoulder      HPI  Meenu Guy is a right hand dominant 72 y.o. female presenting today for evaluation of the right shoulder and bilateral hands.     Right shoulder pain- pain has been going on for 1 year, no injury, Pt has neck surgery years ago and the pain in neck radiates down to shoulder. Per pt a MD told her that her "neck is breaking Up"    B wrists- right and left hurt, pt staets she gets "bruising" color will change to dark color. Pt will wear salon pas at night- arms, shoulder, back  No N/T, just feels hot ( fingers are numb- feel burning in the wrist) Pt is DM ( no insulin)    Feels like handwriting has changed, maybe she thinks it is OA,   In the Am- her hands feel swollen stiff and hurting  Pt never diagnosed with CTS      Review of patient's allergies indicates:   Allergen Reactions    Statins-hmg-coa reductase inhibitors Edema     Tolerating Lipitor    Ace inhibitors Swelling         Current Outpatient Medications   Medication Sig Dispense Refill    amLODIPine (NORVASC) 10 MG tablet Take 1 tablet (10 mg total) by mouth once daily. 90 tablet 3    atorvastatin (LIPITOR) 20 MG tablet Take 1 tablet (20 mg total) by mouth once daily. 90 tablet 3    blood sugar diagnostic Strp 1 each by Misc.(Non-Drug; Combo Route) route 3 (three) times daily. 200 each 11    blood-glucose meter kit Use as instructed 1 each 0    carvediloL (COREG) 6.25 MG tablet Take 1 tablet (6.25 mg total) by mouth 2 (two) times daily. 180 tablet 3    cyclobenzaprine (FLEXERIL) 5 MG tablet Take 1 tablet (5 mg total) by mouth 3 (three) times daily as needed for Muscle spasms. 60 tablet 3    dulaglutide (TRULICITY) 0.75 mg/0.5 mL pen injector Inject 0.75 mg into the skin once a week. 4 pen 11    fluticasone propionate (FLONASE) 50 mcg/actuation nasal spray 1 spray (50 mcg " total) by Each Nostril route once daily. 16 g 11    gabapentin (NEURONTIN) 300 MG capsule Take 1 capsule (300 mg total) by mouth 3 (three) times daily. 90 capsule 1    glimepiride (AMARYL) 4 MG tablet TAKE 1 TABLET (4 MG TOTAL) BY MOUTH DAILY WITH BREAKFAST. 90 tablet 3    lancets Misc 1 each by Misc.(Non-Drug; Combo Route) route 3 (three) times daily. 200 each 1    metFORMIN (GLUCOPHAGE-XR) 500 MG ER 24hr tablet TAKE 2 TABLETS (1,000 MG TOTAL) BY MOUTH DAILY WITH BREAKFAST. 180 tablet 3    montelukast (SINGULAIR) 10 mg tablet TAKE 1 TABLET (10 MG TOTAL) BY MOUTH EVERY EVENING. 30 tablet 11    omeprazole (PRILOSEC) 20 MG capsule Take 1 capsule (20 mg total) by mouth daily as needed. 30 capsule 11    pantoprazole (PROTONIX) 40 MG tablet Take 40 mg by mouth as needed.      aspirin (ECOTRIN) 81 MG EC tablet Take 1 tablet (81 mg total) by mouth once daily. for 365 doses 90 tablet 3    blood-glucose meter,continuous (DEXCOM G6 ) Misc 1 each by Misc.(Non-Drug; Combo Route) route 4 (four) times daily before meals and nightly. 1 each 0    blood-glucose sensor (DEXCOM G6 SENSOR) Cici 1 each by Misc.(Non-Drug; Combo Route) route 4 (four) times daily before meals and nightly. 100 each 11    blood-glucose transmitter (DEXCOM G6 TRANSMITTER) Cici 1 each by Misc.(Non-Drug; Combo Route) route 4 (four) times daily before meals and nightly. 100 each 11     No current facility-administered medications for this visit.       Past Medical History:   Diagnosis Date    Diabetes mellitus, type 2     Hypertension        Past Surgical History:   Procedure Laterality Date    BACK SURGERY      CHOLECYSTECTOMY      DISC REMOVAL      EPIDURAL STEROID INJECTION N/A 10/30/2019    Procedure: INJECTION, STEROID, EPIDURAL, C7-T1 IL;  Surgeon: Rahul Bowie MD;  [facility];  Service: Pain Management;  Laterality: N/A;    HYSTERECTOMY         Review of Systems:  Constitutional: Negative for chills and fever.   Respiratory:  Negative for cough and shortness of breath.    Gastrointestinal: Negative for nausea and vomiting.   Skin: Negative for rash.   Neurological: Negative for dizziness and headaches.   Psychiatric/Behavioral: Negative for depression.   MSK as in HPI       OBJECTIVE:     PHYSICAL EXAM:  There were no vitals taken for this visit.    GEN:  NAD, well-developed, well-groomed.  NEURO: Awake, alert, and oriented. Normal attention and concentration.    PSYCH: Normal mood and affect. Behavior is normal.  HEENT: No cervical lymphadenopathy noted.  CARDIOVASCULAR: Radial pulses 2+ bilaterally. No LE edema noted.  PULMONARY: Breath sounds normal. No respiratory distress.  SKIN: Intact, no rashes.      MSK:   RUE:  Good active ROM of the wrist and fingers. AIN/PIN/Radial/Median/Ulnar Nerves assessed in isolation without deficit. Radial & Ulnar arteries palpated 2+. Capillary Refill <3s.    LUE:  Good active ROM of the wrist and fingers. AIN/PIN/Radial/Median/Ulnar Nerves assessed in isolation without deficit. Radial & Ulnar arteries palpated 2+. Capillary Refill <3s.      RADIOGRAPHS:  Xray bl hands 1/31/23   Impression:   No acute osseous abnormality seen.    Xray shoulder 1/31/23   FINDINGS:  No acute fracture or dislocation seen.  No significant soft tissue edema or radiopaque retained foreign body.   Advanced degenerative change acromioclavicular joint.   Impression:   No acute osseous abnormality seen.    Comments: I have personally reviewed the imaging and I agree with the above radiologist's report.    ASSESSMENT/PLAN:       ICD-10-CM ICD-9-CM   1. Chronic right shoulder pain  M25.511 719.41    G89.29 338.29          No orders of the defined types were placed in this encounter.       Plan:   EMG/NCS - for neck pain, CTS and possible DM    Pt also given wrist braces  for CTS        The patient indicates understanding of these issues and agrees to the plan.    Inga De Santiago PA-C  [facility]   [facility]  [place], [place]

## 2023-01-31 NOTE — TELEPHONE ENCOUNTER
The Farxiga was stopped because of the cost.  She should take Trulicity. Could you see if there's a problem getting Trulicity?

## 2023-01-31 NOTE — TELEPHONE ENCOUNTER
----- Message from Tameka Ley sent at 1/30/2023  9:33 AM CST -----  Regarding: Rx conuslt  Contact: LOUIE MASON [[id number]]  Name of Who is Calling: Louie Brooks            What is the request in detail:   Pt states she is requesting a call back in regards to her rx, she states it came today and wants to know if she should start the medication . Please advise         Can the clinic reply by [facility]: No           What Number to Call Back [email or web address]:    [place]   - [phone]

## 2023-01-31 NOTE — TELEPHONE ENCOUNTER
Spoke to Ms. Brooks and patient states that she just received the Farxiga and I see it has been Discontinued and patient is now on Trulicity which she still has not received.  Patient want to know if she should take the Farxiga or be on Trulicity.

## 2023-02-01 NOTE — TELEPHONE ENCOUNTER
Spoke to Ms. Brooks and patient states that she just received her Trulicity today and that she is suppose to bring Farxiga to the pharmacy on Friday at [facility] [place]  and drop it off.  Patient would like to know if she's suppose to take the metformin and Glimepiride with the Trulicity.  .

## 2023-02-01 NOTE — TELEPHONE ENCOUNTER
Spoke to Ms. Guy and patient states that she just received the Farxiga that was paid for by her insurance.  Patient states that she never received the Trulicity.

## 2023-02-01 NOTE — TELEPHONE ENCOUNTER
She may take Farxiga then and hold off on Trulicity for now.  Please add Farxiga to medication list.  I don't know which dose she is taking.  Thank you!

## 2023-02-01 NOTE — TELEPHONE ENCOUNTER
----- Message from Tameka Ley sent at 2/1/2023 10:10 AM CST -----  Regarding: Rx consult  Contact: LOUIE MASON [[id number]]  Type:  Patient Returning Call    Who Called:Louie Mason    Who Left Message for Patient:Nurse   Does the patient know what this is regarding?:yes in regards to rx directions   Would the patient rather a call back or a response via [facility]? Call back   Best Call Back Number:Home Phone      [phone]  Work Phone      Not on file.  [place]          [phone]      Additional Information:

## 2023-02-01 NOTE — TELEPHONE ENCOUNTER
Hello,       A Patient Assistance Application for Meenu Guy - MRN  [id number] was faxed to your office @ [phone]. Please have Lali Marvin MD review the application to ensure the prescription is correct. If correct, sign and fax the application back to the Pharmacy Patient Assistance Team @[phone].      If changes need to be made to this application, please let me know so corrections can be made, and the application will be faxed back to you for approval and signature.         Thank you,    Caty Sifuentes

## 2023-02-01 NOTE — TELEPHONE ENCOUNTER
Farxiga would be the better option to help her kidneys.   She can continue the metformin, glimepiride and take it with Farxiga.  She can hold off on taking Trulicity for now.    Please add Farxiga to her medication list and make sure that the dose is correct.  Thanks!

## 2023-02-02 ENCOUNTER — PATIENT MESSAGE (OUTPATIENT)
Dept: INTERNAL MEDICINE | Facility: CLINIC | Age: 73
End: 2023-02-02
Payer: MEDICARE

## 2023-02-02 DIAGNOSIS — E11.22 TYPE 2 DIABETES MELLITUS WITH STAGE 3B CHRONIC KIDNEY DISEASE, WITHOUT LONG-TERM CURRENT USE OF INSULIN: Primary | ICD-10-CM

## 2023-02-02 DIAGNOSIS — N18.32 STAGE 3B CHRONIC KIDNEY DISEASE: ICD-10-CM

## 2023-02-02 DIAGNOSIS — N18.32 TYPE 2 DIABETES MELLITUS WITH STAGE 3B CHRONIC KIDNEY DISEASE, WITHOUT LONG-TERM CURRENT USE OF INSULIN: Primary | ICD-10-CM

## 2023-02-02 RX ORDER — DAPAGLIFLOZIN 10 MG/1
10 TABLET, FILM COATED ORAL DAILY
Start: 2023-02-02 | End: 2024-03-15 | Stop reason: SDUPTHER

## 2023-02-02 NOTE — TELEPHONE ENCOUNTER
She can continue the metformin, glimepiride and take it with Farxiga.    She can hold off on taking Trulicity for now.  If her next diabetes lab is still high, we will probably add the Trulicity.

## 2023-02-24 ENCOUNTER — PES CALL (OUTPATIENT)
Dept: ADMINISTRATIVE | Facility: CLINIC | Age: 73
End: 2023-02-24
Payer: MEDICARE

## 2023-03-13 ENCOUNTER — PROCEDURE VISIT (OUTPATIENT)
Dept: NEUROLOGY | Facility: CLINIC | Age: 73
End: 2023-03-13
Payer: MEDICARE

## 2023-03-13 DIAGNOSIS — G56.03 CARPAL TUNNEL SYNDROME, BILATERAL: ICD-10-CM

## 2023-03-13 DIAGNOSIS — M54.2 NECK PAIN: ICD-10-CM

## 2023-03-13 PROCEDURE — 95886 MUSC TEST DONE W/N TEST COMP: CPT | Mod: S$GLB,,, | Performed by: PHYSICAL MEDICINE & REHABILITATION

## 2023-03-13 PROCEDURE — 95911 NRV CNDJ TEST 9-10 STUDIES: CPT | Mod: S$GLB,,, | Performed by: PHYSICAL MEDICINE & REHABILITATION

## 2023-03-13 PROCEDURE — 95886 PR EMG COMPLETE, W/ NERVE CONDUCTION STUDIES, 5+ MUSCLES: ICD-10-PCS | Mod: S$GLB,,, | Performed by: PHYSICAL MEDICINE & REHABILITATION

## 2023-03-13 PROCEDURE — 95911 PR NERVE CONDUCTION STUDY; 9-10 STUDIES: ICD-10-PCS | Mod: S$GLB,,, | Performed by: PHYSICAL MEDICINE & REHABILITATION

## 2023-03-13 NOTE — PROCEDURES
Test Date:  3/13/2023    Patient: Meenu Guy : 1950 Physician: Nikolay Farnsworth D.O.   ID#: [id number] SEX: Female Ref. Phys: Inga De Santiago PA-C     HPI: Meenu Guy is a 72 y.o.female who presents for NCS/EMG to evaluate CTS versus cervical radiculopathy.      NCV & EMG Findings:  Evaluation of the right Median-Radial (Dig I) sensory nerve showed abnormal peak latency difference ((Median Wrist-Dig I)-(Radial Wrist-Dig I)).  The right Median-Ulnar Palmar sensory nerve showed abnormal peak latency difference ((Median Palm-Wrist)-(Ulnar Palm-Wrist)).  All remaining nerves (as indicated in the following tables) were within normal limits.  All examined muscles (as indicated in the following table) showed no evidence of electrical instability.    Impression:  There is electrophysiologic evidence of a right sensory median mononeuropathy across the wrist (I.e. Carpal tunnel syndrome).  There is no motor axonal loss.  There is no active denervation.  This is graded as Mild in severity on the right.    No electrophysiologic evidence of cervical radiculopathy    ___________________________  Nikolay Farnsworth D.O.        NCS+  Motor Nerve Results      Latency Amplitude F-Lat Segment Distance CV Comment   Site (ms) Norm (mV) Norm (ms)  (cm) (m/s) Norm    Left Median (APB)   Wrist 4.1  < 4.4 5.7  > 3.8  Wrist-Palm - - -    Elbow 8.5 - 6.3 -  Elbow-Wrist 24 55  > 51    Right Median (APB)   Wrist 4.3  < 4.4 9.4  > 3.8  Wrist-Palm 10 - -    Elbow 9.0 - 9.3 -  Elbow-Wrist 24 51  > 51    Left Ulnar (ADM)   Wrist 3.3  < 3.7 4.3  > 3.0         Bel Elbow 7.1 - 3.3 -  Bel Elbow-Wrist 21 55  > 52    Abv Elbow 9.3 - 2.2 -  Abv Elbow-Bel Elbow 11 50  > 43    Right Ulnar (ADM)   Wrist 3.5  < 3.7 6.6  > 3.0         Bel Elbow 7.4 - 6.5 -  Bel Elbow-Wrist 25 64  > 52    Abv Elbow 9.3 - 6.2 -  Abv Elbow-Bel Elbow 11 58  > 43      Sensory Nerve Results      Latency (Peak) Amplitude (P-P) Segment Distance CV Comment   Site  (ms) Norm (µV) Norm  (cm) (m/s) Norm    Left Median   Wrist-Dig I 3.1 - 16 - Wrist-Dig I 10 32 -    Wrist-Dig II 3.7  < 4.0 27  > 15 Wrist-Dig II 14.5 39  > 39    Palm-Wrist 2.3 - 18 - Palm-Wrist - - -    Right Median   Wrist-Dig I 3.4 - 23 - Wrist-Dig I 10 29 -    Wrist-Dig II 3.7  < 4.0 32  > 15 Wrist-Dig II 14.5 39  > 39    Palm-Wrist 2.5 - 21 - Palm-Wrist - - -    Left Ulnar   Wrist-Dig V 3.6  < 4.0 18  > 13 Wrist-Dig V 14 39  > 38    Palm-Wrist 2.3 - 7 - Palm-Wrist - - -    Right Ulnar   Wrist-Dig V 3.4  < 4.0 21  > 13 Wrist-Dig V 14 41  > 38    Palm-Wrist 1.70 - 22 - Palm-Wrist - - -    Left Radial   Wrist-Dig I 2.9 - 11 - Wrist-Dig I 10 34 -    Forearm-Wrist 2.2  < 2.8 42  > 11 Forearm-Wrist 10 45 -    Right Radial   Wrist-Dig I 2.8 - 10 - Wrist-Dig I 10 36 -    Forearm-Wrist 2.4  < 2.8 54  > 11 Forearm-Wrist 10 42 -      Inter-Nerve Comparisons     Nerve 1 Value 1 Nerve 2 Value 2 Parameter Result Normal   Sensory Sites   R Median Wrist-Dig I 3.4 ms R Radial Wrist-Dig I 2.8 ms Peak Lat Diff *0.60 ms <0.40   L Median Wrist-Dig I 3.1 ms L Radial Wrist-Dig I 2.9 ms Peak Lat Diff 0.20 ms <0.40   R Median Palm-Wrist 2.5 ms R Ulnar Palm-Wrist 1.7 ms Peak Lat Diff *0.80 ms <0.30   L Median Palm-Wrist 2.3 ms L Ulnar Palm-Wrist 2.3 ms Peak Lat Diff 0.00 ms <0.30     EMG+     Side Muscle Nerve Root Ins Act Fibs Psw Amp Dur Poly Recrt Int Pat Comment   Left Deltoid Axillary C5-C6 Nml Nml Nml Nml Nml 0 Nml Nml    Left Biceps Musculocut C5-C6 Nml Nml Nml Nml Nml 0 Nml Nml    Left Triceps Radial C6-C8 Nml Nml Nml Nml Nml 0 Nml Nml    Left Pronator Teres Median C6-C7 Nml Nml Nml Nml Nml 0 Nml Nml    Left FDI Ulnar C8-T1 Nml Nml Nml Nml Nml 0 Nml Nml    Right Deltoid Axillary C5-C6 Nml Nml Nml Nml Nml 0 Nml Nml    Right Biceps Musculocut C5-C6 Nml Nml Nml Nml Nml 0 Nml Nml    Right Triceps Radial C6-C8 Nml Nml Nml Nml Nml 0 Nml Nml    Right Pronator Teres Median C6-C7 Nml Nml Nml Nml Nml 0 Nml Nml    Right APB Median C8-T1  Nml Nml Nml Nml Nml 0 Nml Nml            Waveforms:    Motor              Sensory

## 2023-03-16 ENCOUNTER — OFFICE VISIT (OUTPATIENT)
Dept: ORTHOPEDICS | Facility: CLINIC | Age: 73
End: 2023-03-16
Payer: MEDICARE

## 2023-03-16 VITALS — HEIGHT: 67 IN | BODY MASS INDEX: 25.11 KG/M2 | WEIGHT: 160 LBS

## 2023-03-16 DIAGNOSIS — G89.29 CHRONIC RIGHT SHOULDER PAIN: Primary | ICD-10-CM

## 2023-03-16 DIAGNOSIS — M25.511 CHRONIC RIGHT SHOULDER PAIN: Primary | ICD-10-CM

## 2023-03-16 DIAGNOSIS — G56.03 CARPAL TUNNEL SYNDROME, BILATERAL: ICD-10-CM

## 2023-03-16 PROCEDURE — 99214 PR OFFICE/OUTPT VISIT, EST, LEVL IV, 30-39 MIN: ICD-10-PCS | Mod: S$GLB,,, | Performed by: ORTHOPAEDIC SURGERY

## 2023-03-16 PROCEDURE — 99999 PR PBB SHADOW E&M-EST. PATIENT-LVL I: ICD-10-PCS | Mod: PBBFAC,,, | Performed by: ORTHOPAEDIC SURGERY

## 2023-03-16 PROCEDURE — 99214 OFFICE O/P EST MOD 30 MIN: CPT | Mod: S$GLB,,, | Performed by: ORTHOPAEDIC SURGERY

## 2023-03-16 PROCEDURE — 3008F PR BODY MASS INDEX (BMI) DOCUMENTED: ICD-10-PCS | Mod: CPTII,S$GLB,, | Performed by: ORTHOPAEDIC SURGERY

## 2023-03-16 PROCEDURE — 3008F BODY MASS INDEX DOCD: CPT | Mod: CPTII,S$GLB,, | Performed by: ORTHOPAEDIC SURGERY

## 2023-03-16 PROCEDURE — 99999 PR PBB SHADOW E&M-EST. PATIENT-LVL I: CPT | Mod: PBBFAC,,, | Performed by: ORTHOPAEDIC SURGERY

## 2023-03-16 NOTE — PROGRESS NOTES
"Subjective:      Patient ID: Meenu Guy is a 72 y.o. female.    Chief Complaint: No chief complaint on file.    03/16/23 - Pt presents in clinic today to discuss the results of her EMG 03/13/23.       HPI  Meenu Guy is a right hand dominant 72 y.o. female presenting today for evaluation of the right shoulder and bilateral hands.     Right shoulder pain- pain has been going on for 1 year, no injury, Pt has neck surgery years ago and the pain in neck radiates down to shoulder. Per pt a MD told her that her "neck is breaking Up"    B wrists- right and left hurt, pt staets she gets "bruising" color will change to dark color. 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 90 tablet 3    aspirin (ECOTRIN) 81 MG EC tablet Take 1 tablet (81 mg total) by mouth once daily. for 365 doses 90 tablet 3    atorvastatin (LIPITOR) 20 MG tablet Take 1 tablet (20 mg total) by mouth once daily. 90 tablet 3    blood sugar diagnostic Strp 1 each by Misc.(Non-Drug; Combo Route) route 3 (three) times daily. 200 each 11    blood-glucose meter kit Use as instructed 1 each 0    blood-glucose meter,continuous (DEXCOM G6 ) Misc 1 each by Misc.(Non-Drug; Combo Route) route 4 (four) times daily before meals and nightly. 1 each 0    blood-glucose sensor (DEXCOM " G6 SENSOR) Cici 1 each by Misc.(Non-Drug; Combo Route) route 4 (four) times daily before meals and nightly. 100 each 11    blood-glucose transmitter (DEXCOM G6 TRANSMITTER) Cici 1 each by Misc.(Non-Drug; Combo Route) route 4 (four) times daily before meals and nightly. 100 each 11    carvediloL (COREG) 6.25 MG tablet Take 1 tablet (6.25 mg total) by mouth 2 (two) times daily. 180 tablet 3    cyclobenzaprine (FLEXERIL) 5 MG tablet Take 1 tablet (5 mg total) by mouth 3 (three) times daily as needed for Muscle spasms. 60 tablet 3    dapagliflozin (FARXIGA) 10 mg tablet Take 1 tablet (10 mg total) by mouth once daily.      fluticasone propionate (FLONASE) 50 mcg/actuation nasal spray 1 spray (50 mcg total) by Each Nostril route once daily. 16 g 11    gabapentin (NEURONTIN) 300 MG capsule TAKE 1 CAPSULE (300 MG TOTAL) BY MOUTH 3 (THREE) TIMES DAILY. 90 capsule 1    glimepiride (AMARYL) 4 MG tablet TAKE 1 TABLET (4 MG TOTAL) BY MOUTH DAILY WITH BREAKFAST. 90 tablet 1    lancets Misc 1 each by Misc.(Non-Drug; Combo Route) route 3 (three) times daily. 200 each 1    metFORMIN (GLUCOPHAGE-XR) 500 MG ER 24hr tablet TAKE 2 TABLETS (1,000 MG TOTAL) BY MOUTH DAILY WITH BREAKFAST. 180 tablet 3    montelukast (SINGULAIR) 10 mg tablet TAKE 1 TABLET (10 MG TOTAL) BY MOUTH EVERY EVENING. 30 tablet 11    omeprazole (PRILOSEC) 20 MG capsule Take 1 capsule (20 mg total) by mouth daily as needed. 30 capsule 11    pantoprazole (PROTONIX) 40 MG tablet Take 40 mg by mouth as needed.       No current facility-administered medications for this visit.       Past Medical History:   Diagnosis Date    Diabetes mellitus, type 2     Hypertension        Past Surgical History:   Procedure Laterality Date    BACK SURGERY      CHOLECYSTECTOMY      DISC REMOVAL      EPIDURAL STEROID INJECTION N/A 10/30/2019    Procedure: INJECTION, STEROID, EPIDURAL, C7-T1 IL;  Surgeon: Rahul Bowie MD;  Location: [facility];  Service: Pain Management;   "Laterality: N/A;    HYSTERECTOMY         Review of Systems:  Constitutional: Negative for chills and fever.   Respiratory: Negative for cough and shortness of breath.    Gastrointestinal: Negative for nausea and vomiting.   Skin: Negative for rash.   Neurological: Negative for dizziness and headaches.   Psychiatric/Behavioral: Negative for depression.   MSK as in HPI       OBJECTIVE:     PHYSICAL EXAM:  Ht 5' 7" (1.702 m)   Wt 72.6 kg (160 lb)   BMI 25.06 kg/m²     GEN:  NAD, well-developed, well-groomed.  NEURO: Awake, alert, and oriented. Normal attention and concentration.    PSYCH: Normal mood and affect. Behavior is normal.  HEENT: No cervical lymphadenopathy noted.  CARDIOVASCULAR: Radial pulses 2+ bilaterally. No LE edema noted.  PULMONARY: Breath sounds normal. No respiratory distress.  SKIN: Intact, no rashes.      MSK:   RUE:  Good active ROM of the wrist and fingers. AIN/PIN/Radial/Median/Ulnar Nerves assessed in isolation without deficit. Radial & Ulnar arteries palpated 2+. Capillary Refill <3s.    Motion limited,+ NCT, + tinels    LUE:  Good active ROM of the wrist and fingers. AIN/PIN/Radial/Median/Ulnar Nerves assessed in isolation without deficit. Radial & Ulnar arteries palpated 2+. Capillary Refill <3s.      RADIOGRAPHS:  Xray bl hands 1/31/23   Impression:   No acute osseous abnormality seen.    Xray shoulder 1/31/23   FINDINGS:  No acute fracture or dislocation seen.  No significant soft tissue edema or radiopaque retained foreign body.   Advanced degenerative change acromioclavicular joint.   Impression:   No acute osseous abnormality seen.    Comments: I have personally reviewed the imaging and I agree with the above radiologist's report.    EMG 03/13/23   Impression:  There is electrophysiologic evidence of a right sensory median mononeuropathy across the wrist (I.e. Carpal tunnel syndrome).  There is no motor axonal loss.  There is no active denervation.  This is graded as Mild in severity " on the right.    No electrophysiologic evidence of cervical radiculopathy    ASSESSMENT/PLAN:     No diagnosis found.         No orders of the defined types were placed in this encounter.      Plan:   Surgical release of right CT.  I have explained the risks, benefits, and alternatives of the procedure to the patient in great detail. The patient voices understanding and all questions have been answered. The patient agrees with to proceed as planned. Consents were performed in clinic.

## 2023-03-21 DIAGNOSIS — G56.03 CARPAL TUNNEL SYNDROME, BILATERAL: Primary | ICD-10-CM

## 2023-04-04 ENCOUNTER — PES CALL (OUTPATIENT)
Dept: ADMINISTRATIVE | Facility: CLINIC | Age: 73
End: 2023-04-04
Payer: MEDICARE

## 2023-04-04 ENCOUNTER — LAB VISIT (OUTPATIENT)
Dept: LAB | Facility: OTHER | Age: 73
End: 2023-04-04
Attending: INTERNAL MEDICINE
Payer: MEDICARE

## 2023-04-04 DIAGNOSIS — N18.32 TYPE 2 DIABETES MELLITUS WITH STAGE 3B CHRONIC KIDNEY DISEASE, WITHOUT LONG-TERM CURRENT USE OF INSULIN: ICD-10-CM

## 2023-04-04 DIAGNOSIS — E11.22 TYPE 2 DIABETES MELLITUS WITH STAGE 3B CHRONIC KIDNEY DISEASE, WITHOUT LONG-TERM CURRENT USE OF INSULIN: ICD-10-CM

## 2023-04-04 DIAGNOSIS — Z00.00 ANNUAL PHYSICAL EXAM: ICD-10-CM

## 2023-04-04 LAB
ESTIMATED AVG GLUCOSE: 146 MG/DL (ref 68–131)
HBA1C MFR BLD: 6.7 % (ref 4–5.6)

## 2023-04-04 PROCEDURE — 83036 HEMOGLOBIN GLYCOSYLATED A1C: CPT | Performed by: INTERNAL MEDICINE

## 2023-04-04 PROCEDURE — 36415 COLL VENOUS BLD VENIPUNCTURE: CPT | Performed by: INTERNAL MEDICINE

## 2023-04-06 NOTE — PRE-PROCEDURE INSTRUCTIONS
Pre admit phone call completed.    Instructions given to patient about NPO status provided by Dr. Huang's office.       Patient was also instructed on the below information:    Park in the Parking lot behind the hospital or in the [facility] across the street from the parking lot.  Parking is complimentary.  If you will be discharged the same day as your procedure, please arrange for a responsible adult to drive you home or  to accompany you if traveling by taxi.  YOU WILL NOT BE PERMITTED TO DRIVE OR TO LEAVE THE HOSPITAL ALONE AFTER SURGERY.  It is strongly recommended that you arrange for someone to remain with you for the first 24 hrs following your surgery.    Patient verbalized understanding of above instructions.

## 2023-04-11 ENCOUNTER — ANESTHESIA EVENT (OUTPATIENT)
Dept: SURGERY | Facility: OTHER | Age: 73
End: 2023-04-11
Payer: MEDICARE

## 2023-04-11 ENCOUNTER — OFFICE VISIT (OUTPATIENT)
Dept: INTERNAL MEDICINE | Facility: CLINIC | Age: 73
End: 2023-04-11
Payer: MEDICARE

## 2023-04-11 ENCOUNTER — TELEPHONE (OUTPATIENT)
Dept: ORTHOPEDICS | Facility: CLINIC | Age: 73
End: 2023-04-11
Payer: MEDICARE

## 2023-04-11 VITALS
WEIGHT: 158.94 LBS | HEIGHT: 67 IN | BODY MASS INDEX: 24.94 KG/M2 | OXYGEN SATURATION: 98 % | HEART RATE: 61 BPM | SYSTOLIC BLOOD PRESSURE: 124 MMHG | DIASTOLIC BLOOD PRESSURE: 60 MMHG

## 2023-04-11 DIAGNOSIS — N18.32 TYPE 2 DIABETES MELLITUS WITH STAGE 3B CHRONIC KIDNEY DISEASE, WITHOUT LONG-TERM CURRENT USE OF INSULIN: ICD-10-CM

## 2023-04-11 DIAGNOSIS — N18.32 STAGE 3B CHRONIC KIDNEY DISEASE: ICD-10-CM

## 2023-04-11 DIAGNOSIS — E11.51 TYPE 2 DIABETES MELLITUS WITH DIABETIC PERIPHERAL ANGIOPATHY WITHOUT GANGRENE, WITHOUT LONG-TERM CURRENT USE OF INSULIN: ICD-10-CM

## 2023-04-11 DIAGNOSIS — G56.03 CARPAL TUNNEL SYNDROME, BILATERAL: Primary | ICD-10-CM

## 2023-04-11 DIAGNOSIS — Z00.00 ANNUAL PHYSICAL EXAM: ICD-10-CM

## 2023-04-11 DIAGNOSIS — N89.8 VAGINAL IRRITATION: Primary | ICD-10-CM

## 2023-04-11 DIAGNOSIS — E78.2 MIXED HYPERLIPIDEMIA: ICD-10-CM

## 2023-04-11 DIAGNOSIS — G95.89 MYELOMALACIA: ICD-10-CM

## 2023-04-11 DIAGNOSIS — I10 ESSENTIAL HYPERTENSION: ICD-10-CM

## 2023-04-11 DIAGNOSIS — E11.22 TYPE 2 DIABETES MELLITUS WITH STAGE 3B CHRONIC KIDNEY DISEASE, WITHOUT LONG-TERM CURRENT USE OF INSULIN: ICD-10-CM

## 2023-04-11 DIAGNOSIS — E21.3 HYPERPARATHYROIDISM: ICD-10-CM

## 2023-04-11 DIAGNOSIS — Z98.890 POST-OPERATIVE STATE: Primary | ICD-10-CM

## 2023-04-11 PROCEDURE — 99999 PR PBB SHADOW E&M-EST. PATIENT-LVL V: CPT | Mod: PBBFAC,,, | Performed by: INTERNAL MEDICINE

## 2023-04-11 PROCEDURE — 3074F SYST BP LT 130 MM HG: CPT | Mod: CPTII,S$GLB,, | Performed by: INTERNAL MEDICINE

## 2023-04-11 PROCEDURE — 3044F HG A1C LEVEL LT 7.0%: CPT | Mod: CPTII,S$GLB,, | Performed by: INTERNAL MEDICINE

## 2023-04-11 PROCEDURE — 99215 OFFICE O/P EST HI 40 MIN: CPT | Mod: S$GLB,,, | Performed by: INTERNAL MEDICINE

## 2023-04-11 PROCEDURE — 3078F DIAST BP <80 MM HG: CPT | Mod: CPTII,S$GLB,, | Performed by: INTERNAL MEDICINE

## 2023-04-11 PROCEDURE — 3288F FALL RISK ASSESSMENT DOCD: CPT | Mod: CPTII,S$GLB,, | Performed by: INTERNAL MEDICINE

## 2023-04-11 PROCEDURE — 1159F PR MEDICATION LIST DOCUMENTED IN MEDICAL RECORD: ICD-10-PCS | Mod: CPTII,S$GLB,, | Performed by: INTERNAL MEDICINE

## 2023-04-11 PROCEDURE — 1101F PR PT FALLS ASSESS DOC 0-1 FALLS W/OUT INJ PAST YR: ICD-10-PCS | Mod: CPTII,S$GLB,, | Performed by: INTERNAL MEDICINE

## 2023-04-11 PROCEDURE — 1160F RVW MEDS BY RX/DR IN RCRD: CPT | Mod: CPTII,S$GLB,, | Performed by: INTERNAL MEDICINE

## 2023-04-11 PROCEDURE — 3078F PR MOST RECENT DIASTOLIC BLOOD PRESSURE < 80 MM HG: ICD-10-PCS | Mod: CPTII,S$GLB,, | Performed by: INTERNAL MEDICINE

## 2023-04-11 PROCEDURE — 1160F PR REVIEW ALL MEDS BY PRESCRIBER/CLIN PHARMACIST DOCUMENTED: ICD-10-PCS | Mod: CPTII,S$GLB,, | Performed by: INTERNAL MEDICINE

## 2023-04-11 PROCEDURE — 99999 PR PBB SHADOW E&M-EST. PATIENT-LVL V: ICD-10-PCS | Mod: PBBFAC,,, | Performed by: INTERNAL MEDICINE

## 2023-04-11 PROCEDURE — 3008F BODY MASS INDEX DOCD: CPT | Mod: CPTII,S$GLB,, | Performed by: INTERNAL MEDICINE

## 2023-04-11 PROCEDURE — 1101F PT FALLS ASSESS-DOCD LE1/YR: CPT | Mod: CPTII,S$GLB,, | Performed by: INTERNAL MEDICINE

## 2023-04-11 PROCEDURE — 1126F AMNT PAIN NOTED NONE PRSNT: CPT | Mod: CPTII,S$GLB,, | Performed by: INTERNAL MEDICINE

## 2023-04-11 PROCEDURE — 1126F PR PAIN SEVERITY QUANTIFIED, NO PAIN PRESENT: ICD-10-PCS | Mod: CPTII,S$GLB,, | Performed by: INTERNAL MEDICINE

## 2023-04-11 PROCEDURE — 1159F MED LIST DOCD IN RCRD: CPT | Mod: CPTII,S$GLB,, | Performed by: INTERNAL MEDICINE

## 2023-04-11 PROCEDURE — 3044F PR MOST RECENT HEMOGLOBIN A1C LEVEL <7.0%: ICD-10-PCS | Mod: CPTII,S$GLB,, | Performed by: INTERNAL MEDICINE

## 2023-04-11 PROCEDURE — 99215 PR OFFICE/OUTPT VISIT, EST, LEVL V, 40-54 MIN: ICD-10-PCS | Mod: S$GLB,,, | Performed by: INTERNAL MEDICINE

## 2023-04-11 PROCEDURE — 3008F PR BODY MASS INDEX (BMI) DOCUMENTED: ICD-10-PCS | Mod: CPTII,S$GLB,, | Performed by: INTERNAL MEDICINE

## 2023-04-11 PROCEDURE — 3288F PR FALLS RISK ASSESSMENT DOCUMENTED: ICD-10-PCS | Mod: CPTII,S$GLB,, | Performed by: INTERNAL MEDICINE

## 2023-04-11 PROCEDURE — 3074F PR MOST RECENT SYSTOLIC BLOOD PRESSURE < 130 MM HG: ICD-10-PCS | Mod: CPTII,S$GLB,, | Performed by: INTERNAL MEDICINE

## 2023-04-11 RX ORDER — MUPIROCIN 20 MG/G
OINTMENT TOPICAL
Status: CANCELLED | OUTPATIENT
Start: 2023-04-11

## 2023-04-11 RX ORDER — ACETAMINOPHEN AND CODEINE PHOSPHATE 300; 30 MG/1; MG/1
1 TABLET ORAL EVERY 6 HOURS PRN
Qty: 10 TABLET | Refills: 0 | Status: SHIPPED | OUTPATIENT
Start: 2023-04-11 | End: 2023-07-21

## 2023-04-11 NOTE — TELEPHONE ENCOUNTER
Spoke c pt. Informed pt of 6:00 a.m.arrival time for 04/12/23 surgery at the [facility]. Reminded pt of NPO status. Pt expressed understanding & was thankful.

## 2023-04-11 NOTE — H&P (VIEW-ONLY)
Subjective:       Patient ID: Meenu Guy is a 72 y.o. female who  has a past medical history of Diabetes mellitus, type 2 and Hypertension.    Chief Complaint: Hypertension and Diabetes    History was obtained from the patient and supplemented through chart review  Following with Ortho for right-sided carpal tunnel syndrome.    She does housework for some seniors.    HPI    Vaginal irritation.  Dry, itching, irritated.  No bleeding, vaginal discharge.  Denies dysuria, urinary sx. No abx recently.  Using OTC vaginal cream, vaseline with relief.     DM2:    Taking glimepiride 4, metformin 1000 XR d/t loose stools. Diarrhea resolved.  GFR 44.  +CKD.      Had issues with cost of Farxiga, but now free of charge.  Was taking Farxiga qOD d/t diarrhea. Diarrhea resolved.      Not taking GLP1. Victoza was expensive.  PA for Invokana was approved, but $150.     Drinks: Glucerna in the AM for breakfast, BID. Doesn't eat after 6 PM.  Small meals, decreased appetite.    ETOH:  not often d/t GERD.    Exercise: 1 hour/day. Treadmill in their building. Arm lifts, sit ups. Has an exercise hula hoop.    Elevated microalbumin creatinine ratio.  Not on an ACE/ARB d/t angioedema.   Retinal exams:  Dr. Antonio. 9/2022.  Foot exams:  4/2021  DM edu: 10/2019.  She previously declined seeing them.  No showed   Lab Results   Component Value Date/Time    HGBA1C 6.7 (H) 04/04/2023 07:11 AM    HGBA1C 7.2 (H) 12/16/2022 07:38 AM    HGBA1C 7.0 (H) 09/16/2022 07:30 AM     Lab Results   Component Value Date    HXPINVPS59 331 03/16/2020     HTN:    No h/o CAD, arrhythmia. TTE  with normal EF, grade 2 diastolic dysfunction.    Pt's BP is controlled. On Coreg 6.25 b.i.d., Norvasc 10.  +DM2, but H/o ACE angioedema.  Tolerating meds well.   Home BP Not checking regularly.    Aortic Atherosclerosis, HLD:    Is taking Lipitor 20 and ASA 81 daily. H/o edema from statin years ago in [place], but has been tolerating Lipitor.  Lab Results    Component Value Date    LDLCALC 57.2 (L) 06/07/2022     The ASCVD Risk score (Flower MANUEL, et al., 2019) failed to calculate for the following reasons:    The valid total cholesterol range is 130 to 320 mg/dL    CKD 3:  Stopped NSAIDs. H/o ACE/ARB angioedema.  Renal ultrasound:  No hydro or nephrolithiasis  Following with Nephrology. Slight fluctuations in renal fnct. Planning on F/u 8/2022.    Vitamin D deficiency: +CKD. Is taking OTC Vit D, MVI.   Lab Results   Component Value Date    HIXEVHUK96QX 31 06/07/2022    [id number] 50 10/05/2021    [id number] 41 08/03/2020     Cervical DDD, FMA:  S/p dissectomy 20 years ago. Not much improvement on lyrica or gabapentin.  Follows with Pain Med for epidural.               Not addressed today.  AR:  Chronic.  H/o recurrent epistaxis, shavon during drier weather.  Has difficulty expectorating mucous, post nasal drip in the AM. Using flonase BID, but not correctly. Has protective pillow cases against fomites.  DDx environmental, dust mites. Refilled Flonase.    PVD:  Status post right femoral bypass around 2012 in [place].  No claudication.  Quit tobacco 15 years ago. Has Kelton horse muscle spasms.   Continue ASA, statin. Encouraged exercise, stretching.    Functional diarrhea:  H/o choley. Resulted in constipation and loose stools.     C scope 2017 with 2 polyps.  Repeat in 3 years.  Repeat C scope with [facility]  with polyp.  Repeat in 5 years. Also had EGD 8/2020 that was reportedly normal.   TSH, TTG wnl.  Stool studies neg. Try cholestyramine d/t h/o choley, probiotic. F/u with [facility].    Belching, GERD:  Chronic intermittent belching, postprandial gas pain at b/l abd. Worse at night and awakes with it.  Worse with hot sauces, chocolate candy, fatty foods.  Avoiding food triggers. Chest soreness, but no burning.  No sour taste in her throat. Last meal after 6 AM.  GasX helps. Takes Mylanta. Stopped NSAIDs.     Had cscope with [facility] as above. EGD was normal.  "Has prilosec.  Avoid dietary triggers. Cont PPI PRN. F/u with [facility]    Colon polyp:  C scope 2017 with 2 polyps.    Repeat C scope with [facility]  with polyp.  Repeat in 5 years.    +MERVAT:  Chronic shoulder pain as below.  Positive MERVAT, but negative sub serologies.  Not consistent with SLE.  Saw rheumatology who recommended follow-up with Sports Medicine/Ortho.    CTS:  Has right-sided release scheduled.    RUE, shoulder pain:  H/o neck surgery and hardware was removed.   Worse when going to bed.  X-ray with bilateral OA. Improved with injection in the past. Saw Orthopedic and declined treatment.  Recommend compound cream, which was expensive and did not help. Recommended core strengthening.  Conservative management at this time.  Referred to Sports Medicine for left shoulder pain.  Has gabapentin, Flexeril p.r.n..  Chronic. Reschedule c Ortho to discuss treatment options.    Review of Systems   Constitutional:  Negative for activity change and appetite change.   Respiratory:  Negative for wheezing.    Cardiovascular:  Negative for leg swelling.   Genitourinary:  Negative for dysuria, hematuria, vaginal bleeding and vaginal discharge.   Musculoskeletal:  Positive for arthralgias.   Psychiatric/Behavioral:  Negative for confusion.        I personally reviewed Past Medical History, Past Surgical History, Social History, and Family History.    Objective:      Vitals:    04/11/23 0910   BP: 124/60   BP Location: Left arm   Patient Position: Sitting   BP Method: Medium (Manual)   Pulse: 61   SpO2: 98%   Weight: 72.1 kg (158 lb 15.2 oz)   Height: 5' 7" (1.702 m)        Physical Exam  Constitutional:       General: She is not in acute distress.     Appearance: She is not diaphoretic.   HENT:      Head: Normocephalic and atraumatic.   Eyes:      General: No scleral icterus.        Right eye: No discharge.         Left eye: No discharge.   Cardiovascular:      Rate and Rhythm: Normal rate and regular rhythm.      " Heart sounds: Normal heart sounds. No murmur heard.  Pulmonary:      Effort: Pulmonary effort is normal. No respiratory distress.      Breath sounds: Normal breath sounds. No wheezing.   Abdominal:      General: Bowel sounds are normal. There is no distension.      Palpations: Abdomen is soft.      Tenderness: There is no abdominal tenderness.   Musculoskeletal:         General: No deformity.      Right lower leg: No edema.      Left lower leg: No edema.   Skin:     General: Skin is warm and dry.      Findings: No erythema.   Neurological:      Mental Status: She is alert.      Gait: Gait normal.   Psychiatric:         Behavior: Behavior normal.         Lab Results   Component Value Date    WBC 6.02 06/07/2022    HGB 13.2 06/07/2022    HCT 40.6 06/07/2022     06/07/2022    CHOL 119 (L) 06/07/2022    TRIG 94 06/07/2022    HDL 43 06/07/2022    ALT 19 06/09/2020    AST 23 06/09/2020     06/07/2022    K 4.2 06/07/2022     06/07/2022    CREATININE 1.4 06/07/2022    BUN 20 06/07/2022    CO2 26 06/07/2022    TSH 2.199 06/18/2021    HGBA1C 6.7 (H) 04/04/2023       The ASCVD Risk score (Flower DK, et al., 2019) failed to calculate for the following reasons:    The valid total cholesterol range is 130 to 320 mg/dL    (Imaging have been independently reviewed)  Hand x-ray without acute abnormality.    Assessment:       1. Vaginal irritation    2. Type 2 diabetes mellitus with stage 3b chronic kidney disease, without long-term current use of insulin    3. Type 2 diabetes mellitus with diabetic peripheral angiopathy without gangrene, without long-term current use of insulin    4. Essential hypertension    5. Mixed hyperlipidemia    6. Stage 3b chronic kidney disease    7. Hyperparathyroidism    8. Myelomalacia    9. Annual physical exam              Plan:       Meenu was seen today for hypertension and diabetes.    Diagnoses and all orders for this visit:    Vaginal irritation  Comments:  DDx yeast infx,  vaginal atrophy. No s/sx of UTI. Refer to OBGYN for pelvic exam.  Orders:  -     Ambulatory referral/consult to Obstetrics / Gynecology; Future    Type 2 diabetes mellitus with stage 3b chronic kidney disease, without long-term current use of insulin  Comments:  A1C now controlled. Limited options d/t CKD. Cont current meds. A1C 3 mo. Resched DM edu, podiatry.  Orders:  -     Hemoglobin A1C; Future  -     Microalbumin/Creatinine Ratio, Urine; Future  -     blood sugar diagnostic Strp; 1 each by Misc.(Non-Drug; Combo Route) route 3 (three) times daily.    Type 2 diabetes mellitus with diabetic peripheral angiopathy without gangrene, without long-term current use of insulin  Comments:  As above.    Essential hypertension  Comments:  Controlled. Cont Coreg 6.25 BID, Norvasc 10. + DM, but h/o ACE angioedema. Monitor home BP.    Mixed hyperlipidemia  Comments:  FLP controlled. Cont Lipitor 20, ASA. Monitor FLP annually.  Orders:  -     Lipid Panel; Future    Stage 3b chronic kidney disease  Comments:  Stable.  D/t h/o HTN, DM, h/o chronic NSAID use.  Resched f/u with Nephrology.  Cont SGLT2.  Orders:  -     Comprehensive Metabolic Panel; Future    Hyperparathyroidism  Comments:  +CKD. Cont supplements. Nephro monitoring; reschedule.    Myelomalacia  Comments:  No acute issues.  Following with Ortho, pain med.    Annual physical exam  -     Hemoglobin A1C; Future  -     Microalbumin/Creatinine Ratio, Urine; Future  -     CBC Auto Differential; Future  -     Comprehensive Metabolic Panel; Future  -     Lipid Panel; Future    Other orders  The following orders have not been finalized:  -     Cancel: blood sugar diagnostic Strp         Side effects of medication(s) were discussed in detail and patient voiced understanding.  Patient will call back for any issues or complications.     I have spent a total of 40 minutes with the patient as well as reviewing the chart/medical record and placing orders on the day of the visit.  Discussed vaginal irritation, DM, HTN, CKD.     RTC in 3 month(s) or sooner PRN for HTN, DM with labs prior. Establish care with a new PCP.

## 2023-04-11 NOTE — PROGRESS NOTES
Subjective:       Patient ID: Meenu Guy is a 72 y.o. female who  has a past medical history of Diabetes mellitus, type 2 and Hypertension.    Chief Complaint: Hypertension and Diabetes    History was obtained from the patient and supplemented through chart review  Following with Ortho for right-sided carpal tunnel syndrome.    She does housework for some seniors.    HPI    Vaginal irritation.  Dry, itching, irritated.  No bleeding, vaginal discharge.  Denies dysuria, urinary sx. No abx recently.  Using OTC vaginal cream, vaseline with relief.     DM2:    Taking glimepiride 4, metformin 1000 XR d/t loose stools. Diarrhea resolved.  GFR 44.  +CKD.      Had issues with cost of Farxiga, but now free of charge.  Was taking Farxiga qOD d/t diarrhea. Diarrhea resolved.      Not taking GLP1. Victoza was expensive.  PA for Invokana was approved, but $150.     Drinks: Glucerna in the AM for breakfast, BID. Doesn't eat after 6 PM.  Small meals, decreased appetite.    ETOH:  not often d/t GERD.    Exercise: 1 hour/day. Treadmill in their building. Arm lifts, sit ups. Has an exercise hula hoop.    Elevated microalbumin creatinine ratio.  Not on an ACE/ARB d/t angioedema.   Retinal exams:  Dr. Antonio. 9/2022.  Foot exams:  4/2021  DM edu: 10/2019.  She previously declined seeing them.  No showed   Lab Results   Component Value Date/Time    HGBA1C 6.7 (H) 04/04/2023 07:11 AM    HGBA1C 7.2 (H) 12/16/2022 07:38 AM    HGBA1C 7.0 (H) 09/16/2022 07:30 AM     Lab Results   Component Value Date    YOYXJXZL90 331 03/16/2020     HTN:    No h/o CAD, arrhythmia. TTE  with normal EF, grade 2 diastolic dysfunction.    Pt's BP is controlled. On Coreg 6.25 b.i.d., Norvasc 10.  +DM2, but H/o ACE angioedema.  Tolerating meds well.   Home BP Not checking regularly.    Aortic Atherosclerosis, HLD:    Is taking Lipitor 20 and ASA 81 daily. H/o edema from statin years ago in [place], but has been tolerating Lipitor.  Lab Results    Component Value Date    LDLCALC 57.2 (L) 06/07/2022     The ASCVD Risk score (Flower MANUEL, et al., 2019) failed to calculate for the following reasons:    The valid total cholesterol range is 130 to 320 mg/dL    CKD 3:  Stopped NSAIDs. H/o ACE/ARB angioedema.  Renal ultrasound:  No hydro or nephrolithiasis  Following with Nephrology. Slight fluctuations in renal fnct. Planning on F/u 8/2022.    Vitamin D deficiency: +CKD. Is taking OTC Vit D, MVI.   Lab Results   Component Value Date    NFDEVSMK75JG 31 06/07/2022    OFXWUBSE83BQ 50 10/05/2021    WQAIYSXE07FA 41 08/03/2020     Cervical DDD, FMA:  S/p dissectomy 20 years ago. Not much improvement on lyrica or gabapentin.  Follows with Pain Med for epidural.               Not addressed today.  AR:  Chronic.  H/o recurrent epistaxis, shavon during drier weather.  Has difficulty expectorating mucous, post nasal drip in the AM. Using flonase BID, but not correctly. Has protective pillow cases against fomites.  DDx environmental, dust mites. Refilled Flonase.    PVD:  Status post right femoral bypass around 2012 in [place].  No claudication.  Quit tobacco 15 years ago. Has Kelton horse muscle spasms.   Continue ASA, statin. Encouraged exercise, stretching.    Functional diarrhea:  H/o choley. Resulted in constipation and loose stools.     C scope 2017 with 2 polyps.  Repeat in 3 years.  Repeat C scope with [facility]  with polyp.  Repeat in 5 years. Also had EGD 8/2020 that was reportedly normal.   TSH, TTG wnl.  Stool studies neg. Try cholestyramine d/t h/o choley, probiotic. F/u with [facility].    Belching, GERD:  Chronic intermittent belching, postprandial gas pain at b/l abd. Worse at night and awakes with it.  Worse with hot sauces, chocolate candy, fatty foods.  Avoiding food triggers. Chest soreness, but no burning.  No sour taste in her throat. Last meal after 6 AM.  GasX helps. Takes Mylanta. Stopped NSAIDs.     Had cscope with [facility] as above. EGD was normal.  "Has prilosec.  Avoid dietary triggers. Cont PPI PRN. F/u with [facility]    Colon polyp:  C scope 2017 with 2 polyps.    Repeat C scope with [facility]  with polyp.  Repeat in 5 years.    +MERVAT:  Chronic shoulder pain as below.  Positive MERVAT, but negative sub serologies.  Not consistent with SLE.  Saw rheumatology who recommended follow-up with Sports Medicine/Ortho.    CTS:  Has right-sided release scheduled.    RUE, shoulder pain:  H/o neck surgery and hardware was removed.   Worse when going to bed.  X-ray with bilateral OA. Improved with injection in the past. Saw Orthopedic and declined treatment.  Recommend compound cream, which was expensive and did not help. Recommended core strengthening.  Conservative management at this time.  Referred to Sports Medicine for left shoulder pain.  Has gabapentin, Flexeril p.r.n..  Chronic. Reschedule c Ortho to discuss treatment options.    Review of Systems   Constitutional:  Negative for activity change and appetite change.   Respiratory:  Negative for wheezing.    Cardiovascular:  Negative for leg swelling.   Genitourinary:  Negative for dysuria, hematuria, vaginal bleeding and vaginal discharge.   Musculoskeletal:  Positive for arthralgias.   Psychiatric/Behavioral:  Negative for confusion.        I personally reviewed Past Medical History, Past Surgical History, Social History, and Family History.    Objective:      Vitals:    04/11/23 0910   BP: 124/60   BP Location: Left arm   Patient Position: Sitting   BP Method: Medium (Manual)   Pulse: 61   SpO2: 98%   Weight: 72.1 kg (158 lb 15.2 oz)   Height: 5' 7" (1.702 m)        Physical Exam  Constitutional:       General: She is not in acute distress.     Appearance: She is not diaphoretic.   HENT:      Head: Normocephalic and atraumatic.   Eyes:      General: No scleral icterus.        Right eye: No discharge.         Left eye: No discharge.   Cardiovascular:      Rate and Rhythm: Normal rate and regular rhythm.      " Heart sounds: Normal heart sounds. No murmur heard.  Pulmonary:      Effort: Pulmonary effort is normal. No respiratory distress.      Breath sounds: Normal breath sounds. No wheezing.   Abdominal:      General: Bowel sounds are normal. There is no distension.      Palpations: Abdomen is soft.      Tenderness: There is no abdominal tenderness.   Musculoskeletal:         General: No deformity.      Right lower leg: No edema.      Left lower leg: No edema.   Skin:     General: Skin is warm and dry.      Findings: No erythema.   Neurological:      Mental Status: She is alert.      Gait: Gait normal.   Psychiatric:         Behavior: Behavior normal.         Lab Results   Component Value Date    WBC 6.02 06/07/2022    HGB 13.2 06/07/2022    HCT 40.6 06/07/2022     06/07/2022    CHOL 119 (L) 06/07/2022    TRIG 94 06/07/2022    HDL 43 06/07/2022    ALT 19 06/09/2020    AST 23 06/09/2020     06/07/2022    K 4.2 06/07/2022     06/07/2022    CREATININE 1.4 06/07/2022    BUN 20 06/07/2022    CO2 26 06/07/2022    TSH 2.199 06/18/2021    HGBA1C 6.7 (H) 04/04/2023       The ASCVD Risk score (Flower DK, et al., 2019) failed to calculate for the following reasons:    The valid total cholesterol range is 130 to 320 mg/dL    (Imaging have been independently reviewed)  Hand x-ray without acute abnormality.    Assessment:       1. Vaginal irritation    2. Type 2 diabetes mellitus with stage 3b chronic kidney disease, without long-term current use of insulin    3. Type 2 diabetes mellitus with diabetic peripheral angiopathy without gangrene, without long-term current use of insulin    4. Essential hypertension    5. Mixed hyperlipidemia    6. Stage 3b chronic kidney disease    7. Hyperparathyroidism    8. Myelomalacia    9. Annual physical exam              Plan:       Meenu was seen today for hypertension and diabetes.    Diagnoses and all orders for this visit:    Vaginal irritation  Comments:  DDx yeast infx,  vaginal atrophy. No s/sx of UTI. Refer to OBGYN for pelvic exam.  Orders:  -     Ambulatory referral/consult to Obstetrics / Gynecology; Future    Type 2 diabetes mellitus with stage 3b chronic kidney disease, without long-term current use of insulin  Comments:  A1C now controlled. Limited options d/t CKD. Cont current meds. A1C 3 mo. Resched DM edu, podiatry.  Orders:  -     Hemoglobin A1C; Future  -     Microalbumin/Creatinine Ratio, Urine; Future  -     blood sugar diagnostic Strp; 1 each by Misc.(Non-Drug; Combo Route) route 3 (three) times daily.    Type 2 diabetes mellitus with diabetic peripheral angiopathy without gangrene, without long-term current use of insulin  Comments:  As above.    Essential hypertension  Comments:  Controlled. Cont Coreg 6.25 BID, Norvasc 10. + DM, but h/o ACE angioedema. Monitor home BP.    Mixed hyperlipidemia  Comments:  FLP controlled. Cont Lipitor 20, ASA. Monitor FLP annually.  Orders:  -     Lipid Panel; Future    Stage 3b chronic kidney disease  Comments:  Stable.  D/t h/o HTN, DM, h/o chronic NSAID use.  Resched f/u with Nephrology.  Cont SGLT2.  Orders:  -     Comprehensive Metabolic Panel; Future    Hyperparathyroidism  Comments:  +CKD. Cont supplements. Nephro monitoring; reschedule.    Myelomalacia  Comments:  No acute issues.  Following with Ortho, pain med.    Annual physical exam  -     Hemoglobin A1C; Future  -     Microalbumin/Creatinine Ratio, Urine; Future  -     CBC Auto Differential; Future  -     Comprehensive Metabolic Panel; Future  -     Lipid Panel; Future    Other orders  The following orders have not been finalized:  -     Cancel: blood sugar diagnostic Strp         Side effects of medication(s) were discussed in detail and patient voiced understanding.  Patient will call back for any issues or complications.     I have spent a total of 40 minutes with the patient as well as reviewing the chart/medical record and placing orders on the day of the visit.  Discussed vaginal irritation, DM, HTN, CKD.     RTC in 3 month(s) or sooner PRN for HTN, DM with labs prior. Establish care with a new PCP.

## 2023-04-12 ENCOUNTER — HOSPITAL ENCOUNTER (OUTPATIENT)
Facility: OTHER | Age: 73
Discharge: HOME | End: 2023-04-12
Attending: ORTHOPAEDIC SURGERY | Admitting: ORTHOPAEDIC SURGERY
Payer: MEDICARE

## 2023-04-12 ENCOUNTER — ANESTHESIA (OUTPATIENT)
Dept: SURGERY | Facility: OTHER | Age: 73
End: 2023-04-12
Payer: MEDICARE

## 2023-04-12 DIAGNOSIS — Z01.818 PREOP TESTING: Primary | ICD-10-CM

## 2023-04-12 DIAGNOSIS — G56.03 CARPAL TUNNEL SYNDROME, BILATERAL: ICD-10-CM

## 2023-04-12 LAB — POCT GLUCOSE: 97 MG/DL (ref 70–110)

## 2023-04-12 PROCEDURE — 36000707: Performed by: ORTHOPAEDIC SURGERY

## 2023-04-12 PROCEDURE — 36000706: Performed by: ORTHOPAEDIC SURGERY

## 2023-04-12 PROCEDURE — 63600175 PHARM REV CODE 636 W HCPCS: Performed by: STUDENT IN AN ORGANIZED HEALTH CARE EDUCATION/TRAINING PROGRAM

## 2023-04-12 PROCEDURE — 37000009 HC ANESTHESIA EA ADD 15 MINS: Performed by: ORTHOPAEDIC SURGERY

## 2023-04-12 PROCEDURE — 25000003 PHARM REV CODE 250: Performed by: NURSE ANESTHETIST, CERTIFIED REGISTERED

## 2023-04-12 PROCEDURE — 71000016 HC POSTOP RECOV ADDL HR: Performed by: ORTHOPAEDIC SURGERY

## 2023-04-12 PROCEDURE — 63600175 PHARM REV CODE 636 W HCPCS: Performed by: ANESTHESIOLOGY

## 2023-04-12 PROCEDURE — 64721 PR REVISE MEDIAN N/CARPAL TUNNEL SURG: ICD-10-PCS | Mod: RT,,, | Performed by: ORTHOPAEDIC SURGERY

## 2023-04-12 PROCEDURE — 37000008 HC ANESTHESIA 1ST 15 MINUTES: Performed by: ORTHOPAEDIC SURGERY

## 2023-04-12 PROCEDURE — 71000015 HC POSTOP RECOV 1ST HR: Performed by: ORTHOPAEDIC SURGERY

## 2023-04-12 PROCEDURE — 63600175 PHARM REV CODE 636 W HCPCS: Performed by: NURSE ANESTHETIST, CERTIFIED REGISTERED

## 2023-04-12 PROCEDURE — 64721 CARPAL TUNNEL SURGERY: CPT | Mod: RT,,, | Performed by: ORTHOPAEDIC SURGERY

## 2023-04-12 PROCEDURE — 25000003 PHARM REV CODE 250: Performed by: STUDENT IN AN ORGANIZED HEALTH CARE EDUCATION/TRAINING PROGRAM

## 2023-04-12 PROCEDURE — 25000003 PHARM REV CODE 250: Performed by: ORTHOPAEDIC SURGERY

## 2023-04-12 PROCEDURE — 82962 GLUCOSE BLOOD TEST: CPT | Performed by: ORTHOPAEDIC SURGERY

## 2023-04-12 RX ORDER — BACITRACIN ZINC 500 UNIT/G
OINTMENT (GRAM) TOPICAL
Status: DISCONTINUED | OUTPATIENT
Start: 2023-04-12 | End: 2023-04-12 | Stop reason: HOSPADM

## 2023-04-12 RX ORDER — LIDOCAINE HYDROCHLORIDE 10 MG/ML
0.5 INJECTION, SOLUTION EPIDURAL; INFILTRATION; INTRACAUDAL; PERINEURAL ONCE
Status: DISCONTINUED | OUTPATIENT
Start: 2023-04-12 | End: 2023-04-12 | Stop reason: HOSPADM

## 2023-04-12 RX ORDER — HYDROCODONE BITARTRATE AND ACETAMINOPHEN 5; 325 MG/1; MG/1
1 TABLET ORAL EVERY 4 HOURS PRN
Status: CANCELLED | OUTPATIENT
Start: 2023-04-12

## 2023-04-12 RX ORDER — SODIUM CHLORIDE, SODIUM LACTATE, POTASSIUM CHLORIDE, CALCIUM CHLORIDE 600; 310; 30; 20 MG/100ML; MG/100ML; MG/100ML; MG/100ML
INJECTION, SOLUTION INTRAVENOUS CONTINUOUS
Status: DISCONTINUED | OUTPATIENT
Start: 2023-04-12 | End: 2023-04-12 | Stop reason: HOSPADM

## 2023-04-12 RX ORDER — LIDOCAINE HYDROCHLORIDE 20 MG/ML
INJECTION INTRAVENOUS
Status: DISCONTINUED | OUTPATIENT
Start: 2023-04-12 | End: 2023-04-12

## 2023-04-12 RX ORDER — ONDANSETRON 8 MG/1
8 TABLET, ORALLY DISINTEGRATING ORAL EVERY 8 HOURS PRN
Status: CANCELLED | OUTPATIENT
Start: 2023-04-12

## 2023-04-12 RX ORDER — MUPIROCIN 20 MG/G
OINTMENT TOPICAL
Status: DISCONTINUED | OUTPATIENT
Start: 2023-04-12 | End: 2023-04-12 | Stop reason: HOSPADM

## 2023-04-12 RX ORDER — FENTANYL CITRATE 50 UG/ML
INJECTION, SOLUTION INTRAMUSCULAR; INTRAVENOUS
Status: DISCONTINUED | OUTPATIENT
Start: 2023-04-12 | End: 2023-04-12

## 2023-04-12 RX ORDER — CEFAZOLIN SODIUM 1 G/3ML
2 INJECTION, POWDER, FOR SOLUTION INTRAMUSCULAR; INTRAVENOUS
Status: COMPLETED | OUTPATIENT
Start: 2023-04-12 | End: 2023-04-12

## 2023-04-12 RX ORDER — PHENYLEPHRINE HYDROCHLORIDE 10 MG/ML
INJECTION INTRAVENOUS
Status: DISCONTINUED | OUTPATIENT
Start: 2023-04-12 | End: 2023-04-12

## 2023-04-12 RX ORDER — PROPOFOL 10 MG/ML
VIAL (ML) INTRAVENOUS
Status: DISCONTINUED | OUTPATIENT
Start: 2023-04-12 | End: 2023-04-12

## 2023-04-12 RX ORDER — PROPOFOL 10 MG/ML
VIAL (ML) INTRAVENOUS CONTINUOUS PRN
Status: DISCONTINUED | OUTPATIENT
Start: 2023-04-12 | End: 2023-04-12

## 2023-04-12 RX ORDER — BUPIVACAINE HYDROCHLORIDE 2.5 MG/ML
INJECTION, SOLUTION EPIDURAL; INFILTRATION; INTRACAUDAL
Status: DISCONTINUED | OUTPATIENT
Start: 2023-04-12 | End: 2023-04-12 | Stop reason: HOSPADM

## 2023-04-12 RX ORDER — LIDOCAINE HYDROCHLORIDE 10 MG/ML
INJECTION, SOLUTION EPIDURAL; INFILTRATION; INTRACAUDAL; PERINEURAL
Status: DISCONTINUED | OUTPATIENT
Start: 2023-04-12 | End: 2023-04-12 | Stop reason: HOSPADM

## 2023-04-12 RX ADMIN — PHENYLEPHRINE HYDROCHLORIDE 100 MCG: 10 INJECTION INTRAVENOUS at 08:04

## 2023-04-12 RX ADMIN — MUPIROCIN: 20 OINTMENT TOPICAL at 07:04

## 2023-04-12 RX ADMIN — PROPOFOL 50 MG: 10 INJECTION, EMULSION INTRAVENOUS at 08:04

## 2023-04-12 RX ADMIN — LIDOCAINE HYDROCHLORIDE 50 MG: 20 INJECTION, SOLUTION INTRAVENOUS at 08:04

## 2023-04-12 RX ADMIN — FENTANYL CITRATE 50 MCG: 50 INJECTION, SOLUTION INTRAMUSCULAR; INTRAVENOUS at 08:04

## 2023-04-12 RX ADMIN — GLYCOPYRROLATE 0.2 MG: 0.2 INJECTION, SOLUTION INTRAMUSCULAR; INTRAVITREAL at 08:04

## 2023-04-12 RX ADMIN — CEFAZOLIN 2 G: 330 INJECTION, POWDER, FOR SOLUTION INTRAMUSCULAR; INTRAVENOUS at 08:04

## 2023-04-12 RX ADMIN — PROPOFOL 50 MCG/KG/MIN: 10 INJECTION, EMULSION INTRAVENOUS at 08:04

## 2023-04-12 RX ADMIN — FENTANYL CITRATE 50 MCG: 50 INJECTION, SOLUTION INTRAMUSCULAR; INTRAVENOUS at 07:04

## 2023-04-12 RX ADMIN — SODIUM CHLORIDE, SODIUM LACTATE, POTASSIUM CHLORIDE, AND CALCIUM CHLORIDE: .6; .31; .03; .02 INJECTION, SOLUTION INTRAVENOUS at 07:04

## 2023-04-12 NOTE — BRIEF OP NOTE
[facility] - Surgery ([place])  Brief Operative Note    Surgery Date: 4/12/2023     Surgeon(s) and Role:     * Genesis Huang MD - Primary    Assisting Surgeon: None    Pre-op Diagnosis:  Carpal tunnel syndrome, bilateral [G56.03]    Post-op Diagnosis:  Post-Op Diagnosis Codes:     * Carpal tunnel syndrome, bilateral [G56.03]    Procedure(s) (LRB):  RELEASE, CARPAL TUNNEL,RIGHT (Right)    Anesthesia: Local MAC    Operative Findings: See op note    Estimated Blood Loss: Minimal         Specimens:   Specimen (24h ago, onward)      None              Discharge Note    OUTCOME: Patient tolerated treatment/procedure well without complication and is now ready for discharge.    DISPOSITION: Home or Self Care    FINAL DIAGNOSIS:  Carpal tunnel syndrome, bilateral    FOLLOWUP: In clinic    DISCHARGE INSTRUCTIONS:    Discharge Procedure Orders   Diet general     Call MD for:  temperature >100.4     Call MD for:  persistent nausea and vomiting     Call MD for:  severe uncontrolled pain     Call MD for:  difficulty breathing, headache or visual disturbances     Call MD for:  redness, tenderness, or signs of infection (pain, swelling, redness, odor or green/yellow discharge around incision site)     Call MD for:  hives     Call MD for:  persistent dizziness or light-headedness     Call MD for:  extreme fatigue     No driving, operating heavy equipment or signing legal documents while taking pain medication     Lifting restrictions     Keep surgical extremity elevated     Leave dressing on - Keep it clean, dry, and intact until clinic visit

## 2023-04-12 NOTE — ANESTHESIA POSTPROCEDURE EVALUATION
Anesthesia Post Evaluation    Patient: Meenu Guy    Procedure(s) Performed: Procedure(s) (LRB):  RELEASE, CARPAL TUNNEL,RIGHT (Right)    Final Anesthesia Type: general      Patient location during evaluation: [facility]  Patient participation: Yes- Able to Participate  Level of consciousness: awake and alert, oriented and awake  Post-procedure vital signs: reviewed and stable  Pain management: adequate  Airway patency: patent    PONV status at discharge: No PONV  Anesthetic complications: no      Cardiovascular status: blood pressure returned to baseline and hemodynamically stable  Respiratory status: unassisted, spontaneous ventilation and room air  Hydration status: euvolemic  Follow-up not needed.          Vitals Value Taken Time   /59 04/12/23 0730   Temp 36.4 °C (97.5 °F) 04/12/23 0729   Pulse 58 04/12/23 0729   Resp 16 04/12/23 0729   SpO2 96 % 04/12/23 0729         No case tracking events are documented in the log.      Pain/Liz Score: No data recorded

## 2023-04-12 NOTE — OP NOTE
Orthopaedic Operative Note       Surgery Date: 4/12/2023     Surgeon(s) and Role:     * Genesis Huang MD - Primary    Pre-op Diagnosis:  Carpal tunnel syndrome, right    Post-op Diagnosis:  Same    Procedure: Right carpal tunnel release    Anesthesia: Local MAC    Estimated Blood Loss: Minimal           Specimen: None    Complications: None               Condition: Stable      INDICATIONS FOR PROCEDURE: Meenu Guy is a 72 y.o. female  with numbness and tingling into the median nerve distribution of the right hand. EMG was positive for right sided carpal tunnel. Patient has failed conservative treatment therefore, operative intervention was deemed necessary. Risk and benefits were explained to the patient in clinic and consents were performed in clinic.       PROCEDURE IN DETAIL: After the correct site was marked with the patient's  participation in the holding area, the patient was brought to the Operating  Room, placed in supine position and underwent MAC anesthesia. A well-padded nonsterile tourniquet was placed on the forearm. Timeout was called for correct site, procedure and patient. Under sterile conditions, an injection of 1%  lidocaine was injected into the carpal tunnel space. Appropriate IV antibiotics were administered within 15 minutes of incision.  The arm was then prepped and draped in normal sterile fashion.  The incision was marked out using Garcia cardinal lines. The arm was exsanguinated using an Esmarch. The incision was made in line with radial aspect of the 4th finger over the carpal tunnel. Careful dissection was made down to the palmar fascia. Palmar fascia was identified, sharply incised and retracted out of the way. The transverse carpal ligament was identified and sharply incised until the median nerve was exposed. A mosquito hemostat was then passed on the undersurface of the transverse ligament both proximally and distally to prevent adherence of the nerve to the  transverse ligament. Using Metzenbaum scissors, the transverse ligament was cut both proximally and distally ensuring not to cut past Kaplans Cardinal Line or injuring the Deep Palmar Arch. Mosquito hemostat was passed proximally and distally to confirm that it was a complete release. The median nerve was noted to have edema along the length that passed through the carpal tunnel. The area was irrigated with copious amounts of normal saline and nylon suture was used to close the skin. Sterile dressing was applied. Tourniquet was deflated. Brisk capillary refill ensued. The patient was transferred to the Recovery Room in stable condition.       Disposition: Discharge home with follow up in 2 weeks for suture removal

## 2023-04-12 NOTE — PLAN OF CARE
Meenu Guy has met all discharge criteria from Phase II. Vital Signs are stable, ambulating  without difficulty. Discharge instructions given, patient verbalized understanding. Discharged from facility via wheelchair in stable condition.

## 2023-04-12 NOTE — ANESTHESIA PREPROCEDURE EVALUATION
04/12/2023  Meenu Guy is a 72 y.o., female.      Pre-op Assessment    I have reviewed the Patient Summary Reports.     I have reviewed the Nursing Notes. I have reviewed the NPO Status.   I have reviewed the Medications.     Review of Systems  Anesthesia Hx:  Denies Family Hx of Anesthesia complications.   Denies Personal Hx of Anesthesia complications.   Social:  Former Smoker    EENT/Dental:   chronic allergic rhinitis   Cardiovascular:   Hypertension hyperlipidemia    Pulmonary:  Pulmonary Normal    Renal/:   Chronic Renal Disease, CKD    Hepatic/GI:   GERD    Musculoskeletal:   Arthritis     Neurological:   Neuromuscular Disease,   Chronic Pain Syndrome   Endocrine:   Diabetes        Physical Exam  General: Well nourished, Alert, Oriented and Cooperative    Airway:  Mallampati: II   Mouth Opening: Normal  TM Distance: Normal  Tongue: Normal  Neck ROM: Normal ROM    Dental:  Intact        Anesthesia Plan  Type of Anesthesia, risks & benefits discussed:    Anesthesia Type: Gen Natural Airway  Intra-op Monitoring Plan: Standard ASA Monitors  Post Op Pain Control Plan: multimodal analgesia  Informed Consent: Informed consent signed with the Patient and all parties understand the risks and agree with anesthesia plan.  All questions answered.   ASA Score: 3    Ready For Surgery From Anesthesia Perspective.     .

## 2023-04-13 VITALS
DIASTOLIC BLOOD PRESSURE: 72 MMHG | HEART RATE: 74 BPM | HEIGHT: 67 IN | RESPIRATION RATE: 16 BRPM | OXYGEN SATURATION: 95 % | SYSTOLIC BLOOD PRESSURE: 167 MMHG | BODY MASS INDEX: 25.11 KG/M2 | WEIGHT: 160 LBS | TEMPERATURE: 98 F

## 2023-04-18 ENCOUNTER — PATIENT MESSAGE (OUTPATIENT)
Dept: ORTHOPEDICS | Facility: CLINIC | Age: 73
End: 2023-04-18
Payer: MEDICARE

## 2023-04-18 ENCOUNTER — TELEPHONE (OUTPATIENT)
Dept: ORTHOPEDICS | Facility: CLINIC | Age: 73
End: 2023-04-18
Payer: MEDICARE

## 2023-04-18 NOTE — TELEPHONE ENCOUNTER
Spoke to patient and recommended tylenol and advil for her pain----- Message from Suzi Estrada sent at 4/18/2023  9:08 AM CDT -----  Regarding: Medication  Refill  Request                Reply in [facility]:  NO      Please refill the medication listed below. Please call the patient  [phone] (R)       Medication    acetaminophen-codeine 300-30mg (TYLENOL #3) 300-30 mg Tab                          Preferred Pharmacy: [address]   Phone: [phone]  Fax:  [phone]

## 2023-04-26 ENCOUNTER — PATIENT MESSAGE (OUTPATIENT)
Dept: ADMINISTRATIVE | Facility: OTHER | Age: 73
End: 2023-04-26
Payer: MEDICARE

## 2023-04-26 ENCOUNTER — OFFICE VISIT (OUTPATIENT)
Dept: ORTHOPEDICS | Facility: CLINIC | Age: 73
End: 2023-04-26
Payer: MEDICARE

## 2023-04-26 ENCOUNTER — DOCUMENTATION ONLY (OUTPATIENT)
Dept: ORTHOPEDICS | Facility: CLINIC | Age: 73
End: 2023-04-26

## 2023-04-26 VITALS
WEIGHT: 158 LBS | HEIGHT: 67 IN | BODY MASS INDEX: 24.8 KG/M2 | SYSTOLIC BLOOD PRESSURE: 157 MMHG | HEART RATE: 58 BPM | DIASTOLIC BLOOD PRESSURE: 70 MMHG

## 2023-04-26 DIAGNOSIS — Z98.890 POST-OPERATIVE STATE: Primary | ICD-10-CM

## 2023-04-26 PROCEDURE — 99024 PR POST-OP FOLLOW-UP VISIT: ICD-10-PCS | Mod: S$GLB,,, | Performed by: PHYSICIAN ASSISTANT

## 2023-04-26 PROCEDURE — 99999 PR PBB SHADOW E&M-EST. PATIENT-LVL III: ICD-10-PCS | Mod: PBBFAC,,, | Performed by: PHYSICIAN ASSISTANT

## 2023-04-26 PROCEDURE — 3288F FALL RISK ASSESSMENT DOCD: CPT | Mod: CPTII,S$GLB,, | Performed by: PHYSICIAN ASSISTANT

## 2023-04-26 PROCEDURE — 3077F PR MOST RECENT SYSTOLIC BLOOD PRESSURE >= 140 MM HG: ICD-10-PCS | Mod: CPTII,S$GLB,, | Performed by: PHYSICIAN ASSISTANT

## 2023-04-26 PROCEDURE — 3044F HG A1C LEVEL LT 7.0%: CPT | Mod: CPTII,S$GLB,, | Performed by: PHYSICIAN ASSISTANT

## 2023-04-26 PROCEDURE — 1101F PT FALLS ASSESS-DOCD LE1/YR: CPT | Mod: CPTII,S$GLB,, | Performed by: PHYSICIAN ASSISTANT

## 2023-04-26 PROCEDURE — 1125F AMNT PAIN NOTED PAIN PRSNT: CPT | Mod: CPTII,S$GLB,, | Performed by: PHYSICIAN ASSISTANT

## 2023-04-26 PROCEDURE — 3288F PR FALLS RISK ASSESSMENT DOCUMENTED: ICD-10-PCS | Mod: CPTII,S$GLB,, | Performed by: PHYSICIAN ASSISTANT

## 2023-04-26 PROCEDURE — 3077F SYST BP >= 140 MM HG: CPT | Mod: CPTII,S$GLB,, | Performed by: PHYSICIAN ASSISTANT

## 2023-04-26 PROCEDURE — 1159F MED LIST DOCD IN RCRD: CPT | Mod: CPTII,S$GLB,, | Performed by: PHYSICIAN ASSISTANT

## 2023-04-26 PROCEDURE — 3008F PR BODY MASS INDEX (BMI) DOCUMENTED: ICD-10-PCS | Mod: CPTII,S$GLB,, | Performed by: PHYSICIAN ASSISTANT

## 2023-04-26 PROCEDURE — 3078F PR MOST RECENT DIASTOLIC BLOOD PRESSURE < 80 MM HG: ICD-10-PCS | Mod: CPTII,S$GLB,, | Performed by: PHYSICIAN ASSISTANT

## 2023-04-26 PROCEDURE — 99999 PR PBB SHADOW E&M-EST. PATIENT-LVL III: CPT | Mod: PBBFAC,,, | Performed by: PHYSICIAN ASSISTANT

## 2023-04-26 PROCEDURE — 3078F DIAST BP <80 MM HG: CPT | Mod: CPTII,S$GLB,, | Performed by: PHYSICIAN ASSISTANT

## 2023-04-26 PROCEDURE — 99024 POSTOP FOLLOW-UP VISIT: CPT | Mod: S$GLB,,, | Performed by: PHYSICIAN ASSISTANT

## 2023-04-26 PROCEDURE — 3044F PR MOST RECENT HEMOGLOBIN A1C LEVEL <7.0%: ICD-10-PCS | Mod: CPTII,S$GLB,, | Performed by: PHYSICIAN ASSISTANT

## 2023-04-26 PROCEDURE — 1101F PR PT FALLS ASSESS DOC 0-1 FALLS W/OUT INJ PAST YR: ICD-10-PCS | Mod: CPTII,S$GLB,, | Performed by: PHYSICIAN ASSISTANT

## 2023-04-26 PROCEDURE — 1159F PR MEDICATION LIST DOCUMENTED IN MEDICAL RECORD: ICD-10-PCS | Mod: CPTII,S$GLB,, | Performed by: PHYSICIAN ASSISTANT

## 2023-04-26 PROCEDURE — 3008F BODY MASS INDEX DOCD: CPT | Mod: CPTII,S$GLB,, | Performed by: PHYSICIAN ASSISTANT

## 2023-04-26 PROCEDURE — 1125F PR PAIN SEVERITY QUANTIFIED, PAIN PRESENT: ICD-10-PCS | Mod: CPTII,S$GLB,, | Performed by: PHYSICIAN ASSISTANT

## 2023-04-26 NOTE — PROGRESS NOTES
"Ms. Guy is here today for a post-operative visit.  She is 14 days status post right carpal tunnel release by Dr. Huang on 4/12/23. She reports that she is doing well.  Pain is well controlled.  She is not taking pain medication.  She denies fever, chills, and sweats since the time of the surgery.     Physical exam:    Vitals:    04/26/23 1003   BP: (!) 157/70   Pulse: (!) 58   Weight: 71.7 kg (158 lb)   Height: 5' 7" (1.702 m)   PainSc:   5     Vital signs are stable, patient is afebrile.  Patient is well dressed and well groomed, no acute distress.  Alert and oriented to person, place, and time.  Post op dressing taken down.  Incision is clean and dry. Not completely closed.  There is no erythema or exudate.  There is no sign of any infection. She is NVI. Sutures removed without difficulty. There is superficial wound dehiscence upon suture removal. 2 steri strips applied. Good ROM.    Assessment: status post right carpal tunnel release by Dr. Huang on 4/12/23    Plan:  Meenu was seen today for post-op evaluation.    Diagnoses and all orders for this visit:    Post-operative state      PO instruction reviewed and provided to patient  Will cast to help assist with incision closure, right short arm fiberglass cast  RTC in 1 week for wound check.     "

## 2023-05-03 ENCOUNTER — OFFICE VISIT (OUTPATIENT)
Dept: ORTHOPEDICS | Facility: CLINIC | Age: 73
End: 2023-05-03
Payer: MEDICARE

## 2023-05-03 DIAGNOSIS — Z98.890 POST-OPERATIVE STATE: Primary | ICD-10-CM

## 2023-05-03 PROCEDURE — 99024 POSTOP FOLLOW-UP VISIT: CPT | Mod: S$GLB,,, | Performed by: PHYSICIAN ASSISTANT

## 2023-05-03 PROCEDURE — 99024 PR POST-OP FOLLOW-UP VISIT: ICD-10-PCS | Mod: S$GLB,,, | Performed by: PHYSICIAN ASSISTANT

## 2023-05-03 PROCEDURE — 3044F HG A1C LEVEL LT 7.0%: CPT | Mod: CPTII,S$GLB,, | Performed by: PHYSICIAN ASSISTANT

## 2023-05-03 PROCEDURE — 3044F PR MOST RECENT HEMOGLOBIN A1C LEVEL <7.0%: ICD-10-PCS | Mod: CPTII,S$GLB,, | Performed by: PHYSICIAN ASSISTANT

## 2023-05-03 NOTE — PROGRESS NOTES
Ms. Guy is here today for a post-operative visit.  She is 3 weeks status post right carpal tunnel release by Dr. Huang on 4/12/23. She was casted at last visit due to incision dehiscence.  She reports that she is doing well.  Pain is well controlled.  She is not taking pain medication.  She denies fever, chills, and sweats since the time of the surgery.     Physical exam:    There were no vitals filed for this visit.    Patient is well dressed and well groomed, no acute distress.  Alert and oriented to person, place, and time.  Cast removed. Incision is clean and dry, well healed. There is no erythema or exudate.  There is no sign of any infection. She is NVI. . Good ROM wrist and fingers.    Assessment: status post right carpal tunnel release by Dr. Huang on 4/12/23    Plan:  Diagnoses and all orders for this visit:    Post-operative state        PO instruction reviewed and provided to patient  Doing well, follow up as needed

## 2023-05-24 DIAGNOSIS — K21.9 GASTRO-ESOPHAGEAL REFLUX DISEASE WITHOUT ESOPHAGITIS: ICD-10-CM

## 2023-05-24 RX ORDER — OMEPRAZOLE 20 MG/1
20 CAPSULE, DELAYED RELEASE ORAL DAILY PRN
Qty: 90 CAPSULE | Refills: 3 | Status: SHIPPED | OUTPATIENT
Start: 2023-05-24 | End: 2023-07-21

## 2023-05-24 NOTE — TELEPHONE ENCOUNTER
Refill Routing Note   Medication(s) are not appropriate for processing by [facility] for the following reason(s):      Responsible provider unclear    [facility] action(s):  Defer None identified   Medication Therapy Plan: FLOS      Appointments  past 12m or future 3m with PCP    Date Provider   Last Visit   Visit date not found Spencer Hewitt MD   Next Visit   Visit date not found Spencer Hewitt MD   ED visits in past 90 days: 0        Note composed:11:10 AM 05/24/2023

## 2023-05-24 NOTE — TELEPHONE ENCOUNTER
Care Due:                  Date            Visit Type   Department     Provider  --------------------------------------------------------------------------------                                EP -                              PRIMARY      [facility] INTERNAL  Last Visit: 04-      CARE ([facility])   MEDICINE       Lali Marvin                              EP -                              PRIMARY      [facility] INTERNAL  Next Visit: 07-      CARE ([facility])   MEDICINE       Ryan Nogueira                                                            Last  Test          Frequency    Reason                     Performed    Due Date  --------------------------------------------------------------------------------    CMP.........  12 months..  atorvastatin,              Not Found    Overdue                             dapagliflozin,                             glimepiride, metFORMIN...    Lipid Panel.  12 months..  atorvastatin.............  06- 06-    [facility] Embedded Care Due Messages. Reference number: [id number].   5/24/2023 10:51:58 AM CDT

## 2023-06-07 ENCOUNTER — PATIENT MESSAGE (OUTPATIENT)
Dept: ADMINISTRATIVE | Facility: OTHER | Age: 73
End: 2023-06-07
Payer: MEDICARE

## 2023-06-26 NOTE — PATIENT INSTRUCTIONS
Your exam was overall normal today.    Your blood pressure was good.    Get your Flu vaccine in the Fall.  I recommend getting on a waiting list at your local pharmacy for the Shingles vaccine (Shingrix) is interested.    We will send you home with the FIT stool test.    I will order and echocardiogram of your heart.  I will refer you for a Bone Density test.    I will start Januvia 100mg once a day for your diabetes.  I will prescribe Lidoderm patch 5% to apply to your arm daily.  If the insurance does not cover this medication, we can consider the topical Diclofenac gel.    Return in 3 months - sooner if needed.  Please come at least 15-20 minutes before your scheduled appointment time.      Sitagliptin oral tablet  What is this medicine?  SITAGLIPTIN (sit a GLIP tin) helps to treat type 2 diabetes. It helps to control blood sugar. Treatment is combined with diet and exercise.  How should I use this medicine?  Take this medicine by mouth with a glass of water. Follow the directions on the prescription label. You can take it with or without food. Do not cut, crush or chew this medicine. Take your dose at the same time each day. Do not take more often than directed. Do not stop taking except on your doctor's advice.  Talk to your pediatrician regarding the use of this medicine in children. Special care may be needed.  What side effects may I notice from receiving this medicine?  Side effects that you should report to your doctor or health care professional as soon as possible:  · allergic reactions like skin rash, itching or hives, swelling of the face, lips, or tongue  · breathing problems  · fever, chills  · joint pain  · loss of appetite  · signs and symptoms of low blood sugar such as feeling anxious, confusion, dizziness, increased hunger, unusually weak or tired, sweating, shakiness, cold, irritable, headache, blurred vision, fast heartbeat, loss of consciousness  · unusual stomach pain or  discomfort  · vomiting  Side effects that usually do not require medical attention (report to your doctor or health care professional if they continue or are bothersome):  · diarrhea  · headache  · sore throat  · stomach upset  · stuffy or runny nose  What may interact with this medicine?  Do not take this medicine with any of the following medications:  · gatifloxacin  This medicine may also interact with the following medications:  · alcohol  · digoxin  · insulin  · sulfonylureas like glimepiride, glipizide, glyburide  What if I miss a dose?  If you miss a dose, take it as soon as you can. If it is almost time for your next dose, take only that dose. Do not take double or extra doses.  Where should I keep my medicine?  Keep out of the reach of children.  Store at room temperature between 15 and 30 degrees C (59 and 86 degrees F). Throw away any unused medicine after the expiration date.  What should I tell my health care provider before I take this medicine?  They need to know if you have any of these conditions:  · diabetic ketoacidosis  · kidney disease  · pancreatitis  · previous swelling of the tongue, face, or lips with difficulty breathing, difficulty swallowing, hoarseness, or tightening of the throat  · type 1 diabetes  · an unusual or allergic reaction to sitagliptin, other medicines, foods, dyes, or preservatives  · pregnant or trying to get pregnant  · breast-feeding  What should I watch for while using this medicine?  Visit your doctor or health care professional for regular checks on your progress.  A test called the HbA1C (A1C) will be monitored. This is a simple blood test. It measures your blood sugar control over the last 2 to 3 months. You will receive this test every 3 to 6 months.  Learn how to check your blood sugar. Learn the symptoms of low and high blood sugar and how to manage them.  Always carry a quick-source of sugar with you in case you have symptoms of low blood sugar. Examples  include hard sugar candy or glucose tablets. Make sure others know that you can choke if you eat or drink when you develop serious symptoms of low blood sugar, such as seizures or unconsciousness. They must get medical help at once.  Tell your doctor or health care professional if you have high blood sugar. You might need to change the dose of your medicine. If you are sick or exercising more than usual, you might need to change the dose of your medicine.  Do not skip meals. Ask your doctor or health care professional if you should avoid alcohol. Many nonprescription cough and cold products contain sugar or alcohol. These can affect blood sugar.  Wear a medical ID bracelet or chain, and carry a card that describes your disease and details of your medicine and dosage times.  NOTE:This sheet is a summary. It may not cover all possible information. If you have questions about this medicine, talk to your doctor, pharmacist, or health care provider. Copyright© 2017 [employer]         No

## 2023-07-21 ENCOUNTER — LAB VISIT (OUTPATIENT)
Dept: LAB | Facility: OTHER | Age: 73
End: 2023-07-21
Attending: INTERNAL MEDICINE
Payer: MEDICARE

## 2023-07-21 ENCOUNTER — OFFICE VISIT (OUTPATIENT)
Dept: INTERNAL MEDICINE | Facility: CLINIC | Age: 73
End: 2023-07-21
Payer: MEDICARE

## 2023-07-21 VITALS
BODY MASS INDEX: 24.77 KG/M2 | OXYGEN SATURATION: 95 % | DIASTOLIC BLOOD PRESSURE: 56 MMHG | HEART RATE: 58 BPM | SYSTOLIC BLOOD PRESSURE: 103 MMHG | WEIGHT: 158.19 LBS

## 2023-07-21 DIAGNOSIS — I12.9 HYPERTENSIVE KIDNEY DISEASE WITH STAGE 3B CHRONIC KIDNEY DISEASE: ICD-10-CM

## 2023-07-21 DIAGNOSIS — E11.22 TYPE 2 DIABETES MELLITUS WITH STAGE 3B CHRONIC KIDNEY DISEASE, WITHOUT LONG-TERM CURRENT USE OF INSULIN: ICD-10-CM

## 2023-07-21 DIAGNOSIS — Z12.31 ENCOUNTER FOR SCREENING MAMMOGRAM FOR BREAST CANCER: ICD-10-CM

## 2023-07-21 DIAGNOSIS — J01.40 SUBACUTE PANSINUSITIS: Primary | ICD-10-CM

## 2023-07-21 DIAGNOSIS — I10 ESSENTIAL HYPERTENSION: ICD-10-CM

## 2023-07-21 DIAGNOSIS — N18.32 HYPERTENSIVE KIDNEY DISEASE WITH STAGE 3B CHRONIC KIDNEY DISEASE: ICD-10-CM

## 2023-07-21 DIAGNOSIS — N18.32 STAGE 3B CHRONIC KIDNEY DISEASE: ICD-10-CM

## 2023-07-21 DIAGNOSIS — E78.2 MIXED HYPERLIPIDEMIA: ICD-10-CM

## 2023-07-21 DIAGNOSIS — Z00.00 ANNUAL PHYSICAL EXAM: ICD-10-CM

## 2023-07-21 DIAGNOSIS — N18.32 TYPE 2 DIABETES MELLITUS WITH STAGE 3B CHRONIC KIDNEY DISEASE, WITHOUT LONG-TERM CURRENT USE OF INSULIN: ICD-10-CM

## 2023-07-21 LAB
ALBUMIN SERPL BCP-MCNC: 4.1 G/DL (ref 3.5–5.2)
ALP SERPL-CCNC: 58 U/L (ref 55–135)
ALT SERPL W/O P-5'-P-CCNC: 24 U/L (ref 10–44)
ANION GAP SERPL CALC-SCNC: 10 MMOL/L (ref 8–16)
AST SERPL-CCNC: 25 U/L (ref 10–40)
BASOPHILS # BLD AUTO: 0.02 K/UL (ref 0–0.2)
BASOPHILS NFR BLD: 0.3 % (ref 0–1.9)
BILIRUB SERPL-MCNC: 0.8 MG/DL (ref 0.1–1)
BUN SERPL-MCNC: 15 MG/DL (ref 8–23)
CALCIUM SERPL-MCNC: 9.8 MG/DL (ref 8.7–10.5)
CHLORIDE SERPL-SCNC: 102 MMOL/L (ref 95–110)
CHOLEST SERPL-MCNC: 123 MG/DL (ref 120–199)
CHOLEST/HDLC SERPL: 2.4 {RATIO} (ref 2–5)
CO2 SERPL-SCNC: 26 MMOL/L (ref 23–29)
CREAT SERPL-MCNC: 1.4 MG/DL (ref 0.5–1.4)
DIFFERENTIAL METHOD: NORMAL
EOSINOPHIL # BLD AUTO: 0.1 K/UL (ref 0–0.5)
EOSINOPHIL NFR BLD: 1.9 % (ref 0–8)
ERYTHROCYTE [DISTWIDTH] IN BLOOD BY AUTOMATED COUNT: 11.8 % (ref 11.5–14.5)
EST. GFR  (NO RACE VARIABLE): 39 ML/MIN/1.73 M^2
ESTIMATED AVG GLUCOSE: 146 MG/DL (ref 68–131)
GLUCOSE SERPL-MCNC: 154 MG/DL (ref 70–110)
HBA1C MFR BLD: 6.7 % (ref 4–5.6)
HCT VFR BLD AUTO: 40.3 % (ref 37–48.5)
HDLC SERPL-MCNC: 51 MG/DL (ref 40–75)
HDLC SERPL: 41.5 % (ref 20–50)
HGB BLD-MCNC: 13 G/DL (ref 12–16)
IMM GRANULOCYTES # BLD AUTO: 0.01 K/UL (ref 0–0.04)
IMM GRANULOCYTES NFR BLD AUTO: 0.1 % (ref 0–0.5)
LDLC SERPL CALC-MCNC: 57 MG/DL (ref 63–159)
LYMPHOCYTES # BLD AUTO: 2.4 K/UL (ref 1–4.8)
LYMPHOCYTES NFR BLD: 35.2 % (ref 18–48)
MCH RBC QN AUTO: 28.6 PG (ref 27–31)
MCHC RBC AUTO-ENTMCNC: 32.3 G/DL (ref 32–36)
MCV RBC AUTO: 89 FL (ref 82–98)
MONOCYTES # BLD AUTO: 0.6 K/UL (ref 0.3–1)
MONOCYTES NFR BLD: 8.8 % (ref 4–15)
NEUTROPHILS # BLD AUTO: 3.6 K/UL (ref 1.8–7.7)
NEUTROPHILS NFR BLD: 53.7 % (ref 38–73)
NONHDLC SERPL-MCNC: 72 MG/DL
NRBC BLD-RTO: 0 /100 WBC
PLATELET # BLD AUTO: 182 K/UL (ref 150–450)
PMV BLD AUTO: 12.4 FL (ref 9.2–12.9)
POTASSIUM SERPL-SCNC: 4 MMOL/L (ref 3.5–5.1)
PROT SERPL-MCNC: 7.6 G/DL (ref 6–8.4)
RBC # BLD AUTO: 4.55 M/UL (ref 4–5.4)
SODIUM SERPL-SCNC: 138 MMOL/L (ref 136–145)
TRIGL SERPL-MCNC: 75 MG/DL (ref 30–150)
WBC # BLD AUTO: 6.7 K/UL (ref 3.9–12.7)

## 2023-07-21 PROCEDURE — 99999 PR PBB SHADOW E&M-EST. PATIENT-LVL IV: ICD-10-PCS | Mod: PBBFAC,,, | Performed by: FAMILY MEDICINE

## 2023-07-21 PROCEDURE — 1101F PT FALLS ASSESS-DOCD LE1/YR: CPT | Mod: CPTII,S$GLB,, | Performed by: FAMILY MEDICINE

## 2023-07-21 PROCEDURE — 80053 COMPREHEN METABOLIC PANEL: CPT | Performed by: INTERNAL MEDICINE

## 2023-07-21 PROCEDURE — 82570 ASSAY OF URINE CREATININE: CPT | Performed by: INTERNAL MEDICINE

## 2023-07-21 PROCEDURE — 1126F PR PAIN SEVERITY QUANTIFIED, NO PAIN PRESENT: ICD-10-PCS | Mod: CPTII,S$GLB,, | Performed by: FAMILY MEDICINE

## 2023-07-21 PROCEDURE — 3074F PR MOST RECENT SYSTOLIC BLOOD PRESSURE < 130 MM HG: ICD-10-PCS | Mod: CPTII,S$GLB,, | Performed by: FAMILY MEDICINE

## 2023-07-21 PROCEDURE — 3044F PR MOST RECENT HEMOGLOBIN A1C LEVEL <7.0%: ICD-10-PCS | Mod: CPTII,S$GLB,, | Performed by: FAMILY MEDICINE

## 2023-07-21 PROCEDURE — 36415 COLL VENOUS BLD VENIPUNCTURE: CPT | Performed by: INTERNAL MEDICINE

## 2023-07-21 PROCEDURE — 3078F DIAST BP <80 MM HG: CPT | Mod: CPTII,S$GLB,, | Performed by: FAMILY MEDICINE

## 2023-07-21 PROCEDURE — 3074F SYST BP LT 130 MM HG: CPT | Mod: CPTII,S$GLB,, | Performed by: FAMILY MEDICINE

## 2023-07-21 PROCEDURE — 1126F AMNT PAIN NOTED NONE PRSNT: CPT | Mod: CPTII,S$GLB,, | Performed by: FAMILY MEDICINE

## 2023-07-21 PROCEDURE — 3288F FALL RISK ASSESSMENT DOCD: CPT | Mod: CPTII,S$GLB,, | Performed by: FAMILY MEDICINE

## 2023-07-21 PROCEDURE — 3078F PR MOST RECENT DIASTOLIC BLOOD PRESSURE < 80 MM HG: ICD-10-PCS | Mod: CPTII,S$GLB,, | Performed by: FAMILY MEDICINE

## 2023-07-21 PROCEDURE — 99999 PR PBB SHADOW E&M-EST. PATIENT-LVL IV: CPT | Mod: PBBFAC,,, | Performed by: FAMILY MEDICINE

## 2023-07-21 PROCEDURE — 99214 PR OFFICE/OUTPT VISIT, EST, LEVL IV, 30-39 MIN: ICD-10-PCS | Mod: S$GLB,,, | Performed by: FAMILY MEDICINE

## 2023-07-21 PROCEDURE — 1159F PR MEDICATION LIST DOCUMENTED IN MEDICAL RECORD: ICD-10-PCS | Mod: CPTII,S$GLB,, | Performed by: FAMILY MEDICINE

## 2023-07-21 PROCEDURE — 1159F MED LIST DOCD IN RCRD: CPT | Mod: CPTII,S$GLB,, | Performed by: FAMILY MEDICINE

## 2023-07-21 PROCEDURE — 85025 COMPLETE CBC W/AUTO DIFF WBC: CPT | Performed by: INTERNAL MEDICINE

## 2023-07-21 PROCEDURE — 1101F PR PT FALLS ASSESS DOC 0-1 FALLS W/OUT INJ PAST YR: ICD-10-PCS | Mod: CPTII,S$GLB,, | Performed by: FAMILY MEDICINE

## 2023-07-21 PROCEDURE — 3044F HG A1C LEVEL LT 7.0%: CPT | Mod: CPTII,S$GLB,, | Performed by: FAMILY MEDICINE

## 2023-07-21 PROCEDURE — 99214 OFFICE O/P EST MOD 30 MIN: CPT | Mod: S$GLB,,, | Performed by: FAMILY MEDICINE

## 2023-07-21 PROCEDURE — 80061 LIPID PANEL: CPT | Performed by: INTERNAL MEDICINE

## 2023-07-21 PROCEDURE — 3008F PR BODY MASS INDEX (BMI) DOCUMENTED: ICD-10-PCS | Mod: CPTII,S$GLB,, | Performed by: FAMILY MEDICINE

## 2023-07-21 PROCEDURE — 3288F PR FALLS RISK ASSESSMENT DOCUMENTED: ICD-10-PCS | Mod: CPTII,S$GLB,, | Performed by: FAMILY MEDICINE

## 2023-07-21 PROCEDURE — 83036 HEMOGLOBIN GLYCOSYLATED A1C: CPT | Performed by: INTERNAL MEDICINE

## 2023-07-21 PROCEDURE — 3008F BODY MASS INDEX DOCD: CPT | Mod: CPTII,S$GLB,, | Performed by: FAMILY MEDICINE

## 2023-07-21 RX ORDER — CETIRIZINE HYDROCHLORIDE 10 MG/1
10 TABLET ORAL DAILY
Qty: 14 TABLET | Refills: 0 | Status: SHIPPED | OUTPATIENT
Start: 2023-07-21 | End: 2023-10-02

## 2023-07-21 RX ORDER — AZELASTINE 1 MG/ML
1 SPRAY, METERED NASAL 2 TIMES DAILY
Qty: 30 ML | Refills: 0 | Status: SHIPPED | OUTPATIENT
Start: 2023-07-21 | End: 2023-10-02

## 2023-07-21 NOTE — PROGRESS NOTES
Subjective:       Patient ID: Meenu Guy is a 73 y.o. female.    Chief Complaint: Establish Care      HPI  Came in today to establish care but also having some new issues.     Over a week has been having some sinus issues and increased phlegm feeling in throat. This has been causing some nausea also.   Taking singulair.   Phlegm is clear.   Decreased appetite.   No real coughing.    Has lost about 200 pounds in last few years with lifestyle change.    Diabetes Management Status    Statin: Taking  ACE/ARB: Not taking    Screening or Prevention Patient's value Goal Complete/Controlled?   HgA1C Testing and Control   Lab Results   Component Value Date    HGBA1C 6.7 (H) 07/21/2023      Annually/Less than 8% Yes   Lipid profile : 07/21/2023 Annually No   LDL control Lab Results   Component Value Date    LDLCALC 57.0 (L) 07/21/2023    Annually/Less than 100 mg/dl  No   Nephropathy screening Lab Results   Component Value Date    LABMICR 243.0 12/16/2022     Lab Results   Component Value Date    PROTEINUA Negative 10/05/2021     Lab Results   Component Value Date    UTPCR 0.11 10/05/2021      Annually Yes   Blood pressure BP Readings from Last 1 Encounters:   07/21/23 (!) 103/56    Less than 140/90 Yes   Dilated retinal exam : 09/01/2022 Annually Yes   Foot exam   : 04/22/2021 Annually No       Family History   Problem Relation Age of Onset    Asthma Mother     Hypertension Mother     Stroke Father     Coronary artery disease Father         s/p 4v CABG    Diabetes Sister         pre diabetes    No Known Problems Brother     Hypertension Brother     Diabetes Brother     Heart disease Brother         stents    Kidney disease Brother         dialysis    Hypertension Brother     Breast cancer Neg Hx     Colon cancer Neg Hx        Current Outpatient Medications:     amLODIPine (NORVASC) 10 MG tablet, Take 1 tablet (10 mg total) by mouth once daily., Disp: 90 tablet, Rfl: 3    atorvastatin (LIPITOR) 20 MG tablet, Take 1  tablet (20 mg total) by mouth once daily., Disp: 90 tablet, Rfl: 3    carvediloL (COREG) 6.25 MG tablet, Take 1 tablet (6.25 mg total) by mouth 2 (two) times daily., Disp: 180 tablet, Rfl: 3    dapagliflozin (FARXIGA) 10 mg tablet, Take 1 tablet (10 mg total) by mouth once daily., Disp: , Rfl:     fluticasone propionate (FLONASE) 50 mcg/actuation nasal spray, 1 spray (50 mcg total) by Each Nostril route once daily., Disp: 16 g, Rfl: 11    glimepiride (AMARYL) 4 MG tablet, TAKE 1 TABLET (4 MG TOTAL) BY MOUTH DAILY WITH BREAKFAST., Disp: 90 tablet, Rfl: 1    metFORMIN (GLUCOPHAGE-XR) 500 MG ER 24hr tablet, TAKE 2 TABLETS (1,000 MG TOTAL) BY MOUTH DAILY WITH BREAKFAST., Disp: 180 tablet, Rfl: 3    montelukast (SINGULAIR) 10 mg tablet, TAKE 1 TABLET (10 MG TOTAL) BY MOUTH EVERY EVENING., Disp: 30 tablet, Rfl: 11    aspirin (ECOTRIN) 81 MG EC tablet, Take 1 tablet (81 mg total) by mouth once daily. for 365 doses (Patient not taking: Reported on 7/21/2023), Disp: 90 tablet, Rfl: 3    azelastine (ASTELIN) 137 mcg (0.1 %) nasal spray, 1 spray (137 mcg total) by Nasal route 2 (two) times daily., Disp: 30 mL, Rfl: 0    blood sugar diagnostic Strp, 1 each by Misc.(Non-Drug; Combo Route) route 3 (three) times daily., Disp: 200 each, Rfl: 11    blood-glucose meter kit, Use as instructed, Disp: 1 each, Rfl: 0    blood-glucose transmitter (DEXCOM G6 TRANSMITTER) Cici, 1 each by Misc.(Non-Drug; Combo Route) route 4 (four) times daily before meals and nightly., Disp: 100 each, Rfl: 11    cetirizine (ZYRTEC) 10 MG tablet, Take 1 tablet (10 mg total) by mouth once daily. For sinus drainage for 14 days, Disp: 14 tablet, Rfl: 0    lancets Misc, 1 each by Misc.(Non-Drug; Combo Route) route 3 (three) times daily., Disp: 200 each, Rfl: 1    Review of Systems   Constitutional:  Negative for chills and fever.   Respiratory:  Negative for cough and shortness of breath.    Cardiovascular:  Negative for chest pain.   Gastrointestinal:  Negative  for abdominal pain.   Skin:  Negative for rash.   Neurological:  Negative for dizziness.     Objective:   BP (!) 103/56 (BP Location: Left arm, Patient Position: Sitting)   Pulse (!) 58   Wt 71.7 kg (158 lb 2.9 oz)   SpO2 95%   BMI 24.77 kg/m²      Physical Exam  Vitals reviewed.   Constitutional:       Appearance: She is well-developed.   HENT:      Head: Normocephalic and atraumatic.   Eyes:      Conjunctiva/sclera: Conjunctivae normal.   Cardiovascular:      Rate and Rhythm: Normal rate.   Pulmonary:      Effort: Pulmonary effort is normal. No respiratory distress.   Skin:     General: Skin is warm and dry.      Findings: No rash.   Neurological:      Mental Status: She is alert and oriented to person, place, and time.      Coordination: Coordination normal.   Psychiatric:         Behavior: Behavior normal.       Assessment & Plan     Problem List Items Addressed This Visit          ENT    Subacute pansinusitis - Primary    Relevant Medications    cetirizine (ZYRTEC) 10 MG tablet    azelastine (ASTELIN) 137 mcg (0.1 %) nasal spray       Cardiac/Vascular    Essential hypertension    Current Assessment & Plan     Blood pressure well controlled.  Continue on same medications              Renal/    Hypertensive kidney disease with stage 3 chronic kidney disease    Current Assessment & Plan     Well controlled and stable at this time.          Other Visit Diagnoses       Encounter for screening mammogram for breast cancer                  Immunizations Administered on Date of Encounter - 7/21/2023       No immunizations on file.             Follow up in about 3 months (around 10/21/2023) for Diabetes.    Disclaimer:  This note may have been prepared using voice recognition software, it may have not been extensively proofed, as such there could be errors within the text such as sound alike errors.

## 2023-07-24 ENCOUNTER — HOSPITAL ENCOUNTER (OUTPATIENT)
Dept: RADIOLOGY | Facility: OTHER | Age: 73
Discharge: HOME | End: 2023-07-24
Attending: FAMILY MEDICINE
Payer: MEDICARE

## 2023-07-24 DIAGNOSIS — Z12.31 ENCOUNTER FOR SCREENING MAMMOGRAM FOR BREAST CANCER: ICD-10-CM

## 2023-07-24 PROBLEM — J01.40 SUBACUTE PANSINUSITIS: Status: ACTIVE | Noted: 2019-07-12

## 2023-07-24 LAB
ALBUMIN/CREAT UR: 44.7 UG/MG (ref 0–30)
CREAT UR-MCNC: 134.2 MG/DL (ref 15–325)
MICROALBUMIN UR DL<=1MG/L-MCNC: 60 UG/ML

## 2023-07-24 PROCEDURE — 77063 MAMMO DIGITAL SCREENING BILAT WITH TOMO: ICD-10-PCS | Mod: 26,,, | Performed by: RADIOLOGY

## 2023-07-24 PROCEDURE — 77067 MAMMO DIGITAL SCREENING BILAT WITH TOMO: ICD-10-PCS | Mod: 26,,, | Performed by: RADIOLOGY

## 2023-07-24 PROCEDURE — 77063 BREAST TOMOSYNTHESIS BI: CPT | Mod: 26,,, | Performed by: RADIOLOGY

## 2023-07-24 PROCEDURE — 77067 SCR MAMMO BI INCL CAD: CPT | Mod: TC

## 2023-07-24 PROCEDURE — 77067 SCR MAMMO BI INCL CAD: CPT | Mod: 26,,, | Performed by: RADIOLOGY

## 2023-08-02 ENCOUNTER — PATIENT MESSAGE (OUTPATIENT)
Dept: ADMINISTRATIVE | Facility: OTHER | Age: 73
End: 2023-08-02
Payer: MEDICARE

## 2023-08-09 ENCOUNTER — TELEPHONE (OUTPATIENT)
Dept: INTERNAL MEDICINE | Facility: CLINIC | Age: 73
End: 2023-08-09
Payer: MEDICARE

## 2023-08-09 DIAGNOSIS — M54.12 CERVICAL RADICULOPATHY: ICD-10-CM

## 2023-08-09 DIAGNOSIS — M48.02 SPINAL STENOSIS, CERVICAL REGION: ICD-10-CM

## 2023-08-09 RX ORDER — GABAPENTIN 300 MG/1
300 CAPSULE ORAL 3 TIMES DAILY
Qty: 90 CAPSULE | Refills: 1 | Status: SHIPPED | OUTPATIENT
Start: 2023-08-09 | End: 2023-08-15 | Stop reason: SDUPTHER

## 2023-08-09 NOTE — TELEPHONE ENCOUNTER
Patient has been contacted in regards to message received. Patient states neck pain is so severe that she can barely sleep. Message has been passed along to provider in person. Dr Nogueira states that patient should be seen in urgent care today or tomorrow. Patient contacted and informed states she will do so, and keep us posted.     No further comments.

## 2023-08-09 NOTE — TELEPHONE ENCOUNTER
----- Message from Paul Rios sent at 8/9/2023  3:33 PM CDT -----  Contact: Patient  Type:  Patient Call          Who Called: Patient         Does the patient know what this is regarding?: Requesting a call back to have a appt scheduled soon as possible for severe neck pain ;please advise           Would the patient rather a call back or a response via [facility]? Call           Best Call Back Number:[phone]             Additional Information:

## 2023-08-15 ENCOUNTER — TELEPHONE (OUTPATIENT)
Dept: INTERNAL MEDICINE | Facility: CLINIC | Age: 73
End: 2023-08-15

## 2023-08-15 ENCOUNTER — OFFICE VISIT (OUTPATIENT)
Dept: INTERNAL MEDICINE | Facility: CLINIC | Age: 73
End: 2023-08-15
Payer: MEDICARE

## 2023-08-15 VITALS
HEART RATE: 61 BPM | BODY MASS INDEX: 25.3 KG/M2 | WEIGHT: 161.19 LBS | OXYGEN SATURATION: 94 % | HEIGHT: 67 IN | DIASTOLIC BLOOD PRESSURE: 60 MMHG | SYSTOLIC BLOOD PRESSURE: 152 MMHG

## 2023-08-15 DIAGNOSIS — M54.12 CERVICAL RADICULOPATHY: ICD-10-CM

## 2023-08-15 DIAGNOSIS — M48.02 SPINAL STENOSIS, CERVICAL REGION: ICD-10-CM

## 2023-08-15 PROCEDURE — 3044F PR MOST RECENT HEMOGLOBIN A1C LEVEL <7.0%: ICD-10-PCS | Mod: CPTII,S$GLB,, | Performed by: INTERNAL MEDICINE

## 2023-08-15 PROCEDURE — 3008F PR BODY MASS INDEX (BMI) DOCUMENTED: ICD-10-PCS | Mod: CPTII,S$GLB,, | Performed by: INTERNAL MEDICINE

## 2023-08-15 PROCEDURE — 3060F PR POS MICROALBUMINURIA RESULT DOCUMENTED/REVIEW: ICD-10-PCS | Mod: CPTII,S$GLB,, | Performed by: INTERNAL MEDICINE

## 2023-08-15 PROCEDURE — 3066F NEPHROPATHY DOC TX: CPT | Mod: CPTII,S$GLB,, | Performed by: INTERNAL MEDICINE

## 2023-08-15 PROCEDURE — 1159F MED LIST DOCD IN RCRD: CPT | Mod: CPTII,S$GLB,, | Performed by: INTERNAL MEDICINE

## 2023-08-15 PROCEDURE — 1160F PR REVIEW ALL MEDS BY PRESCRIBER/CLIN PHARMACIST DOCUMENTED: ICD-10-PCS | Mod: CPTII,S$GLB,, | Performed by: INTERNAL MEDICINE

## 2023-08-15 PROCEDURE — 99213 PR OFFICE/OUTPT VISIT, EST, LEVL III, 20-29 MIN: ICD-10-PCS | Mod: S$GLB,,, | Performed by: INTERNAL MEDICINE

## 2023-08-15 PROCEDURE — 3077F SYST BP >= 140 MM HG: CPT | Mod: CPTII,S$GLB,, | Performed by: INTERNAL MEDICINE

## 2023-08-15 PROCEDURE — 3044F HG A1C LEVEL LT 7.0%: CPT | Mod: CPTII,S$GLB,, | Performed by: INTERNAL MEDICINE

## 2023-08-15 PROCEDURE — 3066F PR DOCUMENTATION OF TREATMENT FOR NEPHROPATHY: ICD-10-PCS | Mod: CPTII,S$GLB,, | Performed by: INTERNAL MEDICINE

## 2023-08-15 PROCEDURE — 99999 PR PBB SHADOW E&M-EST. PATIENT-LVL IV: CPT | Mod: PBBFAC,,, | Performed by: INTERNAL MEDICINE

## 2023-08-15 PROCEDURE — 3288F PR FALLS RISK ASSESSMENT DOCUMENTED: ICD-10-PCS | Mod: CPTII,S$GLB,, | Performed by: INTERNAL MEDICINE

## 2023-08-15 PROCEDURE — 3078F PR MOST RECENT DIASTOLIC BLOOD PRESSURE < 80 MM HG: ICD-10-PCS | Mod: CPTII,S$GLB,, | Performed by: INTERNAL MEDICINE

## 2023-08-15 PROCEDURE — 3288F FALL RISK ASSESSMENT DOCD: CPT | Mod: CPTII,S$GLB,, | Performed by: INTERNAL MEDICINE

## 2023-08-15 PROCEDURE — 3078F DIAST BP <80 MM HG: CPT | Mod: CPTII,S$GLB,, | Performed by: INTERNAL MEDICINE

## 2023-08-15 PROCEDURE — 3008F BODY MASS INDEX DOCD: CPT | Mod: CPTII,S$GLB,, | Performed by: INTERNAL MEDICINE

## 2023-08-15 PROCEDURE — 3077F PR MOST RECENT SYSTOLIC BLOOD PRESSURE >= 140 MM HG: ICD-10-PCS | Mod: CPTII,S$GLB,, | Performed by: INTERNAL MEDICINE

## 2023-08-15 PROCEDURE — 1125F AMNT PAIN NOTED PAIN PRSNT: CPT | Mod: CPTII,S$GLB,, | Performed by: INTERNAL MEDICINE

## 2023-08-15 PROCEDURE — 1101F PT FALLS ASSESS-DOCD LE1/YR: CPT | Mod: CPTII,S$GLB,, | Performed by: INTERNAL MEDICINE

## 2023-08-15 PROCEDURE — 1101F PR PT FALLS ASSESS DOC 0-1 FALLS W/OUT INJ PAST YR: ICD-10-PCS | Mod: CPTII,S$GLB,, | Performed by: INTERNAL MEDICINE

## 2023-08-15 PROCEDURE — 99999 PR PBB SHADOW E&M-EST. PATIENT-LVL IV: ICD-10-PCS | Mod: PBBFAC,,, | Performed by: INTERNAL MEDICINE

## 2023-08-15 PROCEDURE — 1125F PR PAIN SEVERITY QUANTIFIED, PAIN PRESENT: ICD-10-PCS | Mod: CPTII,S$GLB,, | Performed by: INTERNAL MEDICINE

## 2023-08-15 PROCEDURE — 1160F RVW MEDS BY RX/DR IN RCRD: CPT | Mod: CPTII,S$GLB,, | Performed by: INTERNAL MEDICINE

## 2023-08-15 PROCEDURE — 99213 OFFICE O/P EST LOW 20 MIN: CPT | Mod: S$GLB,,, | Performed by: INTERNAL MEDICINE

## 2023-08-15 PROCEDURE — 1159F PR MEDICATION LIST DOCUMENTED IN MEDICAL RECORD: ICD-10-PCS | Mod: CPTII,S$GLB,, | Performed by: INTERNAL MEDICINE

## 2023-08-15 PROCEDURE — 3060F POS MICROALBUMINURIA REV: CPT | Mod: CPTII,S$GLB,, | Performed by: INTERNAL MEDICINE

## 2023-08-15 RX ORDER — GABAPENTIN 300 MG/1
300 CAPSULE ORAL 3 TIMES DAILY
Qty: 90 CAPSULE | Refills: 0 | Status: SHIPPED | OUTPATIENT
Start: 2023-08-15

## 2023-08-15 RX ORDER — MELOXICAM 7.5 MG/1
7.5 TABLET ORAL
COMMUNITY
Start: 2023-08-11 | End: 2023-10-02

## 2023-08-15 RX ORDER — TIZANIDINE 4 MG/1
4 TABLET ORAL EVERY 8 HOURS
Qty: 30 TABLET | Refills: 0 | Status: SHIPPED | OUTPATIENT
Start: 2023-08-15 | End: 2023-09-07

## 2023-08-15 NOTE — TELEPHONE ENCOUNTER
----- Message from Aiden Fox sent at 8/15/2023  9:32 AM CDT -----  Name of Who is Calling: LOUIE MASON [[id number]]      What is the request in detail: Patient is requesting call back being she went to urgent care but still has no relief        Can the clinic reply by [facility]: no      What Number to Call Back if not in [facility]: [phone]

## 2023-08-15 NOTE — PROGRESS NOTES
Subjective:       Patient ID: Meenu Guy is a 73 y.o. female.    Chief Complaint: Neck Pain      Meenu Guy is 73 y.o. female who presents for left neck pain 10/10 without radiation X 9 days.  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  Pt was seen in urgent care 5 days ago.  Pt took xray that showed DJD.  Pt was tx with mobic 7.5 mg po daily without change in symptoms.  Pt is also taking tylenol powder.  Pt is taking gabapentin 300 mg po QHS.        Review of Systems   Constitutional:  Positive for chills. Negative for fever.   Respiratory:  Negative for shortness of breath.    Cardiovascular:  Negative for chest pain.   Gastrointestinal:  Negative for abdominal pain, constipation, diarrhea, nausea and vomiting.   Neurological:  Negative for weakness.         Objective:      Physical Exam  Vitals reviewed.   Constitutional:       General: She is awake.      Appearance: Normal appearance.   HENT:      Head: Normocephalic and atraumatic.      Mouth/Throat:      Mouth: Mucous membranes are moist.      Pharynx: Oropharynx is clear.   Eyes:      Extraocular Movements: Extraocular movements intact.      Conjunctiva/sclera: Conjunctivae normal.      Pupils: Pupils are equal, round, and reactive to light.   Cardiovascular:      Rate and Rhythm: Normal rate and regular rhythm.      Heart sounds: No murmur heard.     No friction rub. No gallop.   Pulmonary:      Effort: Pulmonary effort is normal.      Breath sounds: Normal breath sounds.   Abdominal:      General: Bowel sounds are normal. There is no distension.      Tenderness: There is no abdominal tenderness. There is no guarding or rebound.   Musculoskeletal:      Cervical back: No rigidity or crepitus. Pain with movement (all) and muscular tenderness (left supraspinatous and trapezius) present. No spinous process tenderness. Decreased range of motion (all direction except flexion).      Right lower leg: No edema.      Left lower leg: No  edema.   Lymphadenopathy:      Cervical: No cervical adenopathy.   Neurological:      Mental Status: She is alert and oriented to person, place, and time.   Psychiatric:         Mood and Affect: Mood normal.         Behavior: Behavior is cooperative.         Assessment:       1. Cervical radiculopathy    2. Spinal stenosis, cervical region        Plan:     Pt with neck pain secondary to cervical radiculopathy and spinal stenosis.  CT 1/22 showed C3-C7 cervical fusion, as above and   Degenerative spondylosis at C7-T1 and T1-2, resulting in spinal canal and neural foraminal stenosis, as above, correlating with the 05/27/2019 MRI exam.  Pt was tx with PT and placed on gabapentin 300 mg po QHS with resolution of pain but stopped her gabapentin.  Neck pain reoccurred 9 days ago and pt was seen in urgent care with xray showing DJD and placed on Mobic 7.5 mg po daily.  Pt has had no pain relief with mobic.  Will give trial of zanaflex 4 mg po TID prn pain.  Pt counseled that if she has no improvement in symptoms she should contact her PCP for consult to pain management.  Patient was advised to follow up if symptom are not improving or worsened.    Meenu was seen today for neck pain.    Diagnoses and all orders for this visit:    Cervical radiculopathy  -     gabapentin (NEURONTIN) 300 MG capsule; Take 1 capsule (300 mg total) by mouth 3 (three) times daily.    Spinal stenosis, cervical region  -     gabapentin (NEURONTIN) 300 MG capsule; Take 1 capsule (300 mg total) by mouth 3 (three) times daily.    Other orders  -     tiZANidine (ZANAFLEX) 4 MG tablet; Take 1 tablet (4 mg total) by mouth every 8 (eight) hours. for 10 days

## 2023-08-15 NOTE — TELEPHONE ENCOUNTER
Returned call to Ms. Brooks. States went to Urgent Care but the neck pain continues. States they gave her Motrin and Gabapentin but she still has a hard time especially at night. Would like to see Dr. Nogueira earlier than future scheduled date of 11/30/2023.  Given appointment at [facility] TODAY with Dr. Awad at 1640 hrs

## 2023-09-05 DIAGNOSIS — E78.2 MIXED HYPERLIPIDEMIA: ICD-10-CM

## 2023-09-05 RX ORDER — ATORVASTATIN CALCIUM 20 MG/1
20 TABLET, FILM COATED ORAL DAILY
Qty: 90 TABLET | Refills: 3 | Status: SHIPPED | OUTPATIENT
Start: 2023-09-05

## 2023-09-05 NOTE — TELEPHONE ENCOUNTER
No care due was identified.  [facility] Embedded Care Due Messages. Reference number: [id number].   9/05/2023 1:09:57 PM CDT

## 2023-09-07 RX ORDER — TIZANIDINE 4 MG/1
4 TABLET ORAL EVERY 8 HOURS
Qty: 30 TABLET | Refills: 0 | Status: SHIPPED | OUTPATIENT
Start: 2023-09-07 | End: 2023-09-17

## 2023-09-11 ENCOUNTER — PATIENT MESSAGE (OUTPATIENT)
Dept: INTERNAL MEDICINE | Facility: CLINIC | Age: 73
End: 2023-09-11
Payer: MEDICARE

## 2023-09-11 DIAGNOSIS — E11.22 TYPE 2 DIABETES MELLITUS WITH STAGE 3 CHRONIC KIDNEY DISEASE, WITHOUT LONG-TERM CURRENT USE OF INSULIN, UNSPECIFIED WHETHER STAGE 3A OR 3B CKD: ICD-10-CM

## 2023-09-11 DIAGNOSIS — I10 ESSENTIAL HYPERTENSION: ICD-10-CM

## 2023-09-11 DIAGNOSIS — N18.30 TYPE 2 DIABETES MELLITUS WITH STAGE 3 CHRONIC KIDNEY DISEASE, WITHOUT LONG-TERM CURRENT USE OF INSULIN, UNSPECIFIED WHETHER STAGE 3A OR 3B CKD: ICD-10-CM

## 2023-09-11 RX ORDER — AMLODIPINE BESYLATE 10 MG/1
10 TABLET ORAL DAILY
Qty: 90 TABLET | Refills: 3 | Status: SHIPPED | OUTPATIENT
Start: 2023-09-11 | End: 2024-03-15

## 2023-09-11 RX ORDER — GLIMEPIRIDE 4 MG/1
4 TABLET ORAL
Qty: 90 TABLET | Refills: 1 | Status: SHIPPED | OUTPATIENT
Start: 2023-09-11 | End: 2024-03-01 | Stop reason: SDUPTHER

## 2023-09-13 VITALS — SYSTOLIC BLOOD PRESSURE: 145 MMHG | DIASTOLIC BLOOD PRESSURE: 60 MMHG

## 2023-10-02 ENCOUNTER — TELEPHONE (OUTPATIENT)
Dept: INTERNAL MEDICINE | Facility: CLINIC | Age: 73
End: 2023-10-02

## 2023-10-02 ENCOUNTER — PATIENT OUTREACH (OUTPATIENT)
Dept: ADMINISTRATIVE | Facility: HOSPITAL | Age: 73
End: 2023-10-02
Payer: MEDICARE

## 2023-10-02 ENCOUNTER — OFFICE VISIT (OUTPATIENT)
Dept: INTERNAL MEDICINE | Facility: CLINIC | Age: 73
End: 2023-10-02
Payer: MEDICARE

## 2023-10-02 VITALS
HEART RATE: 59 BPM | WEIGHT: 156.31 LBS | SYSTOLIC BLOOD PRESSURE: 138 MMHG | BODY MASS INDEX: 22.38 KG/M2 | DIASTOLIC BLOOD PRESSURE: 52 MMHG | HEIGHT: 70 IN | OXYGEN SATURATION: 96 %

## 2023-10-02 DIAGNOSIS — J30.9 ALLERGIC RHINITIS, UNSPECIFIED SEASONALITY, UNSPECIFIED TRIGGER: ICD-10-CM

## 2023-10-02 DIAGNOSIS — R09.82 POST-NASAL DRIP: Primary | ICD-10-CM

## 2023-10-02 DIAGNOSIS — J32.9 CHRONIC SINUSITIS, UNSPECIFIED LOCATION: ICD-10-CM

## 2023-10-02 DIAGNOSIS — N18.32 TYPE 2 DIABETES MELLITUS WITH STAGE 3B CHRONIC KIDNEY DISEASE, WITHOUT LONG-TERM CURRENT USE OF INSULIN: ICD-10-CM

## 2023-10-02 DIAGNOSIS — M54.12 CERVICAL RADICULOPATHY: ICD-10-CM

## 2023-10-02 DIAGNOSIS — E11.22 TYPE 2 DIABETES MELLITUS WITH STAGE 3B CHRONIC KIDNEY DISEASE, WITHOUT LONG-TERM CURRENT USE OF INSULIN: ICD-10-CM

## 2023-10-02 DIAGNOSIS — M48.02 SPINAL STENOSIS, CERVICAL REGION: ICD-10-CM

## 2023-10-02 PROCEDURE — 3075F SYST BP GE 130 - 139MM HG: CPT | Mod: CPTII,S$GLB,, | Performed by: FAMILY MEDICINE

## 2023-10-02 PROCEDURE — 99214 OFFICE O/P EST MOD 30 MIN: CPT | Mod: S$GLB,,, | Performed by: FAMILY MEDICINE

## 2023-10-02 PROCEDURE — 3060F POS MICROALBUMINURIA REV: CPT | Mod: CPTII,S$GLB,, | Performed by: FAMILY MEDICINE

## 2023-10-02 PROCEDURE — 3075F PR MOST RECENT SYSTOLIC BLOOD PRESS GE 130-139MM HG: ICD-10-PCS | Mod: CPTII,S$GLB,, | Performed by: FAMILY MEDICINE

## 2023-10-02 PROCEDURE — 3288F PR FALLS RISK ASSESSMENT DOCUMENTED: ICD-10-PCS | Mod: CPTII,S$GLB,, | Performed by: FAMILY MEDICINE

## 2023-10-02 PROCEDURE — 3078F DIAST BP <80 MM HG: CPT | Mod: CPTII,S$GLB,, | Performed by: FAMILY MEDICINE

## 2023-10-02 PROCEDURE — 1159F MED LIST DOCD IN RCRD: CPT | Mod: CPTII,S$GLB,, | Performed by: FAMILY MEDICINE

## 2023-10-02 PROCEDURE — 3066F NEPHROPATHY DOC TX: CPT | Mod: CPTII,S$GLB,, | Performed by: FAMILY MEDICINE

## 2023-10-02 PROCEDURE — 99214 PR OFFICE/OUTPT VISIT, EST, LEVL IV, 30-39 MIN: ICD-10-PCS | Mod: S$GLB,,, | Performed by: FAMILY MEDICINE

## 2023-10-02 PROCEDURE — 1125F PR PAIN SEVERITY QUANTIFIED, PAIN PRESENT: ICD-10-PCS | Mod: CPTII,S$GLB,, | Performed by: FAMILY MEDICINE

## 2023-10-02 PROCEDURE — 3066F PR DOCUMENTATION OF TREATMENT FOR NEPHROPATHY: ICD-10-PCS | Mod: CPTII,S$GLB,, | Performed by: FAMILY MEDICINE

## 2023-10-02 PROCEDURE — 1101F PR PT FALLS ASSESS DOC 0-1 FALLS W/OUT INJ PAST YR: ICD-10-PCS | Mod: CPTII,S$GLB,, | Performed by: FAMILY MEDICINE

## 2023-10-02 PROCEDURE — 3288F FALL RISK ASSESSMENT DOCD: CPT | Mod: CPTII,S$GLB,, | Performed by: FAMILY MEDICINE

## 2023-10-02 PROCEDURE — 3044F HG A1C LEVEL LT 7.0%: CPT | Mod: CPTII,S$GLB,, | Performed by: FAMILY MEDICINE

## 2023-10-02 PROCEDURE — 3008F BODY MASS INDEX DOCD: CPT | Mod: CPTII,S$GLB,, | Performed by: FAMILY MEDICINE

## 2023-10-02 PROCEDURE — 3008F PR BODY MASS INDEX (BMI) DOCUMENTED: ICD-10-PCS | Mod: CPTII,S$GLB,, | Performed by: FAMILY MEDICINE

## 2023-10-02 PROCEDURE — 3060F PR POS MICROALBUMINURIA RESULT DOCUMENTED/REVIEW: ICD-10-PCS | Mod: CPTII,S$GLB,, | Performed by: FAMILY MEDICINE

## 2023-10-02 PROCEDURE — 1159F PR MEDICATION LIST DOCUMENTED IN MEDICAL RECORD: ICD-10-PCS | Mod: CPTII,S$GLB,, | Performed by: FAMILY MEDICINE

## 2023-10-02 PROCEDURE — 3078F PR MOST RECENT DIASTOLIC BLOOD PRESSURE < 80 MM HG: ICD-10-PCS | Mod: CPTII,S$GLB,, | Performed by: FAMILY MEDICINE

## 2023-10-02 PROCEDURE — 3044F PR MOST RECENT HEMOGLOBIN A1C LEVEL <7.0%: ICD-10-PCS | Mod: CPTII,S$GLB,, | Performed by: FAMILY MEDICINE

## 2023-10-02 PROCEDURE — 1101F PT FALLS ASSESS-DOCD LE1/YR: CPT | Mod: CPTII,S$GLB,, | Performed by: FAMILY MEDICINE

## 2023-10-02 PROCEDURE — 99999 PR PBB SHADOW E&M-EST. PATIENT-LVL IV: CPT | Mod: PBBFAC,,, | Performed by: FAMILY MEDICINE

## 2023-10-02 PROCEDURE — 1125F AMNT PAIN NOTED PAIN PRSNT: CPT | Mod: CPTII,S$GLB,, | Performed by: FAMILY MEDICINE

## 2023-10-02 PROCEDURE — 99999 PR PBB SHADOW E&M-EST. PATIENT-LVL IV: ICD-10-PCS | Mod: PBBFAC,,, | Performed by: FAMILY MEDICINE

## 2023-10-02 RX ORDER — AZELASTINE 1 MG/ML
1 SPRAY, METERED NASAL 2 TIMES DAILY
Qty: 30 ML | Refills: 2 | Status: SHIPPED | OUTPATIENT
Start: 2023-10-02 | End: 2024-03-15

## 2023-10-02 RX ORDER — FLUTICASONE PROPIONATE 50 MCG
1 SPRAY, SUSPENSION (ML) NASAL DAILY
Qty: 16 G | Refills: 11 | Status: SHIPPED | OUTPATIENT
Start: 2023-10-02

## 2023-10-02 RX ORDER — OMEPRAZOLE 20 MG/1
20 CAPSULE, DELAYED RELEASE ORAL
COMMUNITY
Start: 2023-09-20

## 2023-10-02 RX ORDER — AZELASTINE HYDROCHLORIDE, FLUTICASONE PROPIONATE 137; 50 UG/1; UG/1
1 SPRAY, METERED NASAL 2 TIMES DAILY
Qty: 23 G | Refills: 0 | Status: CANCELLED | OUTPATIENT
Start: 2023-10-02

## 2023-10-02 RX ORDER — METHOCARBAMOL 500 MG/1
500 TABLET, FILM COATED ORAL 4 TIMES DAILY
Qty: 40 TABLET | Refills: 0 | Status: SHIPPED | OUTPATIENT
Start: 2023-10-02 | End: 2023-10-12

## 2023-10-02 RX ORDER — AMOXICILLIN 500 MG/1
500 CAPSULE ORAL EVERY 12 HOURS
Qty: 20 CAPSULE | Refills: 0 | Status: SHIPPED | OUTPATIENT
Start: 2023-10-02 | End: 2023-10-12

## 2023-10-02 NOTE — PROGRESS NOTES
Subjective:      Chief Complaint: No chief complaint on file.    [facility]  Meenu Guy is a 73 y.o. female who presents with complaint of cold symptoms and acute on chronic neck pain.    Acute on chronic neck pain  History of cervical fusion 40 years ago. Has had worsening pain over the past 3 months. Denies any acute injury. Pain is worse at night. Decreased range of motion. Seen at urgent care and had XR done. Was on gabapentin and states this does not help the pain. Given flexeril at urgent care and states this just makes her sleepy but does not take away the pain. Bought a cervical pillow and this helps her sleep. Tried physical therapy in the past and this does not help. Has acute flares of the pain every few years.  Saw a neurosurgeon around a year ago and they did not advise surgery. Gave her prescription for Mobic at that time but does not take due to her kidneys. Tried to get an MRI but was unable to finish due to claustrophobia. Neurosurgeon said one of the screws in her neck is degenerating.     CT Cervical Spine 1/20/2022   Impression:  C3-C7 cervical fusion, as above.  No acute findings.  Degenerative spondylosis at C7-T1 and T1-2, resulting in spinal canal and neural foraminal stenosis, as above, correlating with the 05/27/2019 MRI exam.    Denies fevers, chills, malaise, other infectious symptoms.     Mucous  Having increase mucous secretion over the last three months. Worse at night when she lays down. Mucous is thick, white, and clear. No color or blood. Denies cough, sore throat, fevers, SOB, runny nose. Thought it was due to her sinuses. Denies any sinus pain or headaches. Never had allergies before. Tried zyrtec and flonase nasal spray in the last couple months but it did not help. Food is not tasting as good.     Was a lifelong smoker until 19 years ago. Denies any hemoptysis, shortness of breath, chest pain.       Current medications: up to date in system.     Health maintenance    Colorectal screening - due 2030 (q 10 years)  Mammogram - due 7/2024 (yearly)  DEXA - due 2025 (Normal bone mineral density)  Annual labs - done 7/2023  Vaccines  - had flu vaccine at pharmacy 2 weeks ago  - due for COVID booster  - due for pneumococcal  Diabetes  - due for foot exam  - due for DM eye exam    ROS  Review of Systems   Constitutional:  Negative for chills, fatigue and fever.   HENT:  Positive for postnasal drip. Negative for congestion, rhinorrhea, sinus pain and sore throat.    Respiratory:  Negative for cough, shortness of breath and wheezing.    Cardiovascular:  Negative for chest pain, palpitations and leg swelling.   Gastrointestinal:  Negative for abdominal pain, constipation, diarrhea, nausea and vomiting.   Genitourinary:  Negative for dysuria, flank pain, frequency, hematuria and urgency.   Musculoskeletal:  Positive for neck pain and neck stiffness. Negative for arthralgias and back pain.   Skin:  Negative for rash.   Allergic/Immunologic: Negative for environmental allergies.   Neurological:  Negative for dizziness, syncope, weakness, light-headedness and headaches.   Psychiatric/Behavioral:  Negative for confusion and dysphoric mood. The patient is not nervous/anxious.           Objective:      Physical Exam   Physical Exam  Vitals and nursing note reviewed.   Constitutional:       Appearance: She is normal weight.   HENT:      Head: Normocephalic and atraumatic.      Nose: No congestion.      Mouth/Throat:      Mouth: Mucous membranes are moist.      Pharynx: Oropharynx is clear.   Eyes:      Extraocular Movements: Extraocular movements intact.   Cardiovascular:      Rate and Rhythm: Normal rate and regular rhythm.      Pulses: Normal pulses.      Heart sounds: Normal heart sounds.   Pulmonary:      Effort: Pulmonary effort is normal.      Breath sounds: Normal breath sounds.   Abdominal:      General: Abdomen is flat. Bowel sounds are normal.      Palpations: Abdomen is soft.    Musculoskeletal:      Cervical back: No edema, erythema or signs of trauma. Muscular tenderness (left paraspinal and trapezius) present. No spinous process tenderness. Decreased range of motion (decreased side to side and looking up. Normal looking down.).   Skin:     General: Skin is warm and dry.      Capillary Refill: Capillary refill takes less than 2 seconds.   Neurological:      General: No focal deficit present.      Mental Status: She is alert and oriented to person, place, and time.   Psychiatric:         Mood and Affect: Mood normal.         Behavior: Behavior normal.         Thought Content: Thought content normal.            Assessment:       1. Post-nasal drip  amoxicillin (AMOXIL) 500 MG capsule    Ambulatory referral/consult to ENT      2. Cervical radiculopathy  methocarbamoL (ROBAXIN) 500 MG Tab    Ambulatory referral/consult to Pain Clinic      3. Spinal stenosis, cervical region  methocarbamoL (ROBAXIN) 500 MG Tab    Ambulatory referral/consult to Pain Clinic      4. Chronic sinusitis, unspecified location  amoxicillin (AMOXIL) 500 MG capsule    Ambulatory referral/consult to ENT      5. Type 2 diabetes mellitus with stage 3b chronic kidney disease, without long-term current use of insulin  Comprehensive Metabolic Panel    Hemoglobin A1C      6. Allergic rhinitis, unspecified seasonality, unspecified trigger  fluticasone propionate (FLONASE) 50 mcg/actuation nasal spray    DDx environmental, dust mites. Refilled Flonase.            Plan:           Health Maintenance  - updated annual flu today  - RTC for routine annual follow-up or sooner as needed

## 2023-10-09 LAB
LEFT EYE DM RETINOPATHY: NEGATIVE
RIGHT EYE DM RETINOPATHY: NEGATIVE

## 2023-10-23 ENCOUNTER — LAB VISIT (OUTPATIENT)
Dept: LAB | Facility: OTHER | Age: 73
End: 2023-10-23
Attending: FAMILY MEDICINE
Payer: MEDICARE

## 2023-10-23 DIAGNOSIS — E11.22 TYPE 2 DIABETES MELLITUS WITH STAGE 3B CHRONIC KIDNEY DISEASE, WITHOUT LONG-TERM CURRENT USE OF INSULIN: ICD-10-CM

## 2023-10-23 DIAGNOSIS — N18.32 TYPE 2 DIABETES MELLITUS WITH STAGE 3B CHRONIC KIDNEY DISEASE, WITHOUT LONG-TERM CURRENT USE OF INSULIN: ICD-10-CM

## 2023-10-23 PROBLEM — J01.40 SUBACUTE PANSINUSITIS: Status: RESOLVED | Noted: 2019-07-12 | Resolved: 2023-10-23

## 2023-10-23 LAB
ALBUMIN SERPL BCP-MCNC: 4.1 G/DL (ref 3.5–5.2)
ALP SERPL-CCNC: 44 U/L (ref 55–135)
ALT SERPL W/O P-5'-P-CCNC: 12 U/L (ref 10–44)
ANION GAP SERPL CALC-SCNC: 7 MMOL/L (ref 8–16)
AST SERPL-CCNC: 21 U/L (ref 10–40)
BILIRUB SERPL-MCNC: 1.1 MG/DL (ref 0.1–1)
BUN SERPL-MCNC: 18 MG/DL (ref 8–23)
CALCIUM SERPL-MCNC: 10 MG/DL (ref 8.7–10.5)
CHLORIDE SERPL-SCNC: 102 MMOL/L (ref 95–110)
CO2 SERPL-SCNC: 28 MMOL/L (ref 23–29)
CREAT SERPL-MCNC: 1.4 MG/DL (ref 0.5–1.4)
EST. GFR  (NO RACE VARIABLE): 39 ML/MIN/1.73 M^2
ESTIMATED AVG GLUCOSE: 148 MG/DL (ref 68–131)
GLUCOSE SERPL-MCNC: 163 MG/DL (ref 70–110)
HBA1C MFR BLD: 6.8 % (ref 4–5.6)
POTASSIUM SERPL-SCNC: 4 MMOL/L (ref 3.5–5.1)
PROT SERPL-MCNC: 7.6 G/DL (ref 6–8.4)
SODIUM SERPL-SCNC: 137 MMOL/L (ref 136–145)

## 2023-10-23 PROCEDURE — 36415 COLL VENOUS BLD VENIPUNCTURE: CPT | Performed by: FAMILY MEDICINE

## 2023-10-23 PROCEDURE — 80053 COMPREHEN METABOLIC PANEL: CPT | Performed by: FAMILY MEDICINE

## 2023-10-23 PROCEDURE — 83036 HEMOGLOBIN GLYCOSYLATED A1C: CPT | Performed by: FAMILY MEDICINE

## 2023-10-24 ENCOUNTER — TELEPHONE (OUTPATIENT)
Dept: INTERNAL MEDICINE | Facility: CLINIC | Age: 73
End: 2023-10-24
Payer: MEDICARE

## 2023-10-24 NOTE — TELEPHONE ENCOUNTER
----- Message from Ryan Nogueira DO sent at 10/23/2023  9:13 PM CDT -----  Diabetes well controlled. Keep up the good work!

## 2023-10-24 NOTE — TELEPHONE ENCOUNTER
LVM of the listed message from Dr. Nogueira , and for her to call  for any questions.      From Dr. Nogueira    Diabetes well controlled. Keep up the good work!

## 2023-10-30 ENCOUNTER — PATIENT OUTREACH (OUTPATIENT)
Dept: ADMINISTRATIVE | Facility: HOSPITAL | Age: 73
End: 2023-10-30
Payer: MEDICARE

## 2023-11-07 ENCOUNTER — TELEPHONE (OUTPATIENT)
Dept: PAIN MEDICINE | Facility: CLINIC | Age: 73
End: 2023-11-07
Payer: MEDICARE

## 2023-11-07 ENCOUNTER — TELEPHONE (OUTPATIENT)
Dept: INTERNAL MEDICINE | Facility: CLINIC | Age: 73
End: 2023-11-07
Payer: MEDICARE

## 2023-11-07 NOTE — TELEPHONE ENCOUNTER
----- Message from Marlene Mcclendon sent at 11/7/2023  4:08 PM CST -----  Type:  Needs Medical Advice    Who Called:  Pt    Pharmacy name and phone #:   [facility] - [place], [place] - [address] [place] [address]   Phone:  [phone]  Fax:  [phone]  Would the patient rather a call back or a response via [facility]?  call  Best Call Back Number:  [phone]  Additional Information:  Pt has an appt scheduled for 11/28/23 and is requesting (if possible) pain medication.  Pt states that her appt was rescheduled once by provider and her pain is unbearable.  Pt would like a call back.

## 2023-11-07 NOTE — TELEPHONE ENCOUNTER
Spoke with patient. Stated someone canceled her appt for 11/28. Advised of needing a RX for neck pain.

## 2023-11-07 NOTE — TELEPHONE ENCOUNTER
Staff spoke to patient who said her PCP prescribed her some pills until her visit with Dr. Hurley in 11/13/23 but he cancelled and she was rescheduled on 11/28/23. Patient was advised to contact her PCP for a refill to last until her appointment date. Patient understood.

## 2023-11-14 ENCOUNTER — PATIENT OUTREACH (OUTPATIENT)
Dept: ADMINISTRATIVE | Facility: HOSPITAL | Age: 73
End: 2023-11-14
Payer: MEDICARE

## 2023-11-16 ENCOUNTER — PATIENT OUTREACH (OUTPATIENT)
Dept: ADMINISTRATIVE | Facility: HOSPITAL | Age: 73
End: 2023-11-16
Payer: MEDICARE

## 2023-11-28 ENCOUNTER — OFFICE VISIT (OUTPATIENT)
Dept: SPINE | Facility: CLINIC | Age: 73
End: 2023-11-28
Attending: ANESTHESIOLOGY
Payer: MEDICARE

## 2023-11-28 VITALS
OXYGEN SATURATION: 100 % | DIASTOLIC BLOOD PRESSURE: 67 MMHG | BODY MASS INDEX: 22.38 KG/M2 | TEMPERATURE: 98 F | SYSTOLIC BLOOD PRESSURE: 153 MMHG | RESPIRATION RATE: 18 BRPM | WEIGHT: 156 LBS | HEART RATE: 62 BPM

## 2023-11-28 DIAGNOSIS — M25.511 CHRONIC PAIN OF BOTH SHOULDERS: ICD-10-CM

## 2023-11-28 DIAGNOSIS — M96.1 POSTLAMINECTOMY SYNDROME, CERVICAL: Primary | ICD-10-CM

## 2023-11-28 DIAGNOSIS — G89.29 CHRONIC PAIN OF BOTH SHOULDERS: ICD-10-CM

## 2023-11-28 DIAGNOSIS — M25.512 CHRONIC PAIN OF BOTH SHOULDERS: ICD-10-CM

## 2023-11-28 DIAGNOSIS — M54.12 CERVICAL RADICULOPATHY: ICD-10-CM

## 2023-11-28 DIAGNOSIS — M48.02 SPINAL STENOSIS, CERVICAL REGION: ICD-10-CM

## 2023-11-28 PROCEDURE — 99204 OFFICE O/P NEW MOD 45 MIN: CPT | Mod: S$GLB,,, | Performed by: ANESTHESIOLOGY

## 2023-11-28 PROCEDURE — 99204 PR OFFICE/OUTPT VISIT, NEW, LEVL IV, 45-59 MIN: ICD-10-PCS | Mod: S$GLB,,, | Performed by: ANESTHESIOLOGY

## 2023-11-28 PROCEDURE — 3008F PR BODY MASS INDEX (BMI) DOCUMENTED: ICD-10-PCS | Mod: CPTII,S$GLB,, | Performed by: ANESTHESIOLOGY

## 2023-11-28 PROCEDURE — 3288F PR FALLS RISK ASSESSMENT DOCUMENTED: ICD-10-PCS | Mod: CPTII,S$GLB,, | Performed by: ANESTHESIOLOGY

## 2023-11-28 PROCEDURE — 1159F PR MEDICATION LIST DOCUMENTED IN MEDICAL RECORD: ICD-10-PCS | Mod: CPTII,S$GLB,, | Performed by: ANESTHESIOLOGY

## 2023-11-28 PROCEDURE — 3060F PR POS MICROALBUMINURIA RESULT DOCUMENTED/REVIEW: ICD-10-PCS | Mod: CPTII,S$GLB,, | Performed by: ANESTHESIOLOGY

## 2023-11-28 PROCEDURE — 3008F BODY MASS INDEX DOCD: CPT | Mod: CPTII,S$GLB,, | Performed by: ANESTHESIOLOGY

## 2023-11-28 PROCEDURE — 3060F POS MICROALBUMINURIA REV: CPT | Mod: CPTII,S$GLB,, | Performed by: ANESTHESIOLOGY

## 2023-11-28 PROCEDURE — 1101F PR PT FALLS ASSESS DOC 0-1 FALLS W/OUT INJ PAST YR: ICD-10-PCS | Mod: CPTII,S$GLB,, | Performed by: ANESTHESIOLOGY

## 2023-11-28 PROCEDURE — 1160F RVW MEDS BY RX/DR IN RCRD: CPT | Mod: CPTII,S$GLB,, | Performed by: ANESTHESIOLOGY

## 2023-11-28 PROCEDURE — 1125F AMNT PAIN NOTED PAIN PRSNT: CPT | Mod: CPTII,S$GLB,, | Performed by: ANESTHESIOLOGY

## 2023-11-28 PROCEDURE — 3077F SYST BP >= 140 MM HG: CPT | Mod: CPTII,S$GLB,, | Performed by: ANESTHESIOLOGY

## 2023-11-28 PROCEDURE — 3078F PR MOST RECENT DIASTOLIC BLOOD PRESSURE < 80 MM HG: ICD-10-PCS | Mod: CPTII,S$GLB,, | Performed by: ANESTHESIOLOGY

## 2023-11-28 PROCEDURE — 3078F DIAST BP <80 MM HG: CPT | Mod: CPTII,S$GLB,, | Performed by: ANESTHESIOLOGY

## 2023-11-28 PROCEDURE — 1101F PT FALLS ASSESS-DOCD LE1/YR: CPT | Mod: CPTII,S$GLB,, | Performed by: ANESTHESIOLOGY

## 2023-11-28 PROCEDURE — 1125F PR PAIN SEVERITY QUANTIFIED, PAIN PRESENT: ICD-10-PCS | Mod: CPTII,S$GLB,, | Performed by: ANESTHESIOLOGY

## 2023-11-28 PROCEDURE — 3066F PR DOCUMENTATION OF TREATMENT FOR NEPHROPATHY: ICD-10-PCS | Mod: CPTII,S$GLB,, | Performed by: ANESTHESIOLOGY

## 2023-11-28 PROCEDURE — 3044F PR MOST RECENT HEMOGLOBIN A1C LEVEL <7.0%: ICD-10-PCS | Mod: CPTII,S$GLB,, | Performed by: ANESTHESIOLOGY

## 2023-11-28 PROCEDURE — 3288F FALL RISK ASSESSMENT DOCD: CPT | Mod: CPTII,S$GLB,, | Performed by: ANESTHESIOLOGY

## 2023-11-28 PROCEDURE — 3066F NEPHROPATHY DOC TX: CPT | Mod: CPTII,S$GLB,, | Performed by: ANESTHESIOLOGY

## 2023-11-28 PROCEDURE — 99999 PR PBB SHADOW E&M-EST. PATIENT-LVL V: CPT | Mod: PBBFAC,,, | Performed by: ANESTHESIOLOGY

## 2023-11-28 PROCEDURE — 3044F HG A1C LEVEL LT 7.0%: CPT | Mod: CPTII,S$GLB,, | Performed by: ANESTHESIOLOGY

## 2023-11-28 PROCEDURE — 1160F PR REVIEW ALL MEDS BY PRESCRIBER/CLIN PHARMACIST DOCUMENTED: ICD-10-PCS | Mod: CPTII,S$GLB,, | Performed by: ANESTHESIOLOGY

## 2023-11-28 PROCEDURE — 99999 PR PBB SHADOW E&M-EST. PATIENT-LVL V: ICD-10-PCS | Mod: PBBFAC,,, | Performed by: ANESTHESIOLOGY

## 2023-11-28 PROCEDURE — 3077F PR MOST RECENT SYSTOLIC BLOOD PRESSURE >= 140 MM HG: ICD-10-PCS | Mod: CPTII,S$GLB,, | Performed by: ANESTHESIOLOGY

## 2023-11-28 PROCEDURE — 1159F MED LIST DOCD IN RCRD: CPT | Mod: CPTII,S$GLB,, | Performed by: ANESTHESIOLOGY

## 2023-11-28 RX ORDER — PREGABALIN 50 MG/1
50 CAPSULE ORAL 3 TIMES DAILY
Qty: 90 CAPSULE | Refills: 6 | Status: SHIPPED | OUTPATIENT
Start: 2023-11-28 | End: 2024-05-28

## 2023-11-28 NOTE — PROGRESS NOTES
Subjective:      Patient ID: Meenu Guy is a 73 y.o. female.    Chief Complaint: Neck Pain    Referred by: Ryan Nogueira, DO     HPI  Consult for cervical pain radiating into the right upper extremity.  Status post cervical anterior fusion about 40 years ago.  She was doing well on Lyrica 50 mg 3 times a day.  She was also doing excellent after an epidural in the cervical spine 2019.  A few months ago she started having the pain with radicular symptoms and limitation in head turning.  She was seen in spine surgery were she could not tolerate an MRI but a CT scan was done that showed lucency at the C3 screws.  There is also multilevel degenerative disease.  Surgery was not recommended.  Currently, not taking medications.  She was given gabapentin that makes her sleepy and does not help the pain.  Denied having any new bowel or bladder symptomatology.  Denied having any fever, chills or night sweats.  There is supposedly an outside new x-ray of the cervical spine that is not available at this visit.  Interventional Pain History  C7/T1 interlaminar epidural steroid injection 2019 with great relief for over 3 years.  Past Medical History:   Diagnosis Date    Diabetes mellitus, type 2     Hypertension        Past Surgical History:   Procedure Laterality Date    BACK SURGERY      CARPAL TUNNEL RELEASE Right 4/12/2023    Procedure: RELEASE, CARPAL TUNNEL,RIGHT;  Surgeon: Genesis Huang MD;  Location: [facility] OR;  Service: Orthopedics;  Laterality: Right;    CHOLECYSTECTOMY      DISC REMOVAL      EPIDURAL STEROID INJECTION N/A 10/30/2019    Procedure: INJECTION, STEROID, EPIDURAL, C7-T1 IL;  Surgeon: Rahul Bowie MD;  Location: [facility] PAIN MGT;  Service: Pain Management;  Laterality: N/A;    HYSTERECTOMY         Review of patient's allergies indicates:   Allergen Reactions    Statins-hmg-coa reductase inhibitors Edema     Tolerating Lipitor    Ace inhibitors Swelling       Current Outpatient  Medications   Medication Sig Dispense Refill    amLODIPine (NORVASC) 10 MG tablet TAKE 1 TABLET (10 MG TOTAL) BY MOUTH ONCE DAILY. 90 tablet 3    atorvastatin (LIPITOR) 20 MG tablet TAKE 1 TABLET (20 MG TOTAL) BY MOUTH ONCE DAILY. 90 tablet 3    azelastine (ASTELIN) 137 mcg (0.1 %) nasal spray 1 spray (137 mcg total) by Nasal route 2 (two) times daily. 30 mL 2    blood sugar diagnostic Strp 1 each by Misc.(Non-Drug; Combo Route) route 3 (three) times daily. 200 each 11    blood-glucose meter kit Use as instructed 1 each 0    carvediloL (COREG) 6.25 MG tablet TAKE 1 TABLET (6.25 MG TOTAL) BY MOUTH 2 (TWO) TIMES DAILY. 180 tablet 3    dapagliflozin (FARXIGA) 10 mg tablet Take 1 tablet (10 mg total) by mouth once daily.      fluticasone propionate (FLONASE) 50 mcg/actuation nasal spray 1 spray (50 mcg total) by Each Nostril route once daily. 16 g 11    gabapentin (NEURONTIN) 300 MG capsule Take 1 capsule (300 mg total) by mouth 3 (three) times daily. 90 capsule 0    glimepiride (AMARYL) 4 MG tablet TAKE 1 TABLET (4 MG TOTAL) BY MOUTH DAILY WITH BREAKFAST. 90 tablet 1    lancets Misc 1 each by Misc.(Non-Drug; Combo Route) route 3 (three) times daily. 200 each 1    metFORMIN (GLUCOPHAGE-XR) 500 MG ER 24hr tablet TAKE 2 TABLETS (1,000 MG TOTAL) BY MOUTH DAILY WITH BREAKFAST. 180 tablet 3    montelukast (SINGULAIR) 10 mg tablet TAKE 1 TABLET (10 MG TOTAL) BY MOUTH EVERY EVENING. 30 tablet 11    omeprazole (PRILOSEC) 20 MG capsule Take 20 mg by mouth.      pregabalin (LYRICA) 50 MG capsule Take 1 capsule (50 mg total) by mouth 3 (three) times daily. 90 capsule 6     No current facility-administered medications for this visit.       Family History   Problem Relation Age of Onset    Asthma Mother     Hypertension Mother     Stroke Father     Coronary artery disease Father         s/p 4v CABG    Diabetes Sister         pre diabetes    No Known Problems Brother     Hypertension Brother     Diabetes Brother     Heart disease  Brother         stents    Kidney disease Brother         dialysis    Hypertension Brother     Breast cancer Neg Hx     Colon cancer Neg Hx        Social History     Socioeconomic History    Marital status:    Tobacco Use    Smoking status: Former     Current packs/day: 0.00     Average packs/day: 2.0 packs/day for 40.0 years (80.0 ttl pk-yrs)     Types: Cigarettes     Start date: 1965     Quit date: 2005     Years since quittin.9    Smokeless tobacco: Never   Substance and Sexual Activity    Alcohol use: Yes     Comment: socially    Drug use: No    Sexual activity: Yes     Partners: Male           ROS        Objective:   BP (!) 153/67   Pulse 62   Temp 98 °F (36.7 °C) (Oral)   Resp 18   Wt 70.8 kg (156 lb)   SpO2 100%   BMI 22.38 kg/m²   Pain Disability Index Review:      2023     1:02 PM 2019     1:48 PM 2019     1:12 PM   Last 3 PDI Scores   Pain Disability Index (PDI) 35 50 10     Normocephalic.  Atraumatic.  Affect appropriate.  Breathing unlabored.  Extra ocular muscles intact.                   Back (L-Spine & T-Spine) / Neck (C-Spine) Exam     Neck (C-Spine) Range of Motion   Flexion:      Normal and limited  Extension:  Normal and limited  Right Lateral Bend: normal  Left Lateral Bend: normal  Right Rotation: normal  Left Rotation: normal    Spinal Sensation   Right Side Sensation  C-Spine Level: normal   Left Side Sensation  C-Spine Level: normal    Neck (C-Spine) Tests   Spurling's Test   Left:  Negative  Right: negative    Comments:  Very limited range of motion the cervical spine with turning and with extension.  Flexion is intact but with pain.  Positive Spurling sign to the right side.  Positive facet loading bilaterally.  Shoulder examination with positive Underwood and Neer's maneuvers.      Right Hand/Wrist Exam     Tests   Phalens Sign: negative  Tinel's sign (median nerve): negative  Finkelstein's test: negative        Left Hand/Wrist Exam     Tests    Phalens Sign: negative  Tinel's sign (median nerve): negative  Finkelstein's test: negative        Right Elbow Exam     Tests   Tinel's sign (cubital tunnel): negative      Left Elbow Exam     Tests   Tinel's sign (cubital tunnel): negative    Right Shoulder Exam     Tests & Signs   Cross arm: negative  Drop arm: negative  Underwood test: negative  Impingement: negative  Yergason's Test: negative  Speed's Test: negative    Left Shoulder Exam     Tests & Signs   Cross arm: negative  Drop arm: negative  Underwood test: negative  Impingement: negative  Yergasons's Test: negative  Speed's Test: negative       Muscle Strength   Right Upper Extremity   Biceps: 5/5   Deltoid:  5/5  Triceps:  5/5  Wrist extension: 5/5   Finger Flexors:  5/5  Finger Extensors:  5/5  Left Upper Extremity  Biceps: 5/5   Deltoid:  5/5  Triceps:  5/5  Wrist extension: 5/5   Finger Flexors:  4/5  Finger Extensors:  5/5    Reflexes     Left Side  Biceps:  1+  Triceps:  1+  Brachioradialis:  1+  Left Worthy's Sign:  Absent  Babinski Sign:  absent  Ankle Clonus:  absent    Right Side   Biceps:  1+  Triceps:  1+  Brachioradialis:  1+  Right Worthy's Sign:  absent  Babinski Sign:  absent  Ankle Clonus:  absent    Vascular Exam       Capillary Refill  Right Hand: normal capillary refill  Left Hand: normal capillary refill            Assessment:       Encounter Diagnoses   Name Primary?    Cervical radiculopathy     Spinal stenosis, cervical region     Postlaminectomy syndrome, cervical Yes         Plan:   We discussed with the patient the assessment and recommendations. The following is the plan we agreed on:  1. Physical therapy.    2. Start pregabalin 3 times a day.  She can start with 1 pill at bedtime 1st and then gradually increase the dosing if desired.  3. Return as needed.  Otherwise follow-up in 6 weeks.  Should her symptomatology persists consider cervical spine MRI and premedicate with Valium.  Also consider a C7/T1 interlaminar  epidural.    I, Susan Hurley MD independently interpretated the CT scan cervical spine 2022 my findings are:  Multilevel degenerative disease with ACDF.  There may be lucency around the C3 screw.  Multilevel disc space narrowing with disc osteophyte formation dorsally affecting the spinal canal and the neural foramina.        Meenu was seen today for neck pain.    Diagnoses and all orders for this visit:    Postlaminectomy syndrome, cervical    Cervical radiculopathy  -     Ambulatory referral/consult to Pain Clinic  -     pregabalin (LYRICA) 50 MG capsule; Take 1 capsule (50 mg total) by mouth 3 (three) times daily.  -     Ambulatory referral/consult to Physical/Occupational Therapy; Future    Spinal stenosis, cervical region  -     Ambulatory referral/consult to Pain Clinic  -     pregabalin (LYRICA) 50 MG capsule; Take 1 capsule (50 mg total) by mouth 3 (three) times daily.  -     Ambulatory referral/consult to Physical/Occupational Therapy; Future

## 2023-12-08 ENCOUNTER — CLINICAL SUPPORT (OUTPATIENT)
Dept: REHABILITATION | Facility: OTHER | Age: 73
End: 2023-12-08
Attending: ANESTHESIOLOGY
Payer: MEDICARE

## 2023-12-08 DIAGNOSIS — G89.29 CHRONIC PAIN OF BOTH SHOULDERS: ICD-10-CM

## 2023-12-08 DIAGNOSIS — M25.619 DECREASED RANGE OF MOTION OF SHOULDER, UNSPECIFIED LATERALITY: Primary | ICD-10-CM

## 2023-12-08 DIAGNOSIS — M48.02 SPINAL STENOSIS, CERVICAL REGION: ICD-10-CM

## 2023-12-08 DIAGNOSIS — M25.512 CHRONIC PAIN OF BOTH SHOULDERS: ICD-10-CM

## 2023-12-08 DIAGNOSIS — M25.511 CHRONIC PAIN OF BOTH SHOULDERS: ICD-10-CM

## 2023-12-08 DIAGNOSIS — M54.12 CERVICAL RADICULOPATHY: ICD-10-CM

## 2023-12-08 PROCEDURE — 97110 THERAPEUTIC EXERCISES: CPT | Mod: PN

## 2023-12-08 PROCEDURE — 97161 PT EVAL LOW COMPLEX 20 MIN: CPT | Mod: PN

## 2023-12-08 NOTE — PROGRESS NOTES
"  [facility]  Physical Therapy Initial Evaluation    Name: Meenu Guy  Clinic Number: [id number]    Therapy Diagnosis:   Encounter Diagnoses   Name Primary?    Cervical radiculopathy     Spinal stenosis, cervical region     Chronic pain of both shoulders     Decreased range of motion of shoulder, unspecified laterality Yes     Physician: Susan Hurley MD    Physician Orders: Physical Therapy Evaluate and Treat  Medical Diagnosis from Referral: cervical radiculopathy, cervical stenosis, chronic shoulder pain   Evaluation Date: 12/8/23  Authorization Period Expiration: 2/2/24  Plan of Care Expiration: 12/8/2023 to 3/8/24  Visit # / Visits authorized: 1/1 (pending additional authorization following initial evaluation)   FOTO: 1/3  on 12/8/23      Time In: 1010a  Time Out: 1100a  Total Billable Time: 50 minutes    Precautions: standard    Subjective     History of current condition - Meenu reports: multiple cervical spine surgeries     Patient reports left > right side neck and bilateral shoulder pain. She reports AM throat congestion    Patient reports bilateral arm "heaviness" and weakness with cooking    Bilateral hand numbness and tingling at the palmar aspect of hand    Driving is difficult 2nd to limited cervical spine mobility    Sleeping is affected 2nd to pain     Medical History:   Past Medical History:   Diagnosis Date    Diabetes mellitus, type 2     Hypertension        Surgical History:   Meenu Guy  has a past surgical history that includes Hysterectomy; Back surgery; Cholecystectomy; Disc removal; Epidural steroid injection (N/A, 10/30/2019); and Carpal tunnel release (Right, 4/12/2023).    Medications:   Meenu has a current medication list which includes the following prescription(s): amlodipine, atorvastatin, azelastine, blood sugar diagnostic, blood-glucose meter, carvedilol, dapagliflozin propanediol, fluticasone propionate, gabapentin, glimepiride, " "lancets, metformin, montelukast, omeprazole, and pregabalin.    Allergies:   Review of patient's allergies indicates:   Allergen Reactions    Statins-hmg-coa reductase inhibitors Edema     Tolerating Lipitor    Ace inhibitors Swelling        Imaging: FINDINGS:  No acute fracture or dislocation seen.  No significant soft tissue edema or radiopaque retained foreign body.     Advanced degenerative change acromioclavicular joint.     Impression:     No acute osseous abnormality seen.    Prior Therapy: yes it helped  Social History: 72 yo female lives with SO  Occupation: retired  Prior Level of Function: no limitation  Current Level of Function: limited with cooking, exercise, driving    Pain:  Current 3/10, worst 5/10, best 2/10   Location: left neck and bilateral shoulder  Description: Aching  Aggravating Factors: sleeping, cooking, reaching up overhead, household chores  Easing Factors: meds, patches    Pts goals: Pt would like to be able to sleep and return to gym exercise at apt (treadmill, bike, hoola hooping) with no increase in neck and shoulder pain.    Objective     WNL=within normal limits  WFL=within functional limits  NT=not tested  *=pain    Posture: forward head / rounded shoulders  Palpation: tenderness to palpation at cervical spine paraspinals left > right, left upper trapezius, left scalenes   Sensation: intact  Deep tendon reflexes: NT    Active Range of Motion (AROM)/Passive Range of Motion (PROM):     Cervical AROM (Degrees) Pain   Flexion 20    Extension 15    Right Side Bend 15    Left Side Bend 10    Right Rotation 50    Left Rotation 35        Dynamometer Measurements: (taken standing with elbow at 90 degrees flexion):  Right: 63 lbs  Left: 43 lbs      Active Range of Motion:   Shoulder Right Left   Flexion 135 140   Abduction 115 ** pain under arm "tightness" 125   ER  C7 T1   IR T8 T6       Manual Muscle Testing:       Manual Muscle Testing JOSHUA DUDLEY Comments    Shoulder Abduction   C5 4/5    " 3/5        Shoulder ER   C5  4/5    4/5        Shoulder IR   C6  4/5    4/5        Wrist Ext   C6  4/5    4/5        Elbow Ext   C7  5/5    5/5        Wrist Flexion   C7 4/5    4/5        Opposition   C8 4/5    4/5        Thumb Ext   C8 4/5    4/5                      Special Tests:    Cervical Results Comments   Distraction Positive.    Spurling's Negative.          ULTT Right  Left Comments   Median NT. NT.    Ulnar NT. NT.    Radial NT. NT.          CMS Impairment/Limitation/Restriction for FOTO Survey    Therapist reviewed FOTO scores for Meenu Guy on 12/8/2023.   FOTO documents entered into [employer] - see Media section.    Limitation Score: 56%  Predicted Goal: 45%    Category: Mobility     TREATMENT     Treatment Time In: 1045a  Treatment Time Out: 1100a  Total Treatment time separate from Evaluation: 15 minutes    Therapeutic Exercises were provided for 15 minutes to improve strength and AROM including:    SL shoulder abduction x10 vs 1#  Rows vs red thera-band x10  Pull downs vs red thera-band x10  Extension/Rotation SNAGs x10 each          Home Exercises and Patient Education Provided:    Education provided:   - Findings; prognosis and plan of care (POC)  - Home exercise program (HEP)  - Modality options  - Therapist contact information    Written Home Exercises Provided: Yes  Exercises were reviewed and Meenu was able to demonstrate them prior to the end of the session.  Meenu demonstrated good understanding of the education provided.       Assessment     Meenu is a 73 y.o. female referred to outpatient Physical Therapy with a medical diagnosis of cervical radiculopathy, cervical stenosis, chronic shoulder pain . Pt presents to PT with pain, decreased cervical spine ROM, decreased strength and flexibility, poor posture, and functional deficits with cervical spine motion and upper extremity use. These deficits are negatively impacting this patient's ability to complete their work duties and  activities of daily living.     Pt prognosis is Good.   Pt will benefit from skilled outpatient Physical Therapy to address the deficits stated above and in the chart below, provide pt/family education, and to maximize pt's level of independence.     Plan of care discussed with patient: Yes  Pt's spiritual, cultural and educational needs considered and pt agreeable to plan of care and goals as stated below:     Anticipated Barriers for therapy: None      Medical Necessity is demonstrated by the following  History  Co-morbidities and personal factors that may impact the plan of care Co-morbidities:   advanced age and diabetes    Personal Factors:   age     low   Examination  Body Structures and Functions, activity limitations and participation restrictions that may impact the plan of care Body Regions:   head  neck  upper extremities    Body Systems:    gross symmetry  ROM  strength  motor control    Participation Restrictions:   Walking    Activity limitations:   Learning and applying knowledge  no deficits    General Tasks and Commands  No Deficits    Communication  No Deficits    Mobility  lifting and carrying objects    Self care  washing oneself (bathing, drying, washing hands)  dressing    Domestic Life  No Deficits    Interactions/Relationships  No Deficits    Life Areas  No Deficits    Community and Social Life  No Deficits         low   Clinical Presentation stable and uncomplicated low   Decision Making/ Complexity Score: low     GOALS:  Short Term Goals:    1.) Pt will improve their FOTO score by 5% to return to PLOF. (Progressing, not met)  2.) Pt will decrease their cervical pain to 3/10 for improved QOL. (Progressing, not met)  3.) Pt will improve their cervical AROM by 10% for improved functional ability. (Progressing, not met)  4.) Pt will improve their UE strength to 5/5 to return to their PLOF. (Progressing, not met)  5.) Pt will become independent with their HEP to improve strength and tolerance to  functional activities. (Progressing, not met)    Long Term Goals:  1.) Pt will improve their FOTO score by 10% to return to PLOF. (Progressing, not met)  2.) Pt will decrease their cervical pain to 2/10 for improved QOL. (Progressing, not met)  3.) Pt will improve their cervical AROM to WNL for improved functional ability. (Progressing, not met)  4.) Pt will tolerate all activities of daily living, work tasks, and recreational activities with no increase in cervical pain to return to PLOF. (Progressing, not met)       Plan     Plan of care Certification: 12/8/2023 to 3/8/24    Outpatient Physical Therapy 1 times weekly for 12 weeks to include the following interventions: Therapeutic Exercises, Manual Therapeutic Technique, Neuromuscular Re Education, Therapeutic Activities. Modalities, Kinesiotape prn, and Functional Dry Needling as needed.    Guzman Dudley, PT,  DPT, OCS

## 2023-12-19 ENCOUNTER — PATIENT MESSAGE (OUTPATIENT)
Dept: ADMINISTRATIVE | Facility: HOSPITAL | Age: 73
End: 2023-12-19
Payer: MEDICARE

## 2023-12-19 ENCOUNTER — PATIENT OUTREACH (OUTPATIENT)
Dept: ADMINISTRATIVE | Facility: HOSPITAL | Age: 73
End: 2023-12-19
Payer: MEDICARE

## 2023-12-19 NOTE — PROGRESS NOTES
Population Health Chart Review & Patient Outreach Details      Updates Requested / Reviewed:     [x]  Care Everywhere    []     []  External Sources ([facility], [facility], [facility], etc.)    [] [facility]   [] [facility]   [] Other:    [x]  Care Team Updated   [x]  Removed  or Duplicate Orders   [x]  Immunization Reconciliation Completed / Queried    [x] [place]   [] [place]   [] [place]   [] [place]      Health Maintenance Topics Addressed and Outreach Outcomes / Actions Taken:         Blood Pressure Control []  Primary Care Follow Up Visit Scheduled     []  Remote Blood Pressure Reading Captured    []  Patient Declined Remote Reading or Scheduling Appt - Escalated to PCP    []  Patient Will Call Back or Send Portal Message with Reading     Additional Notes:     Ms. Guy is currently on PHN BP gap report. Portal message sent to obtain remote reading.

## 2023-12-27 DIAGNOSIS — E11.22 TYPE 2 DIABETES MELLITUS WITH STAGE 3 CHRONIC KIDNEY DISEASE, WITHOUT LONG-TERM CURRENT USE OF INSULIN, UNSPECIFIED WHETHER STAGE 3A OR 3B CKD: ICD-10-CM

## 2023-12-27 DIAGNOSIS — N18.30 TYPE 2 DIABETES MELLITUS WITH STAGE 3 CHRONIC KIDNEY DISEASE, WITHOUT LONG-TERM CURRENT USE OF INSULIN, UNSPECIFIED WHETHER STAGE 3A OR 3B CKD: ICD-10-CM

## 2023-12-27 RX ORDER — METFORMIN HYDROCHLORIDE 500 MG/1
1000 TABLET, EXTENDED RELEASE ORAL
Qty: 180 TABLET | Refills: 0 | Status: SHIPPED | OUTPATIENT
Start: 2023-12-27 | End: 2024-03-15 | Stop reason: SDUPTHER

## 2023-12-27 NOTE — TELEPHONE ENCOUNTER
Refill Routing Note   Medication(s) are not appropriate for processing by [facility] for the following reason(s):        No active prescription written by provider  Required labs abnormal    [facility] action(s):  Defer               Appointments  past 12m or future 3m with PCP    Date Provider   Last Visit   10/2/2023 Ryan Nogueira, DO   Next Visit   Visit date not found Ryan Nogueira, DO   ED visits in past 90 days: 0        Note composed:3:39 PM 12/27/2023

## 2023-12-27 NOTE — TELEPHONE ENCOUNTER
No care due was identified.  [facility] Embedded Care Due Messages. Reference number: [id number].   12/27/2023 2:33:54 PM CST

## 2023-12-30 ENCOUNTER — PATIENT MESSAGE (OUTPATIENT)
Dept: ADMINISTRATIVE | Facility: OTHER | Age: 73
End: 2023-12-30
Payer: MEDICARE

## 2024-03-01 DIAGNOSIS — N18.30 TYPE 2 DIABETES MELLITUS WITH STAGE 3 CHRONIC KIDNEY DISEASE, WITHOUT LONG-TERM CURRENT USE OF INSULIN, UNSPECIFIED WHETHER STAGE 3A OR 3B CKD: ICD-10-CM

## 2024-03-01 DIAGNOSIS — E11.22 TYPE 2 DIABETES MELLITUS WITH STAGE 3 CHRONIC KIDNEY DISEASE, WITHOUT LONG-TERM CURRENT USE OF INSULIN, UNSPECIFIED WHETHER STAGE 3A OR 3B CKD: ICD-10-CM

## 2024-03-01 RX ORDER — GLIMEPIRIDE 4 MG/1
4 TABLET ORAL
Qty: 90 TABLET | Refills: 0 | Status: SHIPPED | OUTPATIENT
Start: 2024-03-01 | End: 2024-03-15 | Stop reason: SDUPTHER

## 2024-03-01 NOTE — TELEPHONE ENCOUNTER
Spoke with pt and scheduled the following appt below:    Appointment Information   Name: LOUIE MASON MRN: [id number]   Date: 3/15/2024 Status: [facility]   Appt Time: 10:30 AM Length: 30   Visit Type: EP - PRIMARY CARE ([facility]) [486] Copay: $0.00   Provider: Ryan Nogueira DO Dept: [facility], Internal Medicine       Dept Address: [address] [place] [id number]       Dept Phone Number: [phone]   Referring Provider:   CSN: [id number]   Appt Phone:     Notes: follow up   Rescheduled From: Thu May 2, 2024 1515       Rescheduled: 3/1/2024 2:41 PM By: BRADLEY US [[id number]] (ES)     Appt reminder placed in bird basket for mailout      ----- Message from Jenny Hernandez sent at 3/1/2024  2:11 PM CST -----  Contact: [phone]@patient  Good afternoon patient would like a call back to discuss getting an earlier apt than the one she has on 5/2. Please give patient a call back [phone]

## 2024-03-01 NOTE — TELEPHONE ENCOUNTER
----- Message from Jenny Hernandez sent at 3/1/2024  2:14 PM CST -----  Contact: [phone]@patient  Requesting an RX refill or new RX.glimepiride (AMARYL) 4 MG tablet  Is this a refill or new RX: refill  RX name and strength (copy/paste from chart):    Is this a 30 day or 90 day RX:   Pharmacy name and phone # [facility] - [address]     Phone: [phone]  The doctors have asked that we provide their patients with the following 2 reminders -- prescription refills can take up to 72 hours, and a friendly reminder that in the future you can use your [facility] account to request refills:

## 2024-03-01 NOTE — TELEPHONE ENCOUNTER
Refill Routing Note   Medication(s) are not appropriate for processing by [facility] for the following reason(s):        Required labs abnormal    [facility] action(s):  Defer     Requires labs : Yes      Medication Therapy Plan: eGFR    Pharmacist review requested: Yes     Appointments  past 12m or future 3m with PCP    Date Provider   Last Visit   10/2/2023 Weeks, Ryan VELEZ, DO   Next Visit   5/2/2024 Weeks, Ryan VELEZ, DO   ED visits in past 90 days: 0        Note composed:2:39 PM 03/01/2024

## 2024-03-01 NOTE — TELEPHONE ENCOUNTER
Care Due:                  Date            Visit Type   Department     Provider  --------------------------------------------------------------------------------                                EP -                              PRIMARY      [facility] INTERNAL  Last Visit: 10-      CARE ([facility])   MEDICINE       Ryan Nogueira                              EP -                              PRIMARY      [facility] INTER[facility]  Next Visit: 05-      CARE ([facility])   MEDICINE       Ryan Nogueira                                                            Last  Test          Frequency    Reason                     Performed    Due Date  --------------------------------------------------------------------------------    HBA1C.......  6 months...  glimepiride, metFORMIN...  10-   04-    [facility] Embedded Care Due Messages. Reference number: [id number].   3/01/2024 2:33:40 PM CST

## 2024-03-01 NOTE — TELEPHONE ENCOUNTER
----- Message from Jenny Hernandez sent at 3/1/2024  2:11 PM CST -----  Contact: [phone]@patient  Good afternoon patient would like a call back to discuss getting an earlier apt than the one she has on 5/2. Please give patient a call back [phone]

## 2024-03-01 NOTE — TELEPHONE ENCOUNTER
Refill Decision Note   Meenu Brooks  is requesting a refill authorization.  Brief Assessment and Rationale for Refill:  Approve     Medication Therapy Plan:  Renal fxn appears stable      Pharmacist review requested: Yes   Comments:     Note composed:3:17 PM 03/01/2024

## 2024-03-07 DIAGNOSIS — E11.22 TYPE 2 DIABETES MELLITUS WITH STAGE 3 CHRONIC KIDNEY DISEASE, WITHOUT LONG-TERM CURRENT USE OF INSULIN, UNSPECIFIED WHETHER STAGE 3A OR 3B CKD: ICD-10-CM

## 2024-03-07 DIAGNOSIS — N18.30 TYPE 2 DIABETES MELLITUS WITH STAGE 3 CHRONIC KIDNEY DISEASE, WITHOUT LONG-TERM CURRENT USE OF INSULIN, UNSPECIFIED WHETHER STAGE 3A OR 3B CKD: ICD-10-CM

## 2024-03-07 RX ORDER — METFORMIN HYDROCHLORIDE 500 MG/1
1000 TABLET ORAL
Qty: 180 TABLET | Refills: 0 | OUTPATIENT
Start: 2024-03-07

## 2024-03-07 NOTE — TELEPHONE ENCOUNTER
No care due was identified.  [facility] Embedded Care Due Messages. Reference number: [id number].   3/07/2024 12:05:38 PM CST

## 2024-03-07 NOTE — TELEPHONE ENCOUNTER
Refill Decision Note   Meenu Guy  is requesting a refill authorization.  Brief Assessment and Rationale for Refill:  Quick Discontinue     Medication Therapy Plan: E-Prescribing Status: Receipt confirmed by pharmacy (12/27/2023  4:02 PM CST); METFORMIN  MG      Comments:     Note composed:12:19 PM 03/07/2024

## 2024-03-15 ENCOUNTER — OFFICE VISIT (OUTPATIENT)
Dept: INTERNAL MEDICINE | Facility: CLINIC | Age: 74
End: 2024-03-15
Payer: MEDICARE

## 2024-03-15 ENCOUNTER — LAB VISIT (OUTPATIENT)
Dept: LAB | Facility: OTHER | Age: 74
End: 2024-03-15
Attending: FAMILY MEDICINE
Payer: MEDICARE

## 2024-03-15 VITALS
WEIGHT: 156.5 LBS | HEIGHT: 70 IN | BODY MASS INDEX: 22.41 KG/M2 | HEART RATE: 60 BPM | SYSTOLIC BLOOD PRESSURE: 128 MMHG | DIASTOLIC BLOOD PRESSURE: 59 MMHG | OXYGEN SATURATION: 94 %

## 2024-03-15 DIAGNOSIS — E11.22 TYPE 2 DIABETES MELLITUS WITH STAGE 3 CHRONIC KIDNEY DISEASE, WITHOUT LONG-TERM CURRENT USE OF INSULIN, UNSPECIFIED WHETHER STAGE 3A OR 3B CKD: ICD-10-CM

## 2024-03-15 DIAGNOSIS — E11.22 TYPE 2 DIABETES MELLITUS WITH STAGE 3B CHRONIC KIDNEY DISEASE, WITHOUT LONG-TERM CURRENT USE OF INSULIN: ICD-10-CM

## 2024-03-15 DIAGNOSIS — N18.32 STAGE 3B CHRONIC KIDNEY DISEASE: ICD-10-CM

## 2024-03-15 DIAGNOSIS — E21.3 HYPERPARATHYROIDISM: ICD-10-CM

## 2024-03-15 DIAGNOSIS — N18.32 TYPE 2 DIABETES MELLITUS WITH STAGE 3B CHRONIC KIDNEY DISEASE, WITHOUT LONG-TERM CURRENT USE OF INSULIN: ICD-10-CM

## 2024-03-15 DIAGNOSIS — E11.51 TYPE 2 DIABETES MELLITUS WITH DIABETIC PERIPHERAL ANGIOPATHY WITHOUT GANGRENE, WITHOUT LONG-TERM CURRENT USE OF INSULIN: ICD-10-CM

## 2024-03-15 DIAGNOSIS — I12.9 HYPERTENSIVE KIDNEY DISEASE WITH STAGE 3B CHRONIC KIDNEY DISEASE: Primary | ICD-10-CM

## 2024-03-15 DIAGNOSIS — N18.30 TYPE 2 DIABETES MELLITUS WITH STAGE 3 CHRONIC KIDNEY DISEASE, WITHOUT LONG-TERM CURRENT USE OF INSULIN, UNSPECIFIED WHETHER STAGE 3A OR 3B CKD: ICD-10-CM

## 2024-03-15 DIAGNOSIS — N18.32 HYPERTENSIVE KIDNEY DISEASE WITH STAGE 3B CHRONIC KIDNEY DISEASE: Primary | ICD-10-CM

## 2024-03-15 LAB
ALBUMIN SERPL BCP-MCNC: 4.3 G/DL (ref 3.5–5.2)
ALP SERPL-CCNC: 47 U/L (ref 55–135)
ALT SERPL W/O P-5'-P-CCNC: 12 U/L (ref 10–44)
ANION GAP SERPL CALC-SCNC: 11 MMOL/L (ref 8–16)
AST SERPL-CCNC: 23 U/L (ref 10–40)
BILIRUB SERPL-MCNC: 1 MG/DL (ref 0.1–1)
BUN SERPL-MCNC: 19 MG/DL (ref 8–23)
CALCIUM SERPL-MCNC: 9.9 MG/DL (ref 8.7–10.5)
CHLORIDE SERPL-SCNC: 104 MMOL/L (ref 95–110)
CO2 SERPL-SCNC: 26 MMOL/L (ref 23–29)
CREAT SERPL-MCNC: 1.3 MG/DL (ref 0.5–1.4)
EST. GFR  (NO RACE VARIABLE): 43 ML/MIN/1.73 M^2
ESTIMATED AVG GLUCOSE: 146 MG/DL (ref 68–131)
GLUCOSE SERPL-MCNC: 135 MG/DL (ref 70–110)
HBA1C MFR BLD: 6.7 % (ref 4–5.6)
POTASSIUM SERPL-SCNC: 4.2 MMOL/L (ref 3.5–5.1)
PROT SERPL-MCNC: 7.8 G/DL (ref 6–8.4)
SODIUM SERPL-SCNC: 141 MMOL/L (ref 136–145)

## 2024-03-15 PROCEDURE — 36415 COLL VENOUS BLD VENIPUNCTURE: CPT | Performed by: FAMILY MEDICINE

## 2024-03-15 PROCEDURE — 83036 HEMOGLOBIN GLYCOSYLATED A1C: CPT | Performed by: FAMILY MEDICINE

## 2024-03-15 PROCEDURE — 99999 PR PBB SHADOW E&M-EST. PATIENT-LVL III: CPT | Mod: PBBFAC,,, | Performed by: FAMILY MEDICINE

## 2024-03-15 PROCEDURE — 80053 COMPREHEN METABOLIC PANEL: CPT | Performed by: FAMILY MEDICINE

## 2024-03-15 PROCEDURE — 99214 OFFICE O/P EST MOD 30 MIN: CPT | Mod: S$GLB,,, | Performed by: FAMILY MEDICINE

## 2024-03-15 RX ORDER — METFORMIN HYDROCHLORIDE 500 MG/1
1000 TABLET, EXTENDED RELEASE ORAL
Qty: 180 TABLET | Refills: 3 | Status: SHIPPED | OUTPATIENT
Start: 2024-03-15

## 2024-03-15 RX ORDER — GLIMEPIRIDE 4 MG/1
4 TABLET ORAL
Qty: 90 TABLET | Refills: 3 | Status: SHIPPED | OUTPATIENT
Start: 2024-03-15 | End: 2024-04-29

## 2024-03-15 RX ORDER — DAPAGLIFLOZIN 5 MG/1
10 TABLET, FILM COATED ORAL DAILY
Qty: 90 TABLET | Refills: 3 | Status: SHIPPED | OUTPATIENT
Start: 2024-03-15

## 2024-03-15 RX ORDER — OLMESARTAN MEDOXOMIL 20 MG/1
20 TABLET ORAL DAILY
Qty: 90 TABLET | Refills: 3 | Status: SHIPPED | OUTPATIENT
Start: 2024-03-15 | End: 2025-03-15

## 2024-03-15 NOTE — PROGRESS NOTES
Subjective:       Patient ID: Meenu Guy is a 73 y.o. female.    Chief Complaint: Hypertension (F/u)    HPI  Pt here for 6 month follow up for HTN and DM. She has no acute concerns or complaints. She has been exercising more, walking on the treadmill and hula hooping 4x a day. She has a big 74th birthday party coming up in June.     Her BP is well controlled today, 128/59. Her last HgA1C was 6.8. repeating labs today.     Diabetes Management Status    Statin: Taking  ACE/ARB: Not taking    Screening or Prevention Patient's value Goal Complete/Controlled?   HgA1C Testing and Control   Lab Results   Component Value Date    HGBA1C 6.8 (H) 10/23/2023      Annually/Less than 8% Yes   Lipid profile : 07/21/2023 Annually Yes   LDL control Lab Results   Component Value Date    LDLCALC 57.0 (L) 07/21/2023    Annually/Less than 100 mg/dl  Yes   Nephropathy screening Lab Results   Component Value Date    LABMICR 60.0 07/21/2023     Lab Results   Component Value Date    PROTEINUA Negative 10/05/2021     Lab Results   Component Value Date    UTPCR 0.11 10/05/2021      Annually Yes   Blood pressure BP Readings from Last 1 Encounters:   03/15/24 (!) 128/59    Less than 140/90 Yes   Dilated retinal exam : 10/09/2023 Annually Yes   Foot exam   : 10/02/2023 Annually Yes          All of your core healthy metrics are met.      Social History     Social History Narrative    Not on file       Family History   Problem Relation Age of Onset    Asthma Mother     Hypertension Mother     Stroke Father     Coronary artery disease Father         s/p 4v CABG    Diabetes Sister         pre diabetes    No Known Problems Brother     Hypertension Brother     Diabetes Brother     Heart disease Brother         stents    Kidney disease Brother         dialysis    Hypertension Brother     Breast cancer Neg Hx     Colon cancer Neg Hx        Current Outpatient Medications:     atorvastatin (LIPITOR) 20 MG tablet, TAKE 1 TABLET (20 MG TOTAL) BY  MOUTH ONCE DAILY., Disp: 90 tablet, Rfl: 3    blood sugar diagnostic Strp, 1 each by Misc.(Non-Drug; Combo Route) route 3 (three) times daily., Disp: 200 each, Rfl: 11    blood-glucose meter kit, Use as instructed, Disp: 1 each, Rfl: 0    carvediloL (COREG) 6.25 MG tablet, TAKE 1 TABLET (6.25 MG TOTAL) BY MOUTH 2 (TWO) TIMES DAILY., Disp: 180 tablet, Rfl: 3    fluticasone propionate (FLONASE) 50 mcg/actuation nasal spray, 1 spray (50 mcg total) by Each Nostril route once daily., Disp: 16 g, Rfl: 11    gabapentin (NEURONTIN) 300 MG capsule, Take 1 capsule (300 mg total) by mouth 3 (three) times daily., Disp: 90 capsule, Rfl: 0    lancets Misc, 1 each by Misc.(Non-Drug; Combo Route) route 3 (three) times daily., Disp: 200 each, Rfl: 1    montelukast (SINGULAIR) 10 mg tablet, TAKE 1 TABLET (10 MG TOTAL) BY MOUTH EVERY EVENING., Disp: 30 tablet, Rfl: 11    omeprazole (PRILOSEC) 20 MG capsule, Take 20 mg by mouth., Disp: , Rfl:     pregabalin (LYRICA) 50 MG capsule, Take 1 capsule (50 mg total) by mouth 3 (three) times daily., Disp: 90 capsule, Rfl: 6    azelastine (ASTELIN) 137 mcg (0.1 %) nasal spray, 1 spray (137 mcg total) by Nasal route 2 (two) times daily., Disp: 30 mL, Rfl: 2    dapagliflozin propanediol (FARXIGA) 5 mg Tab tablet, Take 2 tablets (10 mg total) by mouth once daily., Disp: 90 tablet, Rfl: 3    glimepiride (AMARYL) 4 MG tablet, Take 1 tablet (4 mg total) by mouth daily with breakfast., Disp: 90 tablet, Rfl: 3    metFORMIN (GLUCOPHAGE-XR) 500 MG ER 24hr tablet, Take 2 tablets (1,000 mg total) by mouth daily with breakfast., Disp: 180 tablet, Rfl: 3    olmesartan (BENICAR) 20 MG tablet, Take 1 tablet (20 mg total) by mouth once daily., Disp: 90 tablet, Rfl: 3    Review of Systems   Constitutional:  Negative for chills and fever.   Respiratory:  Negative for cough and shortness of breath.    Cardiovascular:  Negative for chest pain.   Gastrointestinal:  Negative for abdominal pain.   Musculoskeletal:   "Positive for arthralgias (right shoulder stiffness).   Skin:  Negative for rash.   Neurological:  Negative for dizziness.       Objective:   BP (!) 128/59 (BP Location: Left arm, Patient Position: Sitting)   Pulse 60   Ht 5' 10" (1.778 m)   Wt 71 kg (156 lb 8.4 oz)   SpO2 (!) 94%   BMI 22.46 kg/m²      Physical Exam  Vitals reviewed.   Constitutional:       Appearance: She is well-developed.   HENT:      Head: Normocephalic and atraumatic.   Eyes:      Conjunctiva/sclera: Conjunctivae normal.   Cardiovascular:      Rate and Rhythm: Normal rate.   Pulmonary:      Effort: Pulmonary effort is normal. No respiratory distress.   Skin:     General: Skin is warm and dry.      Findings: No rash.   Neurological:      Mental Status: She is alert and oriented to person, place, and time.      Coordination: Coordination normal.   Psychiatric:         Behavior: Behavior normal.             @resultssec@  Assessment & Plan       Problem List Items Addressed This Visit          Renal/    CKD (chronic kidney disease) stage 3, GFR 30-59 ml/min    Relevant Medications    dapagliflozin propanediol (FARXIGA) 5 mg Tab tablet    Hypertensive kidney disease with stage 3 chronic kidney disease - Primary    Current Assessment & Plan     To do better will changed amlodipine to olmesartan             Endocrine    Type 2 diabetes mellitus with stage 3 chronic kidney disease, without long-term current use of insulin    Relevant Medications    metFORMIN (GLUCOPHAGE-XR) 500 MG ER 24hr tablet    glimepiride (AMARYL) 4 MG tablet    dapagliflozin propanediol (FARXIGA) 5 mg Tab tablet    Other Relevant Orders    HEMOGLOBIN A1C    COMPREHENSIVE METABOLIC PANEL    Type 2 diabetes mellitus with diabetic peripheral angiopathy without gangrene    Current Assessment & Plan     Checking A1c. Refilled meds. 3 mo follow up.         Relevant Medications    metFORMIN (GLUCOPHAGE-XR) 500 MG ER 24hr tablet    glimepiride (AMARYL) 4 MG tablet    " Hyperparathyroidism    Current Assessment & Plan     Checking Ca level today              Immunizations Administered on Date of Encounter - 3/15/2024       No immunizations on file.             Follow up in about 3 months (around 6/15/2024) for follow up with Cindy Grewal.    This note was generated with the assistance of ambient listening technology. Verbal consent was obtained by the patient and accompanying visitor(s) for the recording of patient appointment to facilitate this note. I attest to having reviewed and edited the generated note for accuracy, though some syntax or spelling errors may persist. Please contact the author of this note for any clarification.      Disclaimer:  This note may have been prepared using voice recognition software, it may have not been extensively proofed, as such there could be errors within the text such as sound alike errors.

## 2024-03-18 ENCOUNTER — TELEPHONE (OUTPATIENT)
Dept: INTERNAL MEDICINE | Facility: CLINIC | Age: 74
End: 2024-03-18
Payer: MEDICARE

## 2024-03-18 NOTE — TELEPHONE ENCOUNTER
Spoke with pt stated message below:    ----- Message from Ryan Nogueira DO sent at 3/18/2024  4:17 PM CDT -----  Diabetes well controlled. Keep up the good work!

## 2024-03-18 NOTE — TELEPHONE ENCOUNTER
----- Message from Ryan Nogueira DO sent at 3/18/2024  4:17 PM CDT -----  Diabetes well controlled. Keep up the good work!

## 2024-03-28 ENCOUNTER — TELEPHONE (OUTPATIENT)
Dept: INTERNAL MEDICINE | Facility: CLINIC | Age: 74
End: 2024-03-28
Payer: MEDICARE

## 2024-03-28 NOTE — LETTER
April 3, 2024      [facility]  [address]  [facility] [address]  Phone: [phone]  Fax: [phone]       Patient: Meenu Guy   YOB: 1950  Date of Visit: 04/03/2024    To Whom It May Concern:    Javad Guy takes dapagliflozin propanediol (FARXIGA) 5 mg Tab tablet as she has diabetes. If you have any questions or concerns please call us at [phone].    Sincerely,    Ivett Martin MA

## 2024-03-28 NOTE — TELEPHONE ENCOUNTER
----- Message from Ivett Martin MA sent at 3/25/2024 10:05 AM CDT -----    ----- Message -----  From: Yuriy Escoto  Sent: 3/25/2024   9:57 AM CDT  To: Mirlande [facility]ORE[facility] Staff    Type: Patient Call Back    Who called:SELF    What is the request in detail:IN REGARDS TO   dapagliflozin propanediol (FARXIGA) 5 mg Tab tablet INSURANCE NEEDS A WRITTEN LETTER SAYING WHY PT IS TAKING MEDICATION     Can the clinic reply by [facility]?NO    Would the patient rather a call back or a response via [facility]? CALL    Best call back number:[phone] (home)       Additional Information:

## 2024-04-03 NOTE — TELEPHONE ENCOUNTER
"Spoke with pt and informed her that the letter below was successfully faxed per confirmation below:    Letter by Ryan Nogueira, DO on 4/3/2024             April 3, 2024       [facility]  [address] [place] [address]  Phone: [phone]  Fax: [phone]         Patient: Meenu Guy   YOB: 1950  Date of Visit: 04/03/2024     To Whom It May Concern:     Javad Guy takes dapagliflozin propanediol (FARXIGA) 5 mg Tab tablet as she has diabetes. If you have any questions or concerns please call us at [phone].     Sincerely,     Ivett Martin MA        Your fax has been successfully sent to [phone] at [phone].  ------------------------------------------------------------  From: [id number]  ------------------------------------------------------------  4/3/2024 10:05:33 AM Transmission Record          Sent to [phone] with remote ID "[id number]"          Result: (0/339;0/0) Success          Page record: 1 - 3          Elapsed time: 01:15 on channel 45    "

## 2024-04-04 ENCOUNTER — OFFICE VISIT (OUTPATIENT)
Dept: URGENT CARE | Facility: CLINIC | Age: 74
End: 2024-04-04
Payer: MEDICARE

## 2024-04-04 VITALS
TEMPERATURE: 98 F | HEIGHT: 70 IN | OXYGEN SATURATION: 96 % | RESPIRATION RATE: 18 BRPM | DIASTOLIC BLOOD PRESSURE: 71 MMHG | HEART RATE: 59 BPM | SYSTOLIC BLOOD PRESSURE: 179 MMHG | WEIGHT: 156.5 LBS | BODY MASS INDEX: 22.41 KG/M2

## 2024-04-04 DIAGNOSIS — M77.8 ELBOW TENDONITIS: Primary | ICD-10-CM

## 2024-04-04 DIAGNOSIS — S49.91XA ARM INJURY, RIGHT, INITIAL ENCOUNTER: ICD-10-CM

## 2024-04-04 PROCEDURE — 99213 OFFICE O/P EST LOW 20 MIN: CPT | Mod: S$GLB,,,

## 2024-04-04 PROCEDURE — 73060 X-RAY EXAM OF HUMERUS: CPT | Mod: RT,S$GLB,, | Performed by: RADIOLOGY

## 2024-04-04 PROCEDURE — 73080 X-RAY EXAM OF ELBOW: CPT | Mod: RT,S$GLB,, | Performed by: RADIOLOGY

## 2024-04-04 RX ORDER — DICLOFENAC SODIUM 10 MG/G
2 GEL TOPICAL DAILY
Qty: 20 G | Refills: 0 | Status: SHIPPED | OUTPATIENT
Start: 2024-04-04
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
## 2024-04-04 NOTE — PATIENT INSTRUCTIONS
Please drink plenty of fluids.  Please get plenty of rest.    Please return here or go to the Emergency Department for any concerns or worsening of condition.    Please apply VOLTAREN GEL to the affected area to help with pain/inflammation.    Please use sling for stabilization and to help reduce pain.    Tylenol:  Regular strength can take 650 mg every 4-6 hours as needed, do not exceed 3,250 mg within a day.  Extra strength can take 1,000 mg every 6 hours as needed, do not exceed 3,000 mg in one day.    Rest, ice, compression and elevation to the affected joint or limb as needed.  Please follow up with your primary care doctor or specialist as needed.    If you  smoke, please stop smoking.

## 2024-04-17 ENCOUNTER — TELEPHONE (OUTPATIENT)
Dept: INTERNAL MEDICINE | Facility: CLINIC | Age: 74
End: 2024-04-17
Payer: MEDICARE

## 2024-04-17 NOTE — TELEPHONE ENCOUNTER
----- Message from Suzi Estrada sent at 4/17/2024 12:21 PM CDT -----  Regarding: Patient Advice                Name of Who is Calling: Meenu Guy    Who Left The Message: Meenu Mustapha Brooks        What is the request in detail:    Patient called to make the Office aware that her blood pressure has been elevated for the pass couple of days. Patient is complains with a headaches .   Please give a call back at your earliest convenience and further advise.    Thank you      Reply by [facility]: NO    Preferred Call Back : [phone] (B)
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
## 2024-04-19 VITALS — DIASTOLIC BLOOD PRESSURE: 70 MMHG | SYSTOLIC BLOOD PRESSURE: 164 MMHG

## 2024-04-19 NOTE — TELEPHONE ENCOUNTER
Pt stated her bp was 164/70(updated) today am and yesterday luncg- 167/67, 185/75 all after taking meds

## 2024-04-29 ENCOUNTER — OFFICE VISIT (OUTPATIENT)
Dept: INTERNAL MEDICINE | Facility: CLINIC | Age: 74
End: 2024-04-29
Payer: MEDICARE

## 2024-04-29 VITALS — SYSTOLIC BLOOD PRESSURE: 164 MMHG | DIASTOLIC BLOOD PRESSURE: 70 MMHG

## 2024-04-29 DIAGNOSIS — N18.32 TYPE 2 DIABETES MELLITUS WITH STAGE 3B CHRONIC KIDNEY DISEASE, WITHOUT LONG-TERM CURRENT USE OF INSULIN: ICD-10-CM

## 2024-04-29 DIAGNOSIS — E11.22 TYPE 2 DIABETES MELLITUS WITH STAGE 3B CHRONIC KIDNEY DISEASE, WITHOUT LONG-TERM CURRENT USE OF INSULIN: ICD-10-CM

## 2024-04-29 DIAGNOSIS — I10 ESSENTIAL HYPERTENSION: Primary | ICD-10-CM

## 2024-04-29 DIAGNOSIS — N18.32 HYPERTENSIVE KIDNEY DISEASE WITH STAGE 3B CHRONIC KIDNEY DISEASE: ICD-10-CM

## 2024-04-29 DIAGNOSIS — I12.9 HYPERTENSIVE KIDNEY DISEASE WITH STAGE 3B CHRONIC KIDNEY DISEASE: ICD-10-CM

## 2024-04-29 PROCEDURE — 1125F AMNT PAIN NOTED PAIN PRSNT: CPT | Mod: CPTII,95,, | Performed by: FAMILY MEDICINE

## 2024-04-29 PROCEDURE — 3044F HG A1C LEVEL LT 7.0%: CPT | Mod: CPTII,95,, | Performed by: FAMILY MEDICINE

## 2024-04-29 PROCEDURE — 3288F FALL RISK ASSESSMENT DOCD: CPT | Mod: CPTII,95,, | Performed by: FAMILY MEDICINE

## 2024-04-29 PROCEDURE — 1159F MED LIST DOCD IN RCRD: CPT | Mod: CPTII,95,, | Performed by: FAMILY MEDICINE

## 2024-04-29 PROCEDURE — 1101F PT FALLS ASSESS-DOCD LE1/YR: CPT | Mod: CPTII,95,, | Performed by: FAMILY MEDICINE

## 2024-04-29 PROCEDURE — 99214 OFFICE O/P EST MOD 30 MIN: CPT | Mod: 95,,, | Performed by: FAMILY MEDICINE

## 2024-04-29 PROCEDURE — 3077F SYST BP >= 140 MM HG: CPT | Mod: CPTII,95,, | Performed by: FAMILY MEDICINE

## 2024-04-29 PROCEDURE — 3078F DIAST BP <80 MM HG: CPT | Mod: CPTII,95,, | Performed by: FAMILY MEDICINE

## 2024-04-29 PROCEDURE — 4010F ACE/ARB THERAPY RXD/TAKEN: CPT | Mod: CPTII,95,, | Performed by: FAMILY MEDICINE

## 2024-04-29 RX ORDER — AMLODIPINE BESYLATE 10 MG/1
10 TABLET ORAL DAILY
Qty: 90 TABLET | Refills: 3 | Status: SHIPPED | OUTPATIENT
Start: 2024-04-29 | End: 2025-04-29

## 2024-04-29 NOTE — PROGRESS NOTES
Subjective:       Patient ID: Meenu Guy is a 73 y.o. female.    The patient location is: [place]  The chief complaint leading to consultation is:   Chief Complaint   Patient presents with    Hypertension     F/u         Visit type: audiovisual    Face to Face time with patient: 15 minutes of total time spent on the encounter, which includes face to face time and non-face to face time preparing to see the patient (eg, review of tests), Obtaining and/or reviewing separately obtained history, Documenting clinical information in the electronic or other health record, Independently interpreting results (not separately reported) and communicating results to the patient/family/caregiver, or Care coordination (not separately reported).       Each patient to whom he or she provides medical services by telemedicine is:  (1) informed of the relationship between the physician and patient and the respective role of any other health care provider with respect to management of the patient; and (2) notified that he or she may decline to receive medical services by telemedicine and may withdraw from such care at any time.    Notes:     Hypertension  This is a chronic problem. The current episode started more than 1 year ago. The problem has been gradually worsening since onset. The problem is resistant. Associated symptoms include anxiety, headaches, malaise/fatigue, neck pain and sweats. Pertinent negatives include no chest pain or shortness of breath. Agents associated with hypertension include decongestants and NSAIDs. Risk factors for coronary artery disease include diabetes mellitus, dyslipidemia, family history and stress. The current treatment provides no improvement. Compliance problems include exercise.      History of Present Illness  The patient is a 73-year-old female who presents via virtual visit to discuss hypertension.    The patient has been experiencing fluctuations in her blood pressure, with readings  ranging from 172/73 to 180/84. She hypothesizes that her medication change may be contributing to this change. Her medication regimen was transitioned from amlodipine to Olmesartan 20 mg in 03/2024, and she is also on Carvedilol 6.25 mg. Amlodipine was changed for olmesartan for a goal of bettering CKD. Concurrently, she has been experiencing neck and shoulder pain, which she initially attributed to her sinuses, although she does not typically experience headaches. She also reports a recent injury to her right arm, which has since improved, albeit with significant discomfort. She had been managing the pain with Tylenol Arthritis, but discontinued its use due to concerns about renal implications. She sought medical attention at an urgent care facility for severe arm pain, where x-rays were taken, revealing inflammation. Despite this, she continues to experience significant pain. She acknowledges that the pain may be contributing to her elevated blood pressure. She is also under significant stress due to caring for her , who has recently undergone knee surgery.    The patient is also on Farxiga 10 mg twice daily, Metformin 500 mg once in the morning, and Glimepiride with breakfast for her diabetes.    Supplemental Information  She is on Lyrica.      Tests to Keep You Healthy    Mammogram: Met on 7/24/2023  Eye Exam: Met on 10/9/2023  Colon Cancer Screening: Met on 8/21/2020  Last Blood Pressure <= 139/89 (4/29/2024): NO  Last HbA1c < 8 (03/15/2024): Yes      Social History     Social History Narrative    Not on file       Family History   Problem Relation Name Age of Onset    Asthma Mother Diamond salazar     Hypertension Mother Diamond salazar     Stroke Father Claude A Weaver     Coronary artery disease Father Claude A Weaver         s/p 4v CABG    Diabetes Sister Diamond         pre diabetes    No Known Problems Brother      Hypertension Brother      Diabetes Brother      Heart disease Brother Naylor         stents     Kidney disease Brother Mayank         dialysis    Hypertension Brother Mayank     Breast cancer Neg Hx      Colon cancer Neg Hx         Current Outpatient Medications:     atorvastatin (LIPITOR) 20 MG tablet, TAKE 1 TABLET (20 MG TOTAL) BY MOUTH ONCE DAILY., Disp: 90 tablet, Rfl: 3    blood sugar diagnostic Strp, 1 each by Misc.(Non-Drug; Combo Route) route 3 (three) times daily., Disp: 200 each, Rfl: 11    blood-glucose meter kit, Use as instructed, Disp: 1 each, Rfl: 0    carvediloL (COREG) 6.25 MG tablet, TAKE 1 TABLET (6.25 MG TOTAL) BY MOUTH 2 (TWO) TIMES DAILY., Disp: 180 tablet, Rfl: 3    dapagliflozin propanediol (FARXIGA) 5 mg Tab tablet, Take 2 tablets (10 mg total) by mouth once daily., Disp: 90 tablet, Rfl: 3    diclofenac sodium (VOLTAREN) 1 % Gel, Apply 2 g topically once daily., Disp: 20 g, Rfl: 0    fluticasone propionate (FLONASE) 50 mcg/actuation nasal spray, 1 spray (50 mcg total) by Each Nostril route once daily., Disp: 16 g, Rfl: 11    gabapentin (NEURONTIN) 300 MG capsule, Take 1 capsule (300 mg total) by mouth 3 (three) times daily., Disp: 90 capsule, Rfl: 0    lancets Misc, 1 each by Misc.(Non-Drug; Combo Route) route 3 (three) times daily., Disp: 200 each, Rfl: 1    metFORMIN (GLUCOPHAGE-XR) 500 MG ER 24hr tablet, Take 2 tablets (1,000 mg total) by mouth daily with breakfast., Disp: 180 tablet, Rfl: 3    montelukast (SINGULAIR) 10 mg tablet, TAKE 1 TABLET (10 MG TOTAL) BY MOUTH EVERY EVENING., Disp: 30 tablet, Rfl: 11    olmesartan (BENICAR) 20 MG tablet, Take 1 tablet (20 mg total) by mouth once daily., Disp: 90 tablet, Rfl: 3    omeprazole (PRILOSEC) 20 MG capsule, Take 20 mg by mouth., Disp: , Rfl:     pregabalin (LYRICA) 50 MG capsule, Take 1 capsule (50 mg total) by mouth 3 (three) times daily., Disp: 90 capsule, Rfl: 6    amLODIPine (NORVASC) 10 MG tablet, Take 1 tablet (10 mg total) by mouth once daily., Disp: 90 tablet, Rfl: 3    Review of Systems   Constitutional:  Positive for  malaise/fatigue. Negative for chills and fever.   Respiratory:  Negative for cough and shortness of breath.    Cardiovascular:  Negative for chest pain.   Gastrointestinal:  Negative for abdominal pain.   Musculoskeletal:  Positive for neck pain.   Skin:  Negative for rash.   Neurological:  Positive for headaches. Negative for dizziness.       Objective:   BP (!) 164/70      Physical Exam  Constitutional:       General: She is not in acute distress.     Appearance: She is well-developed.   HENT:      Head: Normocephalic.   Pulmonary:      Effort: Pulmonary effort is normal. No respiratory distress.   Neurological:      Mental Status: She is alert and oriented to person, place, and time.   Psychiatric:         Behavior: Behavior normal.         Physical Exam      @resultssec@  Assessment & Plan   Assessment & Plan  1. Hypertension.  The patient's recent medication change could potentially be contributing to her elevated blood pressure. The patient will maintain her current regimen of Olmesartan 20 mg, Farxiga 10 mg, Metformin 500 mg, and Glimepiride, while continuing Carvedilol 6.25 mg. She will recommence Amlodipine 10 mg. Should her blood pressure begin to drop too low, we will consider a reduction in her Amlodipine dosage to 5 mg.    2. Diabetes.  The patient's diabetes is currently well-managed. The patient will reduce her Farxiga dosage to 10 mg per day.    Follow-up  The patient is scheduled for a follow-up visit in 3 to 4 weeks.    Problem List Items Addressed This Visit          Cardiac/Vascular    Essential hypertension - Primary       Renal/    Hypertensive kidney disease with stage 3 chronic kidney disease       Endocrine    Type 2 diabetes mellitus with stage 3 chronic kidney disease, without long-term current use of insulin         Immunizations Administered on Date of Encounter - 4/29/2024       No immunizations on file.             No follow-ups on file.    This note was generated with the assistance  of ambient listening technology. Verbal consent was obtained by the patient and accompanying visitor(s) for the recording of patient appointment to facilitate this note. I attest to having reviewed and edited the generated note for accuracy, though some syntax or spelling errors may persist. Please contact the author of this note for any clarification.      Disclaimer:  This note may have been prepared using voice recognition software, it may have not been extensively proofed, as such there could be errors within the text such as sound alike errors.

## 2024-04-30 RX ORDER — OMEPRAZOLE 20 MG/1
20 CAPSULE, DELAYED RELEASE ORAL
Qty: 90 CAPSULE | Refills: 3 | Status: SHIPPED | OUTPATIENT
Start: 2024-04-30

## 2024-05-15 ENCOUNTER — PATIENT MESSAGE (OUTPATIENT)
Dept: ADMINISTRATIVE | Facility: HOSPITAL | Age: 74
End: 2024-05-15
Payer: MEDICARE

## 2024-05-20 ENCOUNTER — PATIENT MESSAGE (OUTPATIENT)
Dept: ADMINISTRATIVE | Facility: HOSPITAL | Age: 74
End: 2024-05-20
Payer: MEDICARE

## 2024-05-20 ENCOUNTER — PATIENT OUTREACH (OUTPATIENT)
Dept: ADMINISTRATIVE | Facility: HOSPITAL | Age: 74
End: 2024-05-20
Payer: MEDICARE

## 2024-05-20 DIAGNOSIS — Z12.31 ENCOUNTER FOR SCREENING MAMMOGRAM FOR MALIGNANT NEOPLASM OF BREAST: Primary | ICD-10-CM

## 2024-05-23 ENCOUNTER — TELEPHONE (OUTPATIENT)
Dept: INTERNAL MEDICINE | Facility: CLINIC | Age: 74
End: 2024-05-23
Payer: MEDICARE

## 2024-05-23 NOTE — TELEPHONE ENCOUNTER
----- Message from Radha Michaels sent at 2024 10:58 AM CDT -----  Regarding: call back  Name of caller: Meenu       What is the requesting detail: pt states her handicap card . Requesting a call back.Please advise       Can the clinic reply by [facility]:       What number to call back:[phone]

## 2024-05-28 DIAGNOSIS — L30.9 DERMATITIS: ICD-10-CM

## 2024-05-28 RX ORDER — MONTELUKAST SODIUM 10 MG/1
TABLET ORAL
Qty: 30 TABLET | Refills: 11 | Status: SHIPPED | OUTPATIENT
Start: 2024-05-28

## 2024-06-10 ENCOUNTER — PATIENT MESSAGE (OUTPATIENT)
Dept: INTERNAL MEDICINE | Facility: CLINIC | Age: 74
End: 2024-06-10
Payer: MEDICARE

## 2024-06-14 ENCOUNTER — OFFICE VISIT (OUTPATIENT)
Dept: INTERNAL MEDICINE | Facility: CLINIC | Age: 74
End: 2024-06-14
Payer: MEDICARE

## 2024-06-14 VITALS
DIASTOLIC BLOOD PRESSURE: 60 MMHG | HEIGHT: 70 IN | OXYGEN SATURATION: 97 % | HEART RATE: 53 BPM | WEIGHT: 155.88 LBS | SYSTOLIC BLOOD PRESSURE: 124 MMHG | BODY MASS INDEX: 22.32 KG/M2

## 2024-06-14 DIAGNOSIS — E55.9 VITAMIN D DEFICIENCY: ICD-10-CM

## 2024-06-14 DIAGNOSIS — K59.1 FUNCTIONAL DIARRHEA: ICD-10-CM

## 2024-06-14 DIAGNOSIS — I70.0 AORTIC ATHEROSCLEROSIS: ICD-10-CM

## 2024-06-14 DIAGNOSIS — E11.22 TYPE 2 DIABETES MELLITUS WITH STAGE 3B CHRONIC KIDNEY DISEASE, WITHOUT LONG-TERM CURRENT USE OF INSULIN: ICD-10-CM

## 2024-06-14 DIAGNOSIS — N18.32 STAGE 3B CHRONIC KIDNEY DISEASE: ICD-10-CM

## 2024-06-14 DIAGNOSIS — R14.2 BELCHING: ICD-10-CM

## 2024-06-14 DIAGNOSIS — I10 ESSENTIAL HYPERTENSION: ICD-10-CM

## 2024-06-14 DIAGNOSIS — E11.51 TYPE 2 DIABETES MELLITUS WITH DIABETIC PERIPHERAL ANGIOPATHY WITHOUT GANGRENE, WITHOUT LONG-TERM CURRENT USE OF INSULIN: ICD-10-CM

## 2024-06-14 DIAGNOSIS — Z00.00 ENCOUNTER FOR PREVENTIVE HEALTH EXAMINATION: Primary | ICD-10-CM

## 2024-06-14 DIAGNOSIS — K21.9 GASTRO-ESOPHAGEAL REFLUX DISEASE WITHOUT ESOPHAGITIS: ICD-10-CM

## 2024-06-14 DIAGNOSIS — E78.2 MIXED HYPERLIPIDEMIA: ICD-10-CM

## 2024-06-14 DIAGNOSIS — M19.041 PRIMARY OSTEOARTHRITIS OF RIGHT HAND: ICD-10-CM

## 2024-06-14 DIAGNOSIS — E21.3 HYPERPARATHYROIDISM: ICD-10-CM

## 2024-06-14 DIAGNOSIS — M54.12 CERVICAL RADICULOPATHY: ICD-10-CM

## 2024-06-14 DIAGNOSIS — G56.03 CARPAL TUNNEL SYNDROME, BILATERAL: ICD-10-CM

## 2024-06-14 DIAGNOSIS — M79.7 FIBROMYALGIA: ICD-10-CM

## 2024-06-14 DIAGNOSIS — N18.32 HYPERTENSIVE KIDNEY DISEASE WITH STAGE 3B CHRONIC KIDNEY DISEASE: ICD-10-CM

## 2024-06-14 DIAGNOSIS — I12.9 HYPERTENSIVE KIDNEY DISEASE WITH STAGE 3B CHRONIC KIDNEY DISEASE: ICD-10-CM

## 2024-06-14 DIAGNOSIS — M25.511 CHRONIC RIGHT SHOULDER PAIN: ICD-10-CM

## 2024-06-14 DIAGNOSIS — G95.89 MYELOMALACIA: ICD-10-CM

## 2024-06-14 DIAGNOSIS — M25.619 DECREASED RANGE OF MOTION OF SHOULDER, UNSPECIFIED LATERALITY: ICD-10-CM

## 2024-06-14 DIAGNOSIS — N18.32 TYPE 2 DIABETES MELLITUS WITH STAGE 3B CHRONIC KIDNEY DISEASE, WITHOUT LONG-TERM CURRENT USE OF INSULIN: ICD-10-CM

## 2024-06-14 DIAGNOSIS — I73.9 PERIPHERAL VASCULAR DISEASE: ICD-10-CM

## 2024-06-14 DIAGNOSIS — K63.5 POLYP OF COLON, UNSPECIFIED PART OF COLON, UNSPECIFIED TYPE: ICD-10-CM

## 2024-06-14 DIAGNOSIS — G89.29 CHRONIC RIGHT SHOULDER PAIN: ICD-10-CM

## 2024-06-14 DIAGNOSIS — D64.9 NORMOCYTIC ANEMIA: ICD-10-CM

## 2024-06-14 PROCEDURE — 3044F HG A1C LEVEL LT 7.0%: CPT | Mod: CPTII,S$GLB,, | Performed by: NURSE PRACTITIONER

## 2024-06-14 PROCEDURE — 1158F ADVNC CARE PLAN TLK DOCD: CPT | Mod: CPTII,S$GLB,, | Performed by: NURSE PRACTITIONER

## 2024-06-14 PROCEDURE — 4010F ACE/ARB THERAPY RXD/TAKEN: CPT | Mod: CPTII,S$GLB,, | Performed by: NURSE PRACTITIONER

## 2024-06-14 PROCEDURE — 1170F FXNL STATUS ASSESSED: CPT | Mod: CPTII,S$GLB,, | Performed by: NURSE PRACTITIONER

## 2024-06-14 PROCEDURE — G0439 PPPS, SUBSEQ VISIT: HCPCS | Mod: S$GLB,,, | Performed by: NURSE PRACTITIONER

## 2024-06-14 PROCEDURE — 1159F MED LIST DOCD IN RCRD: CPT | Mod: CPTII,S$GLB,, | Performed by: NURSE PRACTITIONER

## 2024-06-14 PROCEDURE — 1101F PT FALLS ASSESS-DOCD LE1/YR: CPT | Mod: CPTII,S$GLB,, | Performed by: NURSE PRACTITIONER

## 2024-06-14 PROCEDURE — 3078F DIAST BP <80 MM HG: CPT | Mod: CPTII,S$GLB,, | Performed by: NURSE PRACTITIONER

## 2024-06-14 PROCEDURE — 3074F SYST BP LT 130 MM HG: CPT | Mod: CPTII,S$GLB,, | Performed by: NURSE PRACTITIONER

## 2024-06-14 PROCEDURE — 1160F RVW MEDS BY RX/DR IN RCRD: CPT | Mod: CPTII,S$GLB,, | Performed by: NURSE PRACTITIONER

## 2024-06-14 PROCEDURE — 3288F FALL RISK ASSESSMENT DOCD: CPT | Mod: CPTII,S$GLB,, | Performed by: NURSE PRACTITIONER

## 2024-06-14 PROCEDURE — 99999 PR PBB SHADOW E&M-EST. PATIENT-LVL V: CPT | Mod: PBBFAC,,, | Performed by: NURSE PRACTITIONER

## 2024-06-14 PROCEDURE — 1125F AMNT PAIN NOTED PAIN PRSNT: CPT | Mod: CPTII,S$GLB,, | Performed by: NURSE PRACTITIONER

## 2024-06-14 RX ORDER — ACETAMINOPHEN 500 MG
500 TABLET ORAL EVERY 6 HOURS PRN
COMMUNITY

## 2024-06-14 RX ORDER — PHENYLEPHRINE HCL 10 MG
500 TABLET ORAL DAILY
COMMUNITY

## 2024-06-14 NOTE — PROGRESS NOTES
"  Meenu Guy presented for a  Medicare AWV and comprehensive Health Risk Assessment today. The following components were reviewed and updated:    Medical history  Family History  Social history  Allergies and Current Medications  Health Risk Assessment  Health Maintenance  Care Team         ** See Completed Assessments for Annual Wellness Visit within the encounter summary.**         The following assessments were completed:  Living Situation  CAGE  Depression Screening  Timed Get Up and Go  Whisper Test  Cognitive Function Screening  Nutrition Screening  ADL Screening  PAQ Screening        Vitals:    06/14/24 1320   BP: 124/60   BP Location: Left arm   Patient Position: Sitting   BP Method: Large (Automatic)   Pulse: (!) 53   SpO2: 97%   Weight: 70.7 kg (155 lb 13.8 oz)   Height: 5' 10" (1.778 m)     Body mass index is 22.36 kg/m².    Physical Exam  Vitals reviewed.   Constitutional:       Appearance: She is well-developed.   HENT:      Head: Normocephalic and atraumatic. Not macrocephalic and not microcephalic. No raccoon eyes, Wright's sign, abrasion, contusion, right periorbital erythema, left periorbital erythema or laceration. Hair is normal.      Right Ear: No decreased hearing noted. No laceration, drainage, swelling or tenderness. No middle ear effusion. No foreign body. No mastoid tenderness. No hemotympanum. Tympanic membrane is not injected, scarred, perforated, erythematous, retracted or bulging. Tympanic membrane has normal mobility.      Left Ear: No decreased hearing noted. No laceration, drainage, swelling or tenderness.  No middle ear effusion. No foreign body. No mastoid tenderness. No hemotympanum. Tympanic membrane is not injected, scarred, perforated, erythematous, retracted or bulging. Tympanic membrane has normal mobility.      Nose: Nose normal. No nasal deformity, laceration or mucosal edema.      Mouth/Throat:      Pharynx: Uvula midline.   Eyes:      General: Lids are normal.      " Conjunctiva/sclera: Conjunctivae normal.   Neck:      Thyroid: No thyroid mass or thyromegaly.      Trachea: Trachea normal.   Cardiovascular:      Rate and Rhythm: Normal rate and regular rhythm.   Pulmonary:      Effort: Pulmonary effort is normal.      Breath sounds: Normal breath sounds.   Abdominal:      Palpations: Abdomen is soft.   Musculoskeletal:         General: Normal range of motion.      Cervical back: Neck supple. No edema or erythema. No spinous process tenderness or muscular tenderness. Normal range of motion.   Lymphadenopathy:      Head:      Right side of head: No submental, submandibular, tonsillar, preauricular or posterior auricular adenopathy.      Left side of head: No submental, submandibular, tonsillar, preauricular, posterior auricular or occipital adenopathy.   Skin:     General: Skin is warm and dry.   Neurological:      Mental Status: She is alert and oriented to person, place, and time.   Psychiatric:         Behavior: Behavior normal.         Thought Content: Thought content normal.         Judgment: Judgment normal.               Diagnoses and health risks identified today and associated recommendations/orders:    1. Encounter for preventive health examination  Annual Health Risk Assessment (HRA) visit today.  Counseling and referral of health maintenance and preventative health measures performed.  Patient given annual wellness paperwork to take home.  Encouraged to return in 1 year for subsequent HRA visit.     2. Aortic atherosclerosis  Chronic. Stable. Continue current treatment plan as previously prescribed by PCP.    3. Essential hypertension  Chronic. Stable. Controlled. Encouraged to increase exercise as tolerated (moderate-intensity aerobic activity and muscle-strengthening activities) improve diet to heart healthy, low sodium diet.  Continue current treatment plan as previously prescribed by PCP.    4. Mixed hyperlipidemia  Chronic. Stable. Continue current treatment plan  as previously prescribed by PCP.    5. Peripheral vascular disease  Chronic. Stable. Continue current treatment plan as previously prescribed by PCP.    6. Carpal tunnel syndrome, bilateral  Chronic. Stable. Continue current treatment plan as previously prescribed by PCP.    7. Cervical radiculopathy  Chronic. Stable. Continue current treatment plan as previously prescribed by PCP.    8. Myelomalacia  Chronic. Stable. Continue current treatment plan as previously prescribed by PCP.    9. Hypertensive kidney disease with stage 3b chronic kidney disease  Chronic. Stable. Continue current treatment plan as previously prescribed by PCP.    10. Stage 3b chronic kidney disease  Chronic. Stable. Continue current treatment plan as previously prescribed by PCP.    11. Normocytic anemia  Chronic. Stable. Continue current treatment plan as previously prescribed by PCP.    12. Vitamin D deficiency  Chronic. Stable. Continue current treatment plan as previously prescribed by PCP.    13. Type 2 diabetes mellitus with stage 3b chronic kidney disease, without long-term current use of insulin  Chronic. Stable. Continue current treatment plan as previously prescribed by PCP.    14. Type 2 diabetes mellitus with diabetic peripheral angiopathy without gangrene, without long-term current use of insulin  Chronic. Stable. Controlled. Last Hgb A1c=6.7 from 3/15/24. Continue current treatment plan as previously prescribed by PCP.    15. Hyperparathyroidism  Chronic. Stable. Continue current treatment plan as previously prescribed by PCP.    16. Gastro-esophageal reflux disease without esophagitis  Chronic. Stable. Continue current treatment plan as previously prescribed by PCP.    17. Functional diarrhea  Chronic. Stable. Continue current treatment plan as previously prescribed by PCP.    18. Polyp of colon, unspecified part of colon, unspecified type  Chronic. Stable. Continue current treatment plan as previously prescribed by PCP.    19.  Belching  Chronic. Stable. Continue current treatment plan as previously prescribed by PCP.    20. Primary osteoarthritis of right hand  Chronic. Stable. Continue current treatment plan as previously prescribed by PCP.    21. Fibromyalgia  Chronic. Stable. Continue current treatment plan as previously prescribed by PCP.    22. Decreased range of motion of shoulder, unspecified laterality  Chronic. Stable. Continue current treatment plan as previously prescribed by PCP.    23. Chronic right shoulder pain  Chronic. Stable. Continue current treatment plan as previously prescribed by PCP.        Provided Meenu with a 5-10 year written screening schedule and personal prevention plan. Recommendations were developed using the USPSTF age appropriate recommendations. Education, counseling, and referrals were provided as needed. After Visit Summary printed and given to patient which includes a list of additional screenings\tests needed.      I offered to discuss end of life issues, including information on how to make advance directives that the patient could use to name someone who would make medical decisions on their behalf if they became too ill to make themselves.    ___Patient declined  _X_Patient is interested, I provided paper work and offered to discuss.    No follow-ups on file.    ARIANNA Aguayo offered to discuss advanced care planning, including how to pick a person who would make decisions for you if you were unable to make them for yourself, called a health care power of , and what kind of decisions you might make such as use of life sustaining treatments such as ventilators and tube feeding when faced with a life limiting illness recorded on a living will that they will need to know. (How you want to be cared for as you near the end of your natural life)     X Patient is interested in learning more about how to make advanced directives.  I provided them paperwork and offered to discuss this  with them.

## 2024-06-14 NOTE — PATIENT INSTRUCTIONS
Counseling and Referral of Other Preventative  (Italic type indicates deductible and co-insurance are waived)    Patient Name: Meenu Guy  Today's Date: 6/14/2024    Health Maintenance       Date Due Completion Date    Pneumococcal Vaccines (Age 65+) (2 of 2 - PPSV23 or PCV20) 09/07/2020 7/13/2020    COVID-19 Vaccine (7 - 2023-24 season) 05/19/2024 1/19/2024    Mammogram 07/24/2024 7/24/2023    Diabetes Urine Screening 07/21/2024 7/21/2023    Lipid Panel 07/21/2024 7/21/2023    Hemoglobin A1c 09/15/2024 3/15/2024    Foot Exam 10/02/2024 10/2/2023 (Done)    Override on 10/2/2023: Done    Override on 9/26/2019: Done    Eye Exam 10/09/2024 10/9/2023    Override on 9/1/2022: Done    DEXA Scan 08/16/2025 8/16/2022    TETANUS VACCINE 04/19/2029 4/19/2019    Colorectal Cancer Screening 08/21/2030 8/21/2020    Override on 1/1/2017: Done ([place], [place] and had 2 polyps)        No orders of the defined types were placed in this encounter.    The following information is provided to all patients.  This information is to help you find resources for any of the problems found today that may be affecting your health:                  Living healthy guide: www.[facility].[place].gov      Understanding Diabetes: www.diabetes.org      Eating healthy: www.cdc.gov/healthyweight      CDC home safety checklist: www.cdc.gov/steadi/patient.html      Agency on Aging: www.goea.[place].[facility]      Alcoholics anonymous (AA): www.aa.org      Physical Activity: www.jeff.nih.gov/mx8pwhe      Tobacco use: www.quitwithusla.org

## 2024-06-20 DIAGNOSIS — E78.2 MIXED HYPERLIPIDEMIA: ICD-10-CM

## 2024-06-20 RX ORDER — ATORVASTATIN CALCIUM 20 MG/1
20 TABLET, FILM COATED ORAL DAILY
Qty: 90 TABLET | Refills: 3 | Status: SHIPPED | OUTPATIENT
Start: 2024-06-20

## 2024-06-20 NOTE — TELEPHONE ENCOUNTER
Care Due:                  Date            Visit Type   Department     Provider  --------------------------------------------------------------------------------                                ESTABLISHED                              PATIENT -    [facility] INTERNAL  Last Visit: 04-      [facility]      MEDICINE       Ryan Nogueira  Next Visit: None Scheduled  None         None Found                                                            Last  Test          Frequency    Reason                     Performed    Due Date  --------------------------------------------------------------------------------    HBA1C.......  6 months...  dapagliflozin, metFORMIN.  03-   09-    Lipid Panel.  12 months..  atorvastatin.............  07-   07-    [employer] Embedded Care Due Messages. Reference number: [id number].   6/20/2024 3:41:45 PM CDT

## 2024-06-21 NOTE — TELEPHONE ENCOUNTER
Refill Routing Note   Medication(s) are not appropriate for processing by [facility] for the following reason(s):        No active prescription written by provider  Allergy or intolerance    [facility] action(s):  Defer               Appointments  past 12m or future 3m with PCP    Date Provider   Last Visit   4/29/2024 Ryan Nogueira, DO   Next Visit   Visit date not found Ryan Nogueira, DO   ED visits in past 90 days: 0        Note composed:9:29 PM 06/20/2024

## 2024-06-24 ENCOUNTER — OFFICE VISIT (OUTPATIENT)
Dept: INTERNAL MEDICINE | Facility: CLINIC | Age: 74
End: 2024-06-24
Payer: MEDICARE

## 2024-06-24 ENCOUNTER — LAB VISIT (OUTPATIENT)
Dept: LAB | Facility: OTHER | Age: 74
End: 2024-06-24
Payer: MEDICARE

## 2024-06-24 VITALS
DIASTOLIC BLOOD PRESSURE: 42 MMHG | WEIGHT: 155 LBS | BODY MASS INDEX: 22.19 KG/M2 | HEIGHT: 70 IN | SYSTOLIC BLOOD PRESSURE: 132 MMHG | OXYGEN SATURATION: 100 % | HEART RATE: 76 BPM

## 2024-06-24 DIAGNOSIS — E78.2 MIXED HYPERLIPIDEMIA: ICD-10-CM

## 2024-06-24 DIAGNOSIS — N18.32 STAGE 3B CHRONIC KIDNEY DISEASE: ICD-10-CM

## 2024-06-24 DIAGNOSIS — N18.32 TYPE 2 DIABETES MELLITUS WITH STAGE 3B CHRONIC KIDNEY DISEASE, WITHOUT LONG-TERM CURRENT USE OF INSULIN: ICD-10-CM

## 2024-06-24 DIAGNOSIS — N18.32 TYPE 2 DIABETES MELLITUS WITH STAGE 3B CHRONIC KIDNEY DISEASE, WITHOUT LONG-TERM CURRENT USE OF INSULIN: Primary | ICD-10-CM

## 2024-06-24 DIAGNOSIS — I10 ESSENTIAL HYPERTENSION: ICD-10-CM

## 2024-06-24 DIAGNOSIS — E11.22 TYPE 2 DIABETES MELLITUS WITH STAGE 3B CHRONIC KIDNEY DISEASE, WITHOUT LONG-TERM CURRENT USE OF INSULIN: ICD-10-CM

## 2024-06-24 DIAGNOSIS — E11.22 TYPE 2 DIABETES MELLITUS WITH STAGE 3B CHRONIC KIDNEY DISEASE, WITHOUT LONG-TERM CURRENT USE OF INSULIN: Primary | ICD-10-CM

## 2024-06-24 LAB
ALBUMIN SERPL BCP-MCNC: 3.7 G/DL (ref 3.5–5.2)
ALP SERPL-CCNC: 60 U/L (ref 55–135)
ALT SERPL W/O P-5'-P-CCNC: 38 U/L (ref 10–44)
ANION GAP SERPL CALC-SCNC: 10 MMOL/L (ref 8–16)
AST SERPL-CCNC: 58 U/L (ref 10–40)
BILIRUB SERPL-MCNC: 0.9 MG/DL (ref 0.1–1)
BUN SERPL-MCNC: 15 MG/DL (ref 8–23)
CALCIUM SERPL-MCNC: 9.4 MG/DL (ref 8.7–10.5)
CHLORIDE SERPL-SCNC: 104 MMOL/L (ref 95–110)
CHOLEST SERPL-MCNC: 109 MG/DL (ref 120–199)
CHOLEST/HDLC SERPL: 2.4 {RATIO} (ref 2–5)
CO2 SERPL-SCNC: 25 MMOL/L (ref 23–29)
CREAT SERPL-MCNC: 1.3 MG/DL (ref 0.5–1.4)
EST. GFR  (NO RACE VARIABLE): 43 ML/MIN/1.73 M^2
ESTIMATED AVG GLUCOSE: 163 MG/DL (ref 68–131)
GLUCOSE SERPL-MCNC: 256 MG/DL (ref 70–110)
HBA1C MFR BLD: 7.3 % (ref 4–5.6)
HDLC SERPL-MCNC: 46 MG/DL (ref 40–75)
HDLC SERPL: 42.2 % (ref 20–50)
LDLC SERPL CALC-MCNC: 46 MG/DL (ref 63–159)
NONHDLC SERPL-MCNC: 63 MG/DL
POTASSIUM SERPL-SCNC: 4.2 MMOL/L (ref 3.5–5.1)
PROT SERPL-MCNC: 7.2 G/DL (ref 6–8.4)
SODIUM SERPL-SCNC: 139 MMOL/L (ref 136–145)
TRIGL SERPL-MCNC: 85 MG/DL (ref 30–150)

## 2024-06-24 PROCEDURE — 83036 HEMOGLOBIN GLYCOSYLATED A1C: CPT

## 2024-06-24 PROCEDURE — 3044F HG A1C LEVEL LT 7.0%: CPT | Mod: CPTII,S$GLB,,

## 2024-06-24 PROCEDURE — 1125F AMNT PAIN NOTED PAIN PRSNT: CPT | Mod: CPTII,S$GLB,,

## 2024-06-24 PROCEDURE — 3078F DIAST BP <80 MM HG: CPT | Mod: CPTII,S$GLB,,

## 2024-06-24 PROCEDURE — 3288F FALL RISK ASSESSMENT DOCD: CPT | Mod: CPTII,S$GLB,,

## 2024-06-24 PROCEDURE — 80061 LIPID PANEL: CPT

## 2024-06-24 PROCEDURE — 1159F MED LIST DOCD IN RCRD: CPT | Mod: CPTII,S$GLB,,

## 2024-06-24 PROCEDURE — 3075F SYST BP GE 130 - 139MM HG: CPT | Mod: CPTII,S$GLB,,

## 2024-06-24 PROCEDURE — 99214 OFFICE O/P EST MOD 30 MIN: CPT | Mod: S$GLB,,,

## 2024-06-24 PROCEDURE — 4010F ACE/ARB THERAPY RXD/TAKEN: CPT | Mod: CPTII,S$GLB,,

## 2024-06-24 PROCEDURE — 80053 COMPREHEN METABOLIC PANEL: CPT

## 2024-06-24 PROCEDURE — 3008F BODY MASS INDEX DOCD: CPT | Mod: CPTII,S$GLB,,

## 2024-06-24 PROCEDURE — 36415 COLL VENOUS BLD VENIPUNCTURE: CPT

## 2024-06-24 PROCEDURE — 1160F RVW MEDS BY RX/DR IN RCRD: CPT | Mod: CPTII,S$GLB,,

## 2024-06-24 PROCEDURE — 1101F PT FALLS ASSESS-DOCD LE1/YR: CPT | Mod: CPTII,S$GLB,,

## 2024-06-24 PROCEDURE — 99999 PR PBB SHADOW E&M-EST. PATIENT-LVL IV: CPT | Mod: PBBFAC,,,

## 2024-06-24 NOTE — PROGRESS NOTES
CHIEF COMPLAINT     Chief Complaint   Patient presents with    Diabetes       HPI     Meenu Guy is a 73 y.o. female who presents for DM f/u today.    PCP is Ryan Nogueira DO, patient is new to me. Pt reports medication compliance. She reports decreased appetite but stays away from sugary and high carb foods. She is active in her home doing chores and stays busy. She reports riding her stationary bike for additional exercise. BP well controlled on amlodipine, carvedilol, olmesartan. HLD controlled on atorvastatin. CKD stable.    Past Medical History:  Past Medical History:   Diagnosis Date    Diabetes mellitus, type 2     Hypertension        Home Medications:  Prior to Admission medications    Medication Sig Start Date End Date Taking? Authorizing Provider   acetaminophen (TYLENOL) 500 MG tablet Take 500 mg by mouth every 6 (six) hours as needed for Pain.   Yes Provider, Historical   amLODIPine (NORVASC) 10 MG tablet Take 1 tablet (10 mg total) by mouth once daily. 4/29/24 4/29/25 Yes Ryan Nogueira DO   atorvastatin (LIPITOR) 20 MG tablet TAKE 1 TABLET (20 MG TOTAL) BY MOUTH ONCE DAILY. 6/20/24  Yes Ryan Nogueira DO   blood sugar diagnostic Strp 1 each by Misc.(Non-Drug; Combo Route) route 3 (three) times daily. 4/11/23  Yes Lali Medley MD   blood-glucose meter kit Use as instructed 2/14/22 7/22/24 Yes Spencer Hewitt MD   carvediloL (COREG) 6.25 MG tablet TAKE 1 TABLET (6.25 MG TOTAL) BY MOUTH 2 (TWO) TIMES DAILY. 9/20/23  Yes Ryan Nogueira,    cinnamon bark (CINNAMON) 500 mg capsule Take 500 mg by mouth once daily.   Yes Provider, Historical   dapagliflozin propanediol (FARXIGA) 5 mg Tab tablet Take 2 tablets (10 mg total) by mouth once daily. 3/15/24  Yes Ryan Nogueira DO   diclofenac sodium (VOLTAREN) 1 % Gel Apply 2 g topically once daily. 4/4/24  Yes Kateryna Gloria PA-C   fluticasone propionate (FLONASE) 50 mcg/actuation nasal spray 1 spray (50 mcg  total) by Each Nostril route once daily. 10/2/23  Yes Weeks, Ryan VELEZ, DO   gabapentin (NEURONTIN) 300 MG capsule Take 1 capsule (300 mg total) by mouth 3 (three) times daily. 8/15/23  Yes Kaity Awad MD   lancets Misc 1 each by Misc.(Non-Drug; Combo Route) route 3 (three) times daily. 4/18/19  Yes Gustavo Wallace MD   metFORMIN (GLUCOPHAGE-XR) 500 MG ER 24hr tablet Take 2 tablets (1,000 mg total) by mouth daily with breakfast. 3/15/24  Yes Weeks, Ryan VELEZ, DO   montelukast (SINGULAIR) 10 mg tablet TAKE 1 TABLET (10 MG TOTAL) BY MOUTH EVERY EVENING. 5/28/24  Yes Weeks, Ryan VELEZ, DO   multivitamin with minerals tablet Take 1 tablet by mouth once daily.   Yes Provider, Historical   olmesartan (BENICAR) 20 MG tablet Take 1 tablet (20 mg total) by mouth once daily. 3/15/24 3/15/25 Yes Weeks, Ryan VELEZ,    omeprazole (PRILOSEC) 20 MG capsule TAKE 1 CAPSULE (20 MG TOTAL) BY MOUTH DAILY AS NEEDED. 4/30/24  Yes Weeks, Ryan VELEZ,    pregabalin (LYRICA) 50 MG capsule Take 1 capsule (50 mg total) by mouth 3 (three) times daily. 11/28/23 6/14/24  Susan Hurley MD       Review of Systems:  Review of Systems   Constitutional: Negative.    Respiratory: Negative.     Cardiovascular: Negative.        Health Maintainence:   Immunizations:  Health Maintenance         Date Due Completion Date    Pneumococcal Vaccines (Age 65+) (2 of 2 - PPSV23 or PCV20) 09/07/2020 7/13/2020    COVID-19 Vaccine (7 - 2023-24 season) 05/19/2024 1/19/2024    Diabetes Urine Screening 07/21/2024 7/21/2023    Lipid Panel 07/21/2024 7/21/2023    Mammogram 07/24/2024 7/24/2023    Hemoglobin A1c 09/15/2024 3/15/2024    Foot Exam 10/02/2024 10/2/2023 (Done)    Override on 10/2/2023: Done    Override on 9/26/2019: Done    Eye Exam 10/09/2024 10/9/2023    Override on 9/1/2022: Done    DEXA Scan 08/16/2025 8/16/2022    TETANUS VACCINE 04/19/2029 4/19/2019    Colorectal Cancer Screening 08/21/2030 8/21/2020    Override on 1/1/2017: Done [place]  "and had 2 polyps)             PHYSICAL EXAM     BP (!) 132/42   Pulse 76   Ht 5' 10" (1.778 m)   Wt 70.3 kg (155 lb)   SpO2 100%   BMI 22.24 kg/m²     Physical Exam  Vitals reviewed.   Constitutional:       Appearance: She is well-developed.   HENT:      Head: Normocephalic and atraumatic.   Eyes:      Conjunctiva/sclera: Conjunctivae normal.   Cardiovascular:      Rate and Rhythm: Normal rate.   Pulmonary:      Effort: Pulmonary effort is normal. No respiratory distress.   Skin:     General: Skin is warm and dry.      Findings: No rash.   Neurological:      Mental Status: She is alert and oriented to person, place, and time.      Coordination: Coordination normal.   Psychiatric:         Behavior: Behavior normal.         LABS     Lab Results   Component Value Date    HGBA1C 6.7 (H) 03/15/2024     CMP  Sodium   Date Value Ref Range Status   03/15/2024 141 136 - 145 mmol/L Final     Potassium   Date Value Ref Range Status   03/15/2024 4.2 3.5 - 5.1 mmol/L Final     Chloride   Date Value Ref Range Status   03/15/2024 104 95 - 110 mmol/L Final     CO2   Date Value Ref Range Status   03/15/2024 26 23 - 29 mmol/L Final     Glucose   Date Value Ref Range Status   03/15/2024 135 (H) 70 - 110 mg/dL Final     BUN   Date Value Ref Range Status   03/15/2024 19 8 - 23 mg/dL Final     Creatinine   Date Value Ref Range Status   03/15/2024 1.3 0.5 - 1.4 mg/dL Final     Calcium   Date Value Ref Range Status   03/15/2024 9.9 8.7 - 10.5 mg/dL Final     Total Protein   Date Value Ref Range Status   03/15/2024 7.8 6.0 - 8.4 g/dL Final     Albumin   Date Value Ref Range Status   03/15/2024 4.3 3.5 - 5.2 g/dL Final     Total Bilirubin   Date Value Ref Range Status   03/15/2024 1.0 0.1 - 1.0 mg/dL Final     Comment:     For infants and newborns, interpretation of results should be based  on gestational age, weight and in agreement with clinical  observations.    Premature Infant recommended reference ranges:  Up to 24 " hours.............<8.0 mg/dL  Up to 48 hours............<12.0 mg/dL  3-5 days..................<15.0 mg/dL  6-29 days.................<15.0 mg/dL       Alkaline Phosphatase   Date Value Ref Range Status   03/15/2024 47 (L) 55 - 135 U/L Final     AST   Date Value Ref Range Status   03/15/2024 23 10 - 40 U/L Final     ALT   Date Value Ref Range Status   03/15/2024 12 10 - 44 U/L Final     Anion Gap   Date Value Ref Range Status   03/15/2024 11 8 - 16 mmol/L Final     eGFR if    Date Value Ref Range Status   06/07/2022 43 (A) >60 mL/min/1.73 m^2 Final     eGFR if non    Date Value Ref Range Status   06/07/2022 38 (A) >60 mL/min/1.73 m^2 Final     Comment:     Calculation used to obtain the estimated glomerular filtration  rate (eGFR) is the CKD-EPI equation.        Lab Results   Component Value Date    WBC 6.70 07/21/2023    HGB 13.0 07/21/2023    HCT 40.3 07/21/2023    MCV 89 07/21/2023     07/21/2023     Lab Results   Component Value Date    CHOL 123 07/21/2023    CHOL 119 (L) 06/07/2022    CHOL 119 (L) 03/19/2021     Lab Results   Component Value Date    HDL 51 07/21/2023    HDL 43 06/07/2022    HDL 42 03/19/2021     Lab Results   Component Value Date    LDLCALC 57.0 (L) 07/21/2023    LDLCALC 57.2 (L) 06/07/2022    LDLCALC 61.8 (L) 03/19/2021     Lab Results   Component Value Date    TRIG 75 07/21/2023    TRIG 94 06/07/2022    TRIG 76 03/19/2021     Lab Results   Component Value Date    CHOLHDL 41.5 07/21/2023    CHOLHDL 36.1 06/07/2022    CHOLHDL 35.3 03/19/2021     Lab Results   Component Value Date    TSH 2.199 06/18/2021       ASSESSMENT/PLAN   1. Type 2 diabetes mellitus with stage 3b chronic kidney disease, without long-term current use of insulin  -     Hemoglobin A1C; Future; Expected date: 06/24/2024    2. Essential hypertension  -     Lipid Panel; Future; Expected date: 06/24/2024    3. Mixed hyperlipidemia  -     Lipid Panel; Future; Expected date: 06/24/2024    4.  Stage 3b chronic kidney disease  -     Comprehensive Metabolic Panel; Future; Expected date: 06/24/2024             Andrew Espinal NP   Department of Internal Medicine - [facility]  9:32 AM

## 2024-07-02 ENCOUNTER — PATIENT MESSAGE (OUTPATIENT)
Dept: ADMINISTRATIVE | Facility: OTHER | Age: 74
End: 2024-07-02
Payer: MEDICARE

## 2024-07-18 ENCOUNTER — PATIENT MESSAGE (OUTPATIENT)
Dept: INTERNAL MEDICINE | Facility: CLINIC | Age: 74
End: 2024-07-18

## 2024-07-18 ENCOUNTER — OFFICE VISIT (OUTPATIENT)
Dept: INTERNAL MEDICINE | Facility: CLINIC | Age: 74
End: 2024-07-18
Payer: MEDICARE

## 2024-07-18 VITALS
SYSTOLIC BLOOD PRESSURE: 138 MMHG | HEIGHT: 70 IN | WEIGHT: 155.19 LBS | DIASTOLIC BLOOD PRESSURE: 50 MMHG | OXYGEN SATURATION: 98 % | HEART RATE: 51 BPM | BODY MASS INDEX: 22.22 KG/M2

## 2024-07-18 DIAGNOSIS — M79.601 RIGHT ARM PAIN: ICD-10-CM

## 2024-07-18 DIAGNOSIS — J30.2 SEASONAL ALLERGIES: Primary | ICD-10-CM

## 2024-07-18 PROCEDURE — 4010F ACE/ARB THERAPY RXD/TAKEN: CPT | Mod: CPTII,S$GLB,,

## 2024-07-18 PROCEDURE — 3078F DIAST BP <80 MM HG: CPT | Mod: CPTII,S$GLB,,

## 2024-07-18 PROCEDURE — 1159F MED LIST DOCD IN RCRD: CPT | Mod: CPTII,S$GLB,,

## 2024-07-18 PROCEDURE — 1160F RVW MEDS BY RX/DR IN RCRD: CPT | Mod: CPTII,S$GLB,,

## 2024-07-18 PROCEDURE — 99213 OFFICE O/P EST LOW 20 MIN: CPT | Mod: S$GLB,,,

## 2024-07-18 PROCEDURE — 3075F SYST BP GE 130 - 139MM HG: CPT | Mod: CPTII,S$GLB,,

## 2024-07-18 PROCEDURE — 99999 PR PBB SHADOW E&M-EST. PATIENT-LVL IV: CPT | Mod: PBBFAC,,,

## 2024-07-18 PROCEDURE — 1125F AMNT PAIN NOTED PAIN PRSNT: CPT | Mod: CPTII,S$GLB,,

## 2024-07-18 PROCEDURE — 3051F HG A1C>EQUAL 7.0%<8.0%: CPT | Mod: CPTII,S$GLB,,

## 2024-07-18 NOTE — PROGRESS NOTES
CHIEF COMPLAINT     Chief Complaint   Patient presents with    Sinus Problem     On/off sinus for 3 months. Phlegm (clear). Mornings, sometimes all day. Light diarrhea. Problems with sleep.        HPI     Meenu Guy is a 74 y.o. female who presents for xxx today.    PCP is Ryan Nogueira DO, patient is known to me. Pt reports sinus congestion x 3 months. Intermittent. Denies URI symptoms, fever. She wakes up in the morning with copious amounts of mucus. Her eyes get itchy and puffy at times. She has been using azelastin, flonase, and montelukast. Instructed pt to add xyzal at night. Will f/u in 2 weeks to assess effectiveness. Will refer to ENT or allergist if no improvement.    Pt had a fall 3 months ago and injured her right arm. There was no fracture, but she still has pain. Worse with supination of the hand and flexion of the elbow. She reports weakness in that arm as well.    Past Medical History:  Past Medical History:   Diagnosis Date    Diabetes mellitus, type 2     Hypertension        Home Medications:  Prior to Admission medications    Medication Sig Start Date End Date Taking? Authorizing Provider   acetaminophen (TYLENOL) 500 MG tablet Take 500 mg by mouth every 6 (six) hours as needed for Pain.   Yes Provider, Historical   amLODIPine (NORVASC) 10 MG tablet Take 1 tablet (10 mg total) by mouth once daily. 4/29/24 4/29/25 Yes Ryan Nogueira DO   atorvastatin (LIPITOR) 20 MG tablet TAKE 1 TABLET (20 MG TOTAL) BY MOUTH ONCE DAILY. 6/20/24  Yes Ryan Nogueira DO   blood sugar diagnostic Strp 1 each by Misc.(Non-Drug; Combo Route) route 3 (three) times daily. 4/11/23  Yes Lali Medley MD   blood-glucose meter kit Use as instructed 2/14/22 7/22/24 Yes Spencer Hewitt MD   carvediloL (COREG) 6.25 MG tablet TAKE 1 TABLET (6.25 MG TOTAL) BY MOUTH 2 (TWO) TIMES DAILY. 9/20/23  Yes Ryan Nogueira DO   cinnamon bark (CINNAMON) 500 mg capsule Take 500 mg by mouth once daily.    Yes Provider, Historical   dapagliflozin propanediol (FARXIGA) 5 mg Tab tablet Take 2 tablets (10 mg total) by mouth once daily. 3/15/24  Yes Weeks, Ryan VELEZ, DO   diclofenac sodium (VOLTAREN) 1 % Gel Apply 2 g topically once daily. 4/4/24  Yes Kateryna Gloria PA-C   fluticasone propionate (FLONASE) 50 mcg/actuation nasal spray 1 spray (50 mcg total) by Each Nostril route once daily. 10/2/23  Yes Weeks, Ryan VELEZ, DO   gabapentin (NEURONTIN) 300 MG capsule Take 1 capsule (300 mg total) by mouth 3 (three) times daily. 8/15/23  Yes Kaity Awad MD   lancets Misc 1 each by Misc.(Non-Drug; Combo Route) route 3 (three) times daily. 4/18/19  Yes Gustavo Wallace MD   metFORMIN (GLUCOPHAGE-XR) 500 MG ER 24hr tablet Take 2 tablets (1,000 mg total) by mouth daily with breakfast. 3/15/24  Yes Weeks, Ryan VELEZ, DO   montelukast (SINGULAIR) 10 mg tablet TAKE 1 TABLET (10 MG TOTAL) BY MOUTH EVERY EVENING. 5/28/24  Yes Weeks, Ryan VELEZ, DO   multivitamin with minerals tablet Take 1 tablet by mouth once daily.   Yes Provider, Historical   olmesartan (BENICAR) 20 MG tablet Take 1 tablet (20 mg total) by mouth once daily. 3/15/24 3/15/25 Yes Weeks, Ryan VELEZ, DO   omeprazole (PRILOSEC) 20 MG capsule TAKE 1 CAPSULE (20 MG TOTAL) BY MOUTH DAILY AS NEEDED. 4/30/24  Yes Weeks, Ryan VELEZ, DO   pregabalin (LYRICA) 50 MG capsule Take 1 capsule (50 mg total) by mouth 3 (three) times daily. 11/28/23 7/18/24 Yes Susan Hurley MD       Review of Systems:  Review of Systems   Constitutional: Negative.    HENT:  Positive for congestion, facial swelling and postnasal drip.    Respiratory: Negative.     Cardiovascular: Negative.        Health Maintainence:   Immunizations:  Health Maintenance         Date Due Completion Date    Pneumococcal Vaccines (Age 65+) (2 of 2 - PPSV23 or PCV20) 09/07/2020 7/13/2020    COVID-19 Vaccine (7 - 2023-24 season) 05/19/2024 1/19/2024    Diabetes Urine Screening 07/21/2024 7/21/2023    Eye Exam  "10/09/2024 10/9/2023    Override on 9/1/2022: Done    Influenza Vaccine (1) 09/01/2024 9/18/2023 (Done)    Override on 9/18/2023: Done    Foot Exam 10/02/2024 10/2/2023 (Done)    Override on 10/2/2023: Done    Override on 9/26/2019: Done    Hemoglobin A1c 12/24/2024 6/24/2024    Lipid Panel 06/24/2025 6/24/2024    DEXA Scan 08/16/2025 8/16/2022    TETANUS VACCINE 04/19/2029 4/19/2019    Colorectal Cancer Screening 08/21/2030 8/21/2020    Override on 1/1/2017: Done [place] and had 2 polyps)             PHYSICAL EXAM     BP (!) 138/50 (BP Location: Left arm, Patient Position: Sitting, BP Method: Medium (Manual))   Pulse (!) 51   Ht 5' 10" (1.778 m)   Wt 70.4 kg (155 lb 3.3 oz)   SpO2 98%   BMI 22.27 kg/m²     Physical Exam  Vitals reviewed.   Constitutional:       Appearance: She is well-developed.   HENT:      Head: Normocephalic and atraumatic.   Eyes:      Conjunctiva/sclera: Conjunctivae normal.   Cardiovascular:      Rate and Rhythm: Normal rate.   Pulmonary:      Effort: Pulmonary effort is normal. No respiratory distress.   Skin:     General: Skin is warm and dry.      Findings: No rash.   Neurological:      Mental Status: She is alert and oriented to person, place, and time.      Coordination: Coordination normal.   Psychiatric:         Behavior: Behavior normal.         LABS     Lab Results   Component Value Date    HGBA1C 7.3 (H) 06/24/2024     CMP  Sodium   Date Value Ref Range Status   06/24/2024 139 136 - 145 mmol/L Final     Potassium   Date Value Ref Range Status   06/24/2024 4.2 3.5 - 5.1 mmol/L Final     Chloride   Date Value Ref Range Status   06/24/2024 104 95 - 110 mmol/L Final     CO2   Date Value Ref Range Status   06/24/2024 25 23 - 29 mmol/L Final     Glucose   Date Value Ref Range Status   06/24/2024 256 (H) 70 - 110 mg/dL Final     BUN   Date Value Ref Range Status   06/24/2024 15 8 - 23 mg/dL Final     Creatinine   Date Value Ref Range Status   06/24/2024 1.3 0.5 - 1.4 mg/dL Final "     Calcium   Date Value Ref Range Status   06/24/2024 9.4 8.7 - 10.5 mg/dL Final     Total Protein   Date Value Ref Range Status   06/24/2024 7.2 6.0 - 8.4 g/dL Final     Albumin   Date Value Ref Range Status   06/24/2024 3.7 3.5 - 5.2 g/dL Final     Total Bilirubin   Date Value Ref Range Status   06/24/2024 0.9 0.1 - 1.0 mg/dL Final     Comment:     For infants and newborns, interpretation of results should be based  on gestational age, weight and in agreement with clinical  observations.    Premature Infant recommended reference ranges:  Up to 24 hours.............<8.0 mg/dL  Up to 48 hours............<12.0 mg/dL  3-5 days..................<15.0 mg/dL  6-29 days.................<15.0 mg/dL       Alkaline Phosphatase   Date Value Ref Range Status   06/24/2024 60 55 - 135 U/L Final     AST   Date Value Ref Range Status   06/24/2024 58 (H) 10 - 40 U/L Final     ALT   Date Value Ref Range Status   06/24/2024 38 10 - 44 U/L Final     Anion Gap   Date Value Ref Range Status   06/24/2024 10 8 - 16 mmol/L Final     eGFR if    Date Value Ref Range Status   06/07/2022 43 (A) >60 mL/min/1.73 m^2 Final     eGFR if non    Date Value Ref Range Status   06/07/2022 38 (A) >60 mL/min/1.73 m^2 Final     Comment:     Calculation used to obtain the estimated glomerular filtration  rate (eGFR) is the CKD-EPI equation.        Lab Results   Component Value Date    WBC 6.70 07/21/2023    HGB 13.0 07/21/2023    HCT 40.3 07/21/2023    MCV 89 07/21/2023     07/21/2023     Lab Results   Component Value Date    CHOL 109 (L) 06/24/2024    CHOL 123 07/21/2023    CHOL 119 (L) 06/07/2022     Lab Results   Component Value Date    HDL 46 06/24/2024    HDL 51 07/21/2023    HDL 43 06/07/2022     Lab Results   Component Value Date    LDLCALC 46.0 (L) 06/24/2024    LDLCALC 57.0 (L) 07/21/2023    LDLCALC 57.2 (L) 06/07/2022     Lab Results   Component Value Date    TRIG 85 06/24/2024    TRIG 75 07/21/2023    TRIG  94 06/07/2022     Lab Results   Component Value Date    CHOLHDL 42.2 06/24/2024    CHOLHDL 41.5 07/21/2023    CHOLHDL 36.1 06/07/2022     Lab Results   Component Value Date    TSH 2.199 06/18/2021       ASSESSMENT/PLAN   1. Seasonal allergies    2. Right arm pain  -     Ambulatory referral/consult to Orthopedics; Future; Expected date: 07/25/2024             Andrew Espinal NP   Department of Internal Medicine - [facility]  10:20 AM   n/a

## 2024-07-26 ENCOUNTER — HOSPITAL ENCOUNTER (OUTPATIENT)
Dept: RADIOLOGY | Facility: OTHER | Age: 74
Discharge: HOME | End: 2024-07-26
Attending: FAMILY MEDICINE
Payer: MEDICARE

## 2024-07-26 DIAGNOSIS — Z12.31 ENCOUNTER FOR SCREENING MAMMOGRAM FOR MALIGNANT NEOPLASM OF BREAST: ICD-10-CM

## 2024-07-26 PROCEDURE — 77067 SCR MAMMO BI INCL CAD: CPT | Mod: 26,,, | Performed by: RADIOLOGY

## 2024-07-26 PROCEDURE — 77063 BREAST TOMOSYNTHESIS BI: CPT | Mod: 26,,, | Performed by: RADIOLOGY

## 2024-07-26 PROCEDURE — 77067 SCR MAMMO BI INCL CAD: CPT | Mod: TC

## 2024-07-29 ENCOUNTER — TELEPHONE (OUTPATIENT)
Dept: INTERNAL MEDICINE | Facility: CLINIC | Age: 74
End: 2024-07-29
Payer: MEDICARE

## 2024-07-29 NOTE — TELEPHONE ENCOUNTER
----- Message from Dilshad Benedict sent at 7/29/2024  1:17 PM CDT -----  Name of Who is Calling:LOUIE MASON [[id number]]                   What is the request in detail:PT wants a call back she has been trying to get with the dr for a month now and has gotten no help and she needs to speak with anyone in your office please assist                   Can the clinic reply by [facility]: No                   What Number to Call Back if not in [email or web address]: [phone]   Negative

## 2024-07-29 NOTE — TELEPHONE ENCOUNTER
Spoke with patient and she stated she's been trying to get in touch with Caty Sifuentes. Informed patient I've sent a secure chat below:      Hello  5 mins  This patient states she's been trying to get in touch with to get her Farxiga for free. Sunday she'll be taking her last tablet.    3 mins    9 mins  DS  Caty Sifuentes  Hi, I will give her a call, thank you    1 min  ok Thanks so much! :)     Patient informed I'll forward message to myself to stay on top of it.    Patient said she'll call me back.

## 2024-07-30 ENCOUNTER — TELEPHONE (OUTPATIENT)
Dept: PHARMACY | Facility: CLINIC | Age: 74
End: 2024-07-30
Payer: MEDICARE

## 2024-07-30 NOTE — TELEPHONE ENCOUNTER
Hello,       A Patient Assistance Application for Meenu Guy - MRN  [id number] was faxed to your office [phone] Please have Ryan Nogueira DO review the application to ensure the prescription is correct. If correct, sign and fax the application back to the Pharmacy Patient Assistance Team [phone].     Please do not write any corrections on this application.  If changes are needed,let me know ASAP and the corrected application will be re-faxed to your office for Provider signature.        Caty Sifuentes  Pharmacy Patient Assistance  [address] [place] [address]  [id number] [place] [address]  Fax: [phone]  Email: [email or web address]

## 2024-07-31 DIAGNOSIS — E11.9 TYPE 2 DIABETES MELLITUS WITHOUT COMPLICATION: ICD-10-CM

## 2024-08-03 NOTE — PROGRESS NOTES
Your mammogram is normal and you need to repeat in 1 year. Let us know if any questions.     Cindy Hewitt and Dr. Nogueira

## 2024-08-05 DIAGNOSIS — N18.32 TYPE 2 DIABETES MELLITUS WITH STAGE 3B CHRONIC KIDNEY DISEASE, WITHOUT LONG-TERM CURRENT USE OF INSULIN: Primary | ICD-10-CM

## 2024-08-05 DIAGNOSIS — E11.22 TYPE 2 DIABETES MELLITUS WITH STAGE 3B CHRONIC KIDNEY DISEASE, WITHOUT LONG-TERM CURRENT USE OF INSULIN: Primary | ICD-10-CM

## 2024-08-05 RX ORDER — DAPAGLIFLOZIN 10 MG/1
10 TABLET, FILM COATED ORAL DAILY
Qty: 90 TABLET | Refills: 3 | Status: SHIPPED | OUTPATIENT
Start: 2024-08-05

## 2024-08-22 ENCOUNTER — TELEPHONE (OUTPATIENT)
Dept: INTERNAL MEDICINE | Facility: CLINIC | Age: 74
End: 2024-08-22
Payer: MEDICARE

## 2024-08-22 NOTE — TELEPHONE ENCOUNTER
" Patient contacted office in regards to her " dapagliflozin propanediol (FARXIGA) 10 mg tablet". Stated that "Caty Sifuentes" was supposed to  fax over paperwork for PCP to sign in regards to her medication. She's been out for 2 weeks. Contacted Caty in regards to this.  "

## 2024-08-22 NOTE — TELEPHONE ENCOUNTER
----- Message from Dilshad Benedict sent at 8/22/2024  3:41 PM CDT -----  Name of Who is Calling:LOUIE MASON [[id number]]                   What is the request in detail:PT wants a call back she is out of her meds and wants to know when you will call them in please assist                    Can the clinic reply by [facility]: No                   What Number to Call Back if not in [facility]: [phone]

## 2024-08-27 ENCOUNTER — OFFICE VISIT (OUTPATIENT)
Dept: ORTHOPEDICS | Facility: CLINIC | Age: 74
End: 2024-08-27
Payer: MEDICARE

## 2024-08-27 VITALS — WEIGHT: 155.19 LBS | HEIGHT: 70 IN | BODY MASS INDEX: 22.22 KG/M2

## 2024-08-27 DIAGNOSIS — M25.511 CHRONIC RIGHT SHOULDER PAIN: Primary | ICD-10-CM

## 2024-08-27 DIAGNOSIS — G89.29 CHRONIC RIGHT SHOULDER PAIN: Primary | ICD-10-CM

## 2024-08-27 DIAGNOSIS — M79.601 RIGHT ARM PAIN: ICD-10-CM

## 2024-08-27 PROCEDURE — 99999 PR PBB SHADOW E&M-EST. PATIENT-LVL III: CPT | Mod: PBBFAC,,, | Performed by: ORTHOPAEDIC SURGERY

## 2024-08-27 RX ADMIN — TRIAMCINOLONE ACETONIDE 80 MG: 40 INJECTION, SUSPENSION INTRA-ARTICULAR; INTRAMUSCULAR at 03:08

## 2024-08-27 NOTE — PROGRESS NOTES
Pt has right shoulder Rotator cuff athropathy- very limited motion, very painful    Plan for injection, pt states she had done PT  Pt's spouse just went through [facility]- discussed this would be [facility]

## 2024-08-27 NOTE — PROCEDURES
Large Joint Aspiration/Injection: R subacromial bursa    Date/Time: 8/27/2024 3:00 PM    Performed by: Genesis Huang MD  Authorized by: Genesis Huang MD    Consent Done?:  Yes (Verbal)  Indications:  Arthritis  Timeout: prior to procedure the correct patient, procedure, and site was verified      Local anesthesia used?: Yes    Anesthesia:  Local infiltration  Local anesthetic:  Lidocaine 1% without epinephrine    Details:  Needle Size:  22 G  Approach:  Posterior  Location:  Shoulder  Site:  R subacromial bursa  Medications:  80 mg triamcinolone acetonide 40 mg/mL

## 2024-08-27 NOTE — PROGRESS NOTES
[facility]  History & Physical  Orthopedics    SUBJECTIVE:        Chief Complaint: Right shoulder and elbow pain    Referring Provider: Andrew Espinal, *     History of Present Illness:  Patient is a 74 y.o. right hand dominant female who presents today with complaints of right shoulder, and hand pain.     Right carpal tunnel surgery in 2023    The patient is a/an retired.    Onset of symptoms/DOI was 1 year ago in the shoulder, and hand pain 3-4 months    Symptoms are aggravated by activity and movement.    Symptoms are alleviated by  nothing .    Symptoms consist of pain and decreased ROM.    Attempted treatment(s) and/or interventions include activity modifications, rest, activity modification and anti-inflammatory medications.     The patient denies any fevers, chills, N/V, D/C and presents for evaluation.       Past Medical History:   Diagnosis Date    Diabetes mellitus, type 2     Hypertension      Past Surgical History:   Procedure Laterality Date    BACK SURGERY      CARPAL TUNNEL RELEASE Right 04/12/2023    Procedure: RELEASE, CARPAL TUNNEL,RIGHT;  Surgeon: Genesis Huang MD;  Location: [facility] OR;  Service: Orthopedics;  Laterality: Right;    CHOLECYSTECTOMY      DISC REMOVAL      EPIDURAL STEROID INJECTION N/A 10/30/2019    Procedure: INJECTION, STEROID, EPIDURAL, C7-T1 IL;  Surgeon: Rahul Bowie MD;  Location: [facility] PAIN MGT;  Service: Pain Management;  Laterality: N/A;    HYSTERECTOMY       Review of patient's allergies indicates:   Allergen Reactions    Statins-hmg-coa reductase inhibitors Edema     Tolerating Lipitor    Ace inhibitors Swelling     Social History     Social History Narrative    Not on file     Family History   Problem Relation Name Age of Onset    Asthma Mother Diamond salazar     Hypertension Mother Diamond salazar     Stroke Father Claude A Weaver     Coronary artery disease Father Claude A Weaver         s/p 4v CABG    Diabetes Sister Diamond         pre  diabetes    No Known Problems Brother      Hypertension Brother      Diabetes Brother      Heart disease Brother Naylor         stents    Kidney disease Brother          dialysis    Hypertension Brother      Breast cancer Neg Hx      Colon cancer Neg Hx           Current Outpatient Medications:     amLODIPine (NORVASC) 10 MG tablet, Take 1 tablet (10 mg total) by mouth once daily., Disp: 90 tablet, Rfl: 3    atorvastatin (LIPITOR) 20 MG tablet, TAKE 1 TABLET (20 MG TOTAL) BY MOUTH ONCE DAILY., Disp: 90 tablet, Rfl: 3    blood sugar diagnostic Strp, 1 each by Misc.(Non-Drug; Combo Route) route 3 (three) times daily., Disp: 200 each, Rfl: 11    carvediloL (COREG) 6.25 MG tablet, TAKE 1 TABLET (6.25 MG TOTAL) BY MOUTH 2 (TWO) TIMES DAILY., Disp: 180 tablet, Rfl: 3    cinnamon bark (CINNAMON) 500 mg capsule, Take 500 mg by mouth once daily., Disp: , Rfl:     dapagliflozin propanediol (FARXIGA) 5 mg Tab tablet, Take 2 tablets (10 mg total) by mouth once daily., Disp: 90 tablet, Rfl: 3    fluticasone propionate (FLONASE) 50 mcg/actuation nasal spray, 1 spray (50 mcg total) by Each Nostril route once daily., Disp: 16 g, Rfl: 11    gabapentin (NEURONTIN) 300 MG capsule, Take 1 capsule (300 mg total) by mouth 3 (three) times daily., Disp: 90 capsule, Rfl: 0    lancets Misc, 1 each by Misc.(Non-Drug; Combo Route) route 3 (three) times daily., Disp: 200 each, Rfl: 1    metFORMIN (GLUCOPHAGE-XR) 500 MG ER 24hr tablet, Take 2 tablets (1,000 mg total) by mouth daily with breakfast., Disp: 180 tablet, Rfl: 3    montelukast (SINGULAIR) 10 mg tablet, TAKE 1 TABLET (10 MG TOTAL) BY MOUTH EVERY EVENING., Disp: 30 tablet, Rfl: 11    multivitamin with minerals tablet, Take 1 tablet by mouth once daily., Disp: , Rfl:     olmesartan (BENICAR) 20 MG tablet, Take 1 tablet (20 mg total) by mouth once daily., Disp: 90 tablet, Rfl: 3    omeprazole (PRILOSEC) 20 MG capsule, TAKE 1 CAPSULE (20 MG TOTAL) BY MOUTH DAILY AS NEEDED., Disp: 90  "capsule, Rfl: 3    blood-glucose meter kit, Use as instructed, Disp: 1 each, Rfl: 0    dapagliflozin propanediol (FARXIGA) 10 mg tablet, Take 1 tablet (10 mg total) by mouth once daily., Disp: 90 tablet, Rfl: 3    diclofenac sodium (VOLTAREN) 1 % Gel, Apply 2 g topically once daily., Disp: 20 g, Rfl: 0      Review of Systems:  As per HPI otherwise noncontributory    OBJECTIVE:      Vital Signs (Most Recent):  Vitals:    08/27/24 1523   Weight: 70.4 kg (155 lb 3.3 oz)   Height: 5' 10" (1.778 m)     Body mass index is 22.27 kg/m².      Physical Exam:  Constitutional: The patient appears well-developed and well-nourished. No distress.   Skin: No lesions appreciated  Head: Normocephalic and atraumatic.   Nose: Nose normal.   Ears: No deformities seen  Eyes: Conjunctivae and EOM are normal.   Neck: No tracheal deviation present.   Cardiovascular: Normal rate and intact distal pulses.    Pulmonary/Chest: Effort normal. No respiratory distress.   Abdominal: There is no guarding.   Neurological: The patient is alert.   Psychiatric: The patient has a normal mood and affect.     Right Hand/Wrist Examination:    Observation/Inspection:  Swelling  none    Deformity  none  Discoloration  none     Scars   none    Atrophy  none    HAND/WRIST EXAMINATION:  Finkelstein's Test   Neg  WHAT Test    Neg  Snuff box tenderness   Neg  Montes's Test    Neg  Hook of Hamate Tenderness  Neg  CMC grind    Mild   Circumduction test   Neg    Neurovascular Exam:  Digits WWP, brisk CR < 3s throughout  NVI motor/LTS to M/R/U nerves, radial pulse 2+  Tinel's Test - Carpal Tunnel  Neg  Tinel's Test - Cubital Tunnel  Neg  Phalen's Test    Neg  Median Nerve Compression Test Localized pain over the carpal tunnel scar    ROM hand full, painless    ROM wrist full, painless    ROM elbow full, painless    ROM of the shoulder ER 5, IR GT, , pain with AROM of the shoulder.     Abdomen not guarded  Respirations nonlabored  Perfusion intact    Diagnostic " Results:     Imaging - I independently viewed the patient's imaging as well as the radiology report.  Xrays of the patient's right shoulder show rotator cuff arthropathy. Mild osteoarthritis of the right elbow.     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 Carpal tunnel syndrome).  There is no motor axonal loss.  There is no active denervation.  This is graded as Mild in severity on the right.    No electrophysiologic evidence of cervical radiculopathy    ASSESSMENT/PLAN:      74 y.o. yo female with Right rotator cuff arthropathy     Plan: The patient and I had a thorough discussion today.  We discussed the working diagnosis as well as several other potential alternative diagnoses.  Treatment options were discussed, both conservative and surgical.  Conservative treatment options would include things such as activity modifications, workplace modifications, a period of rest, oral vs topical OTC and prescription anti-inflammatory medications, occupational therapy, splinting/bracing, immobilization, corticosteroid injections, and others.  Surgical options were discussed as well.     At this time, the patient would like to proceed with a trial of CSI in the right shoulder and continued bracing for the right wrist. Follow up in 6 weeks.     Should the patient's symptoms worsen, persist, or fail to improve they should return for reevaluation and I would be happy to see them back anytime.

## 2024-08-28 RX ORDER — TRIAMCINOLONE ACETONIDE 40 MG/ML
80 INJECTION, SUSPENSION INTRA-ARTICULAR; INTRAMUSCULAR
Status: DISCONTINUED | OUTPATIENT
Start: 2024-08-27 | End: 2024-08-28 | Stop reason: HOSPADM

## 2024-08-30 ENCOUNTER — PATIENT OUTREACH (OUTPATIENT)
Dept: ADMINISTRATIVE | Facility: HOSPITAL | Age: 74
End: 2024-08-30
Payer: MEDICARE

## 2024-08-30 DIAGNOSIS — E11.9 TYPE 2 DIABETES MELLITUS WITHOUT COMPLICATION, WITHOUT LONG-TERM CURRENT USE OF INSULIN: Primary | ICD-10-CM

## 2024-09-09 ENCOUNTER — PATIENT MESSAGE (OUTPATIENT)
Dept: PHARMACY | Facility: CLINIC | Age: 74
End: 2024-09-09
Payer: MEDICARE

## 2024-09-16 DIAGNOSIS — I10 ESSENTIAL HYPERTENSION: ICD-10-CM

## 2024-09-16 NOTE — TELEPHONE ENCOUNTER
No care due was identified.  [facility] Embedded Care Due Messages. Reference number: [id number].   9/16/2024 12:21:13 PM CDT

## 2024-09-17 RX ORDER — CARVEDILOL 6.25 MG/1
6.25 TABLET ORAL 2 TIMES DAILY
Qty: 180 TABLET | Refills: 2 | Status: SHIPPED | OUTPATIENT
Start: 2024-09-17

## 2024-09-17 NOTE — TELEPHONE ENCOUNTER
Refill Decision Note   Meenu Guy  is requesting a refill authorization.  Brief Assessment and Rationale for Refill:  Approve     Medication Therapy Plan:         Comments:     Note composed:2:23 PM 09/17/2024

## 2024-09-21 ENCOUNTER — OFFICE VISIT (OUTPATIENT)
Dept: URGENT CARE | Facility: CLINIC | Age: 74
End: 2024-09-21
Payer: MEDICARE

## 2024-09-21 VITALS
WEIGHT: 155.19 LBS | SYSTOLIC BLOOD PRESSURE: 137 MMHG | BODY MASS INDEX: 22.22 KG/M2 | OXYGEN SATURATION: 98 % | HEART RATE: 57 BPM | RESPIRATION RATE: 18 BRPM | TEMPERATURE: 98 F | HEIGHT: 70 IN | DIASTOLIC BLOOD PRESSURE: 60 MMHG

## 2024-09-21 DIAGNOSIS — B96.89 ACUTE BACTERIAL SINUSITIS: Primary | ICD-10-CM

## 2024-09-21 DIAGNOSIS — J01.90 ACUTE BACTERIAL SINUSITIS: Primary | ICD-10-CM

## 2024-09-21 DIAGNOSIS — R05.9 COUGH, UNSPECIFIED TYPE: ICD-10-CM

## 2024-09-21 LAB
CTP QC/QA: YES
SARS-COV-2 AG RESP QL IA.RAPID: NEGATIVE

## 2024-09-21 PROCEDURE — 87811 SARS-COV-2 COVID19 W/OPTIC: CPT | Mod: QW,S$GLB,, | Performed by: NURSE PRACTITIONER

## 2024-09-21 PROCEDURE — 99214 OFFICE O/P EST MOD 30 MIN: CPT | Mod: S$GLB,,, | Performed by: NURSE PRACTITIONER

## 2024-09-21 RX ORDER — BENZONATATE 200 MG/1
200 CAPSULE ORAL 3 TIMES DAILY PRN
Qty: 30 CAPSULE | Refills: 0 | Status: SHIPPED | OUTPATIENT
Start: 2024-09-21 | End: 2024-10-01

## 2024-09-21 RX ORDER — AMOXICILLIN AND CLAVULANATE POTASSIUM 875; 125 MG/1; MG/1
1 TABLET, FILM COATED ORAL 2 TIMES DAILY
Qty: 14 TABLET | Refills: 0 | Status: SHIPPED | OUTPATIENT
Start: 2024-09-21 | End: 2024-09-28

## 2024-09-21 NOTE — PROGRESS NOTES
"Subjective:      Patient ID: Meenu Guy is a 74 y.o. female.    Vitals:  height is 5' 10" (1.778 m) and weight is 70.4 kg (155 lb 3.3 oz). Her oral temperature is 97.6 °F (36.4 °C). Her blood pressure is 137/60 and her pulse is 57 (abnormal). Her respiration is 18 and oxygen saturation is 98%.     Chief Complaint: Sinus Problem    Pt is a 75 yo female w/ c/o congestion, malaise, sinus pressure behind eyes, diarrhea, cough (productive). Sx began over a week ago. Tx w/ coricidin, tussin, flonase, and claritin with no relief. Denies fever. No known sick contacts.       Sinus Problem  This is a new problem. The current episode started 1 to 4 weeks ago. There has been no fever. Associated symptoms include congestion, coughing, sinus pressure and a sore throat. Treatments tried: coricidin, tussin, flonase, clartitin. The treatment provided no relief.       HENT:  Positive for congestion, sinus pressure and sore throat.    Respiratory:  Positive for cough.       Objective:     Physical Exam   Constitutional: She is oriented to person, place, and time. She appears well-developed. She is cooperative.  Non-toxic appearance. She does not appear ill. No distress.   HENT:   Head: Normocephalic and atraumatic.   Ears:   Right Ear: Hearing, tympanic membrane, external ear and ear canal normal.   Left Ear: Hearing, tympanic membrane, external ear and ear canal normal.   Nose: Mucosal edema present. No rhinorrhea or nasal deformity. No epistaxis. Right sinus exhibits no maxillary sinus tenderness and no frontal sinus tenderness. Left sinus exhibits no maxillary sinus tenderness and no frontal sinus tenderness.   Mouth/Throat: Uvula is midline and mucous membranes are normal. No trismus in the jaw. Normal dentition. No uvula swelling. Posterior oropharyngeal erythema present. No oropharyngeal exudate or posterior oropharyngeal edema.   Eyes: Conjunctivae and lids are normal. No scleral icterus.   Neck: Trachea normal and " phonation normal. Neck supple. No edema present. No erythema present. No neck rigidity present.   Cardiovascular: Normal rate, regular rhythm, normal heart sounds and normal pulses.   Pulmonary/Chest: Effort normal and breath sounds normal. No respiratory distress. She has no decreased breath sounds. She has no rhonchi.   Abdominal: Normal appearance.   Musculoskeletal: Normal range of motion.         General: No deformity. Normal range of motion.   Neurological: She is alert and oriented to person, place, and time. She exhibits normal muscle tone. Coordination normal.   Skin: Skin is warm, dry, intact, not diaphoretic and not pale.   Psychiatric: Her speech is normal and behavior is normal. Judgment and thought content normal.   Nursing note and vitals reviewed.    Results for orders placed or performed in visit on 09/21/24   SARS Coronavirus 2 Antigen, POCT Manual Read   Result Value Ref Range    SARS Coronavirus 2 Antigen Negative Negative     Acceptable Yes        Assessment:     1. Acute bacterial sinusitis    2. Cough, unspecified type        Plan:       Acute bacterial sinusitis  -     amoxicillin-clavulanate 875-125mg (AUGMENTIN) 875-125 mg per tablet; Take 1 tablet by mouth 2 (two) times daily. for 7 days  Dispense: 14 tablet; Refill: 0    Cough, unspecified type  -     SARS Coronavirus 2 Antigen, POCT Manual Read  -     benzonatate (TESSALON) 200 MG capsule; Take 1 capsule (200 mg total) by mouth 3 (three) times daily as needed for Cough.  Dispense: 30 capsule; Refill: 0      Patient Instructions   - You must understand that you have received an Urgent Care treatment only and that you may be released before all of your medical problems are known or treated.   - You, the patient, will arrange for follow up care as instructed.   - If your condition worsens or fails to improve we recommend that you receive another evaluation at the ER immediately or contact your PCP to discuss your concerns.    - You can call [phone] or [phone] to help schedule an appointment with the appropriate provider.    Drink plenty of fluids   Get lots of rest  Tylenol or ibuprofen for pain/fever  Mucinex DM for cough  Saline nasal rinses to irrigate sinus cavities  Warm salt water gargles for sore throat

## 2024-09-21 NOTE — PATIENT INSTRUCTIONS
- You must understand that you have received an Urgent Care treatment only and that you may be released before all of your medical problems are known or treated.   - You, the patient, will arrange for follow up care as instructed.   - If your condition worsens or fails to improve we recommend that you receive another evaluation at the ER immediately or contact your PCP to discuss your concerns.   - You can call [phone] or [phone] to help schedule an appointment with the appropriate provider.    Drink plenty of fluids   Get lots of rest  Tylenol or ibuprofen for pain/fever  Mucinex DM for cough  Saline nasal rinses to irrigate sinus cavities  Warm salt water gargles for sore throat

## 2024-10-07 ENCOUNTER — OFFICE VISIT (OUTPATIENT)
Dept: INTERNAL MEDICINE | Facility: CLINIC | Age: 74
End: 2024-10-07
Payer: MEDICARE

## 2024-10-07 VITALS
OXYGEN SATURATION: 96 % | WEIGHT: 153.88 LBS | HEART RATE: 52 BPM | HEIGHT: 70 IN | DIASTOLIC BLOOD PRESSURE: 58 MMHG | BODY MASS INDEX: 22.03 KG/M2 | SYSTOLIC BLOOD PRESSURE: 134 MMHG

## 2024-10-07 DIAGNOSIS — G93.31 POST VIRAL SYNDROME: Primary | ICD-10-CM

## 2024-10-07 PROCEDURE — 99999 PR PBB SHADOW E&M-EST. PATIENT-LVL III: CPT | Mod: PBBFAC,,,

## 2024-10-07 PROCEDURE — 1126F AMNT PAIN NOTED NONE PRSNT: CPT | Mod: CPTII,S$GLB,,

## 2024-10-07 PROCEDURE — 3078F DIAST BP <80 MM HG: CPT | Mod: CPTII,S$GLB,,

## 2024-10-07 PROCEDURE — 3051F HG A1C>EQUAL 7.0%<8.0%: CPT | Mod: CPTII,S$GLB,,

## 2024-10-07 PROCEDURE — 4010F ACE/ARB THERAPY RXD/TAKEN: CPT | Mod: CPTII,S$GLB,,

## 2024-10-07 PROCEDURE — 3075F SYST BP GE 130 - 139MM HG: CPT | Mod: CPTII,S$GLB,,

## 2024-10-07 PROCEDURE — 1159F MED LIST DOCD IN RCRD: CPT | Mod: CPTII,S$GLB,,

## 2024-10-07 PROCEDURE — 99213 OFFICE O/P EST LOW 20 MIN: CPT | Mod: S$GLB,,,

## 2024-10-07 PROCEDURE — 1101F PT FALLS ASSESS-DOCD LE1/YR: CPT | Mod: CPTII,S$GLB,,

## 2024-10-07 PROCEDURE — 3288F FALL RISK ASSESSMENT DOCD: CPT | Mod: CPTII,S$GLB,,

## 2024-10-07 NOTE — PROGRESS NOTES
CHIEF COMPLAINT     Chief Complaint   Patient presents with    Nasal Congestion       HPI     Meenu Guy is a 74 y.o. female who presents for xxx today.    PCP is Ryan Nogueira DO, patient is known to me. Pt consents to AI recording of visit for documentation purposes.     History of Present Illness    CHIEF COMPLAINT:  Meenu presents today for congestion.    HISTORY OF PRESENT ILLNESS:  She reports experiencing congestion that started about a week ago, initially seeking treatment at urgent care where she was prescribed a 7-day course of antibiotics (Augmentin). This provided temporary relief, but symptoms returned on Saturday. She describes clear, white mucus and thick saliva, feeling congested in her sinuses and mouth. She denies frequent nose blowing. She also reports diarrhea since the onset of congestion, describing watery stools after eating. She denies shortness of breath or wheezing. She tested negative for COVID-19.    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    MEDICATIONS:  She completed the prescribed course of antibiotics. She is currently taking Claritin daily and using Flonase nasal spray, but reports that Claritin is not providing significant relief for her symptoms.    WORK CONCERNS:  She requests a note for return to work. She expresses concern about exposure to children with RSV at her workplace, but feels well enough to resume work duties despite some lingering post-viral symptoms. She inquires about the appropriateness of wearing a mask upon return.    HEALTH PRACTICES:  She expresses interest in boosting her immune system and inquires about potential supplements or interventions to improve her immune function.      ROS:  General: -fever, -chills, -fatigue, -weight gain, -weight loss  Eyes: -vision changes, -redness, -discharge  ENT: -ear pain, +nasal congestion,  -sore throat  Cardiovascular: -chest pain, -palpitations, -lower extremity edema  Respiratory: -cough, -shortness of breath, -wheezing  Gastrointestinal: -abdominal pain, -nausea, -vomiting, +diarrhea, -constipation, -blood in stool  Genitourinary: -dysuria, -hematuria, -frequency  Musculoskeletal: -joint pain, -muscle pain  Skin: -rash, -lesion  Neurological: -headache, -dizziness, -numbness, -tingling  Psychiatric: -anxiety, -depression, -sleep difficulty          Past Medical History:  Past Medical History:   Diagnosis Date    Diabetes mellitus, type 2     Hypertension        Home Medications:  Prior to Admission medications    Medication Sig Start Date End Date Taking? Authorizing Provider   amLODIPine (NORVASC) 10 MG tablet Take 1 tablet (10 mg total) by mouth once daily. 4/29/24 4/29/25 Yes Weeks, Ryan VELEZ, DO   atorvastatin (LIPITOR) 20 MG tablet TAKE 1 TABLET (20 MG TOTAL) BY MOUTH ONCE DAILY. 6/20/24  Yes Mirlande, Ryan VELEZ, DO   blood sugar diagnostic Strp 1 each by Misc.(Non-Drug; Combo Route) route 3 (three) times daily. 4/11/23  Yes Lali Medley MD   blood-glucose meter kit Use as instructed 2/14/22 10/7/24 Yes Spencer Hewitt MD   carvediloL (COREG) 6.25 MG tablet TAKE 1 TABLET (6.25 MG TOTAL) BY MOUTH 2 (TWO) TIMES DAILY. 9/17/24  Yes Mirlande, Ryan VELEZ, DO   cinnamon bark (CINNAMON) 500 mg capsule Take 500 mg by mouth once daily.   Yes Provider, Historical   dapagliflozin propanediol (FARXIGA) 5 mg Tab tablet Take 2 tablets (10 mg total) by mouth once daily. 3/15/24  Yes Weeks, Ryan VELEZ, DO   fluticasone propionate (FLONASE) 50 mcg/actuation nasal spray 1 spray (50 mcg total) by Each Nostril route once daily. 10/2/23  Yes Weeks, Ryan VELEZ, DO   gabapentin (NEURONTIN) 300 MG capsule Take 1 capsule (300 mg total) by mouth 3 (three) times daily. 8/15/23  Yes Kaity Awad MD   lancets Misc 1 each by Misc.(Non-Drug; Combo Route) route 3 (three) times daily. 4/18/19  Yes Gustavo Wallace,  "MD   metFORMIN (GLUCOPHAGE-XR) 500 MG ER 24hr tablet Take 2 tablets (1,000 mg total) by mouth daily with breakfast. 3/15/24  Yes Weeks, Ryan VELEZ, DO   montelukast (SINGULAIR) 10 mg tablet TAKE 1 TABLET (10 MG TOTAL) BY MOUTH EVERY EVENING. 5/28/24  Yes Weeks, Ryan VELEZ, DO   multivitamin with minerals tablet Take 1 tablet by mouth once daily.   Yes Provider, Historical   olmesartan (BENICAR) 20 MG tablet Take 1 tablet (20 mg total) by mouth once daily. 3/15/24 3/15/25 Yes Weeks, Ryan VELEZ,    omeprazole (PRILOSEC) 20 MG capsule TAKE 1 CAPSULE (20 MG TOTAL) BY MOUTH DAILY AS NEEDED. 4/30/24  Yes Weeks, Ryan VELEZ, DO   dapagliflozin propanediol (FARXIGA) 10 mg tablet Take 1 tablet (10 mg total) by mouth once daily. 8/5/24 10/7/24 Yes Weeks, Ryan VELEZ, DO   diclofenac sodium (VOLTAREN) 1 % Gel Apply 2 g topically once daily. 4/4/24 10/7/24 Yes Kateryna Gloria PAILIA       Review of Systems:  Review of Systems   Constitutional: Negative.    Respiratory: Negative.  Negative for shortness of breath and wheezing.    Cardiovascular: Negative.        Health Maintainence:   Immunizations:  Health Maintenance         Date Due Completion Date    Pneumococcal Vaccines (Age 65+) (2 of 2 - PPSV23 or PCV20) 09/07/2020 7/13/2020    Diabetes Urine Screening 07/21/2024 7/21/2023    Influenza Vaccine (1) 09/01/2024 9/18/2023 (Done)    Override on 9/18/2023: Done    COVID-19 Vaccine (7 - 2024-25 season) 09/01/2024 1/19/2024    Foot Exam 10/02/2024 10/2/2023 (Done)    Override on 10/2/2023: Done    Override on 9/26/2019: Done    Eye Exam 10/09/2024 10/9/2023    Override on 9/1/2022: Done    Hemoglobin A1c 12/24/2024 6/24/2024    Lipid Panel 06/24/2025 6/24/2024    DEXA Scan 08/16/2025 8/16/2022    TETANUS VACCINE 04/19/2029 4/19/2019    Colorectal Cancer Screening 08/21/2030 8/21/2020    Override on 1/1/2017: Done ([place] and had 2 polyps)             PHYSICAL EXAM     BP (!) 134/58   Pulse (!) 52   Ht 5' 10" (1.778 " m)   Wt 69.8 kg (153 lb 14.1 oz)   SpO2 96%   BMI 22.08 kg/m²     Physical Exam  Vitals reviewed.   Constitutional:       Appearance: She is well-developed.   HENT:      Head: Normocephalic and atraumatic.   Eyes:      Conjunctiva/sclera: Conjunctivae normal.   Cardiovascular:      Rate and Rhythm: Normal rate.   Pulmonary:      Effort: Pulmonary effort is normal. No respiratory distress.      Breath sounds: Normal breath sounds.   Skin:     General: Skin is warm and dry.      Findings: No rash.   Neurological:      Mental Status: She is alert and oriented to person, place, and time.      Coordination: Coordination normal.   Psychiatric:         Behavior: Behavior normal.         LABS     Lab Results   Component Value Date    HGBA1C 7.3 (H) 06/24/2024     CMP  Sodium   Date Value Ref Range Status   06/24/2024 139 136 - 145 mmol/L Final     Potassium   Date Value Ref Range Status   06/24/2024 4.2 3.5 - 5.1 mmol/L Final     Chloride   Date Value Ref Range Status   06/24/2024 104 95 - 110 mmol/L Final     CO2   Date Value Ref Range Status   06/24/2024 25 23 - 29 mmol/L Final     Glucose   Date Value Ref Range Status   06/24/2024 256 (H) 70 - 110 mg/dL Final     BUN   Date Value Ref Range Status   06/24/2024 15 8 - 23 mg/dL Final     Creatinine   Date Value Ref Range Status   06/24/2024 1.3 0.5 - 1.4 mg/dL Final     Calcium   Date Value Ref Range Status   06/24/2024 9.4 8.7 - 10.5 mg/dL Final     Total Protein   Date Value Ref Range Status   06/24/2024 7.2 6.0 - 8.4 g/dL Final     Albumin   Date Value Ref Range Status   06/24/2024 3.7 3.5 - 5.2 g/dL Final     Total Bilirubin   Date Value Ref Range Status   06/24/2024 0.9 0.1 - 1.0 mg/dL Final     Comment:     For infants and newborns, interpretation of results should be based  on gestational age, weight and in agreement with clinical  observations.    Premature Infant recommended reference ranges:  Up to 24 hours.............<8.0 mg/dL  Up to 48  hours............<12.0 mg/dL  3-5 days..................<15.0 mg/dL  6-29 days.................<15.0 mg/dL       Alkaline Phosphatase   Date Value Ref Range Status   06/24/2024 60 55 - 135 U/L Final     AST   Date Value Ref Range Status   06/24/2024 58 (H) 10 - 40 U/L Final     ALT   Date Value Ref Range Status   06/24/2024 38 10 - 44 U/L Final     Anion Gap   Date Value Ref Range Status   06/24/2024 10 8 - 16 mmol/L Final     eGFR if    Date Value Ref Range Status   06/07/2022 43 (A) >60 mL/min/1.73 m^2 Final     eGFR if non    Date Value Ref Range Status   06/07/2022 38 (A) >60 mL/min/1.73 m^2 Final     Comment:     Calculation used to obtain the estimated glomerular filtration  rate (eGFR) is the CKD-EPI equation.        Lab Results   Component Value Date    WBC 6.70 07/21/2023    HGB 13.0 07/21/2023    HCT 40.3 07/21/2023    MCV 89 07/21/2023     07/21/2023     Lab Results   Component Value Date    CHOL 109 (L) 06/24/2024    CHOL 123 07/21/2023    CHOL 119 (L) 06/07/2022     Lab Results   Component Value Date    HDL 46 06/24/2024    HDL 51 07/21/2023    HDL 43 06/07/2022     Lab Results   Component Value Date    LDLCALC 46.0 (L) 06/24/2024    LDLCALC 57.0 (L) 07/21/2023    LDLCALC 57.2 (L) 06/07/2022     Lab Results   Component Value Date    TRIG 85 06/24/2024    TRIG 75 07/21/2023    TRIG 94 06/07/2022     Lab Results   Component Value Date    CHOLHDL 42.2 06/24/2024    CHOLHDL 41.5 07/21/2023    CHOLHDL 36.1 06/07/2022     Lab Results   Component Value Date    TSH 2.199 06/18/2021       ASSESSMENT/PLAN   Assessment & Plan    Assessed congestion as likely post-viral syndrome or bronchitis, not requiring antibiotics due to clear mucus  Considered recent antibiotic use and potential link to reported diarrhea  Determined antihistamines and nasal spray as appropriate treatment for ongoing congestion  Evaluated need for oral steroids, but decided against due to potential side  effects and lack of guaranteed efficacy  Concluded patient is past the acute phase where zinc supplementation would be beneficial    POST-VIRAL SYNDROME:  - Explained post-viral syndromes are common and can persist for weeks after initial illness.  - Discussed that clear mucus indicates low likelihood of bacterial infection.  - Educated on the importance of diet, exercise, rest, and hydration for supporting the immune system.  - Meenu to continue staying hydrated to keep secretions thin.  - Meenu to maintain a balanced diet and exercise routine to support immune system.    ANTIBIOTIC-ASSOCIATED DIARRHEA:  - Informed about potential link between recent antibiotic use and diarrhea.    MEDICATIONS/SUPPLEMENTS:  - Provided information on the potential benefits of vitamin C and zinc supplementation during cold and flu season.  - Recommend considering addition of vitamin C and zinc supplements to diet during cold and flu season for potential prevention of future illnesses.  - Continued Claritin daily.  - Started Azelastine nasal spray, use 2 times daily to help dry out mucus.    FOLLOW UP:  - Follow up if experiencing shortness of breath, coughing, fever, or if mucus turns green or brown.  - Provided note clearing patient to return to work.          Meenu was seen today for nasal congestion.    Diagnoses and all orders for this visit:    Post viral syndrome          Andrew Espinal NP   Department of Internal Medicine - [facility]  9:28 AM

## 2024-10-07 NOTE — LETTER
October 7, 2024      [facility]  [address]  [facility] [address]  Phone: [phone]  Fax: [phone]       Patient: eMenu Guy   YOB: 1950  Date of Visit: 10/07/2024    To Whom It May Concern:    Javad Guy  was at [facility] on 10/07/2024. The patient may return to work/school on 10/8/2024 without restrictions. If you have any questions or concerns, or if I can be of further assistance, please do not hesitate to contact me.    Sincerely,    Martin Garay MA

## 2024-10-16 DIAGNOSIS — E11.9 TYPE 2 DIABETES MELLITUS WITHOUT COMPLICATION, UNSPECIFIED WHETHER LONG TERM INSULIN USE: ICD-10-CM

## 2024-10-21 ENCOUNTER — TELEPHONE (OUTPATIENT)
Dept: INTERNAL MEDICINE | Facility: CLINIC | Age: 74
End: 2024-10-21

## 2024-10-21 ENCOUNTER — OFFICE VISIT (OUTPATIENT)
Dept: INTERNAL MEDICINE | Facility: CLINIC | Age: 74
End: 2024-10-21
Payer: MEDICARE

## 2024-10-21 DIAGNOSIS — Z00.00 ANNUAL PHYSICAL EXAM: ICD-10-CM

## 2024-10-21 DIAGNOSIS — R19.7 DIARRHEA, UNSPECIFIED TYPE: Primary | ICD-10-CM

## 2024-10-21 DIAGNOSIS — K21.9 GASTRO-ESOPHAGEAL REFLUX DISEASE WITHOUT ESOPHAGITIS: ICD-10-CM

## 2024-10-21 DIAGNOSIS — N18.32 TYPE 2 DIABETES MELLITUS WITH STAGE 3B CHRONIC KIDNEY DISEASE, WITHOUT LONG-TERM CURRENT USE OF INSULIN: ICD-10-CM

## 2024-10-21 DIAGNOSIS — N18.32 STAGE 3B CHRONIC KIDNEY DISEASE: ICD-10-CM

## 2024-10-21 DIAGNOSIS — G93.31 POST VIRAL SYNDROME: ICD-10-CM

## 2024-10-21 DIAGNOSIS — E11.22 TYPE 2 DIABETES MELLITUS WITH STAGE 3B CHRONIC KIDNEY DISEASE, WITHOUT LONG-TERM CURRENT USE OF INSULIN: ICD-10-CM

## 2024-10-21 PROCEDURE — 1160F RVW MEDS BY RX/DR IN RCRD: CPT | Mod: CPTII,95,, | Performed by: INTERNAL MEDICINE

## 2024-10-21 PROCEDURE — 3051F HG A1C>EQUAL 7.0%<8.0%: CPT | Mod: CPTII,95,, | Performed by: INTERNAL MEDICINE

## 2024-10-21 PROCEDURE — 4010F ACE/ARB THERAPY RXD/TAKEN: CPT | Mod: CPTII,95,, | Performed by: INTERNAL MEDICINE

## 2024-10-21 PROCEDURE — 1159F MED LIST DOCD IN RCRD: CPT | Mod: CPTII,95,, | Performed by: INTERNAL MEDICINE

## 2024-10-21 PROCEDURE — 99215 OFFICE O/P EST HI 40 MIN: CPT | Mod: 95,,, | Performed by: INTERNAL MEDICINE

## 2024-10-21 RX ORDER — ONDANSETRON 4 MG/1
4 TABLET, ORALLY DISINTEGRATING ORAL EVERY 6 HOURS PRN
Qty: 20 TABLET | Refills: 0 | Status: SHIPPED | OUTPATIENT
Start: 2024-10-21

## 2024-10-21 RX ORDER — DICYCLOMINE HYDROCHLORIDE 20 MG/1
20 TABLET ORAL
Qty: 30 TABLET | Refills: 0 | Status: SHIPPED | OUTPATIENT
Start: 2024-10-21 | End: 2024-11-20

## 2024-10-21 RX ORDER — OMEPRAZOLE 20 MG/1
20 CAPSULE, DELAYED RELEASE ORAL DAILY PRN
Qty: 30 CAPSULE | Refills: 3 | Status: SHIPPED | OUTPATIENT
Start: 2024-10-21

## 2024-10-21 NOTE — PROGRESS NOTES
The patient location is: [place]  The chief complaint leading to consultation is: Diarrhea  Visit type: Virtual visit with synchronous audio and video  Contact time spent with patient: 22 minutes  Each patient to whom he or she provides medical services by telemedicine is:  (1) informed of the relationship between the physician and patient and the respective role of any other health care provider with respect to management of the patient; and (2) notified that he or she may decline to receive medical services by telemedicine and may withdraw from such care at any time.  Subjective:       Patient ID: Meenu Guy is a 74 y.o. female who  has a past medical history of Diabetes mellitus, type 2 and Hypertension.    Chief Complaint: Diarrhea    History was obtained from the patient and supplemented through chart review  She is a patient of Ryan Nogueira DO.  Was last seen about 2 weeks ago for postviral syndrome, nasal congestion.    She joined the VV 12 minutes late.    HPI    Initially went to urgent care for sinusitis on 09/21/2024 and was prescribed Augmentin, Tessalon.  Per chart review, she also c/o diarrhea at the time, but she doesn't recall this.    She tested negative for COVID.  Did not test for flu.  She completed the course of antibiotics.     Still a little congested, spitting up some clear mucous.  Sometimes has a mild dry cough.  Has Tessalon.    Reports that diarrhea started while taking Augmentin.  Diarrhea resolved, but then recurred 2 weeks ago.  She had a visit for nasal congestion 2 weeks ago and also reported watery diarrhea after eating.    She vomitted last week.  Felt better and has been able to return to work.  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  UTD RSV vaccine.    Diarrhea recurred 2 days ago.  She had 5 watery BMs yesterday.  She had 3 soft BMs this AM, but has been slowing down.  Volume of diarrhea has decreased.  No blood, mucus.    Has some abd discomfort (mid?  She refuses to  point to the location of the pain); improves after her BM.    Has been drinking powerade, eating crackers x 2 days.  Nausea resolved.     Had sweats/chills, but temp was normal, 98. Had sweats last night?    Denies CP, SOB, KIM, lightheadedness, dizziness, palpitations.    No decreased UOP.  Denies urinary symptoms such as dysuria, hematuria, cloudy appearing urine.    Lab Results   Component Value Date    TSH 2.199 06/18/2021     H/o choley. Has solid stools though.    C scope 2017 with 2 polyps.  Repeat in 3 years.  Repeat C scope with [facility]  with polyp.  Repeat in 5 years. Also had EGD 8/2020 that was reportedly normal.    GERD:  Reports altered taste in her throat, decreased appetite.  Worse with hot sauces, chocolate candy, fatty foods.    EGD 8/2020 was reportedly normal.  Takes prilosec PRN, but not often.  Following with [facility]    DM2:    Taking metformin 1000 XR d/t loose stools, Farxiga 10. +CKD.      Not taking GLP1. Victoza was expensive.  PA for Invokana was approved, but $150.     Elevated microalbumin creatinine ratio.  Not on an ACE/ARB d/t angioedema.     Lab Results   Component Value Date/Time    HGBA1C 7.3 (H) 06/24/2024 10:02 AM    HGBA1C 6.7 (H) 03/15/2024 11:20 AM    HGBA1C 6.8 (H) 10/23/2023 10:08 AM     Lab Results   Component Value Date    KVWVMLUT41 331 03/16/2020     Microalb/Creat Ratio   Date Value Ref Range Status   07/21/2023 44.7 (H) 0.0 - 30.0 ug/mg Final     CKD 3:  H/o ACE/ARB angioedema.  Renal ultrasound:  No hydro or nephrolithiasis  Last saw Nephrology in 2021.            Not addressed today.  HTN:    No h/o CAD, arrhythmia. TTE  with normal EF, grade 2 diastolic dysfunction.    Meds:  On Coreg 6.25 b.i.d., Norvasc 10.    +DM2, but H/o ACE angioedema.  Tolerating meds well.     BP Readings from Last 3 Encounters:   10/07/24 (!) 134/58   09/21/24 137/60   07/18/24 (!) 138/50   Controlled. Cont current meds. + DM, but h/o ACE angioedema. Monitor home BP.      Aortic Atherosclerosis, HLD:    Is taking Lipitor 20 and ASA 81 daily. H/o edema from statin years ago in [place], but has been tolerating Lipitor.   FLP controlled. Cont Lipitor 20, ASA. Monitor FLP annually.   Lab Results   Component Value Date    LDLCALC 46.0 (L) 06/24/2024     The ASCVD Risk score (Flower MANUEL, et al., 2019) failed to calculate for the following reasons:    The valid total cholesterol range is 130 to 320 mg/dL    PVD:  Status post right femoral bypass around 2012 in [place].  No claudication.  Quit tobacco >15 years ago. Has Kelton horse muscle spasms.   Continue ASA, statin. Encouraged exercise, stretching.    Vitamin D deficiency: +CKD. Is taking OTC Vit D, MVI.   Lab Results   Component Value Date    IHBSWKSJ03CL 31 06/07/2022    XKCSDIDO58OR 50 10/05/2021    USGZVAYE04BD 41 08/03/2020     AR:  Chronic.  H/o recurrent epistaxis, shavon during drier weather.  Has difficulty expectorating mucous, post nasal drip in the AM. Using flonase BID, but not correctly. Has protective pillow cases against fomites.  DDx environmental, dust mites. Has Flonase.    Colon polyp:  C scope 2017 with 2 polyps.    Repeat C scope with [facility]  with polyp.  Repeat in 5 years.    Cervical DDD, FMA:  S/p dissectomy 20 years ago. Not much improvement on lyrica or gabapentin.  Follows with Pain Med for epidural.    +MERVAT:  Chronic shoulder pain.  Positive MERVAT, but negative sub serologies.  Not consistent with SLE.  Saw rheumatology; recommended follow-up with Sports Medicine/Ortho.    RUE, shoulder pain:  X-ray with bilateral OA. Improved with injection in the past.  Has gabapentin. Following with Ortho.    Review of Systems   Constitutional:  Positive for activity change. Negative for unexpected weight change.   HENT:  Negative for hearing loss, rhinorrhea and trouble swallowing.    Eyes:  Negative for discharge and visual disturbance.   Respiratory:  Negative for chest tightness and wheezing.    Cardiovascular:   Negative for chest pain and palpitations.   Gastrointestinal:  Positive for diarrhea and vomiting. Negative for blood in stool and constipation.   Endocrine: Positive for polydipsia and polyuria.   Genitourinary:  Negative for difficulty urinating, dysuria, hematuria and menstrual problem.   Musculoskeletal:  Negative for arthralgias, joint swelling and neck pain.   Neurological:  Positive for weakness and headaches.   Psychiatric/Behavioral:  Negative for confusion and dysphoric mood.        I personally reviewed Past Medical History, Past Surgical History, Social History, and Family History.    Objective:      There were no vitals filed for this visit.     Physical Exam  Constitutional:       General: She is not in acute distress.     Appearance: She is well-developed. She is not ill-appearing or diaphoretic.   HENT:      Head: Normocephalic.   Eyes:      General: No scleral icterus.        Right eye: No discharge.         Left eye: No discharge.   Pulmonary:      Effort: Pulmonary effort is normal. No respiratory distress.      Comments: No cough.  Skin:     Coloration: Skin is not pale.      Findings: No erythema.   Neurological:      Mental Status: She is alert.   Psychiatric:         Behavior: Behavior normal.         Lab Results   Component Value Date    WBC 6.70 07/21/2023    HGB 13.0 07/21/2023    HCT 40.3 07/21/2023     07/21/2023    CHOL 109 (L) 06/24/2024    TRIG 85 06/24/2024    HDL 46 06/24/2024    ALT 38 06/24/2024    AST 58 (H) 06/24/2024     06/24/2024    K 4.2 06/24/2024     06/24/2024    CREATININE 1.3 06/24/2024    BUN 15 06/24/2024    CO2 25 06/24/2024    TSH 2.199 06/18/2021    HGBA1C 7.3 (H) 06/24/2024       The ASCVD Risk score (Willard DK, et al., 2019) failed to calculate for the following reasons:    The valid total cholesterol range is 130 to 320 mg/dL    Assessment:       1. Diarrhea, unspecified type    2. Post viral syndrome    3. Gastro-esophageal reflux disease  without esophagitis    4. Type 2 diabetes mellitus with stage 3b chronic kidney disease, without long-term current use of insulin    5. Stage 3b chronic kidney disease    6. Annual physical exam      Plan:       Meenu was seen today for diarrhea.    Diagnoses and all orders for this visit:    Diarrhea, unspecified type  Comments:  Improved. Hydration, advance diet as tolerated.  Stool studies including C diff given recent abx use.  Update labs. Zofran, Bentyl p.r.n..  Orders:  -     Clostridium difficile EIA; Future  -     Stool Exam-Ova,Cysts,Parasites; Future  -     Stool culture; Future  -     WBC, Stool; Future  -     Giardia / Cryptosporidum, EIA; Future  -     Calprotectin, Stool; Future  -     ondansetron (ZOFRAN-ODT) 4 MG TbDL; Take 1 tablet (4 mg total) by mouth every 6 (six) hours as needed (Nausea).  -     Comprehensive Metabolic Panel; Future  -     CBC Auto Differential; Future  -     dicyclomine (BENTYL) 20 mg tablet; Take 1 tablet (20 mg total) by mouth before meals and at bedtime as needed (Stomach cramps).  -     TSH; Future  -     Tissue Transglutaminase Abs, IgA/IgG; Future    Post viral syndrome  Comments:  DDx AGE, viral illness, flu, COVID.    Gastro-esophageal reflux disease without esophagitis  Comments:  Possible recent flare, gastritis.  Rec taking PPI qAM for 14 days, then PRN.  Orders:  -     omeprazole (PRILOSEC) 20 MG capsule; Take 1 capsule (20 mg total) by mouth daily as needed (acid reflux).    Type 2 diabetes mellitus with stage 3b chronic kidney disease, without long-term current use of insulin  Comments:  A1C elevated; monitor. Cont current meds. Schedule follow-up with PCP.  Orders:  -     Hemoglobin A1C; Future  -     Microalbumin/Creatinine Ratio, Urine; Future    Stage 3b chronic kidney disease  Comments:  H/o HTN, DM, h/o chronic NSAID use.  Repeat CMP, urine studies. Resched f/u with Nephrology.  Cont SGLT2.  Orders:  -     Microalbumin/Creatinine Ratio, Urine; Future  -      Ambulatory referral/consult to Nephrology; Future  -     Comprehensive Metabolic Panel; Future    Annual physical exam  -     Hemoglobin A1C; Future  -     Microalbumin/Creatinine Ratio, Urine; Future  -     Comprehensive Metabolic Panel; Future  -     CBC Auto Differential; Future  -     TSH; Future    Side effects of medication(s) were discussed in detail and patient voiced understanding.  Patient will call back for any issues or complications.     I have spent a total of 55 minutes with the patient as well as reviewing the chart/medical record and placing orders on the day of the visit. This includes face to face time and non-face to face time preparing to see the patient (eg, review of tests), obtaining and/or reviewing separately obtained history, documenting clinical information in the electronic or other health record, independently interpreting results and communicating results to the patient/family/caregiver, or care coordinator.    RTC: schedule c PCP to f/u DM with A1C prior.

## 2024-10-21 NOTE — TELEPHONE ENCOUNTER
----- Message from Lali Medley MD sent at 10/21/2024  2:55 PM CDT -----  1. Please schedule labs.  Not fasting.  2. I've ordered stool studies.  Please prepare the stool collection supplies and let the patient know to come to the clinic  to pick it up.  Thank you!  3. I ordered a referral to Nephrology.  Please assist the patient with scheduling.    4.  Please CALL her.  She doesn't use the patient portal.  Thank you!

## 2024-10-21 NOTE — TELEPHONE ENCOUNTER
----- Message from Bisi sent at 10/21/2024  8:01 AM CDT -----  Regarding: Concern And Questions  Name of Who is Calling:  Patient          What is the request in detail: Patient stated she has diarrhea and would like to know is there something she can take              Can the clinic reply by [facility]: No            What Number to Call Back if not in [facility]: [phone]

## 2024-10-22 ENCOUNTER — TELEPHONE (OUTPATIENT)
Dept: INTERNAL MEDICINE | Facility: CLINIC | Age: 74
End: 2024-10-22
Payer: MEDICARE

## 2024-10-22 ENCOUNTER — LAB VISIT (OUTPATIENT)
Dept: LAB | Facility: OTHER | Age: 74
End: 2024-10-22
Attending: INTERNAL MEDICINE
Payer: MEDICARE

## 2024-10-22 DIAGNOSIS — N18.32 TYPE 2 DIABETES MELLITUS WITH STAGE 3B CHRONIC KIDNEY DISEASE, WITHOUT LONG-TERM CURRENT USE OF INSULIN: ICD-10-CM

## 2024-10-22 DIAGNOSIS — Z00.00 ANNUAL PHYSICAL EXAM: ICD-10-CM

## 2024-10-22 DIAGNOSIS — R19.7 DIARRHEA, UNSPECIFIED TYPE: ICD-10-CM

## 2024-10-22 DIAGNOSIS — E11.22 TYPE 2 DIABETES MELLITUS WITH STAGE 3B CHRONIC KIDNEY DISEASE, WITHOUT LONG-TERM CURRENT USE OF INSULIN: ICD-10-CM

## 2024-10-22 DIAGNOSIS — N18.32 STAGE 3B CHRONIC KIDNEY DISEASE: ICD-10-CM

## 2024-10-22 LAB
ALBUMIN SERPL BCP-MCNC: 3.9 G/DL (ref 3.5–5.2)
ALP SERPL-CCNC: 50 U/L (ref 40–150)
ALT SERPL W/O P-5'-P-CCNC: 19 U/L (ref 10–44)
ANION GAP SERPL CALC-SCNC: 11 MMOL/L (ref 8–16)
AST SERPL-CCNC: 28 U/L (ref 10–40)
BASOPHILS # BLD AUTO: 0.02 K/UL (ref 0–0.2)
BASOPHILS NFR BLD: 0.3 % (ref 0–1.9)
BILIRUB SERPL-MCNC: 0.9 MG/DL (ref 0.1–1)
BUN SERPL-MCNC: 14 MG/DL (ref 8–23)
CALCIUM SERPL-MCNC: 9.9 MG/DL (ref 8.7–10.5)
CHLORIDE SERPL-SCNC: 103 MMOL/L (ref 95–110)
CO2 SERPL-SCNC: 26 MMOL/L (ref 23–29)
CREAT SERPL-MCNC: 1.3 MG/DL (ref 0.5–1.4)
DIFFERENTIAL METHOD BLD: NORMAL
EOSINOPHIL # BLD AUTO: 0.2 K/UL (ref 0–0.5)
EOSINOPHIL NFR BLD: 2.8 % (ref 0–8)
ERYTHROCYTE [DISTWIDTH] IN BLOOD BY AUTOMATED COUNT: 11.9 % (ref 11.5–14.5)
EST. GFR  (NO RACE VARIABLE): 43 ML/MIN/1.73 M^2
ESTIMATED AVG GLUCOSE: 171 MG/DL (ref 68–131)
GLUCOSE SERPL-MCNC: 173 MG/DL (ref 70–110)
HBA1C MFR BLD: 7.6 % (ref 4–5.6)
HCT VFR BLD AUTO: 41.9 % (ref 37–48.5)
HGB BLD-MCNC: 13.4 G/DL (ref 12–16)
IMM GRANULOCYTES # BLD AUTO: 0.01 K/UL (ref 0–0.04)
IMM GRANULOCYTES NFR BLD AUTO: 0.1 % (ref 0–0.5)
LYMPHOCYTES # BLD AUTO: 1.7 K/UL (ref 1–4.8)
LYMPHOCYTES NFR BLD: 22.8 % (ref 18–48)
MCH RBC QN AUTO: 28.8 PG (ref 27–31)
MCHC RBC AUTO-ENTMCNC: 32 G/DL (ref 32–36)
MCV RBC AUTO: 90 FL (ref 82–98)
MONOCYTES # BLD AUTO: 0.7 K/UL (ref 0.3–1)
MONOCYTES NFR BLD: 9.1 % (ref 4–15)
NEUTROPHILS # BLD AUTO: 5 K/UL (ref 1.8–7.7)
NEUTROPHILS NFR BLD: 64.9 % (ref 38–73)
NRBC BLD-RTO: 0 /100 WBC
PLATELET # BLD AUTO: 173 K/UL (ref 150–450)
PMV BLD AUTO: 12.2 FL (ref 9.2–12.9)
POTASSIUM SERPL-SCNC: 4 MMOL/L (ref 3.5–5.1)
PROT SERPL-MCNC: 7.3 G/DL (ref 6–8.4)
RBC # BLD AUTO: 4.65 M/UL (ref 4–5.4)
SODIUM SERPL-SCNC: 140 MMOL/L (ref 136–145)
TSH SERPL DL<=0.005 MIU/L-ACNC: 1.84 UIU/ML (ref 0.4–4)
WBC # BLD AUTO: 7.62 K/UL (ref 3.9–12.7)

## 2024-10-22 PROCEDURE — 80053 COMPREHEN METABOLIC PANEL: CPT | Performed by: INTERNAL MEDICINE

## 2024-10-22 PROCEDURE — 86364 TISS TRNSGLTMNASE EA IG CLAS: CPT | Mod: 59 | Performed by: INTERNAL MEDICINE

## 2024-10-22 PROCEDURE — 85025 COMPLETE CBC W/AUTO DIFF WBC: CPT | Performed by: INTERNAL MEDICINE

## 2024-10-22 PROCEDURE — 84443 ASSAY THYROID STIM HORMONE: CPT | Performed by: INTERNAL MEDICINE

## 2024-10-22 PROCEDURE — 83036 HEMOGLOBIN GLYCOSYLATED A1C: CPT | Performed by: INTERNAL MEDICINE

## 2024-10-22 PROCEDURE — 36415 COLL VENOUS BLD VENIPUNCTURE: CPT | Performed by: INTERNAL MEDICINE

## 2024-10-22 NOTE — TELEPHONE ENCOUNTER
----- Message from Lali Medley MD sent at 10/21/2024  3:06 PM CDT -----  1. Please also schedule a visit for DM with PCP.  Thank you!

## 2024-10-24 ENCOUNTER — LAB VISIT (OUTPATIENT)
Dept: LAB | Facility: OTHER | Age: 74
End: 2024-10-24
Attending: INTERNAL MEDICINE
Payer: MEDICARE

## 2024-10-24 DIAGNOSIS — R19.7 DIARRHEA, UNSPECIFIED TYPE: ICD-10-CM

## 2024-10-24 LAB
C DIFF GDH STL QL: NEGATIVE
C DIFF TOX A+B STL QL IA: NEGATIVE
WBC #/AREA STL HPF: NORMAL /[HPF]

## 2024-10-24 PROCEDURE — 87045 FECES CULTURE AEROBIC BACT: CPT | Performed by: INTERNAL MEDICINE

## 2024-10-24 PROCEDURE — 87329 GIARDIA AG IA: CPT | Performed by: INTERNAL MEDICINE

## 2024-10-24 PROCEDURE — 89055 LEUKOCYTE ASSESSMENT FECAL: CPT | Performed by: INTERNAL MEDICINE

## 2024-10-24 PROCEDURE — 83993 ASSAY FOR CALPROTECTIN FECAL: CPT | Performed by: INTERNAL MEDICINE

## 2024-10-24 PROCEDURE — 87324 CLOSTRIDIUM AG IA: CPT | Performed by: INTERNAL MEDICINE

## 2024-10-24 PROCEDURE — 87427 SHIGA-LIKE TOXIN AG IA: CPT | Mod: 59 | Performed by: INTERNAL MEDICINE

## 2024-10-24 PROCEDURE — 87449 NOS EACH ORGANISM AG IA: CPT | Mod: 91 | Performed by: INTERNAL MEDICINE

## 2024-10-24 PROCEDURE — 87177 OVA AND PARASITES SMEARS: CPT | Performed by: INTERNAL MEDICINE

## 2024-10-24 PROCEDURE — 87046 STOOL CULTR AEROBIC BACT EA: CPT | Performed by: INTERNAL MEDICINE

## 2024-10-24 PROCEDURE — 87328 CRYPTOSPORIDIUM AG IA: CPT | Performed by: INTERNAL MEDICINE

## 2024-10-24 PROCEDURE — 87449 NOS EACH ORGANISM AG IA: CPT | Performed by: INTERNAL MEDICINE

## 2024-10-24 PROCEDURE — 87209 SMEAR COMPLEX STAIN: CPT | Performed by: INTERNAL MEDICINE

## 2024-10-25 ENCOUNTER — TELEPHONE (OUTPATIENT)
Dept: INTERNAL MEDICINE | Facility: CLINIC | Age: 74
End: 2024-10-25
Payer: MEDICARE

## 2024-10-25 DIAGNOSIS — E11.22 TYPE 2 DIABETES MELLITUS WITH STAGE 3B CHRONIC KIDNEY DISEASE, WITHOUT LONG-TERM CURRENT USE OF INSULIN: Primary | ICD-10-CM

## 2024-10-25 DIAGNOSIS — N18.32 TYPE 2 DIABETES MELLITUS WITH STAGE 3B CHRONIC KIDNEY DISEASE, WITHOUT LONG-TERM CURRENT USE OF INSULIN: Primary | ICD-10-CM

## 2024-10-25 LAB
CRYPTOSP AG STL QL IA: NEGATIVE
E COLI SXT1 STL QL IA: NEGATIVE
E COLI SXT2 STL QL IA: NEGATIVE
G LAMBLIA AG STL QL IA: NEGATIVE
TTG IGA SER-ACNC: 0.9 U/ML
TTG IGG SER-ACNC: <0.6 U/ML

## 2024-10-25 NOTE — TELEPHONE ENCOUNTER
----- Message from Lali Medley MD sent at 10/25/2024 12:47 PM CDT -----  -Her A1C is high. Please schedule a virtual visit with me to discuss diabetes.    -I ordered a referral to DM education.  Please assist the patient with scheduling.  Thank you!            -------------------------  -Will discuss results in clinic.    Diarrhea  Improved. Hydration, advance diet as tolerated. Ordered Stool studies including C diff given recent abx use. Zofran, Bentyl p.r.n..     Lytes, TSH WNL.  No KAREN, leukocytosis.  TTG neg.     DM2:  A1C persistently elevated. Cont current meds.  Consider adding GLP 1 if covered.  Schedule earlier visit with me to discuss.   Previously no-showed with DM education; reordering.  Has follow-up with PCP in 1 month.        Stage 3b chronic kidney disease  H/o HTN, DM, h/o chronic NSAID use.   Persistent, stable.  Resched f/u with Nephrology.  Cont SGLT2.

## 2024-10-25 NOTE — TELEPHONE ENCOUNTER
----- Message from Stella sent at 10/25/2024  3:54 PM CDT -----  Type:  Patient Returning Call    Who Called:     Who Left Message for Patient:     Does the patient know what this is regarding?: missed call     Best Call Back Number:   [phone]    Additional Information:

## 2024-10-25 NOTE — TELEPHONE ENCOUNTER
Left message with her spouse for Ms. Guy to call. She needs to schedule a virtual appointment with Dr. Medley to discuss her A1C, and be informed that someone will also be contacting her from diabetic education as she has been referred there,

## 2024-10-25 NOTE — TELEPHONE ENCOUNTER
Spoke with the pt got her schedule for this Monday with VV @ [id number] with Lali Medley for review of test results

## 2024-10-26 LAB — BACTERIA STL CULT: NORMAL

## 2024-10-28 ENCOUNTER — OFFICE VISIT (OUTPATIENT)
Dept: INTERNAL MEDICINE | Facility: CLINIC | Age: 74
End: 2024-10-28
Payer: MEDICARE

## 2024-10-28 DIAGNOSIS — N18.32 TYPE 2 DIABETES MELLITUS WITH STAGE 3B CHRONIC KIDNEY DISEASE, WITHOUT LONG-TERM CURRENT USE OF INSULIN: Primary | ICD-10-CM

## 2024-10-28 DIAGNOSIS — R19.7 DIARRHEA, UNSPECIFIED TYPE: ICD-10-CM

## 2024-10-28 DIAGNOSIS — E11.22 TYPE 2 DIABETES MELLITUS WITH STAGE 3B CHRONIC KIDNEY DISEASE, WITHOUT LONG-TERM CURRENT USE OF INSULIN: Primary | ICD-10-CM

## 2024-10-28 LAB — CALPROTECTIN STL-MCNT: 25.9 MCG/G

## 2024-10-28 PROCEDURE — 99214 OFFICE O/P EST MOD 30 MIN: CPT | Mod: 95,,, | Performed by: INTERNAL MEDICINE

## 2024-10-28 PROCEDURE — 1159F MED LIST DOCD IN RCRD: CPT | Mod: CPTII,95,, | Performed by: INTERNAL MEDICINE

## 2024-10-28 PROCEDURE — 1160F RVW MEDS BY RX/DR IN RCRD: CPT | Mod: CPTII,95,, | Performed by: INTERNAL MEDICINE

## 2024-10-28 PROCEDURE — 3051F HG A1C>EQUAL 7.0%<8.0%: CPT | Mod: CPTII,95,, | Performed by: INTERNAL MEDICINE

## 2024-10-28 PROCEDURE — 4010F ACE/ARB THERAPY RXD/TAKEN: CPT | Mod: CPTII,95,, | Performed by: INTERNAL MEDICINE

## 2024-10-28 PROCEDURE — G2211 COMPLEX E/M VISIT ADD ON: HCPCS | Mod: 95,,, | Performed by: INTERNAL MEDICINE

## 2024-10-28 RX ORDER — LANCETS
EACH MISCELLANEOUS
Qty: 100 EACH | Refills: 3 | Status: CANCELLED | OUTPATIENT
Start: 2024-10-28

## 2024-10-28 RX ORDER — INSULIN PUMP SYRINGE, 3 ML
EACH MISCELLANEOUS
Qty: 1 EACH | Refills: 0 | Status: CANCELLED | OUTPATIENT
Start: 2024-10-28 | End: 2025-10-28

## 2024-10-30 ENCOUNTER — TELEPHONE (OUTPATIENT)
Dept: ADMINISTRATIVE | Facility: OTHER | Age: 74
End: 2024-10-30
Payer: MEDICARE

## 2024-10-30 ENCOUNTER — TELEPHONE (OUTPATIENT)
Dept: INTERNAL MEDICINE | Facility: CLINIC | Age: 74
End: 2024-10-30
Payer: MEDICARE

## 2024-10-31 LAB — O+P STL MICRO: NORMAL

## 2024-11-08 ENCOUNTER — TELEPHONE (OUTPATIENT)
Dept: ADMINISTRATIVE | Facility: OTHER | Age: 74
End: 2024-11-08
Payer: MEDICARE

## 2024-11-08 NOTE — TELEPHONE ENCOUNTER
LM with rescheduled  My Chart message to be sent. Diabetes Education appointment of 11-18-20204, and contact number provided for any questions.

## 2024-11-25 ENCOUNTER — OFFICE VISIT (OUTPATIENT)
Dept: INTERNAL MEDICINE | Facility: CLINIC | Age: 74
End: 2024-11-25
Payer: MEDICARE

## 2024-11-25 VITALS
RESPIRATION RATE: 18 BRPM | DIASTOLIC BLOOD PRESSURE: 65 MMHG | HEART RATE: 61 BPM | OXYGEN SATURATION: 96 % | WEIGHT: 152.75 LBS | SYSTOLIC BLOOD PRESSURE: 134 MMHG | BODY MASS INDEX: 21.92 KG/M2

## 2024-11-25 DIAGNOSIS — N18.32 TYPE 2 DIABETES MELLITUS WITH STAGE 3B CHRONIC KIDNEY DISEASE, WITHOUT LONG-TERM CURRENT USE OF INSULIN: Primary | ICD-10-CM

## 2024-11-25 DIAGNOSIS — E11.22 TYPE 2 DIABETES MELLITUS WITH STAGE 3B CHRONIC KIDNEY DISEASE, WITHOUT LONG-TERM CURRENT USE OF INSULIN: Primary | ICD-10-CM

## 2024-11-25 PROCEDURE — 90653 IIV ADJUVANT VACCINE IM: CPT | Mod: S$GLB,,, | Performed by: FAMILY MEDICINE

## 2024-11-25 PROCEDURE — G0008 ADMIN INFLUENZA VIRUS VAC: HCPCS | Mod: S$GLB,,, | Performed by: FAMILY MEDICINE

## 2024-11-25 PROCEDURE — 3075F SYST BP GE 130 - 139MM HG: CPT | Mod: CPTII,S$GLB,, | Performed by: FAMILY MEDICINE

## 2024-11-25 PROCEDURE — 3051F HG A1C>EQUAL 7.0%<8.0%: CPT | Mod: CPTII,S$GLB,, | Performed by: FAMILY MEDICINE

## 2024-11-25 PROCEDURE — 1159F MED LIST DOCD IN RCRD: CPT | Mod: CPTII,S$GLB,, | Performed by: FAMILY MEDICINE

## 2024-11-25 PROCEDURE — 3078F DIAST BP <80 MM HG: CPT | Mod: CPTII,S$GLB,, | Performed by: FAMILY MEDICINE

## 2024-11-25 PROCEDURE — 99999 PR PBB SHADOW E&M-EST. PATIENT-LVL IV: CPT | Mod: PBBFAC,,, | Performed by: FAMILY MEDICINE

## 2024-11-25 PROCEDURE — 4010F ACE/ARB THERAPY RXD/TAKEN: CPT | Mod: CPTII,S$GLB,, | Performed by: FAMILY MEDICINE

## 2024-11-25 PROCEDURE — 99214 OFFICE O/P EST MOD 30 MIN: CPT | Mod: S$GLB,,, | Performed by: FAMILY MEDICINE

## 2024-11-25 PROCEDURE — 1125F AMNT PAIN NOTED PAIN PRSNT: CPT | Mod: CPTII,S$GLB,, | Performed by: FAMILY MEDICINE

## 2024-11-25 RX ORDER — PREGABALIN 50 MG/1
100 CAPSULE ORAL 2 TIMES DAILY
Qty: 120 CAPSULE | Refills: 2 | Status: CANCELLED | OUTPATIENT
Start: 2024-11-25 | End: 2025-02-23

## 2024-11-25 RX ORDER — PREGABALIN 50 MG/1
50 CAPSULE ORAL 3 TIMES DAILY
COMMUNITY
Start: 2024-08-22 | End: 2024-11-26 | Stop reason: SDUPTHER

## 2024-11-25 NOTE — PROGRESS NOTES
After obtaining consent, and per orders of Dr. Nogueira, injection of FLUAD High Dose Lot [id number] Exp 6/12/25 given in the LD by Emily Rodriguez. Patient tolerated well and band aid applied. Patient instructed to remain in clinic for 15 minutes afterwards, and to report any adverse reaction to me immediately.

## 2024-11-26 DIAGNOSIS — G95.89 MYELOMALACIA: Primary | ICD-10-CM

## 2024-11-26 RX ORDER — PREGABALIN 50 MG/1
100 CAPSULE ORAL 2 TIMES DAILY
Qty: 120 CAPSULE | Refills: 1 | Status: SHIPPED | OUTPATIENT
Start: 2024-11-26 | End: 2025-01-25

## 2024-11-26 NOTE — TELEPHONE ENCOUNTER
No care due was identified.  [facility] Embedded Care Due Messages. Reference number: [id number].   11/26/2024 11:28:27 AM CST

## 2024-11-26 NOTE — TELEPHONE ENCOUNTER
----- Message from Suzi Estrada sent at 11/26/2024 11:09 AM CST -----  Regarding: Medication  Refill  Request                Reply in [facility]: YES      Please refill the medication listed below. Please call the patient  [phone] (K)      Medication     pregabalin (LYRICA) 50 MG capsule       Preferred Pharmacy:  [address]   Phone: [phone]  Fax: [phone]

## 2024-11-26 NOTE — TELEPHONE ENCOUNTER
Refill Routing Note   Medication(s) are not appropriate for processing by [facility] for the following reason(s):        Outside of protocol    [facility] action(s):  Route             Appointments  past 12m or future 3m with PCP    Date Provider   Last Visit   11/25/2024 Ryan Nogueira DO   Next Visit   Visit date not found Ryan Nogueira, DO   ED visits in past 90 days: 0        Note composed:11:31 AM 11/26/2024

## 2024-11-30 NOTE — PROGRESS NOTES
Subjective:       Patient ID: Meenu Guy is a 74 y.o. female.    Chief Complaint: Hip Pain and Shoulder Pain    Hip Pain     Shoulder Pain       History of Present Illness    CHIEF COMPLAINT:  Meenu presents today for follow-up of diabetes.    DIABETES MANAGEMENT:  She reports a recent increase in HbA1c to 7.6, attributed to dietary indiscretion. She declined a diabetes medication referral at her last appointment, opting for dietary changes instead. She has experienced weight loss, currently weighing 152 lbs. She expresses concern about potential future insulin use but understands it may be necessary if her HbA1c remains elevated. She denies current insulin use. She acknowledges the challenges of managing diabetes but notes positive weight loss as a result.    MEDICATIONS:  She takes metformin for diabetes, reporting occasional diarrhea as a side effect, particularly if not taken before meals. She uses pregabalin 50 mg for pain management, but only when at home. She has run out of pregabalin and requests a refill. She denies any numbness or problems with her feet.    MUSCULOSKELETAL PAIN:  She reports shoulder pain due to a rotator cuff issue requiring surgery, which is affecting her sleep. She also experiences pain in her wrist, thumb, and entire hand, demonstrating pain with shoulder rotation. To manage the discomfort, she uses a wrist brace during work and while sleeping.    PREVENTIVE CARE:  She has an upcoming diabetic eye exam scheduled for January. She has not received a flu vaccine this year and has not had a COVID booster. She expresses willingness to receive both during the current visit.    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 She works 5 days a week for 4 hours a day. She discloses experiencing significant financial stress due to her 's credit card debt and his recent knee surgery, which led to the loss of his  second job.      ROS:  General: -fever, -chills, -fatigue, -weight gain, +weight loss  Eyes: -vision changes, -redness, -discharge  ENT: -ear pain, -nasal congestion, -sore throat  Cardiovascular: -chest pain, -palpitations, -lower extremity edema  Respiratory: -cough, -shortness of breath  Gastrointestinal: -abdominal pain, -nausea, -vomiting, +diarrhea, -constipation, -blood in stool  Genitourinary: -dysuria, -hematuria, -frequency  Musculoskeletal: +joint pain, -muscle pain  Skin: -rash, -lesion  Neurological: -headache, -dizziness, -numbness, -tingling  Psychiatric: -anxiety, -depression, -sleep difficulty         Diabetes Management Status    Statin: Taking  ACE/ARB: Taking    Screening or Prevention Patient's value Goal Complete/Controlled?   HgA1C Testing and Control   Lab Results   Component Value Date    HGBA1C 7.6 (H) 10/22/2024      Annually/Less than 8% Yes   Lipid profile : 06/24/2024 Annually Yes   LDL control Lab Results   Component Value Date    LDLCALC 46.0 (L) 06/24/2024    Annually/Less than 100 mg/dl  Yes   Nephropathy screening Lab Results   Component Value Date    LABMICR 60.0 07/21/2023     Lab Results   Component Value Date    PROTEINUA Negative 10/05/2021     Lab Results   Component Value Date    UTPCR 0.11 10/05/2021      Annually No   Blood pressure BP Readings from Last 1 Encounters:   11/25/24 134/65    Less than 140/90 Yes   Dilated retinal exam : 10/09/2023 Annually No   Foot exam   : 11/25/2024 Annually Yes       Tests to Keep You Healthy    Eye Exam: ORDERED BUT NOT SCHEDULED  Colon Cancer Screening: Met on 8/21/2020  Last Blood Pressure <= 139/89 (11/25/2024): Yes  Last HbA1c < 8 (10/22/2024): Yes      Social History     Social History Narrative    Not on file       Family History   Problem Relation Name Age of Onset    Asthma Mother Diamond salazar     Hypertension Mother Diamond salazar     Stroke Father Claude A Weaver     Coronary artery disease Father Claude A Weaver         s/p 4v  CABG    Diabetes Sister Diamond         pre diabetes    No Known Problems Brother      Hypertension Brother      Diabetes Brother      Heart disease Brother Naylor         stents    Kidney disease Brother          dialysis    Hypertension Brother      Breast cancer Neg Hx      Colon cancer Neg Hx         Current Outpatient Medications:     amLODIPine (NORVASC) 10 MG tablet, Take 1 tablet (10 mg total) by mouth once daily., Disp: 90 tablet, Rfl: 3    atorvastatin (LIPITOR) 20 MG tablet, TAKE 1 TABLET (20 MG TOTAL) BY MOUTH ONCE DAILY., Disp: 90 tablet, Rfl: 3    blood sugar diagnostic Strp, 1 each by Misc.(Non-Drug; Combo Route) route 3 (three) times daily., Disp: 200 each, Rfl: 11    carvediloL (COREG) 6.25 MG tablet, TAKE 1 TABLET (6.25 MG TOTAL) BY MOUTH 2 (TWO) TIMES DAILY., Disp: 180 tablet, Rfl: 2    cinnamon bark (CINNAMON) 500 mg capsule, Take 500 mg by mouth once daily., Disp: , Rfl:     dapagliflozin propanediol (FARXIGA) 5 mg Tab tablet, Take 2 tablets (10 mg total) by mouth once daily., Disp: 90 tablet, Rfl: 3    fluticasone propionate (FLONASE) 50 mcg/actuation nasal spray, 1 spray (50 mcg total) by Each Nostril route once daily., Disp: 16 g, Rfl: 11    lancets Misc, 1 each by Misc.(Non-Drug; Combo Route) route 3 (three) times daily., Disp: 200 each, Rfl: 1    metFORMIN (GLUCOPHAGE-XR) 500 MG ER 24hr tablet, Take 2 tablets (1,000 mg total) by mouth daily with breakfast., Disp: 180 tablet, Rfl: 3    montelukast (SINGULAIR) 10 mg tablet, TAKE 1 TABLET (10 MG TOTAL) BY MOUTH EVERY EVENING., Disp: 30 tablet, Rfl: 11    multivitamin with minerals tablet, Take 1 tablet by mouth once daily., Disp: , Rfl:     olmesartan (BENICAR) 20 MG tablet, Take 1 tablet (20 mg total) by mouth once daily., Disp: 90 tablet, Rfl: 3    omeprazole (PRILOSEC) 20 MG capsule, Take 1 capsule (20 mg total) by mouth daily as needed (acid reflux)., Disp: 30 capsule, Rfl: 3    blood-glucose meter kit, Use as instructed, Disp: 1 each, Rfl:  0    pregabalin (LYRICA) 50 MG capsule, Take 2 capsules (100 mg total) by mouth 2 (two) times daily., Disp: 120 capsule, Rfl: 1    Review of Systems    Objective:   /65 (BP Location: Right arm, Patient Position: Sitting)   Pulse 61   Resp 18   Wt 69.3 kg (152 lb 12.5 oz)   SpO2 96%   BMI 21.92 kg/m²      Physical Exam  Vitals reviewed.   Constitutional:       Appearance: She is well-developed.   HENT:      Head: Normocephalic and atraumatic.   Eyes:      Conjunctiva/sclera: Conjunctivae normal.   Cardiovascular:      Rate and Rhythm: Normal rate.   Pulmonary:      Effort: Pulmonary effort is normal. No respiratory distress.   Skin:     General: Skin is warm and dry.      Findings: No rash.   Neurological:      Mental Status: She is alert and oriented to person, place, and time.      Coordination: Coordination normal.   Psychiatric:         Behavior: Behavior normal.         Physical Exam             @resultssec@  Assessment & Plan   Assessment & Plan    Reviewed recent A1C of 7.6%, indicating suboptimal glycemic control  Considered patient's stress levels and dietary changes as contributing factors to elevated A1C  Evaluated shoulder pain, likely related to rotator cuff injury  Assessed current pregabalin regimen for pain management    TYPE 2 DIABETES MELLITUS:  - Explained importance of protein intake in diabetic diet.  - Discussed potential progression to insulin therapy if A1C remains elevated, emphasizing it is not a punishment but a tool to protect kidney function.  - Meenu to continue current dietary modifications, including brown bread and rice.  - Ordered A1C test for late January.    SHOULDER PAIN:  - Meenu to maintain current work schedule with accommodations for shoulder pain.    PAIN MANAGEMENT:  - Refilled pregabalin 50 mg, patient to continue current dosing of 2 tablets at a time as needed for pain.    IMMUNIZATION:  - Administered flu vaccine in office. Out of covid  vaccine    FOLLOW-UP:  - Follow up in late January for A1C test and appointment.         Problem List Items Addressed This Visit          Endocrine    Type 2 diabetes mellitus with stage 3 chronic kidney disease, without long-term current use of insulin - Primary    Relevant Orders    Microalbumin/Creatinine Ratio, Urine    Hemoglobin A1C    Comprehensive Metabolic Panel         Immunizations Administered on Date of Encounter - 11/25/2024       Name Date Dose VIS Date Route Exp Date    Influenza - Trivalent - Fluad - Adjuvanted - PF (65 years and older 11/25/2024  3:43 PM 0.5 mL 8/6/2021 Intramuscular 6/12/2025    Site: Left deltoid     Given By: Emily Rodriguez LPN     : [employer]     Lot: [id number]              No follow-ups on file.    This note was generated with the assistance of ambient listening technology. Verbal consent was obtained by the patient and accompanying visitor(s) for the recording of patient appointment to facilitate this note. I attest to having reviewed and edited the generated note for accuracy, though some syntax or spelling errors may persist. Please contact the author of this note for any clarification.      Disclaimer:  This note may have been prepared using voice recognition software, it may have not been extensively proofed, as such there could be errors within the text such as sound alike errors.

## 2024-12-10 ENCOUNTER — OFFICE VISIT (OUTPATIENT)
Dept: ORTHOPEDICS | Facility: CLINIC | Age: 74
End: 2024-12-10
Payer: MEDICARE

## 2024-12-10 DIAGNOSIS — M12.811 ROTATOR CUFF ARTHROPATHY OF RIGHT SHOULDER: Primary | ICD-10-CM

## 2024-12-10 PROCEDURE — 99499 UNLISTED E&M SERVICE: CPT | Mod: S$GLB,,, | Performed by: ORTHOPAEDIC SURGERY

## 2024-12-10 PROCEDURE — 99999 PR PBB SHADOW E&M-EST. PATIENT-LVL I: CPT | Mod: PBBFAC,,, | Performed by: ORTHOPAEDIC SURGERY

## 2024-12-10 NOTE — PROGRESS NOTES
Patient ID: Meenu Guy is a 74 y.o. female.    Chief Complaint: Pain of the Right Shoulder and Shoulder Pain    History of Present Illness              Physical Exam              Assessment & Plan               Patient left without being seen by myself she was seen by the resident and patient specifically stated that she would not undergo surgery and she was not ready for an injection but she states the injection only lasted 2 months    No follow-ups on file.    This note was generated with the assistance of ambient listening technology. Verbal consent was obtained by the patient and accompanying visitor(s) for the recording of patient appointment to facilitate this note. I attest to having reviewed and edited the generated note for accuracy, though some syntax or spelling errors may persist. Please contact the author of this note for any clarification.

## 2024-12-10 NOTE — PROGRESS NOTES
[facility]  History & Physical  Orthopedics    SUBJECTIVE:        Chief Complaint: Right shoulder and elbow pain    Referring Provider: No ref. provider found     History of Present Illness:  Patient is a 74 y.o. right hand dominant female who presents today with complaints of right shoulder, and hand pain.     Right carpal tunnel surgery in 2023    The patient is a/an retired.    Onset of symptoms/DOI was 1 year ago in the shoulder, and hand pain 3-4 months    Symptoms are aggravated by activity and movement.    Symptoms are alleviated by  nothing .    Symptoms consist of pain and decreased ROM.    Attempted treatment(s) and/or interventions include activity modifications, rest, activity modification and anti-inflammatory medications.     The patient denies any fevers, chills, N/V, D/C and presents for evaluation.    Interval HPI 12/10/24   pt states she had 2 months of relief after CSI but pain returned rather quickly. She still has very limitted ROM. Denies any therapy. She states her  is recovering from TKA and she is not ready for TSA yet until he is recovered.    Past Medical History:   Diagnosis Date    Diabetes mellitus, type 2     Hypertension      Past Surgical History:   Procedure Laterality Date    BACK SURGERY      CARPAL TUNNEL RELEASE Right 04/12/2023    Procedure: RELEASE, CARPAL TUNNEL,RIGHT;  Surgeon: Genesis Huang MD;  Location: [facility] OR;  Service: Orthopedics;  Laterality: Right;    CHOLECYSTECTOMY      DISC REMOVAL      EPIDURAL STEROID INJECTION N/A 10/30/2019    Procedure: INJECTION, STEROID, EPIDURAL, C7-T1 IL;  Surgeon: Rahul Bowie MD;  Location: [facility] PAIN MGT;  Service: Pain Management;  Laterality: N/A;    HYSTERECTOMY       Review of patient's allergies indicates:   Allergen Reactions    Statins-hmg-coa reductase inhibitors Edema     Tolerating Lipitor    Ace inhibitors Swelling     Social History     Social History Narrative    Not on file      Family History   Problem Relation Name Age of Onset    Asthma Mother Diamond salazar     Hypertension Mother Diaomnd salazar     Stroke Father Claude A Weaver     Coronary artery disease Father Claude A Weaver         s/p 4v CABG    Diabetes Sister Diamond         pre diabetes    No Known Problems Brother      Hypertension Brother      Diabetes Brother      Heart disease Brother Naylor         stents    Kidney disease Brother          dialysis    Hypertension Brother      Breast cancer Neg Hx      Colon cancer Neg Hx           Current Outpatient Medications:     amLODIPine (NORVASC) 10 MG tablet, Take 1 tablet (10 mg total) by mouth once daily., Disp: 90 tablet, Rfl: 3    atorvastatin (LIPITOR) 20 MG tablet, TAKE 1 TABLET (20 MG TOTAL) BY MOUTH ONCE DAILY., Disp: 90 tablet, Rfl: 3    blood sugar diagnostic Strp, 1 each by Misc.(Non-Drug; Combo Route) route 3 (three) times daily., Disp: 200 each, Rfl: 11    blood-glucose meter kit, Use as instructed, Disp: 1 each, Rfl: 0    carvediloL (COREG) 6.25 MG tablet, TAKE 1 TABLET (6.25 MG TOTAL) BY MOUTH 2 (TWO) TIMES DAILY., Disp: 180 tablet, Rfl: 2    cinnamon bark (CINNAMON) 500 mg capsule, Take 500 mg by mouth once daily., Disp: , Rfl:     dapagliflozin propanediol (FARXIGA) 5 mg Tab tablet, Take 2 tablets (10 mg total) by mouth once daily., Disp: 90 tablet, Rfl: 3    fluticasone propionate (FLONASE) 50 mcg/actuation nasal spray, 1 spray (50 mcg total) by Each Nostril route once daily., Disp: 16 g, Rfl: 11    lancets Misc, 1 each by Misc.(Non-Drug; Combo Route) route 3 (three) times daily., Disp: 200 each, Rfl: 1    metFORMIN (GLUCOPHAGE-XR) 500 MG ER 24hr tablet, Take 2 tablets (1,000 mg total) by mouth daily with breakfast., Disp: 180 tablet, Rfl: 3    montelukast (SINGULAIR) 10 mg tablet, TAKE 1 TABLET (10 MG TOTAL) BY MOUTH EVERY EVENING., Disp: 30 tablet, Rfl: 11    multivitamin with minerals tablet, Take 1 tablet by mouth once daily., Disp: , Rfl:     olmesartan  (BENICAR) 20 MG tablet, Take 1 tablet (20 mg total) by mouth once daily., Disp: 90 tablet, Rfl: 3    omeprazole (PRILOSEC) 20 MG capsule, Take 1 capsule (20 mg total) by mouth daily as needed (acid reflux)., Disp: 30 capsule, Rfl: 3    pregabalin (LYRICA) 50 MG capsule, Take 2 capsules (100 mg total) by mouth 2 (two) times daily., Disp: 120 capsule, Rfl: 1      Review of Systems:  As per HPI otherwise noncontributory    OBJECTIVE:      Vital Signs (Most Recent):  There were no vitals filed for this visit.    There is no height or weight on file to calculate BMI.      Physical Exam:  Constitutional: The patient appears well-developed and well-nourished. No distress.   Skin: No lesions appreciated  Head: Normocephalic and atraumatic.   Nose: Nose normal.   Ears: No deformities seen  Eyes: Conjunctivae and EOM are normal.   Neck: No tracheal deviation present.   Cardiovascular: Normal rate and intact distal pulses.    Pulmonary/Chest: Effort normal. No respiratory distress.   Abdominal: There is no guarding.   Neurological: The patient is alert.   Psychiatric: The patient has a normal mood and affect.     Right Hand/Wrist Examination:    Observation/Inspection:  Swelling  none    Deformity  none  Discoloration  none     Scars   none    Atrophy  none    HAND/WRIST EXAMINATION:  Finkelstein's Test   Neg  WHAT Test    Neg  Snuff box tenderness   Neg  Montes's Test    Neg  Hook of Hamate Tenderness  Neg  CMC grind    Mild   Circumduction test   Neg    Neurovascular Exam:  Digits WWP, brisk CR < 3s throughout  NVI motor/LTS to M/R/U nerves, radial pulse 2+  Tinel's Test - Carpal Tunnel  Neg  Tinel's Test - Cubital Tunnel  Neg  Phalen's Test    Neg  Median Nerve Compression Test Localized pain over the carpal tunnel scar    ROM hand full, painless    ROM wrist full, painless    ROM elbow full, painless    ROM of the shoulder ER 5, IR GT, FF 70, pain with AROM of the shoulder.     Abdomen not guarded  Respirations  nonlabored  Perfusion intact    Diagnostic Results:     Imaging - I independently viewed the patient's imaging as well as the radiology report.  Xrays of the patient's right shoulder show rotator cuff arthropathy. Mild osteoarthritis of the right elbow.     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 Carpal tunnel syndrome).  There is no motor axonal loss.  There is no active denervation.  This is graded as Mild in severity on the right.    No electrophysiologic evidence of cervical radiculopathy    ASSESSMENT/PLAN:      74 y.o. yo female with Right rotator cuff arthropathy     - will attempt another CSI right shoulder too  - will order PT  - f/u 6 weeks  - discussed role for rTSA if no improvement    Unfortunately the patient left prior to being seen by Dr. Huang and no interventions were ordered or performed    MD Blane Braswell/[facility]

## 2024-12-11 ENCOUNTER — TELEPHONE (OUTPATIENT)
Dept: ORTHOPEDICS | Facility: CLINIC | Age: 74
End: 2024-12-11
Payer: MEDICARE

## 2024-12-11 ENCOUNTER — PATIENT MESSAGE (OUTPATIENT)
Dept: ORTHOPEDICS | Facility: CLINIC | Age: 74
End: 2024-12-11
Payer: MEDICARE

## 2024-12-11 NOTE — TELEPHONE ENCOUNTER
Spoke c pts . He stated that his wife would be out today until 3:00pm. Advised that if she didn't hear back from me today, I would try her again tomorrow to discuss r/s appointment.  inquired if we were scheduling her back with Dr. Cano, I advised that I would need to speak with her first so that nothing is lost in translation as to what she is seeking. (Prior message pt stated that she is interested in seeking care elsewhere).         Caller is requesting a sooner appointment.  Caller declined first available appointment listed below.  Caller will not accept being placed on the waitlist and is requesting a message be sent to doctor.   Name of Caller: Pt   When is the first available appointment?   Symptoms: injection of the shoulder   Would the patient rather a call back or a response via [facility]? Call   Best Call Back Number: [phone]   Additional Information: Pt states she came for her appt on today but was left in office for over 20minutes with out being seen.  Pt would like to schedule an appt to see different provider as she is not satisfied with the services provided to her on today.  Pt states it was indicated in her portal that she refused services.  Pt states this is not true as she was left in room for over 20 minutes and not seen by provider

## 2024-12-16 ENCOUNTER — TELEPHONE (OUTPATIENT)
Dept: ORTHOPEDICS | Facility: CLINIC | Age: 74
End: 2024-12-16
Payer: MEDICARE

## 2024-12-16 NOTE — TELEPHONE ENCOUNTER
Spoke c pts . Pt not available until after 3 pm today. Will call back later to assist pt with scheduling      ----- Message from Destiny Villegas sent at 12/12/2024  4:31 PM CST -----    ----- Message -----  From: Liz Woods  Sent: 12/12/2024   8:32 AM CST  To: [facility] Staff    Type:  Sooner Apoointment Request    Caller is requesting a sooner appointment.  Caller declined first available appointment listed below.  Caller will not accept being placed on the waitlist and is requesting a message be sent to doctor.  Name of Caller: Pt  When is the first available appointment? Radha  Symptoms: returning call  Would the patient rather a call back or a response via [facility]? Call/[facility]  Best Call Back Number: [phone]  Additional Information: Pt is returning call from office.  Pt states she will not be available after 9:30.  Pt will be available after 3p.m

## 2025-01-06 DIAGNOSIS — K21.9 GASTRO-ESOPHAGEAL REFLUX DISEASE WITHOUT ESOPHAGITIS: ICD-10-CM

## 2025-01-06 NOTE — TELEPHONE ENCOUNTER
No care due was identified.  [facility] Embedded Care Due Messages. Reference number: [id number].   1/06/2025 9:31:19 AM CST

## 2025-01-07 RX ORDER — OMEPRAZOLE 20 MG/1
20 CAPSULE, DELAYED RELEASE ORAL
Qty: 90 CAPSULE | Refills: 3 | Status: SHIPPED | OUTPATIENT
Start: 2025-01-07

## 2025-01-07 NOTE — TELEPHONE ENCOUNTER
Refill Routing Note   Medication(s) are not appropriate for processing by [facility] for the following reason(s):        Outside of protocol    [facility] action(s):  Route        Medication Therapy Plan: PRN usage on Omeprazole outside of protocol for [facility]       Appointments  past 12m or future 3m with PCP    Date Provider   Last Visit   11/25/2024 Ryan Nogueira DO   Next Visit   Visit date not found Ryan Nogueira, DO   ED visits in past 90 days: 0        Note composed:10:39 PM 01/06/2025

## 2025-01-15 LAB
LEFT EYE DM RETINOPATHY: NEGATIVE
RIGHT EYE DM RETINOPATHY: NEGATIVE

## 2025-01-17 ENCOUNTER — PATIENT OUTREACH (OUTPATIENT)
Dept: ADMINISTRATIVE | Facility: HOSPITAL | Age: 75
End: 2025-01-17
Payer: MEDICARE

## 2025-01-27 ENCOUNTER — PATIENT OUTREACH (OUTPATIENT)
Dept: ADMINISTRATIVE | Facility: HOSPITAL | Age: 75
End: 2025-01-27
Payer: MEDICARE

## 2025-03-06 DIAGNOSIS — E11.22 TYPE 2 DIABETES MELLITUS WITH STAGE 3 CHRONIC KIDNEY DISEASE, WITHOUT LONG-TERM CURRENT USE OF INSULIN, UNSPECIFIED WHETHER STAGE 3A OR 3B CKD: ICD-10-CM

## 2025-03-06 DIAGNOSIS — N18.30 TYPE 2 DIABETES MELLITUS WITH STAGE 3 CHRONIC KIDNEY DISEASE, WITHOUT LONG-TERM CURRENT USE OF INSULIN, UNSPECIFIED WHETHER STAGE 3A OR 3B CKD: ICD-10-CM

## 2025-03-06 RX ORDER — METFORMIN HYDROCHLORIDE 500 MG/1
1000 TABLET, EXTENDED RELEASE ORAL
Qty: 180 TABLET | Refills: 3 | Status: SHIPPED | OUTPATIENT
Start: 2025-03-06

## 2025-03-06 RX ORDER — OLMESARTAN MEDOXOMIL 20 MG/1
20 TABLET ORAL DAILY
Qty: 90 TABLET | Refills: 2 | Status: SHIPPED | OUTPATIENT
Start: 2025-03-06 | End: 2026-03-06

## 2025-03-06 NOTE — TELEPHONE ENCOUNTER
Care Due:                  Date            Visit Type   Department     Provider  --------------------------------------------------------------------------------                                EP -                              PRIMARY      [facility] INTERNAL  Last Visit: 11-      CARE ([facility])   MEDICINE       Ryan Nogueira  Next Visit: None Scheduled  None         None Found                                                            Last  Test          Frequency    Reason                     Performed    Due Date  --------------------------------------------------------------------------------    HBA1C.......  6 months...  dapagliflozin, metFORMIN.  10-   04-    [facility] Embedded Care Due Messages. Reference number: [id number].   3/06/2025 9:25:34 AM CST

## 2025-03-06 NOTE — TELEPHONE ENCOUNTER
Refill Routing Note   Medication(s) are not appropriate for processing by [facility] for the following reason(s):        Required labs abnormal    [facility] action(s):  Approve  Defer   Requires appointment : Yes   Requires labs : Yes             Appointments  past 12m or future 3m with PCP    Date Provider   Last Visit   11/25/2024 Ryan Nogueira, DO   Next Visit   Visit date not found Ryan Nogueira, DO   ED visits in past 90 days: 0        Note composed:1:41 PM 03/06/2025

## 2025-03-28 DIAGNOSIS — E11.22 TYPE 2 DIABETES MELLITUS WITH STAGE 3B CHRONIC KIDNEY DISEASE, WITHOUT LONG-TERM CURRENT USE OF INSULIN: ICD-10-CM

## 2025-03-28 DIAGNOSIS — N18.32 STAGE 3B CHRONIC KIDNEY DISEASE: ICD-10-CM

## 2025-03-28 DIAGNOSIS — N18.32 TYPE 2 DIABETES MELLITUS WITH STAGE 3B CHRONIC KIDNEY DISEASE, WITHOUT LONG-TERM CURRENT USE OF INSULIN: ICD-10-CM

## 2025-03-28 NOTE — TELEPHONE ENCOUNTER
.Refill Encounter    PCP Visits: Recent Visits  Date Type Provider Dept   11/25/24 Office Visit Ryan Nogueira DO [facility] Internal Medicine   10/28/24 Office Visit Lali Medley MD [facility] Medicine   10/21/24 Office Visit Lali Medley, MD [facility]   10/07/24 Office Visit Andrew Espinal, NP [facility]   07/18/24 Office Visit Andrew Espinal, NP [facility]   06/24/24 Office Visit Andrew Espinal, NP [facility]   06/14/24 Office Visit Mayank Garay, NP [facility]   04/29/24 Office Visit Ryan Nogueira DO [facility]   Showing recent visits within past 360 days and meeting all other requirements  Future Appointments  Date Type Provider Dept   05/12/25 Appointment Ryan Nogueira DO [facility] Internal Medicine   Showing future appointments within next 720 days and meeting all other requirements      Last 3 Blood Pressure:   BP Readings from Last 3 Encounters:   11/25/24 134/65   10/07/24 (!) 134/58   09/21/24 137/60     Preferred Pharmacy:   [email or web address] [place], [place] - [address] [place], [place] [address] [facility]  [facility] [address]  Phone: [phone] Fax: [phone]    [facility]/pharmacy #[id number] - [place], [place] - [address]  [address] [facility] [address]  Phone: [phone] Fax: [phone]    [facility]  [address]  [facility] [address]  Phone: [phone] Fax: [phone]    Requested RX:  Requested Prescriptions      No prescriptions requested or ordered in this encounter      RX Route: Normal

## 2025-03-28 NOTE — TELEPHONE ENCOUNTER
Care Due:                  Date            Visit Type   Department     Provider  --------------------------------------------------------------------------------                                EP -                              PRIMARY      [facility]  Last Visit: 11-      CARE ([facility])   MEDICINE       Ryan Nogueira                              EP -                              PRIMARY      [facility]  Next Visit: 05-      CARE ([facility])   MEDICINE       Ryan Nogueira                                                            Last  Test          Frequency    Reason                     Performed    Due Date  --------------------------------------------------------------------------------    Lipid Panel.  12 months..  atorvastatin.............  06- 06-    [facility] Embedded Care Due Messages. Reference number: [id number].   3/28/2025 4:29:51 PM CDT

## 2025-03-29 RX ORDER — DAPAGLIFLOZIN 5 MG/1
10 TABLET, FILM COATED ORAL DAILY
Qty: 180 TABLET | Refills: 0 | Status: SHIPPED | OUTPATIENT
Start: 2025-03-29

## 2025-03-29 NOTE — TELEPHONE ENCOUNTER
Provider Staff:  Action required for this patient    Requires labs      Please see care gap opportunities below in Care Due Message.    Thanks!  [facility]     Appointments      Date Provider   Last Visit   11/25/2024 Weeks, Ryan VELEZ,    Next Visit   5/12/2025 Weeks, Ryan VELEZ, DO     Refill Decision Note   Meenu Guy  is requesting a refill authorization.  Brief Assessment and Rationale for Refill:  Approve     Medication Therapy Plan:         Comments:     Note composed:11:41 AM 03/29/2025

## 2025-04-02 DIAGNOSIS — N18.32 STAGE 3B CHRONIC KIDNEY DISEASE: ICD-10-CM

## 2025-04-02 DIAGNOSIS — N18.32 TYPE 2 DIABETES MELLITUS WITH STAGE 3B CHRONIC KIDNEY DISEASE, WITHOUT LONG-TERM CURRENT USE OF INSULIN: ICD-10-CM

## 2025-04-02 DIAGNOSIS — E11.22 TYPE 2 DIABETES MELLITUS WITH STAGE 3B CHRONIC KIDNEY DISEASE, WITHOUT LONG-TERM CURRENT USE OF INSULIN: ICD-10-CM

## 2025-04-02 RX ORDER — DAPAGLIFLOZIN 5 MG/1
10 TABLET, FILM COATED ORAL DAILY
Qty: 180 TABLET | Refills: 0 | OUTPATIENT
Start: 2025-04-02

## 2025-04-02 NOTE — TELEPHONE ENCOUNTER
Refill Decision Note   Meenu Guy  is requesting a refill authorization.  Brief Assessment and Rationale for Refill:  Quick Discontinue     Medication Therapy Plan: E-Prescribing Status: Receipt confirmed by pharmacy (3/29/2025 11:42 AM CDT)      Comments:     Note composed:2:12 PM 04/02/2025

## 2025-04-02 NOTE — TELEPHONE ENCOUNTER
No care due was identified.  Health [facility] Embedded Care Due Messages. Reference number: [id number].   4/02/2025 2:10:41 PM CDT

## 2025-04-02 NOTE — TELEPHONE ENCOUNTER
.Refill Encounter    PCP Visits: Recent Visits  Date Type Provider Dept   11/25/24 Office Visit yRan Nogueira DO [facility] Internal Medicine   10/28/24 Office Visit Lali Medley MD [facility]   10/21/24 Office Visit Lali Medley, MD [facility]   10/07/24 Office Visit Andrew Espinal, NP [facility]   07/18/24 Office Visit Andrew Espinal, NP [facility]   06/24/24 Office Visit Andrew Espinal, NP [facility]   06/14/24 Office Visit Mayank Garay, NP [facility]   04/29/24 Office Visit Ryan Nogueira DO [facility] Medicine   Showing recent visits within past 360 days and meeting all other requirements  Future Appointments  Date Type Provider Dept   05/12/25 Appointment Ryan Nogueira DO [facility] Internal Medicine   Showing future appointments within next 720 days and meeting all other requirements      Last 3 Blood Pressure:   BP Readings from Last 3 Encounters:   11/25/24 134/65   10/07/24 (!) 134/58   09/21/24 137/60     Preferred Pharmacy:   [facility] [place] - [address] [place], [place] [address] [facility]  [facility] [address]  Phone: [phone] Fax: [phone]    [facility]/pharmacy #[id number] - [place], [place] - [address]  [address]  [place] [address]  Phone: [phone] Fax: [phone]    [facility]  [address] [facility] [address]  [facility] [address]  Phone: [phone] Fax: [phone]    Requested RX:  Requested Prescriptions     Pending Prescriptions Disp Refills    dapagliflozin propanediol (FARXIGA) 5 mg Tab tablet 180 tablet 0     Sig: Take 2 tablets (10 mg total) by mouth once daily.      RX Route: Normal

## 2025-04-08 ENCOUNTER — OFFICE VISIT (OUTPATIENT)
Dept: URGENT CARE | Facility: CLINIC | Age: 75
End: 2025-04-08
Payer: MEDICARE

## 2025-04-08 VITALS
DIASTOLIC BLOOD PRESSURE: 51 MMHG | BODY MASS INDEX: 21.87 KG/M2 | HEIGHT: 70 IN | SYSTOLIC BLOOD PRESSURE: 136 MMHG | WEIGHT: 152.75 LBS | HEART RATE: 62 BPM | OXYGEN SATURATION: 98 % | RESPIRATION RATE: 18 BRPM | TEMPERATURE: 98 F

## 2025-04-08 DIAGNOSIS — H61.22 IMPACTED CERUMEN OF LEFT EAR: Primary | ICD-10-CM

## 2025-04-08 DIAGNOSIS — H66.92 LEFT OTITIS MEDIA, UNSPECIFIED OTITIS MEDIA TYPE: ICD-10-CM

## 2025-04-08 DIAGNOSIS — R05.9 COUGH, UNSPECIFIED TYPE: ICD-10-CM

## 2025-04-08 DIAGNOSIS — H92.02 LEFT EAR PAIN: ICD-10-CM

## 2025-04-08 PROCEDURE — 99213 OFFICE O/P EST LOW 20 MIN: CPT | Mod: S$GLB,,, | Performed by: NURSE PRACTITIONER

## 2025-04-08 RX ORDER — ACETAMINOPHEN 500 MG
1000 TABLET ORAL
Status: COMPLETED | OUTPATIENT
Start: 2025-04-08 | End: 2025-04-08

## 2025-04-08 RX ORDER — AMOXICILLIN 400 MG/5ML
500 POWDER, FOR SUSPENSION ORAL 2 TIMES DAILY
Qty: 150 ML | Refills: 0 | Status: SHIPPED | OUTPATIENT
Start: 2025-04-08 | End: 2025-04-18

## 2025-04-08 RX ORDER — CETIRIZINE HYDROCHLORIDE 10 MG/1
10 TABLET ORAL DAILY
Qty: 30 TABLET | Refills: 0 | Status: SHIPPED | OUTPATIENT
Start: 2025-04-08 | End: 2025-05-08

## 2025-04-08 RX ORDER — BENZONATATE 100 MG/1
200 CAPSULE ORAL 3 TIMES DAILY PRN
Qty: 60 CAPSULE | Refills: 0 | Status: SHIPPED | OUTPATIENT
Start: 2025-04-08 | End: 2025-04-18

## 2025-04-08 RX ADMIN — Medication 1000 MG: at 11:04

## 2025-04-08 NOTE — PATIENT INSTRUCTIONS
Discharge instruction for Left Otitis Media, Cough and Left Ear pain  Successful removal of Ear wax to left ear  Push fluids maintain hydration  Start Amoxicillin as prescribed  Continue Mucinex  Start Zyrtec as prescribed    What care is needed at home?   Call your regular doctor to let them know you were in the ED. Make a follow-up appointment if you were told to.  Do not put anything in your ear unless it was ordered by the doctor.  You may want to take medicines like ibuprofen, naproxen, or acetaminophen to help with pain.    1) See orders for this visit as documented in the electronic medical record.  2) Symptomatic therapy suggested: use acetaminophen/ibuprofen every 6-8 hours prn pain or fever, push fluids.   3) Call or return to clinic prn if these symptoms worsen or fail to improve as anticipated.    Discussed results/diagnosis/plan with patient in clinic.  We had shared decision making for patient's treatment. Patient verbalized understanding and in agreement with current treatment plan.     Patient was instructed to return for re-evaluation with urgent care or PCP for continued outpatient workup and management if symptoms do not improve/worsening symptoms. Strict ED versus clinic precautions given in depth.    Discharge and follow-up instructions given verbally/printed with the patient who expressed understanding. The instructions and results are also available on [employer].      - You must understand that you have received an Urgent Care treatment only and that you may be released before all of your medical problems are known or treated.   - You, the patient, will arrange for follow up care as instructed.   - Follow up with your PCP or specialty clinic as directed in the next 1-2 weeks if not improved or as needed.  You can call [phone] to schedule an appointment with the appropriate provider.   - If your condition worsens or fails to improve we recommend that you receive another evaluation at the  ER immediately or contact your PCP to discuss your concerns or return here.        PAVEL Mcgowan

## 2025-04-08 NOTE — PROGRESS NOTES
"Subjective:      Patient ID: Meenu Guy is a 74 y.o. female.    Vitals:  height is 5' 10" (1.778 m) and weight is 69.3 kg (152 lb 12.5 oz). Her oral temperature is 98.2 °F (36.8 °C). Her blood pressure is 136/51 (abnormal) and her pulse is 62. Her respiration is 18 and oxygen saturation is 98%.     Chief Complaint: Otalgia    74 year old female c/o ear ache. Pt states that her L ear is aching that started a week ago. Pt states that her L ear is dripping discharge out of ear(brownish) , draining and runny to cause an sore throat(back of throat) and spiting up mucous (white/ light greenish). Pt states slight coughing when lying down, coughs to get the mucous out of her throat from the drainage. Pt states discomfort, headaches, and L sided  neck pain. Pt put peroxide in L ear for a few seconds.     Otalgia   There is pain in the left ear. This is a new problem. The current episode started in the past 7 days. The problem occurs constantly. The problem has been gradually worsening. There has been no fever. The pain is at a severity of 8/10. The pain is severe. Associated symptoms include coughing, ear discharge, headaches, neck pain and a sore throat. Pertinent negatives include no abdominal pain, diarrhea, hearing loss, rash or rhinorrhea. Treatments tried: peroxide. The treatment provided mild relief.       Constitution: Negative for chills and fever.   HENT:  Positive for ear pain, ear discharge and sore throat. Negative for hearing loss.    Neck: Positive for neck pain.   Cardiovascular:  Negative for chest pain, leg swelling, palpitations and sob on exertion.   Respiratory:  Positive for cough.    Gastrointestinal:  Negative for abdominal pain and diarrhea.   Skin:  Negative for color change and rash.   Neurological:  Positive for headaches.      Objective:     Physical Exam   Constitutional: She is oriented to person, place, and time. She appears well-developed. She is cooperative.  Non-toxic appearance. " She does not appear ill. No distress. awake  HENT:   Head: Normocephalic and atraumatic.   Ears:   Right Ear: Hearing, tympanic membrane, external ear and ear canal normal. no impacted cerumen  Left Ear: Hearing and external ear normal. impacted cerumen  Nose: No mucosal edema, rhinorrhea, nasal deformity or congestion. No epistaxis. Right sinus exhibits no maxillary sinus tenderness and no frontal sinus tenderness. Left sinus exhibits no maxillary sinus tenderness and no frontal sinus tenderness.   Mouth/Throat: Uvula is midline, oropharynx is clear and moist and mucous membranes are normal. No trismus in the jaw. Normal dentition. No uvula swelling. No oropharyngeal exudate, posterior oropharyngeal edema or posterior oropharyngeal erythema.   Left ear with palpable auricular pain and lymphadenopathy to left side of jaw line and neck      Comments: Left ear with palpable auricular pain and lymphadenopathy to left side of jaw line and neck  Eyes: Conjunctivae and lids are normal. No scleral icterus.   Neck: Trachea normal and phonation normal. Neck supple. No thyromegaly present. No edema present. No erythema present. No neck rigidity present.   Cardiovascular: Normal rate, regular rhythm, S1 normal, S2 normal, normal heart sounds and normal pulses.   Pulmonary/Chest: Effort normal and breath sounds normal. No respiratory distress. She has no decreased breath sounds. She has no wheezes. She has no rhonchi.   Abdominal: Normal appearance and bowel sounds are normal. Soft. flat abdomen   Musculoskeletal: Normal range of motion.         General: No deformity. Normal range of motion.   Lymphadenopathy:     She has no cervical adenopathy.   Neurological: no focal deficit. She is alert and oriented to person, place, and time. She exhibits normal muscle tone. Coordination normal.   Skin: Skin is warm, dry, intact, not diaphoretic and not pale. Capillary refill takes less than 2 seconds.   Psychiatric: Her speech is normal  and behavior is normal. Mood, judgment and thought content normal.   Nursing note and vitals reviewed.      Assessment:     1. Impacted cerumen of left ear    2. Left ear pain    3. Cough, unspecified type    4. Left otitis media, unspecified otitis media type      Successful removal of ear wax to left ear, noted inflamed and full Tms to left ear  Plan:       Impacted cerumen of left ear  -     Ear wax removal    Left ear pain  -     acetaminophen tablet 1,000 mg    Cough, unspecified type  -     benzonatate (TESSALON) 100 MG capsule; Take 2 capsules (200 mg total) by mouth 3 (three) times daily as needed.  Dispense: 60 capsule; Refill: 0    Left otitis media, unspecified otitis media type  -     amoxicillin (AMOXIL) 400 mg/5 mL suspension; Take 6.3 mLs (504 mg total) by mouth 2 (two) times daily. for 10 days. discard any remainder after 10 days  Dispense: 150 mL; Refill: 0  -     cetirizine (ZYRTEC) 10 MG tablet; Take 1 tablet (10 mg total) by mouth once daily.  Dispense: 30 tablet; Refill: 0      Patient Instructions   Discharge instruction for Left Otitis Media, Cough and Left Ear pain  Successful removal of Ear wax to left ear  Push fluids maintain hydration  Start Amoxicillin as prescribed  Continue Mucinex  Start Zyrtec as prescribed    What care is needed at home?   Call your regular doctor to let them know you were in the ED. Make a follow-up appointment if you were told to.  Do not put anything in your ear unless it was ordered by the doctor.  You may want to take medicines like ibuprofen, naproxen, or acetaminophen to help with pain.    1) See orders for this visit as documented in the electronic medical record.  2) Symptomatic therapy suggested: use acetaminophen/ibuprofen every 6-8 hours prn pain or fever, push fluids.   3) Call or return to clinic prn if these symptoms worsen or fail to improve as anticipated.    Discussed results/diagnosis/plan with patient in clinic.  We had shared decision making for  patient's treatment. Patient verbalized understanding and in agreement with current treatment plan.     Patient was instructed to return for re-evaluation with urgent care or PCP for continued outpatient workup and management if symptoms do not improve/worsening symptoms. Strict ED versus clinic precautions given in depth.    Discharge and follow-up instructions given verbally/printed with the patient who expressed understanding. The instructions and results are also available on [employer].      - You must understand that you have received an Urgent Care treatment only and that you may be released before all of your medical problems are known or treated.   - You, the patient, will arrange for follow up care as instructed.   - Follow up with your PCP or specialty clinic as directed in the next 1-2 weeks if not improved or as needed.  You can call [phone] to schedule an appointment with the appropriate provider.   - If your condition worsens or fails to improve we recommend that you receive another evaluation at the ER immediately or contact your PCP to discuss your concerns or return here.        PAVEL Mcgowan

## 2025-04-08 NOTE — LETTER
April 8, 2025      [facility] [address] [address]  Phone: [phone]  Fax: [phone]       Patient: Meenu Guy   YOB: 1950  Date of Visit: 04/08/2025    To Whom It May Concern:    Javad Guy  was at [facility] on 04/08/2025. The patient may return to work/school on 04/10/2025 with no restrictions. If you have any questions or concerns, or if I can be of further assistance, please do not hesitate to contact me.    Sincerely,    Christina Harman, DNP

## 2025-04-15 ENCOUNTER — TELEPHONE (OUTPATIENT)
Dept: INTERNAL MEDICINE | Facility: CLINIC | Age: 75
End: 2025-04-15
Payer: MEDICARE

## 2025-04-15 NOTE — TELEPHONE ENCOUNTER
Talk to ms. Luciano about her upcoming apptoinrment on may 7 2025 she has an upcoming on may 12 with

## 2025-05-08 ENCOUNTER — TELEPHONE (OUTPATIENT)
Dept: INTERNAL MEDICINE | Facility: CLINIC | Age: 75
End: 2025-05-08
Payer: MEDICARE

## 2025-05-08 DIAGNOSIS — Z00.00 ANNUAL PHYSICAL EXAM: Primary | ICD-10-CM

## 2025-05-12 ENCOUNTER — LAB VISIT (OUTPATIENT)
Dept: LAB | Facility: OTHER | Age: 75
End: 2025-05-12
Attending: FAMILY MEDICINE

## 2025-05-12 ENCOUNTER — OFFICE VISIT (OUTPATIENT)
Dept: INTERNAL MEDICINE | Facility: CLINIC | Age: 75
End: 2025-05-12

## 2025-05-12 VITALS
DIASTOLIC BLOOD PRESSURE: 67 MMHG | HEART RATE: 60 BPM | WEIGHT: 154.31 LBS | SYSTOLIC BLOOD PRESSURE: 137 MMHG | OXYGEN SATURATION: 97 % | HEIGHT: 70 IN | BODY MASS INDEX: 22.09 KG/M2

## 2025-05-12 DIAGNOSIS — M19.90 ARTHRITIS: ICD-10-CM

## 2025-05-12 DIAGNOSIS — Z23 NEED FOR PNEUMOCOCCAL VACCINATION: ICD-10-CM

## 2025-05-12 DIAGNOSIS — N18.32 STAGE 3B CHRONIC KIDNEY DISEASE: ICD-10-CM

## 2025-05-12 DIAGNOSIS — N18.32 TYPE 2 DIABETES MELLITUS WITH STAGE 3B CHRONIC KIDNEY DISEASE, WITHOUT LONG-TERM CURRENT USE OF INSULIN: ICD-10-CM

## 2025-05-12 DIAGNOSIS — H66.92 LEFT OTITIS MEDIA, UNSPECIFIED OTITIS MEDIA TYPE: ICD-10-CM

## 2025-05-12 DIAGNOSIS — I10 ESSENTIAL HYPERTENSION: ICD-10-CM

## 2025-05-12 DIAGNOSIS — E78.2 MIXED HYPERLIPIDEMIA: ICD-10-CM

## 2025-05-12 DIAGNOSIS — G95.89 MYELOMALACIA: Primary | ICD-10-CM

## 2025-05-12 DIAGNOSIS — E11.22 TYPE 2 DIABETES MELLITUS WITH STAGE 3B CHRONIC KIDNEY DISEASE, WITHOUT LONG-TERM CURRENT USE OF INSULIN: ICD-10-CM

## 2025-05-12 LAB
ANION GAP (OHS): 9 MMOL/L (ref 8–16)
BUN SERPL-MCNC: 28 MG/DL (ref 8–23)
CALCIUM SERPL-MCNC: 10.1 MG/DL (ref 8.7–10.5)
CHLORIDE SERPL-SCNC: 105 MMOL/L (ref 95–110)
CO2 SERPL-SCNC: 26 MMOL/L (ref 23–29)
CREAT SERPL-MCNC: 1.4 MG/DL (ref 0.5–1.4)
EAG (OHS): 157 MG/DL (ref 68–131)
GFR SERPLBLD CREATININE-BSD FMLA CKD-EPI: 39 ML/MIN/1.73/M2
GLUCOSE SERPL-MCNC: 124 MG/DL (ref 70–110)
HBA1C MFR BLD: 7.1 % (ref 4–5.6)
POTASSIUM SERPL-SCNC: 4.6 MMOL/L (ref 3.5–5.1)
SODIUM SERPL-SCNC: 140 MMOL/L (ref 136–145)

## 2025-05-12 PROCEDURE — 83036 HEMOGLOBIN GLYCOSYLATED A1C: CPT

## 2025-05-12 PROCEDURE — 90677 PCV20 VACCINE IM: CPT | Mod: PBBFAC

## 2025-05-12 PROCEDURE — 90471 IMMUNIZATION ADMIN: CPT | Mod: PBBFAC

## 2025-05-12 PROCEDURE — 99213 OFFICE O/P EST LOW 20 MIN: CPT | Mod: PBBFAC | Performed by: FAMILY MEDICINE

## 2025-05-12 PROCEDURE — 99999 PR PBB SHADOW E&M-EST. PATIENT-LVL III: CPT | Mod: PBBFAC,,, | Performed by: FAMILY MEDICINE

## 2025-05-12 PROCEDURE — 99999PBSHW PR PBB SHADOW TECHNICAL ONLY FILED TO HB: Mod: PBBFAC,,,

## 2025-05-12 PROCEDURE — 99214 OFFICE O/P EST MOD 30 MIN: CPT | Mod: S$PBB,,, | Performed by: FAMILY MEDICINE

## 2025-05-12 PROCEDURE — 36415 COLL VENOUS BLD VENIPUNCTURE: CPT

## 2025-05-12 PROCEDURE — 80048 BASIC METABOLIC PNL TOTAL CA: CPT

## 2025-05-12 RX ORDER — GABAPENTIN 600 MG/1
600 TABLET ORAL 3 TIMES DAILY
Qty: 90 TABLET | Refills: 11 | Status: SHIPPED | OUTPATIENT
Start: 2025-05-12 | End: 2025-05-14 | Stop reason: SDUPTHER

## 2025-05-12 RX ORDER — GABAPENTIN 600 MG/1
600 TABLET ORAL 3 TIMES DAILY
Qty: 90 TABLET | Refills: 11 | Status: SHIPPED | OUTPATIENT
Start: 2025-05-12 | End: 2025-05-12

## 2025-05-12 RX ORDER — ATORVASTATIN CALCIUM 20 MG/1
20 TABLET, FILM COATED ORAL DAILY
Qty: 90 TABLET | Refills: 3 | Status: SHIPPED | OUTPATIENT
Start: 2025-05-12

## 2025-05-12 RX ORDER — AMLODIPINE BESYLATE 10 MG/1
10 TABLET ORAL DAILY
Qty: 90 TABLET | Refills: 3 | Status: SHIPPED | OUTPATIENT
Start: 2025-05-12 | End: 2026-05-12

## 2025-05-12 RX ORDER — AMLODIPINE BESYLATE 10 MG/1
10 TABLET ORAL DAILY
Qty: 90 TABLET | Refills: 3 | Status: SHIPPED | OUTPATIENT
Start: 2025-05-12 | End: 2025-05-12

## 2025-05-12 RX ORDER — CETIRIZINE HYDROCHLORIDE 10 MG/1
10 TABLET ORAL DAILY
Qty: 30 TABLET | Refills: 5 | Status: SHIPPED | OUTPATIENT
Start: 2025-05-12 | End: 2025-06-11

## 2025-05-12 RX ORDER — MELOXICAM 7.5 MG/1
7.5 TABLET ORAL DAILY
Qty: 30 TABLET | Refills: 5 | Status: SHIPPED | OUTPATIENT
Start: 2025-05-12

## 2025-05-12 RX ORDER — CETIRIZINE HYDROCHLORIDE 10 MG/1
10 TABLET ORAL DAILY
Qty: 30 TABLET | Refills: 5 | Status: SHIPPED | OUTPATIENT
Start: 2025-05-12 | End: 2025-05-12

## 2025-05-12 RX ORDER — ATORVASTATIN CALCIUM 20 MG/1
20 TABLET, FILM COATED ORAL DAILY
Qty: 90 TABLET | Refills: 3 | Status: SHIPPED | OUTPATIENT
Start: 2025-05-12 | End: 2025-05-12

## 2025-05-12 RX ORDER — MELOXICAM 7.5 MG/1
7.5 TABLET ORAL DAILY
Qty: 30 TABLET | Refills: 5 | Status: SHIPPED | OUTPATIENT
Start: 2025-05-12 | End: 2025-05-12

## 2025-05-12 RX ADMIN — PNEUMOCOCCAL 20-VALENT CONJUGATE VACCINE 0.5 ML
2.2; 2.2; 2.2; 2.2; 2.2; 2.2; 2.2; 2.2; 2.2; 2.2; 2.2; 2.2; 2.2; 2.2; 2.2; 2.2; 4.4; 2.2; 2.2; 2.2 INJECTION, SUSPENSION INTRAMUSCULAR at 03:05

## 2025-05-12 NOTE — PROGRESS NOTES
After obtaining consent, and per orders of , injection of prevnar 20 given by Viji Greenfield. Patient instructed to remain in clinic for 15 minutes afterwards, and to report any adverse reaction to me immediately.

## 2025-05-14 NOTE — TELEPHONE ENCOUNTER
----- Message from Patricia sent at 5/14/2025  2:28 PM CDT -----  Regarding: Meenu  Who Called:DianneRefill or New Rx: RefillRX Name and Strength:gabapentin (NEURONTIN) 600 MG tabletIs this a 30 day or 90 day RX:[facility] the patient rather a call back or a response via [facility]? CallbackBest Call Back Number:.[phone] Information:

## 2025-05-14 NOTE — TELEPHONE ENCOUNTER
No care due was identified.  Health [facility] Embedded Care Due Messages. Reference number: [id number].   5/14/2025 4:47:51 PM CDT

## 2025-05-14 NOTE — TELEPHONE ENCOUNTER
.Refill Encounter    PCP Visits: Recent Visits  Date Type Provider Dept   05/12/25 Office Visit Ryan Nogueira, DO [facility]   11/25/24 Office Visit Ryan Nogueira, DO [facility]   10/28/24 Office Visit Lali Medley, MD [facility]   10/21/24 Office Visit Lali Medley, MD [facility]   10/07/24 Office Visit Andrew Espinal, NP [facility]   07/18/24 Office Visit Andrew Espinal, NP [facility]   06/24/24 Office Visit Andrew Espinal, NP [facility]   06/14/24 Office Visit Mayank Garay, NP [facility]   Showing recent visits within past 360 days and meeting all other requirements  Future Appointments  No visits were found meeting these conditions.  Showing future appointments within next 720 days and meeting all other requirements      Last 3 Blood Pressure:   BP Readings from Last 3 Encounters:   05/12/25 137/67   04/08/25 (!) 136/51   11/25/24 134/65     Preferred Pharmacy:   [facility]    [id number], [place] [address]     Requested RX:  Requested Prescriptions     Pending Prescriptions Disp Refills    gabapentin (NEURONTIN) 600 MG tablet 90 tablet 11     Sig: Take 1 tablet (600 mg total) by mouth 3 (three) times daily.      RX Route: Normal

## 2025-05-15 RX ORDER — GABAPENTIN 600 MG/1
600 TABLET ORAL 3 TIMES DAILY
Qty: 90 TABLET | Refills: 11 | Status: SHIPPED | OUTPATIENT
Start: 2025-05-15 | End: 2025-05-16 | Stop reason: SDUPTHER

## 2025-05-16 DIAGNOSIS — M79.601 RIGHT ARM PAIN: Primary | ICD-10-CM

## 2025-05-16 DIAGNOSIS — M54.12 CERVICAL RADICULOPATHY: ICD-10-CM

## 2025-05-16 RX ORDER — GABAPENTIN 600 MG/1
600 TABLET ORAL 3 TIMES DAILY
Qty: 90 TABLET | Refills: 11 | Status: SHIPPED | OUTPATIENT
Start: 2025-05-16 | End: 2026-05-16

## 2025-05-16 NOTE — TELEPHONE ENCOUNTER
No care due was identified.  [facility] Embedded Care Due Messages. Reference number: [id number].   5/16/2025 5:01:02 PM CDT

## 2025-05-16 NOTE — TELEPHONE ENCOUNTER
----- Message from Med Assistant Amee sent at 5/16/2025  9:19 AM CDT -----  Name of Who is Calling:LOUIE MASON [[id number]]  What is the request in detail: Pt states the medication gabapentin (NEURONTIN) 600 MG tablet Was sent to the wrong pharmacy. It needs to be sent to [facility] - [address]. Please assist.  Can the clinic reply by [facility]: No  What Number to Call Back if not [facility]: [phone]

## 2025-05-19 NOTE — PROGRESS NOTES
Subjective:       Patient ID: Meenu Guy is a 74 y.o. female.    Chief Complaint: Annual Exam    HPI  History of Present Illness    CHIEF COMPLAINT:  Meenu presents today with arthritis pain    MUSCULOSKELETAL:  She reports arthritis pain in multiple locations including knuckles, wrist, and shoulders with associated pain, swelling, tenderness and aching in her fingers. Pain can be severe at times. She notes that while initial movement causes discomfort, continued movement helps alleviate the pain. When affected areas remain immobile, pain persists.    MEDICATIONS:  She currently takes Tylenol 500mg two tablets 3 times daily and Gabapentin 1000mg 3 times daily. She previously took meloxicam which was discontinued during oxygen therapy. She denies medication side effects except for a possible skin breakout attributed to Gabapentin.    DERMATOLOGIC:  She reports a one-month history of an itchy rash on her wrist that is improving. She has a history of a burn in the same area that became infected due to scratching. Application of bandages worsened the condition.    RECENT ILLNESS:  She recently experienced a severe sinus infection with associated ear infection treated at Urgent Care. She was prescribed Xanax, Mucinex, and Zantac for symptom management. She discontinued all medications after developing a skin rash.      ROS:  General: -fever, -chills, -fatigue, +weight gain, -weight loss  Eyes: -vision changes, -redness, -discharge  ENT: -ear pain, -nasal congestion, -sore throat  Cardiovascular: -chest pain, -palpitations, -lower extremity edema  Respiratory: -cough, -shortness of breath  Gastrointestinal: -abdominal pain, -nausea, -vomiting, -diarrhea, -constipation, -blood in stool  Genitourinary: -dysuria, -hematuria, -frequency, +excessive urination  Musculoskeletal: +joint pain, -muscle pain, +joint swelling  Skin: +rash, -lesion  Neurological: -headache, -dizziness, -numbness, -tingling  Psychiatric:  "-anxiety, -depression, -sleep difficulty           All of your core healthy metrics are met.      Social History     Social History Narrative    Not on file       Family History   Problem Relation Name Age of Onset    Asthma Mother Diamond salazar     Hypertension Mother Diamond salazar     Stroke Father Claude A Weaver     Coronary artery disease Father Claude A Weaver         s/p 4v CABG    Diabetes Sister Diamond         pre diabetes    No Known Problems Brother      Hypertension Brother      Diabetes Brother      Heart disease Brother Naylor         stents    Kidney disease Brother          dialysis    Hypertension Brother      Breast cancer Neg Hx      Colon cancer Neg Hx       Current Medications[1]    Review of Systems    Objective:   /67 (Patient Position: Sitting)   Pulse 60   Ht 5' 10" (1.778 m)   Wt 70 kg (154 lb 5.2 oz)   SpO2 97%   BMI 22.14 kg/m²      Physical Exam  Vitals reviewed.   Constitutional:       Appearance: She is well-developed.   HENT:      Head: Normocephalic and atraumatic.   Eyes:      Conjunctiva/sclera: Conjunctivae normal.   Cardiovascular:      Rate and Rhythm: Normal rate.   Pulmonary:      Effort: Pulmonary effort is normal. No respiratory distress.   Skin:     General: Skin is warm and dry.      Findings: No rash.   Neurological:      Mental Status: She is alert and oriented to person, place, and time.      Coordination: Coordination normal.   Psychiatric:         Behavior: Behavior normal.         Physical Exam              @resultssec@  Assessment & Plan   Assessment & Plan    IMPRESSION:   Arthritis symptoms include widespread joint pain and swelling, particularly in hands, wrists, and shoulders.   Evaluated current pain management regimen of Tylenol and gabapentin.   Decreased gabapentin from 1000 mg TID to a lower dose in conjunction with meloxicam initiation.   Started meloxicam daily for arthritis management, with plan to monitor renal function.    PLAN SUMMARY:   " Continue hydrocortisone application to affected areas for rash   Reduce gabapentin dosage due to possible cutaneous reaction   Continue Tylenol (acetaminophen) 500 mg, 2 tablets 3 times daily   Restart meloxicam for pain management   Continue current blood pressure and cholesterol medications   Administered pneumonia vaccine in office   Ordered hemoglobin A1C and metabolic panel labs   Repeat kidney labs in 3 months to assess kidney function    TYPE 2 DIABETES MELLITUS WITH DIABETIC CHRONIC KIDNEY DISEASE:   Ordered hemoglobin A1C and metabolic panel labs.   Meenu's blood pressure is good and cholesterol is consistently well-controlled on current medication, which will be continued.   Plan to repeat kidney labs in 3 months to monitor kidney function.   Discussed the importance of regular lab testing for ongoing kidney function assessment.    ## POLYOSTEOARTHRITIS:   Monitored patient's arthritis pain in knuckles, wrists, shoulders, and fingers with noted swelling in fingers.   Meenu feels good overall despite arthritis pain and reports improvement during activity.   Discussed that arthritis symptoms often improve with movement.   Will restart meloxicam for pain management while monitoring kidney function due to potential medication effects.   Continuing Tylenol (acetaminophen) 500 mg, 2 tablets 3 times daily.   Plan to reduce gabapentin dosage.    ## DERMATITIS:   Monitored rash and pruritus on wrist, possibly related to arthritis medication.   Rash has shown improvement and drying after 1 month.   Meenu is currently using hydrocortisone and Neosporin for treatment.   Advised to continue hydrocortisone application to affected areas for localized rash management.    ## BURN OF LEFT WRIST:   Monitored burn on wrist which was exacerbated by scratching and adhesive bandage application.   Burn is showing improvement and drying after 1 month.   Continuing treatment with hydrocortisone and Neosporin.    ## SKIN ERUPTION  DUE TO MEDICATIONS:   Monitored rash and pruritus, possibly related to gabapentin.   Planning to reduce gabapentin dosage due to possible cutaneous reaction.    ## IMMUNIZATION:   Administered pneumonia vaccine in office today as patient was due for pneumococcal vaccination and agreed to receive it.         Problem List Items Addressed This Visit          Neuro    Myelomalacia - Primary    Overview   Seen on imaging. F/u c Pain Med.         Current Assessment & Plan   Seen on imaging. F/u c Pain Med.            Cardiac/Vascular    Mixed hyperlipidemia    Relevant Medications    atorvastatin (LIPITOR) 20 MG tablet    Essential hypertension    Relevant Medications    amLODIPine (NORVASC) 10 MG tablet       Renal/    CKD (chronic kidney disease) stage 3, GFR 30-59 ml/min    Relevant Orders    Basic Metabolic Panel (Completed)       Endocrine    Type 2 diabetes mellitus with stage 3 chronic kidney disease, without long-term current use of insulin     Other Visit Diagnoses         Left otitis media, unspecified otitis media type        Relevant Medications    cetirizine (ZYRTEC) 10 MG tablet      Arthritis        Relevant Medications    meloxicam (MOBIC) 7.5 MG tablet    Other Relevant Orders    Basic Metabolic Panel (Completed)      Need for pneumococcal vaccination        Relevant Medications    pneumoc 20-victor hugo conj-dip cr(PF) (PREVNAR-20 (PF)) injection Syrg 0.5 mL (Completed)              Immunizations Administered on Date of Encounter - 5/12/2025       Name Date Dose VIS Date Route Exp Date    Pneumococcal Conjugate - 20 Valent 5/12/2025  3:30 PM 0.5 mL 7/24/2023 Intramuscular 6/30/2026    Site: Left deltoid     Given By: Edilia Jalloh MA     : [employer]     Lot: [id number]              No follow-ups on file.    This note was generated with the assistance of ambient listening technology. Verbal consent was obtained by the patient and accompanying visitor(s) for the recording of patient appointment to  facilitate this note. I attest to having reviewed and edited the generated note for accuracy, though some syntax or spelling errors may persist. Please contact the author of this note for any clarification.      Disclaimer:  This note may have been prepared using voice recognition software, it may have not been extensively proofed, as such there could be errors within the text such as sound alike errors.       [1]   Current Outpatient Medications:     amLODIPine (NORVASC) 10 MG tablet, Take 1 tablet (10 mg total) by mouth once daily., Disp: 90 tablet, Rfl: 3    atorvastatin (LIPITOR) 20 MG tablet, Take 1 tablet (20 mg total) by mouth once daily., Disp: 90 tablet, Rfl: 3    blood sugar diagnostic Strp, 1 each by Misc.(Non-Drug; Combo Route) route 3 (three) times daily., Disp: 200 each, Rfl: 11    blood-glucose meter kit, Use as instructed, Disp: 1 each, Rfl: 0    carvediloL (COREG) 6.25 MG tablet, TAKE 1 TABLET (6.25 MG TOTAL) BY MOUTH 2 (TWO) TIMES DAILY., Disp: 180 tablet, Rfl: 2    cetirizine (ZYRTEC) 10 MG tablet, Take 1 tablet (10 mg total) by mouth once daily., Disp: 30 tablet, Rfl: 5    cinnamon bark (CINNAMON) 500 mg capsule, Take 500 mg by mouth once daily., Disp: , Rfl:     dapagliflozin propanediol (FARXIGA) 5 mg Tab tablet, Take 2 tablets (10 mg total) by mouth once daily., Disp: 180 tablet, Rfl: 0    fluticasone propionate (FLONASE) 50 mcg/actuation nasal spray, 1 spray (50 mcg total) by Each Nostril route once daily., Disp: 16 g, Rfl: 11    gabapentin (NEURONTIN) 600 MG tablet, Take 1 tablet (600 mg total) by mouth 3 (three) times daily., Disp: 90 tablet, Rfl: 11    lancets Misc, 1 each by Misc.(Non-Drug; Combo Route) route 3 (three) times daily., Disp: 200 each, Rfl: 1    meloxicam (MOBIC) 7.5 MG tablet, Take 1 tablet (7.5 mg total) by mouth once daily., Disp: 30 tablet, Rfl: 5    metFORMIN (GLUCOPHAGE-XR) 500 MG ER 24hr tablet, TAKE 2 TABLETS (1,000 MG TOTAL) BY MOUTH DAILY WITH BREAKFAST., Disp: 180  tablet, Rfl: 3    montelukast (SINGULAIR) 10 mg tablet, TAKE 1 TABLET (10 MG TOTAL) BY MOUTH EVERY EVENING., Disp: 30 tablet, Rfl: 11    multivitamin with minerals tablet, Take 1 tablet by mouth once daily., Disp: , Rfl:     olmesartan (BENICAR) 20 MG tablet, TAKE 1 TABLET (20 MG TOTAL) BY MOUTH ONCE DAILY., Disp: 90 tablet, Rfl: 2    omeprazole (PRILOSEC) 20 MG capsule, TAKE 1 CAPSULE (20 MG TOTAL) BY MOUTH DAILY AS NEEDED., Disp: 90 capsule, Rfl: 3

## 2025-05-26 DIAGNOSIS — Z00.00 ENCOUNTER FOR MEDICARE ANNUAL WELLNESS EXAM: ICD-10-CM

## 2025-06-16 DIAGNOSIS — I10 ESSENTIAL HYPERTENSION: ICD-10-CM

## 2025-06-16 RX ORDER — CARVEDILOL 6.25 MG/1
6.25 TABLET ORAL 2 TIMES DAILY
Qty: 180 TABLET | Refills: 3 | Status: SHIPPED | OUTPATIENT
Start: 2025-06-16

## 2025-06-16 NOTE — TELEPHONE ENCOUNTER
Provider Staff:  Action required for this patient    Requires labs      Please see care gap opportunities below in Care Due Message.    Thanks!  [facility]     Appointments      Date Provider   Last Visit   5/12/2025 Ryan Nogueira DO   Next Visit   Visit date not found Mirlande, Ryan VELEZ, DO     Refill Decision Note   Meenu Guy  is requesting a refill authorization.  Brief Assessment and Rationale for Refill:  Approve     Medication Therapy Plan:        Comments:     Note composed:12:48 PM 06/16/2025

## 2025-06-16 NOTE — TELEPHONE ENCOUNTER
Care Due:                  Date            Visit Type   Department     Provider  --------------------------------------------------------------------------------                                EP -                              PRIMARY      [facility]  Last Visit: 05-      CARE ([facility])   MEDICINE       Ryan Nogueira  Next Visit: None Scheduled  None         None Found                                                            Last  Test          Frequency    Reason                     Performed    Due Date  --------------------------------------------------------------------------------    Lipid Panel.  12 months..  atorvastatin.............  06- 06-    [facility] Embedded Care Due Messages. Reference number: [id number].   6/16/2025 12:26:06 PM CDT

## 2025-07-31 ENCOUNTER — TELEPHONE (OUTPATIENT)
Dept: INTERNAL MEDICINE | Facility: CLINIC | Age: 75
End: 2025-07-31
Payer: MEDICARE

## 2025-07-31 DIAGNOSIS — Z12.31 ENCOUNTER FOR SCREENING MAMMOGRAM FOR MALIGNANT NEOPLASM OF BREAST: ICD-10-CM

## 2025-07-31 NOTE — TELEPHONE ENCOUNTER
Copied from CRM #[id number]. Topic: General Inquiry - Patient Advice  >> Jul 31, 2025 10:14 AM Ritika wrote:  Type: Patient call    Who called: Patient     Does the patient know what this is regarding? Requesting a call back in regards to needing order for annual mammogram ; please advise     Would the patient rather a call back or response via [facility]? Call    Best call back number: [phone] or [phone]    Additional information:

## 2025-08-18 ENCOUNTER — HOSPITAL ENCOUNTER (OUTPATIENT)
Dept: RADIOLOGY | Facility: OTHER | Age: 75
Discharge: HOME | End: 2025-08-18
Attending: FAMILY MEDICINE
Payer: MEDICARE

## 2025-08-18 ENCOUNTER — OFFICE VISIT (OUTPATIENT)
Dept: INTERNAL MEDICINE | Facility: CLINIC | Age: 75
End: 2025-08-18
Payer: MEDICARE

## 2025-08-18 VITALS
WEIGHT: 157.44 LBS | OXYGEN SATURATION: 98 % | DIASTOLIC BLOOD PRESSURE: 68 MMHG | HEIGHT: 70 IN | BODY MASS INDEX: 22.54 KG/M2 | SYSTOLIC BLOOD PRESSURE: 130 MMHG | HEART RATE: 65 BPM

## 2025-08-18 DIAGNOSIS — N18.32 STAGE 3B CHRONIC KIDNEY DISEASE: Primary | ICD-10-CM

## 2025-08-18 DIAGNOSIS — Z12.31 ENCOUNTER FOR SCREENING MAMMOGRAM FOR MALIGNANT NEOPLASM OF BREAST: ICD-10-CM

## 2025-08-18 DIAGNOSIS — N18.32 TYPE 2 DIABETES MELLITUS WITH STAGE 3B CHRONIC KIDNEY DISEASE, WITHOUT LONG-TERM CURRENT USE OF INSULIN: ICD-10-CM

## 2025-08-18 DIAGNOSIS — E11.22 TYPE 2 DIABETES MELLITUS WITH STAGE 3B CHRONIC KIDNEY DISEASE, WITHOUT LONG-TERM CURRENT USE OF INSULIN: ICD-10-CM

## 2025-08-18 DIAGNOSIS — I70.0 AORTIC ATHEROSCLEROSIS: ICD-10-CM

## 2025-08-18 DIAGNOSIS — M25.50 ARTHRALGIA OF MULTIPLE JOINTS: ICD-10-CM

## 2025-08-18 DIAGNOSIS — I10 ESSENTIAL HYPERTENSION: ICD-10-CM

## 2025-08-18 PROCEDURE — 3075F SYST BP GE 130 - 139MM HG: CPT | Mod: CPTII,S$GLB,, | Performed by: FAMILY MEDICINE

## 2025-08-18 PROCEDURE — 1125F AMNT PAIN NOTED PAIN PRSNT: CPT | Mod: CPTII,S$GLB,, | Performed by: FAMILY MEDICINE

## 2025-08-18 PROCEDURE — 3078F DIAST BP <80 MM HG: CPT | Mod: CPTII,S$GLB,, | Performed by: FAMILY MEDICINE

## 2025-08-18 PROCEDURE — 77067 SCR MAMMO BI INCL CAD: CPT | Mod: 26,,, | Performed by: RADIOLOGY

## 2025-08-18 PROCEDURE — 77063 BREAST TOMOSYNTHESIS BI: CPT | Mod: TC

## 2025-08-18 PROCEDURE — 1159F MED LIST DOCD IN RCRD: CPT | Mod: CPTII,S$GLB,, | Performed by: FAMILY MEDICINE

## 2025-08-18 PROCEDURE — 3288F FALL RISK ASSESSMENT DOCD: CPT | Mod: CPTII,S$GLB,, | Performed by: FAMILY MEDICINE

## 2025-08-18 PROCEDURE — 1101F PT FALLS ASSESS-DOCD LE1/YR: CPT | Mod: CPTII,S$GLB,, | Performed by: FAMILY MEDICINE

## 2025-08-18 PROCEDURE — 77063 BREAST TOMOSYNTHESIS BI: CPT | Mod: 26,,, | Performed by: RADIOLOGY

## 2025-08-18 PROCEDURE — 2023F DILAT RTA XM W/O RTNOPTHY: CPT | Mod: CPTII,S$GLB,, | Performed by: FAMILY MEDICINE

## 2025-08-18 PROCEDURE — 99999 PR PBB SHADOW E&M-EST. PATIENT-LVL IV: CPT | Mod: PBBFAC,,, | Performed by: FAMILY MEDICINE

## 2025-08-18 PROCEDURE — 99214 OFFICE O/P EST MOD 30 MIN: CPT | Mod: S$GLB,,, | Performed by: FAMILY MEDICINE

## 2025-08-25 ENCOUNTER — HOSPITAL ENCOUNTER (EMERGENCY)
Facility: OTHER | Age: 75
Discharge: HOME | End: 2025-08-25
Attending: EMERGENCY MEDICINE
Payer: MEDICARE

## 2025-08-25 VITALS
RESPIRATION RATE: 18 BRPM | BODY MASS INDEX: 21.42 KG/M2 | TEMPERATURE: 98 F | DIASTOLIC BLOOD PRESSURE: 77 MMHG | HEIGHT: 71 IN | OXYGEN SATURATION: 95 % | WEIGHT: 153 LBS | SYSTOLIC BLOOD PRESSURE: 177 MMHG | HEART RATE: 92 BPM
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
## 2025-08-25 DIAGNOSIS — M79.602 BILATERAL ARM PAIN: ICD-10-CM

## 2025-08-25 DIAGNOSIS — M79.601 BILATERAL ARM PAIN: ICD-10-CM

## 2025-08-25 DIAGNOSIS — S01.81XA FACIAL LACERATION, INITIAL ENCOUNTER: ICD-10-CM

## 2025-08-25 PROCEDURE — 25000003 PHARM REV CODE 250

## 2025-08-25 PROCEDURE — 12011 RPR F/E/E/N/L/M 2.5 CM/<: CPT

## 2025-08-25 PROCEDURE — 63600175 PHARM REV CODE 636 W HCPCS

## 2025-08-25 PROCEDURE — 99285 EMERGENCY DEPT VISIT HI MDM: CPT | Mod: 25

## 2025-08-25 RX ORDER — DICLOFENAC SODIUM 10 MG/G
2 GEL TOPICAL 2 TIMES DAILY
Qty: 100 G | Refills: 0 | Status: SHIPPED | OUTPATIENT
Start: 2025-08-25 | End: 2025-08-30

## 2025-08-25 RX ORDER — NAPROXEN 500 MG/1
500 TABLET ORAL
Status: COMPLETED | OUTPATIENT
Start: 2025-08-25 | End: 2025-08-25

## 2025-08-25 RX ORDER — NAPROXEN 500 MG/1
500 TABLET ORAL 2 TIMES DAILY PRN
Qty: 10 TABLET | Refills: 0 | Status: SHIPPED | OUTPATIENT
Start: 2025-08-25 | End: 2025-08-30

## 2025-08-25 RX ORDER — BACITRACIN ZINC 500 UNIT/G
OINTMENT (GRAM) TOPICAL 2 TIMES DAILY
Qty: 30 G | Refills: 0 | Status: SHIPPED | OUTPATIENT
Start: 2025-08-25 | End: 2025-08-30

## 2025-08-25 RX ORDER — LIDOCAINE HYDROCHLORIDE 10 MG/ML
10 INJECTION, SOLUTION INFILTRATION; PERINEURAL
Status: COMPLETED | OUTPATIENT
Start: 2025-08-25 | End: 2025-08-25

## 2025-08-25 RX ADMIN — NAPROXEN 500 MG: 500 TABLET ORAL at 07:08

## 2025-08-25 RX ADMIN — LIDOCAINE HYDROCHLORIDE 10 ML: 10 INJECTION, SOLUTION INFILTRATION; PERINEURAL at 07:08

## 2025-08-26 ENCOUNTER — PATIENT OUTREACH (OUTPATIENT)
Facility: OTHER | Age: 75
End: 2025-08-26
Payer: MEDICARE

## (undated) DEVICE — DRESSING LEUKOPLAST FLEX 1X3IN

## (undated) DEVICE — PACK UPPER EXTREMITY BAPTIST

## (undated) DEVICE — APPLICATOR CHLORAPREP ORN 26ML

## (undated) DEVICE — GLOVE BIOGEL PI MICRO INDIC 7

## (undated) DEVICE — NDL 18GA X1 1/2 REG BEVEL

## (undated) DEVICE — SUT 4/0 18IN ETHILON BL P3

## (undated) DEVICE — GLOVE BIOGEL ECLIPSE SZ 7

## (undated) DEVICE — BANDAGE MATRIX HK LOOP 2IN 5YD

## (undated) DEVICE — FORCEP STRAIGHT DISP

## (undated) DEVICE — SOL POVIDONE SCRUB IODINE 4 OZ

## (undated) DEVICE — SYR B-D DISP CONTROL 10CC100/C

## (undated) DEVICE — BANDAGE BULKEE LITE 3INX4.1YD

## (undated) DEVICE — CORD BIPOLAR 12 FOOT

## (undated) DEVICE — NDL SAFETY 22G X 1.5 ECLIPSE

## (undated) DEVICE — DRAPE STERI-DRAPE 1000 17X11IN

## (undated) DEVICE — SPONGE COTTON TRAY 4X4IN

## (undated) DEVICE — TOURNIQUET SB QC DP 18X4IN

## (undated) DEVICE — DRESSING N ADH OIL EMUL 3X3

## (undated) DEVICE — PAD UNDERPAD 30X30